# Patient Record
Sex: MALE | Race: WHITE | NOT HISPANIC OR LATINO | Employment: OTHER | URBAN - NONMETROPOLITAN AREA
[De-identification: names, ages, dates, MRNs, and addresses within clinical notes are randomized per-mention and may not be internally consistent; named-entity substitution may affect disease eponyms.]

---

## 2022-02-25 ENCOUNTER — HOSPITAL ENCOUNTER (INPATIENT)
Facility: HOSPITAL | Age: 72
LOS: 7 days | Discharge: NURSING, REHAB OR LONG-TERM CARE | DRG: 442 | End: 2022-03-04
Attending: EMERGENCY MEDICINE | Admitting: FAMILY MEDICINE
Payer: MEDICARE

## 2022-02-25 ENCOUNTER — APPOINTMENT (EMERGENCY)
Dept: RADIOLOGY | Facility: HOSPITAL | Age: 72
DRG: 442 | End: 2022-02-25
Payer: MEDICARE

## 2022-02-25 DIAGNOSIS — I10 HYPERTENSION: ICD-10-CM

## 2022-02-25 DIAGNOSIS — K80.20 CHOLELITHIASIS: ICD-10-CM

## 2022-02-25 DIAGNOSIS — K72.90 HEPATIC ENCEPHALOPATHY (HCC): ICD-10-CM

## 2022-02-25 DIAGNOSIS — R41.82 ALTERED MENTAL STATUS: Primary | ICD-10-CM

## 2022-02-25 LAB
ALBUMIN SERPL BCP-MCNC: 2.5 G/DL (ref 3.5–5)
ALP SERPL-CCNC: 214 U/L (ref 46–116)
ALT SERPL W P-5'-P-CCNC: 53 U/L (ref 12–78)
AMMONIA PLAS-SCNC: 233 UMOL/L (ref 11–35)
ANION GAP SERPL CALCULATED.3IONS-SCNC: 10 MMOL/L (ref 4–13)
APTT PPP: 34 SECONDS (ref 23–37)
AST SERPL W P-5'-P-CCNC: 61 U/L (ref 5–45)
BASOPHILS # BLD AUTO: 0.02 THOUSANDS/ΜL (ref 0–0.1)
BASOPHILS NFR BLD AUTO: 1 % (ref 0–1)
BILIRUB DIRECT SERPL-MCNC: 0.89 MG/DL (ref 0–0.2)
BILIRUB SERPL-MCNC: 2.12 MG/DL (ref 0.2–1)
BUN SERPL-MCNC: 15 MG/DL (ref 5–25)
CALCIUM ALBUM COR SERPL-MCNC: 9.5 MG/DL (ref 8.3–10.1)
CALCIUM SERPL-MCNC: 8.3 MG/DL (ref 8.3–10.1)
CARDIAC TROPONIN I PNL SERPL HS: 4 NG/L
CHLORIDE SERPL-SCNC: 108 MMOL/L (ref 100–108)
CK SERPL-CCNC: 130 U/L (ref 39–308)
CO2 SERPL-SCNC: 23 MMOL/L (ref 21–32)
CREAT SERPL-MCNC: 0.89 MG/DL (ref 0.6–1.3)
EOSINOPHIL # BLD AUTO: 0.08 THOUSAND/ΜL (ref 0–0.61)
EOSINOPHIL NFR BLD AUTO: 2 % (ref 0–6)
ERYTHROCYTE [DISTWIDTH] IN BLOOD BY AUTOMATED COUNT: 13.7 % (ref 11.6–15.1)
GFR SERPL CREATININE-BSD FRML MDRD: 85 ML/MIN/1.73SQ M
GLUCOSE SERPL-MCNC: 156 MG/DL (ref 65–140)
HCT VFR BLD AUTO: 36.3 % (ref 36.5–49.3)
HGB BLD-MCNC: 12.9 G/DL (ref 12–17)
IMM GRANULOCYTES # BLD AUTO: 0.01 THOUSAND/UL (ref 0–0.2)
IMM GRANULOCYTES NFR BLD AUTO: 0 % (ref 0–2)
INR PPP: 1.31 (ref 0.84–1.19)
LIPASE SERPL-CCNC: 300 U/L (ref 73–393)
LYMPHOCYTES # BLD AUTO: 0.52 THOUSANDS/ΜL (ref 0.6–4.47)
LYMPHOCYTES NFR BLD AUTO: 12 % (ref 14–44)
MAGNESIUM SERPL-MCNC: 2.1 MG/DL (ref 1.6–2.6)
MCH RBC QN AUTO: 32.3 PG (ref 26.8–34.3)
MCHC RBC AUTO-ENTMCNC: 35.5 G/DL (ref 31.4–37.4)
MCV RBC AUTO: 91 FL (ref 82–98)
MONOCYTES # BLD AUTO: 0.34 THOUSAND/ΜL (ref 0.17–1.22)
MONOCYTES NFR BLD AUTO: 8 % (ref 4–12)
NEUTROPHILS # BLD AUTO: 3.47 THOUSANDS/ΜL (ref 1.85–7.62)
NEUTS SEG NFR BLD AUTO: 77 % (ref 43–75)
NRBC BLD AUTO-RTO: 0 /100 WBCS
NT-PROBNP SERPL-MCNC: 15 PG/ML
PLATELET # BLD AUTO: 53 THOUSANDS/UL (ref 149–390)
PMV BLD AUTO: 12.7 FL (ref 8.9–12.7)
POTASSIUM SERPL-SCNC: 4 MMOL/L (ref 3.5–5.3)
PROT SERPL-MCNC: 8 G/DL (ref 6.4–8.2)
PROTHROMBIN TIME: 16.1 SECONDS (ref 11.6–14.5)
RBC # BLD AUTO: 3.99 MILLION/UL (ref 3.88–5.62)
SODIUM SERPL-SCNC: 141 MMOL/L (ref 136–145)
WBC # BLD AUTO: 4.44 THOUSAND/UL (ref 4.31–10.16)

## 2022-02-25 PROCEDURE — 93005 ELECTROCARDIOGRAM TRACING: CPT

## 2022-02-25 PROCEDURE — 82948 REAGENT STRIP/BLOOD GLUCOSE: CPT

## 2022-02-25 PROCEDURE — 71045 X-RAY EXAM CHEST 1 VIEW: CPT

## 2022-02-25 PROCEDURE — 85730 THROMBOPLASTIN TIME PARTIAL: CPT | Performed by: EMERGENCY MEDICINE

## 2022-02-25 PROCEDURE — 83735 ASSAY OF MAGNESIUM: CPT | Performed by: EMERGENCY MEDICINE

## 2022-02-25 PROCEDURE — 87040 BLOOD CULTURE FOR BACTERIA: CPT | Performed by: EMERGENCY MEDICINE

## 2022-02-25 PROCEDURE — 99285 EMERGENCY DEPT VISIT HI MDM: CPT | Performed by: EMERGENCY MEDICINE

## 2022-02-25 PROCEDURE — 1124F ACP DISCUSS-NO DSCNMKR DOCD: CPT | Performed by: EMERGENCY MEDICINE

## 2022-02-25 PROCEDURE — 84484 ASSAY OF TROPONIN QUANT: CPT | Performed by: EMERGENCY MEDICINE

## 2022-02-25 PROCEDURE — 83880 ASSAY OF NATRIURETIC PEPTIDE: CPT | Performed by: EMERGENCY MEDICINE

## 2022-02-25 PROCEDURE — 85025 COMPLETE CBC W/AUTO DIFF WBC: CPT | Performed by: EMERGENCY MEDICINE

## 2022-02-25 PROCEDURE — 82140 ASSAY OF AMMONIA: CPT | Performed by: EMERGENCY MEDICINE

## 2022-02-25 PROCEDURE — 83690 ASSAY OF LIPASE: CPT | Performed by: EMERGENCY MEDICINE

## 2022-02-25 PROCEDURE — 80053 COMPREHEN METABOLIC PANEL: CPT | Performed by: EMERGENCY MEDICINE

## 2022-02-25 PROCEDURE — 36415 COLL VENOUS BLD VENIPUNCTURE: CPT | Performed by: EMERGENCY MEDICINE

## 2022-02-25 PROCEDURE — 85610 PROTHROMBIN TIME: CPT | Performed by: EMERGENCY MEDICINE

## 2022-02-25 PROCEDURE — 99285 EMERGENCY DEPT VISIT HI MDM: CPT

## 2022-02-25 PROCEDURE — 82248 BILIRUBIN DIRECT: CPT | Performed by: EMERGENCY MEDICINE

## 2022-02-25 PROCEDURE — 82550 ASSAY OF CK (CPK): CPT | Performed by: EMERGENCY MEDICINE

## 2022-02-25 RX ORDER — LISINOPRIL AND HYDROCHLOROTHIAZIDE 12.5; 1 MG/1; MG/1
0.5 TABLET ORAL DAILY
COMMUNITY
Start: 2021-12-09 | End: 2022-03-04 | Stop reason: HOSPADM

## 2022-02-25 RX ORDER — MEMANTINE HYDROCHLORIDE 10 MG/1
10 TABLET ORAL 2 TIMES DAILY
COMMUNITY
Start: 2022-02-18

## 2022-02-25 RX ORDER — ASPIRIN 81 MG/1
81 TABLET ORAL DAILY
COMMUNITY

## 2022-02-25 RX ORDER — LACTULOSE 20 G/30ML
30 SOLUTION ORAL ONCE
Status: COMPLETED | OUTPATIENT
Start: 2022-02-25 | End: 2022-02-25

## 2022-02-25 RX ORDER — DONEPEZIL HYDROCHLORIDE 10 MG/1
10 TABLET, FILM COATED ORAL
COMMUNITY
Start: 2021-12-09

## 2022-02-25 RX ORDER — LACTULOSE 10 G/15ML
SOLUTION ORAL
COMMUNITY
Start: 2022-01-05 | End: 2022-03-04 | Stop reason: HOSPADM

## 2022-02-25 RX ADMIN — LACTULOSE 30 G: 10 SOLUTION ORAL at 22:59

## 2022-02-26 ENCOUNTER — APPOINTMENT (INPATIENT)
Dept: ULTRASOUND IMAGING | Facility: HOSPITAL | Age: 72
DRG: 442 | End: 2022-02-26
Payer: MEDICARE

## 2022-02-26 PROBLEM — D69.6 THROMBOCYTOPENIA (HCC): Status: ACTIVE | Noted: 2022-02-26

## 2022-02-26 LAB
ALBUMIN SERPL BCP-MCNC: 2.4 G/DL (ref 3.5–5)
ALP SERPL-CCNC: 189 U/L (ref 46–116)
ALT SERPL W P-5'-P-CCNC: 47 U/L (ref 12–78)
AMMONIA PLAS-SCNC: 48 UMOL/L (ref 11–35)
ANION GAP SERPL CALCULATED.3IONS-SCNC: 12 MMOL/L (ref 4–13)
AST SERPL W P-5'-P-CCNC: 62 U/L (ref 5–45)
BASOPHILS # BLD AUTO: 0.02 THOUSANDS/ΜL (ref 0–0.1)
BASOPHILS NFR BLD AUTO: 0 % (ref 0–1)
BILIRUB SERPL-MCNC: 2.3 MG/DL (ref 0.2–1)
BILIRUB UR QL STRIP: NEGATIVE
BUN SERPL-MCNC: 13 MG/DL (ref 5–25)
CALCIUM ALBUM COR SERPL-MCNC: 9.8 MG/DL (ref 8.3–10.1)
CALCIUM SERPL-MCNC: 8.5 MG/DL (ref 8.3–10.1)
CHLORIDE SERPL-SCNC: 108 MMOL/L (ref 100–108)
CLARITY UR: CLEAR
CO2 SERPL-SCNC: 22 MMOL/L (ref 21–32)
COLOR UR: YELLOW
CREAT SERPL-MCNC: 0.9 MG/DL (ref 0.6–1.3)
EOSINOPHIL # BLD AUTO: 0.12 THOUSAND/ΜL (ref 0–0.61)
EOSINOPHIL NFR BLD AUTO: 3 % (ref 0–6)
ERYTHROCYTE [DISTWIDTH] IN BLOOD BY AUTOMATED COUNT: 14 % (ref 11.6–15.1)
GFR SERPL CREATININE-BSD FRML MDRD: 85 ML/MIN/1.73SQ M
GLUCOSE SERPL-MCNC: 108 MG/DL (ref 65–140)
GLUCOSE SERPL-MCNC: 141 MG/DL (ref 65–140)
GLUCOSE UR STRIP-MCNC: NEGATIVE MG/DL
HCT VFR BLD AUTO: 35.1 % (ref 36.5–49.3)
HGB BLD-MCNC: 12.3 G/DL (ref 12–17)
HGB UR QL STRIP.AUTO: NEGATIVE
IMM GRANULOCYTES # BLD AUTO: 0.02 THOUSAND/UL (ref 0–0.2)
IMM GRANULOCYTES NFR BLD AUTO: 0 % (ref 0–2)
KETONES UR STRIP-MCNC: NEGATIVE MG/DL
LEUKOCYTE ESTERASE UR QL STRIP: NEGATIVE
LYMPHOCYTES # BLD AUTO: 0.73 THOUSANDS/ΜL (ref 0.6–4.47)
LYMPHOCYTES NFR BLD AUTO: 16 % (ref 14–44)
MCH RBC QN AUTO: 31.9 PG (ref 26.8–34.3)
MCHC RBC AUTO-ENTMCNC: 35 G/DL (ref 31.4–37.4)
MCV RBC AUTO: 91 FL (ref 82–98)
MONOCYTES # BLD AUTO: 0.4 THOUSAND/ΜL (ref 0.17–1.22)
MONOCYTES NFR BLD AUTO: 9 % (ref 4–12)
NEUTROPHILS # BLD AUTO: 3.2 THOUSANDS/ΜL (ref 1.85–7.62)
NEUTS SEG NFR BLD AUTO: 72 % (ref 43–75)
NITRITE UR QL STRIP: NEGATIVE
NRBC BLD AUTO-RTO: 0 /100 WBCS
PH UR STRIP.AUTO: 8 [PH]
PLATELET # BLD AUTO: 63 THOUSANDS/UL (ref 149–390)
PMV BLD AUTO: 13 FL (ref 8.9–12.7)
POTASSIUM SERPL-SCNC: 3.4 MMOL/L (ref 3.5–5.3)
PROT SERPL-MCNC: 8 G/DL (ref 6.4–8.2)
PROT UR STRIP-MCNC: NEGATIVE MG/DL
RBC # BLD AUTO: 3.86 MILLION/UL (ref 3.88–5.62)
SODIUM SERPL-SCNC: 142 MMOL/L (ref 136–145)
SP GR UR STRIP.AUTO: 1.02 (ref 1–1.03)
UROBILINOGEN UR QL STRIP.AUTO: 4 E.U./DL
WBC # BLD AUTO: 4.49 THOUSAND/UL (ref 4.31–10.16)

## 2022-02-26 PROCEDURE — 85025 COMPLETE CBC W/AUTO DIFF WBC: CPT

## 2022-02-26 PROCEDURE — 76705 ECHO EXAM OF ABDOMEN: CPT

## 2022-02-26 PROCEDURE — 82140 ASSAY OF AMMONIA: CPT

## 2022-02-26 PROCEDURE — 80053 COMPREHEN METABOLIC PANEL: CPT

## 2022-02-26 PROCEDURE — 99222 1ST HOSP IP/OBS MODERATE 55: CPT | Performed by: FAMILY MEDICINE

## 2022-02-26 PROCEDURE — 81003 URINALYSIS AUTO W/O SCOPE: CPT | Performed by: EMERGENCY MEDICINE

## 2022-02-26 RX ORDER — POTASSIUM CHLORIDE 20 MEQ/1
20 TABLET, EXTENDED RELEASE ORAL ONCE
Status: COMPLETED | OUTPATIENT
Start: 2022-02-26 | End: 2022-02-26

## 2022-02-26 RX ORDER — QUETIAPINE FUMARATE 25 MG/1
12.5 TABLET, FILM COATED ORAL
Status: DISCONTINUED | OUTPATIENT
Start: 2022-02-26 | End: 2022-02-27

## 2022-02-26 RX ORDER — LISINOPRIL 10 MG/1
10 TABLET ORAL DAILY
Status: DISCONTINUED | OUTPATIENT
Start: 2022-02-26 | End: 2022-03-04 | Stop reason: HOSPADM

## 2022-02-26 RX ORDER — HEPARIN SODIUM 5000 [USP'U]/ML
5000 INJECTION, SOLUTION INTRAVENOUS; SUBCUTANEOUS EVERY 8 HOURS SCHEDULED
Status: DISCONTINUED | OUTPATIENT
Start: 2022-02-26 | End: 2022-02-26

## 2022-02-26 RX ORDER — LACTULOSE 20 G/30ML
20 SOLUTION ORAL 4 TIMES DAILY
Status: DISCONTINUED | OUTPATIENT
Start: 2022-02-26 | End: 2022-02-27

## 2022-02-26 RX ORDER — ACETAMINOPHEN 325 MG/1
650 TABLET ORAL EVERY 6 HOURS PRN
Status: DISCONTINUED | OUTPATIENT
Start: 2022-02-26 | End: 2022-03-04 | Stop reason: HOSPADM

## 2022-02-26 RX ORDER — LISINOPRIL 10 MG/1
10 TABLET ORAL DAILY
Status: DISCONTINUED | OUTPATIENT
Start: 2022-02-26 | End: 2022-02-26

## 2022-02-26 RX ORDER — OLANZAPINE 10 MG/1
2.5 INJECTION, POWDER, LYOPHILIZED, FOR SOLUTION INTRAMUSCULAR ONCE
Status: DISCONTINUED | OUTPATIENT
Start: 2022-02-26 | End: 2022-02-26

## 2022-02-26 RX ORDER — DONEPEZIL HYDROCHLORIDE 5 MG/1
10 TABLET, FILM COATED ORAL
Status: DISCONTINUED | OUTPATIENT
Start: 2022-02-26 | End: 2022-03-04 | Stop reason: HOSPADM

## 2022-02-26 RX ORDER — ASPIRIN 81 MG/1
81 TABLET ORAL DAILY
Status: DISCONTINUED | OUTPATIENT
Start: 2022-02-26 | End: 2022-03-04 | Stop reason: HOSPADM

## 2022-02-26 RX ORDER — MEMANTINE HYDROCHLORIDE 10 MG/1
10 TABLET ORAL 2 TIMES DAILY
Status: DISCONTINUED | OUTPATIENT
Start: 2022-02-26 | End: 2022-03-04 | Stop reason: HOSPADM

## 2022-02-26 RX ADMIN — DONEPEZIL HYDROCHLORIDE 10 MG: 5 TABLET, FILM COATED ORAL at 21:16

## 2022-02-26 RX ADMIN — QUETIAPINE FUMARATE 12.5 MG: 25 TABLET ORAL at 21:16

## 2022-02-26 RX ADMIN — LISINOPRIL 10 MG: 10 TABLET ORAL at 08:23

## 2022-02-26 RX ADMIN — LACTULOSE 20 G: 10 SOLUTION ORAL at 08:48

## 2022-02-26 RX ADMIN — MEMANTINE 10 MG: 10 TABLET ORAL at 08:23

## 2022-02-26 RX ADMIN — MEMANTINE 10 MG: 10 TABLET ORAL at 18:12

## 2022-02-26 RX ADMIN — POTASSIUM CHLORIDE 20 MEQ: 1500 TABLET, EXTENDED RELEASE ORAL at 15:00

## 2022-02-26 RX ADMIN — LACTULOSE 200 G: 10 SOLUTION ORAL at 02:55

## 2022-02-26 RX ADMIN — ASPIRIN 81 MG: 81 TABLET, COATED ORAL at 08:23

## 2022-02-26 NOTE — CASE MANAGEMENT
Case Management Assessment & Discharge Planning Note    Patient name Denita Goltz  Location /-07 MRN [id number]  : 1950 Date 2022       Current Admission Date: 2022  Current Admission Diagnosis:Hepatic encephalopathy [facility])   Patient Active Problem List    Diagnosis Date Noted    Thrombocytopenia ([facility] ) 2022    Dementia with behavioral disturbance ([facility] )     Cirrhosis ([facility] )     Hepatic encephalopathy ([facility] )     Hypertension       LOS (days): 1  Geometric Mean LOS (GMLOS) (days): 3 20  Days to GMLOS:2 6     OBJECTIVE:    Risk of Unplanned Readmission Score: 13         Current admission status: Inpatient  Referral Reason: Other    Preferred Pharmacy:   PATIENT/FAMILY REPORTS NO PREFERRED PHARMACY  No address on file      Primary Care Provider: No primary care provider on file  Primary Insurance: MEDICARE  Secondary Insurance: AETNA    ASSESSMENT:  Active Health Care Agents    There are no active Health Care Agents on file  Advance Directives  Does patient have a Health Care POA?:  (Son Amy Amairani is POA)    Readmission Root Cause  30 Day Readmission: No    Patient Information  Admitted from[de-identified] Home  Mental Status: Other (Comment)  During Assessment patient was accompanied by: Other-Comment  Assessment information provided by[de-identified] Leonardo  Support Systems: [employer] entry access options   Select all that apply : Ramp  Type of Current Residence: [address] [place] home (Pt w/ 1st flr accomodations)  Homeless/housing insecurity resource given?: N/A  Living Arrangements: Other (Comment) (Pt has 24* caregiver)    Activities of Daily Living Prior to Admission  Functional Status: Assistance  Completes ADLs independently?: No  Level of ADL dependence: Assistance  Ambulates independently?: Yes  Does patient use assisted devices?: No  Does patient currently own DME?: Yes  What DME does the patient currently own?: Wheelchair (Pt has w/c from wife)  Does the patient have a history of Short-Term Rehab?: No  Does patient have a history of HHC?: No  Does patient currently have [address]?: No    Patient Information Continued  Income Source: Pension/long-term  Does patient have prescription coverage?: Yes  Food insecurity resource given?: N/A  Does patient receive dialysis treatments?: No  Does patient have a history of substance abuse?: No  Does patient have a history of Mental Health Diagnosis?: No    Means of Transportation  Was application for public transport provided?: N/A        DISCHARGE DETAILS:    Discharge planning discussed with[de-identified] Son  Pt lives at home, has 25* caregiver  Son is POA reports Pt was IPTA w/ ambulation has assistance w/ ADLs  Son reports he would consider STR if Pt needs strengthening  CM will follow for needs

## 2022-02-26 NOTE — ASSESSMENT & PLAN NOTE
· Presents from home with altered mental status per family  Patient also with history of dementia, unclear baseline  · Hx JAFFE, decompensated cirrhosis  · Ammonia 233 on arrival  · UA negative, no source of infection at this time  · Current OP regimen : Lactulose 20mg q 6hr   · Received PO Lactulose 30mg in ED  · Patient has not had a BM yet  Will order lactulose retention enema x 1  Titrate lactulose to 3-4 soft BM's per day  · Check AM ammonia  · Consult GI  · Check RUQ ultrasound with hepatic doppler  · Monitor mental status closely  On exam alert to self, confused, does not follow commands  Periods of agitation per nursing staff

## 2022-02-26 NOTE — ED PROVIDER NOTES
History  Chief Complaint   Patient presents with    Altered Mental Status     (as per caregiver and son - hx of dementia and liver disease  Past 3-4 days doesn;t seems himself, sleeping more, increased confusion, difficulty getting around  Recent medication changes  Appears more juandice, does take lactulolse  Seems to be in pain but unable to verbalize where  )     Patient is a 65-year-old male with a history of dementia, as well as chronic liver disease, brought to the emergency department by caregiver and son for complaints of altered mental status worsening over the past 3 days, he has had some shaking, some bizarre questioning, and appears to be in pain but unable to describe where he is experiencing pain, he has had no fever, no cough, cold or congestion, no vomiting or diarrhea, he does take daily lactulose, has not missed any doses, has been having regular bowel movements, no injury or trauma, he does have 24/7 care, no sick contacts          Prior to Admission Medications   Prescriptions Last Dose Informant Patient Reported? Taking? Cholecalciferol 25 MCG (1000 UT) tablet Unknown at Unknown time  Yes No   Sig: Take 1 tablet by mouth daily   aspirin (ECOTRIN LOW STRENGTH) 81 mg EC tablet Unknown at Unknown time  Yes No   Sig: Take 81 mg by mouth daily   donepezil (ARICEPT) 10 mg tablet Unknown at Unknown time  Yes No   Sig: Take 10 mg by mouth   lactulose (CHRONULAC) 10 g/15 mL solution Unknown at Unknown time  Yes No   Sig: Take 20g (30mL) every 6 hours to achieve 3-4 soft bowel movements/24hrs   Hold if greater than 4 stools in 24 hrs    lisinopril-hydrochlorothiazide (PRINZIDE,ZESTORETIC) 10-12 5 MG per tablet Unknown at Unknown time  Yes No   Sig: Take 0 5 tablets by mouth daily   memantine (NAMENDA) 10 mg tablet Unknown at Unknown time  Yes No   Sig: Take 10 mg by mouth 2 (two) times a day   polyethylene glycol (GOLYTELY) 4000 mL solution Unknown at Unknown time  Yes No   Sig: Take 4000 ML By mouth one time for1 dose      Facility-Administered Medications: None       Past Medical History:   Diagnosis Date    [facility]     [facility]     Hypertension        Past Surgical History:   Procedure Laterality Date    REPLACEMENT TOTAL KNEE         History reviewed  No pertinent family history  I have reviewed and agree with the history as documented  E-Cigarette/Vaping     E-Cigarette/Vaping Substances     Social History     Tobacco Use    Smoking status: Former Smoker    Smokeless tobacco: Never Used   Substance Use Topics    Alcohol use: Not Currently     Comment: former social ETOH    Drug use: Never       Review of Systems   Constitutional: Positive for activity change, appetite change and fatigue  HENT: Negative  Eyes: Negative  Respiratory: Negative  Cardiovascular: Negative  Gastrointestinal: Negative  Endocrine: Negative  Genitourinary: Negative  Musculoskeletal: Negative  Allergic/Immunologic: Negative  Neurological: Positive for tremors and weakness  Hematological: Negative  Psychiatric/Behavioral: Positive for confusion  Physical Exam  Physical Exam  Constitutional:       Appearance: He is well-developed  He is ill-appearing  HENT:      Head: Normocephalic and atraumatic  Eyes:      General: Scleral icterus present  Conjunctiva/sclera: Conjunctivae normal       Pupils: Pupils are equal, round, and reactive to light  Cardiovascular:      Rate and Rhythm: Normal rate  Pulmonary:      Effort: Pulmonary effort is normal    Abdominal:      Palpations: Abdomen is soft  Musculoskeletal:         General: Normal range of motion  Cervical back: Normal range of motion and neck supple  Skin:     General: Skin is warm and dry  Coloration: Skin is jaundiced  Neurological:      Mental Status: He is alert  He is confused           Vital Signs  ED Triage Vitals   Temperature Pulse Respirations Blood Pressure SpO2   02/25/22 2149 02/25/22 2149 02/25/22 2149 02/25/22 2149 02/25/22 2149   97 6 °F (36 4 °C) 79 18 134/77 98 %      Temp Source Heart Rate Source Patient Position - Orthostatic VS BP Location FiO2 (%)   02/25/22 2149 02/25/22 2149 02/25/22 2149 02/25/22 2149 --   Temporal Left;Radial Lying Left arm       Pain Score       02/25/22 2346       No Pain           Vitals:    02/25/22 2230 02/25/22 2315 02/25/22 2330 02/25/22 2346   BP: 149/74 160/79 154/76 160/83   Pulse: 77 79 78 80   Patient Position - Orthostatic VS:                 ED Medications  Medications   lactulose oral solution 30 g (30 g Oral Given 2/25/22 2259)       Diagnostic Studies  Results Reviewed     Procedure Component Value Units Date/Time    CBC and differential [[id number]]  (Abnormal) Collected: 02/25/22 2158    Lab Status: Final result Specimen: Blood from Arm, Left Updated: 02/25/22 2301     WBC 4 44 Thousand/uL      RBC 3 99 Million/uL      Hemoglobin 12 9 g/dL      Hematocrit 36 3 %      MCV 91 fL      MCH 32 3 pg      MCHC 35 5 g/dL      RDW 13 7 %      MPV 12 7 fL      Platelets 53 Thousands/uL      nRBC 0 /100 WBCs      Neutrophils Relative 77 %      Immat GRANS % 0 %      Lymphocytes Relative 12 %      Monocytes Relative 8 %      Eosinophils Relative 2 %      Basophils Relative 1 %      Neutrophils Absolute 3 47 Thousands/µL      Immature Grans Absolute 0 01 Thousand/uL      Lymphocytes Absolute 0 52 Thousands/µL      Monocytes Absolute 0 34 Thousand/µL      Eosinophils Absolute 0 08 Thousand/µL      Basophils Absolute 0 02 Thousands/µL     Ammonia [[id number]]  (Abnormal) Collected: 02/25/22 2158    Lab Status: Final result Specimen: Blood from Arm, Left Updated: 02/25/22 2246     Ammonia 233 umol/L     HS Troponin 0hr (reflex protocol) [[id number]]  (Normal) Collected: 02/25/22 2158    Lab Status: Final result Specimen: Blood from Arm, Left Updated: 02/25/22 2245     hs TnI 0hr 4 ng/L     Lipase [[id number]]  (Normal) Collected: 02/25/22 2158    Lab Status: Final result Specimen: Blood from Arm, Left Updated: 02/25/22 2245     Lipase 300 u/L     Magnesium [[id number]]  (Normal) Collected: 02/25/22 2158    Lab Status: Final result Specimen: Blood from Arm, Left Updated: 02/25/22 2245     Magnesium 2 1 mg/dL     NT-BNP PRO [[id number]]  (Normal) Collected: 02/25/22 2158    Lab Status: Final result Specimen: Blood from Arm, Left Updated: 02/25/22 2245     NT-proBNP 15 pg/mL     Bilirubin, direct [[id number]]  (Abnormal) Collected: 02/25/22 2158    Lab Status: Final result Specimen: Blood from Arm, Left Updated: 02/25/22 2245     Bilirubin, Direct 0 89 mg/dL     CK Total with Reflex CKMB [[id number]]  (Normal) Collected: 02/25/22 2158    Lab Status: Final result Specimen: Blood from Arm, Left Updated: 02/25/22 2244     Total  U/L     CMP [[id number]]  (Abnormal) Collected: 02/25/22 2158    Lab Status: Final result Specimen: Blood from Arm, Left Updated: 02/25/22 2242     Sodium 141 mmol/L      Potassium 4 0 mmol/L      Chloride 108 mmol/L      CO2 23 mmol/L      ANION GAP 10 mmol/L      BUN 15 mg/dL      Creatinine 0 89 mg/dL      Glucose 156 mg/dL      Calcium 8 3 mg/dL      Corrected Calcium 9 5 mg/dL      AST 61 U/L      ALT 53 U/L      Alkaline Phosphatase 214 U/L      Total Protein 8 0 g/dL      Albumin 2 5 g/dL      Total Bilirubin 2 12 mg/dL      eGFR 85 ml/min/1 73sq m     Narrative:      [place] guidelines for Chronic Kidney Disease (CKD):     Stage 1 with normal or high GFR (GFR > 90 mL/min/1 73 square meters)    Stage 2 Mild CKD (GFR = 60-89 mL/min/1 73 square meters)    Stage 3A Moderate CKD (GFR = 45-59 mL/min/1 73 square meters)    Stage 3B Moderate CKD (GFR = 30-44 mL/min/1 73 square meters)    Stage 4 Severe CKD (GFR = 15-29 mL/min/1 73 square meters)    Stage 5 End Stage CKD (GFR <15 mL/min/1 73 square meters)  Note: GFR calculation is accurate only with a steady state creatinine    APTT [[id number]]  (Normal) Collected: 02/25/22 2158    Lab Status: Final result Specimen: Blood from Arm, Left Updated: 02/25/22 2232     PTT 34 seconds     Protime-INR [[id number]]  (Abnormal) Collected: 02/25/22 2158    Lab Status: Final result Specimen: Blood from Arm, Left Updated: 02/25/22 2232     Protime 16 1 seconds      INR 1 31    Blood culture #1 [[id number]] Collected: 02/25/22 2158    Lab Status: In process Specimen: Blood from Hand, Left Updated: 02/25/22 2209    Blood culture #2 [[id number]] Collected: 02/25/22 2158    Lab Status:  In process Specimen: Blood from Arm, Left Updated: 02/25/22 2209    UA w Reflex to Microscopic w Reflex to Culture [[id number]]     Lab Status: No result Specimen: Urine                  X-ray chest 1 view portable    (Results Pending)              Procedures  ECG 12 Lead Documentation Only    Date/Time: 2/25/2022 10:06 PM  Performed by: Judy Mccray DO  Authorized by: Judy Mccray DO     Indications / Diagnosis:  AMS  ECG reviewed by me, the ED Provider: yes    Patient location:  ED  Previous ECG:     Previous ECG:  Unavailable    Comparison to cardiac monitor: Yes    Interpretation:     Interpretation: non-specific    Quality:     Tracing quality:  Limited by artifact  Rate:     ECG rate:  78    ECG rate assessment: normal    Rhythm:     Rhythm: sinus rhythm    Ectopy:     Ectopy: PVCs    QRS:     QRS axis:  Normal    QRS intervals:  Normal  Conduction:     Conduction: normal    ST segments:     ST segments:  Normal  T waves:     T waves: normal               ED Course  ED Course as of 02/26/22 0019   Sat Feb 26, 2022   0017 Laboratory and physical exam findings concerning for hepatic encephalopathy with ammonia level of 233, family reports he has not missed any doses of his home lactulose and has been having regular bowel movements with administration of same, therefore unsure what is causing his elevated the levels today as they are usually under control with his current medication regimen, no other concerning findings with liver enzyme values on par with previous lab findings, vital signs stable, although he presents for altered mental status he is relatively calm and cooperative at this time   0019 Patient was discussed with hospitalist service who agreed to admit for further evaluation and treatment                               SBIRT 20yo+      Most Recent Value   SBIRT (22 yo +)    In order to provide better care to our patients, we are screening all of our patients for alcohol and drug use  Would it be okay to ask you these screening questions? Yes Filed at: 02/25/2022 2157   Initial Alcohol Screen: US AUDIT-C     1  How often do you have a drink containing alcohol? 0 Filed at: 02/25/2022 2157   2  How many drinks containing alcohol do you have on a typical day you are drinking? 0 Filed at: 02/25/2022 2157   3a  Male UNDER 65: How often do you have five or more drinks on one occasion? 0 Filed at: 02/25/2022 2157   3b  FEMALE Any Age, or MALE 65+: How often do you have 4 or more drinks on one occassion? 0 Filed at: 02/25/2022 2157   Audit-C Score 0 Filed at: 02/25/2022 2157   SHELIA: How many times in the past year have you    Used an illegal drug or used a prescription medication for non-medical reasons? Never Filed at: 02/25/2022 2157                    Disposition  Final diagnoses:    Altered mental status   Hepatic encephalopathy ([facility] )     Time reflects when diagnosis was documented in both MDM as applicable and the Disposition within this note     Time User Action Codes Description Comment    2/25/2022 11:37 PM Carolina Toledo Add [R41 82] Altered mental status     2/25/2022 11:38 PM Carolina Toledo Add [K72 90] Hepatic encephalopathy [facility])       ED Disposition     ED Disposition Condition Date/Time Comment    Admit Stable Fri Feb 25, 2022 11:37 PM Case was discussed with NIYA De Los Santos and the patient's admission status was agreed to be Admission Status: inpatient status to the service of Dr Len Rolon          Follow-up Information    None         Patient's Medications   Discharge Prescriptions    No medications on file       No discharge procedures on file      PDMP Review     None          ED Provider  Electronically Signed by           Moiz Odonnell DO  02/26/22 0020

## 2022-02-26 NOTE — ASSESSMENT & PLAN NOTE
· Secondary to cirrhosis  · Plt 54  · No evidence of acute bleeding at this time  Hgb stable  · Will hold DVT ppx at this time     · Trend CBC

## 2022-02-26 NOTE — ASSESSMENT & PLAN NOTE
· Hx JAFFE diagnosed by biopsy in 2019  · Follows with [facility]   Scheduled for EGD in April 2022  · Last EGD in August 2021: large varice with band x 1   · MELD score 9  · Currently on lactulose 30 mg q6hr   Per chart review, working on approval for Rifaximin due to cost    · Consult GI

## 2022-02-26 NOTE — ASSESSMENT & PLAN NOTE
· Unclear baseline, lives at home with family and has outside caregivers    · Worsening behavioral disturbance secondary to hepatic encephalopathy  · Continue aricept, namenda

## 2022-02-26 NOTE — H&P
[address]  H&P- Rona Salmeron 1950, 66 y o  male MRN: [id number]  Unit/Bed#: -01 Encounter: [id number]  Primary Care Provider: No primary care provider on file  Date and time admitted to hospital: 2/25/2022  9:43 PM    * Hepatic encephalopathy [facility])  Assessment & Plan  · Presents from home with altered mental status per family  Patient also with history of dementia, unclear baseline  · Hx JAFFE, decompensated cirrhosis  · Ammonia 233 on arrival  · UA negative, no source of infection at this time  · Current OP regimen : Lactulose 20mg q 6hr   · Received PO Lactulose 30mg in ED  · Patient has not had a BM yet  Will order lactulose retention enema x 1  Titrate lactulose to 3-4 soft BM's per day  · Check AM ammonia  · Consult GI  · Check RUQ ultrasound with hepatic doppler  · Monitor mental status closely  On exam alert to self, confused, does not follow commands  Periods of agitation per nursing staff  Cirrhosis ([facility] )  Assessment & Plan  · Hx JAFFE diagnosed by biopsy in 2019  · Follows with [facility]   Scheduled for EGD in April 2022  · Last EGD in August 2021: large varice with band x 1   · MELD score 9  · Currently on lactulose 30 mg q6hr  Per chart review, working on approval for Rifaximin due to cost    · Consult GI     D[facility] with behavioral disturbance ([facility] )  Assessment & Plan  · Unclear baseline, lives at home with family and has outside caregivers  · Worsening behavioral disturbance secondary to hepatic encephalopathy  · Continue aricept, namenda    Thrombocytopenia (HCC)  Assessment & Plan  · Secondary to cirrhosis  · Plt 54  · No evidence of acute bleeding at this time  Hgb stable  · Will hold DVT ppx at this time  · Trend CBC     Hypertension  Assessment & Plan  · Takes Prinzide, will switch to Lisinopril 10mg while inpatient    VTE Pharmacologic Prophylaxis: VTE Score: 3 Moderate Risk (Score 3-4) - Pharmacological DVT Prophylaxis Contraindicated  Sequential Compression Devices Ordered  Thrombocytopenia   Code Status: Level 1 - Full Code     Anticipated Length of Stay: Patient will be admitted on an inpatient basis with an anticipated length of stay of greater than 2 midnights secondary to hepatic encephalopthy, ammonia elevated, lactulose   Total Time for Visit, including Counseling / Coordination of Care: 70 minutes Greater than 50% of this total time spent on direct patient counseling and coordination of care  Chief Complaint: altered mental status     History of Present Illness:  Franky Mahan is a 66 y o  male with a PMH of JAFFE, cirrhosis, dementia, hypertension who presents with altered mental status per family members  Patient lives at home with family and has caregivers, reporting increased confusion over the last 3-4 days  Has been compliant with lactulose at home  Patient poor historian secondary to hepatic encephalopathy and dementia  Unable to obtain ROS and family members not present at time of exam      In ED, found to have ammonia level of 233  Patient was given PO lactulose 30mg  Review of Systems:  Review of Systems   Unable to perform ROS: Mental status change       Past Medical and Surgical History:   Past Medical History:   Diagnosis Date    [facility]rhosis ([facility] )     Dementia ([facility] )     Hypertension        Past Surgical History:   Procedure Laterality Date    REPLACEMENT TOTAL KNEE         Meds/Allergies:  Prior to Admission medications    Medication Sig Start Date End Date Taking? Authorizing Provider   aspirin (ECOTRIN LOW STRENGTH) 81 mg EC tablet Take 81 mg by mouth daily    Historical Provider, MD   Cholecalciferol 25 MCG (1000 UT) tablet Take 1 tablet by mouth daily 11/8/21   Historical Provider, MD   donepezil (ARICEPT) 10 mg tablet Take 10 mg by mouth 12/9/21   Historical Provider, MD   lactulose (CHRONULAC) 10 g/15 mL solution Take 20g (30mL) every 6 hours to achieve 3-4 soft bowel movements/24hrs   Hold if greater than 4 stools in 24 hrs  1/5/22   Historical Provider, MD   lisinopril-hydrochlorothiazide (PRINZIDE,ZESTORETIC) 10-12 5 MG per tablet Take 0 5 tablets by mouth daily 12/9/21 12/9/22  Historical Provider, MD   memantine (NAMENDA) 10 mg tablet Take 10 mg by mouth 2 (two) times a day 2/18/22   Historical Provider, MD   polyethylene glycol (GOLYTELY) 4000 mL solution Take 4000 ML By mouth one time for1 dose 2/17/22   Historical Provider, MD     I have reviewed home medications using recent Epic encounter  Allergies: No Known Allergies    Social History:  Marital Status:    Patient Pre-hospital Living Situation: Home, With other family member: son, caregiver  Patient Pre-hospital Level of Mobility: unable to be assessed at time of evaluation  Patient Pre-hospital Diet Restrictions: none  Substance Use History:   Social History     Substance and Sexual Activity   Alcohol Use Not Currently    Comment: former social ETOH     Social History     Tobacco Use   Smoking Status Former Smoker   Smokeless Tobacco Never Used     Social History     Substance and Sexual Activity   Drug Use Never       Family History:  History reviewed  No pertinent family history  Physical Exam:     Vitals:   Blood Pressure: 147/79 (02/26/22 0232)  Pulse: 73 (02/26/22 0232)  Temperature: (!) 97 °F (36 1 °C) (02/26/22 0232)  Temp Source: Temporal (02/26/22 0232)  Respirations: 18 (02/26/22 0232)  Height: 6' 2" (188 cm) (02/25/22 2149)  Weight - Scale: 93 9 kg (207 lb 0 2 oz) (02/26/22 0232)  SpO2: 100 % (02/26/22 0232)    Physical Exam  Vitals and nursing note reviewed  Constitutional:       General: He is not in acute distress  Appearance: He is ill-appearing  HENT:      Head: Normocephalic and atraumatic  Eyes:      General: Scleral icterus present  Cardiovascular:      Rate and Rhythm: Normal rate and regular rhythm  Pulses: Normal pulses  Heart sounds: Normal heart sounds     Pulmonary:      Effort: Pulmonary effort is normal  No respiratory distress  Breath sounds: Normal breath sounds  No wheezing, rhonchi or rales  Abdominal:      General: Bowel sounds are normal  There is no distension  Palpations: Abdomen is soft  Tenderness: There is no abdominal tenderness  Musculoskeletal:         General: Normal range of motion  Cervical back: Normal range of motion and neck supple  Right lower leg: No edema  Left lower leg: No edema  Skin:     Coloration: Skin is jaundiced  Neurological:      Mental Status: He is alert  He is disoriented  Comments: Alert to self only  Unable to follow commands  Moving all extremities  Psychiatric:      Comments: Encephalopathic           Additional Data:     Lab Results:  Results from last 7 days   Lab Units 02/25/22 2158   WBC Thousand/uL 4 44   HEMOGLOBIN g/dL 12 9   HEMATOCRIT % 36 3*   PLATELETS Thousands/uL 53*   NEUTROS PCT % 77*   LYMPHS PCT % 12*   MONOS PCT % 8   EOS PCT % 2     Results from last 7 days   Lab Units 02/25/22 2158   SODIUM mmol/L 141   POTASSIUM mmol/L 4 0   CHLORIDE mmol/L 108   CO2 mmol/L 23   BUN mg/dL 15   CREATININE mg/dL 0 89   ANION GAP mmol/L 10   CALCIUM mg/dL 8 3   ALBUMIN g/dL 2 5*   TOTAL BILIRUBIN mg/dL 2 12*   ALK PHOS U/L 214*   ALT U/L 53   AST U/L 61*   GLUCOSE RANDOM mg/dL 156*     Results from last 7 days   Lab Units 02/25/22 2158   INR  1 31*                   Imaging: Reviewed radiology reports from this admission including: chest xray  X-ray chest 1 view portable    (Results Pending)   US right upper quadrant with liver dopplers    (Results Pending)       EKG and Other Studies Reviewed on Admission:   · EKG: Accelareted junctional - artifact present  ** Please Note: This note has been constructed using a voice recognition system   **

## 2022-02-26 NOTE — PLAN OF CARE
Problem: Potential for Falls  Goal: Patient will remain free of falls  Description: INTERVENTIONS:  - Educate patient/family on patient safety including physical limitations  - Instruct patient to call for assistance with activity   - Consult OT/PT to assist with strengthening/mobility   - Keep Call bell within reach  - Keep bed low and locked with side rails adjusted as appropriate  - Keep care items and personal belongings within reach  - Initiate and maintain comfort rounds  - Make Fall Risk Sign visible to staff  - Offer Toileting every 2 Hours, in advance of need  - Initiate/Maintain bed alarm  - Obtain necessary fall risk management equipment:   - Apply yellow socks and bracelet for high fall risk patients  - Consider moving patient to room near nurses station  Outcome: Progressing     Problem: PAIN - ADULT  Goal: Verbalizes/displays adequate comfort level or baseline comfort level  Description: Interventions:  - Encourage patient to monitor pain and request assistance  - Assess pain using appropriate pain scale  - Administer analgesics based on type and severity of pain and evaluate response  - Implement non-pharmacological measures as appropriate and evaluate response  - Consider cultural and social influences on pain and pain management  - Notify physician/advanced practitioner if interventions unsuccessful or patient reports new pain  Outcome: Progressing     Problem: INFECTION - ADULT  Goal: Absence or prevention of progression during hospitalization  Description: INTERVENTIONS:  - Assess and monitor for signs and symptoms of infection  - Monitor lab/diagnostic results  - Monitor all insertion sites, i e  indwelling lines, tubes, and drains  - Monitor endotracheal if appropriate and nasal secretions for changes in amount and color  - [place] appropriate cooling/warming therapies per order  - Administer medications as ordered  - Instruct and encourage patient and family to use good hand hygiene technique  - Identify and instruct in appropriate isolation precautions for identified infection/condition  Outcome: Progressing     Problem: SAFETY ADULT  Goal: Patient will remain free of falls  Description: INTERVENTIONS:  - Educate patient/family on patient safety including physical limitations  - Instruct patient to call for assistance with activity   - Consult OT/PT to assist with strengthening/mobility   - Keep Call bell within reach  - Keep bed low and locked with side rails adjusted as appropriate  - Keep care items and personal belongings within reach  - Initiate and maintain comfort rounds  - Make Fall Risk Sign visible to staff  - Offer Toileting every 2 Hours, in advance of need  - Initiate/Maintain bed alarm  - Obtain necessary fall risk management equipment:   - Apply yellow socks and bracelet for high fall risk patients  - Consider moving patient to room near nurses station  Outcome: Progressing  Goal: Maintain or return to baseline ADL function  Description: INTERVENTIONS:  -  Assess patient's ability to carry out ADLs; assess patient's baseline for ADL function and identify physical deficits which impact ability to perform ADLs (bathing, care of mouth/teeth, toileting, grooming, dressing, etc )  - Assess/evaluate cause of self-care deficits   - Assess range of motion  - Assess patient's mobility; develop plan if impaired  - Assess patient's need for assistive devices and provide as appropriate  - Encourage maximum independence but intervene and supervise when necessary  - Involve family in performance of ADLs  - Assess for home care needs following discharge   - Consider OT consult to assist with ADL evaluation and planning for discharge  - Provide patient education as appropriate  Outcome: Progressing  Goal: Maintains/Returns to pre admission functional level  Description: INTERVENTIONS:  - Perform BMAT or MOVE assessment daily    - Set and communicate daily mobility goal to care team and patient/family/caregiver  - Collaborate with rehabilitation services on mobility goals if consulted  - Perform Range of Motion 3 times a day  - Reposition patient every 2 hours  - Dangle patient 3 times a day  - Stand patient 3 times a day  - Ambulate patient 3 times a day  - Out of bed to chair 3 times a day   - Out of bed for meals 3 times a day  - Out of bed for toileting  - Record patient progress and toleration of activity level   Outcome: Progressing     Problem: DISCHARGE PLANNING  Goal: Discharge to home or other facility with appropriate resources  Description: INTERVENTIONS:  - Identify barriers to discharge w/patient and caregiver  - Arrange for needed discharge resources and transportation as appropriate  - Identify discharge learning needs (meds, wound care, etc )  - Arrange for interpretive services to assist at discharge as needed  - Refer to Case Management Department for coordinating discharge planning if the patient needs post-hospital services based on physician/advanced practitioner order or complex needs related to functional status, cognitive ability, or social support system  Outcome: Progressing     Problem: Knowledge Deficit  Goal: Patient/family/caregiver demonstrates understanding of disease process, treatment plan, medications, and discharge instructions  Description: Complete learning assessment and assess knowledge base    Interventions:  - Provide teaching at level of understanding  - Provide teaching via preferred learning methods  Outcome: Progressing     Problem: GASTROINTESTINAL - ADULT  Goal: Minimal or absence of nausea and/or vomiting  Description: INTERVENTIONS:  - Administer IV fluids if ordered to ensure adequate hydration  - Maintain NPO status until nausea and vomiting are resolved  - Nasogastric tube if ordered  - Administer ordered antiemetic medications as needed  - Provide nonpharmacologic comfort measures as appropriate  - Advance diet as tolerated, if ordered  - Consider nutrition services referral to assist patient with adequate nutrition and appropriate food choices  Outcome: Progressing  Goal: Maintains or returns to baseline bowel function  Description: INTERVENTIONS:  - Assess bowel function  - Encourage oral fluids to ensure adequate hydration  - Administer IV fluids if ordered to ensure adequate hydration  - Administer ordered medications as needed  - Encourage mobilization and activity  - Consider nutritional services referral to assist patient with adequate nutrition and appropriate food choices  Outcome: Progressing  Goal: Maintains adequate nutritional intake  Description: INTERVENTIONS:  - Monitor percentage of each meal consumed  - Identify factors contributing to decreased intake, treat as appropriate  - Assist with meals as needed  - Monitor I&O, weight, and lab values if indicated  - Obtain nutrition services referral as needed  Outcome: Progressing     Problem: METABOLIC, FLUID AND ELECTROLYTES - ADULT  Goal: Electrolytes maintained within normal limits  Description: INTERVENTIONS:  - Monitor labs and assess patient for signs and symptoms of electrolyte imbalances  - Administer electrolyte replacement as ordered  - Monitor response to electrolyte replacements, including repeat lab results as appropriate  - Instruct patient on fluid and nutrition as appropriate  Outcome: Progressing     Problem: MOBILITY - ADULT  Goal: Maintain or return to baseline ADL function  Description: INTERVENTIONS:  -  Assess patient's ability to carry out ADLs; assess patient's baseline for ADL function and identify physical deficits which impact ability to perform ADLs (bathing, care of mouth/teeth, toileting, grooming, dressing, etc )  - Assess/evaluate cause of self-care deficits   - Assess range of motion  - Assess patient's mobility; develop plan if impaired  - Assess patient's need for assistive devices and provide as appropriate  - Encourage maximum independence but intervene and supervise when necessary  - Involve family in performance of ADLs  - Assess for home care needs following discharge   - Consider OT consult to assist with ADL evaluation and planning for discharge  - Provide patient education as appropriate  Outcome: Progressing  Goal: Maintains/Returns to pre admission functional level  Description: INTERVENTIONS:  - Perform BMAT or MOVE assessment daily    - Set and communicate daily mobility goal to care team and patient/family/caregiver     - Collaborate with rehabilitation services on mobility goals if consulted  - Out of bed for toileting  - Record patient progress and toleration of activity level   Outcome: Progressing     Problem: Prexisting or High Potential for Compromised Skin Integrity  Goal: Skin integrity is maintained or improved  Description: INTERVENTIONS:  - Identify patients at risk for skin breakdown  - Assess and monitor skin integrity  - Assess and monitor nutrition and hydration status  - Monitor labs   - Assess for incontinence   - Turn and reposition patient  - Assist with mobility/ambulation  - Relieve pressure over bony prominences  - Avoid friction and shearing  - Provide appropriate hygiene as needed including keeping skin clean and dry  - Evaluate need for skin moisturizer/barrier cream  - Collaborate with interdisciplinary team   - Patient/family teaching  - Consider wound care consult   Outcome: Progressing

## 2022-02-26 NOTE — ED NOTES
Pt attempting to climb out of bed  Family present at this time  Attempted to redirect pt with no success  Lap belt applied  Pad alarm on        Christine Elaine RN  02/26/22 4704

## 2022-02-27 LAB
ALBUMIN SERPL BCP-MCNC: 2.2 G/DL (ref 3.5–5)
ALP SERPL-CCNC: 160 U/L (ref 46–116)
ALT SERPL W P-5'-P-CCNC: 43 U/L (ref 12–78)
AMMONIA PLAS-SCNC: 78 UMOL/L (ref 11–35)
ANION GAP SERPL CALCULATED.3IONS-SCNC: 8 MMOL/L (ref 4–13)
AST SERPL W P-5'-P-CCNC: 52 U/L (ref 5–45)
ATRIAL RATE: 71 BPM
BASOPHILS # BLD AUTO: 0.02 THOUSANDS/ΜL (ref 0–0.1)
BASOPHILS NFR BLD AUTO: 1 % (ref 0–1)
BILIRUB SERPL-MCNC: 2.36 MG/DL (ref 0.2–1)
BUN SERPL-MCNC: 16 MG/DL (ref 5–25)
CALCIUM ALBUM COR SERPL-MCNC: 9.5 MG/DL (ref 8.3–10.1)
CALCIUM SERPL-MCNC: 8.1 MG/DL (ref 8.3–10.1)
CHLORIDE SERPL-SCNC: 107 MMOL/L (ref 100–108)
CO2 SERPL-SCNC: 25 MMOL/L (ref 21–32)
CREAT SERPL-MCNC: 0.87 MG/DL (ref 0.6–1.3)
EOSINOPHIL # BLD AUTO: 0.17 THOUSAND/ΜL (ref 0–0.61)
EOSINOPHIL NFR BLD AUTO: 4 % (ref 0–6)
ERYTHROCYTE [DISTWIDTH] IN BLOOD BY AUTOMATED COUNT: 14.2 % (ref 11.6–15.1)
GFR SERPL CREATININE-BSD FRML MDRD: 86 ML/MIN/1.73SQ M
GLUCOSE SERPL-MCNC: 81 MG/DL (ref 65–140)
HCT VFR BLD AUTO: 32.6 % (ref 36.5–49.3)
HGB BLD-MCNC: 11.3 G/DL (ref 12–17)
IMM GRANULOCYTES # BLD AUTO: 0.01 THOUSAND/UL (ref 0–0.2)
IMM GRANULOCYTES NFR BLD AUTO: 0 % (ref 0–2)
INR PPP: 1.44 (ref 0.84–1.19)
LYMPHOCYTES # BLD AUTO: 0.8 THOUSANDS/ΜL (ref 0.6–4.47)
LYMPHOCYTES NFR BLD AUTO: 21 % (ref 14–44)
MCH RBC QN AUTO: 32.1 PG (ref 26.8–34.3)
MCHC RBC AUTO-ENTMCNC: 34.7 G/DL (ref 31.4–37.4)
MCV RBC AUTO: 93 FL (ref 82–98)
MONOCYTES # BLD AUTO: 0.39 THOUSAND/ΜL (ref 0.17–1.22)
MONOCYTES NFR BLD AUTO: 10 % (ref 4–12)
NEUTROPHILS # BLD AUTO: 2.52 THOUSANDS/ΜL (ref 1.85–7.62)
NEUTS SEG NFR BLD AUTO: 64 % (ref 43–75)
NRBC BLD AUTO-RTO: 0 /100 WBCS
PLATELET # BLD AUTO: 49 THOUSANDS/UL (ref 149–390)
PMV BLD AUTO: 13.1 FL (ref 8.9–12.7)
POTASSIUM SERPL-SCNC: 3.5 MMOL/L (ref 3.5–5.3)
PROT SERPL-MCNC: 6.7 G/DL (ref 6.4–8.2)
PROTHROMBIN TIME: 17.3 SECONDS (ref 11.6–14.5)
QRS AXIS: 38 DEGREES
QRSD INTERVAL: 84 MS
QT INTERVAL: 396 MS
QTC INTERVAL: 451 MS
RBC # BLD AUTO: 3.52 MILLION/UL (ref 3.88–5.62)
SODIUM SERPL-SCNC: 140 MMOL/L (ref 136–145)
T WAVE AXIS: 32 DEGREES
VENTRICULAR RATE: 78 BPM
WBC # BLD AUTO: 3.91 THOUSAND/UL (ref 4.31–10.16)

## 2022-02-27 PROCEDURE — 99232 SBSQ HOSP IP/OBS MODERATE 35: CPT | Performed by: FAMILY MEDICINE

## 2022-02-27 PROCEDURE — 85025 COMPLETE CBC W/AUTO DIFF WBC: CPT | Performed by: FAMILY MEDICINE

## 2022-02-27 PROCEDURE — 85610 PROTHROMBIN TIME: CPT | Performed by: FAMILY MEDICINE

## 2022-02-27 PROCEDURE — 80053 COMPREHEN METABOLIC PANEL: CPT | Performed by: FAMILY MEDICINE

## 2022-02-27 PROCEDURE — 82140 ASSAY OF AMMONIA: CPT | Performed by: FAMILY MEDICINE

## 2022-02-27 RX ORDER — LACTULOSE 20 G/30ML
30 SOLUTION ORAL ONCE
Status: COMPLETED | OUTPATIENT
Start: 2022-02-27 | End: 2022-02-27

## 2022-02-27 RX ORDER — QUETIAPINE FUMARATE 25 MG/1
12.5 TABLET, FILM COATED ORAL
Status: DISCONTINUED | OUTPATIENT
Start: 2022-02-27 | End: 2022-03-04 | Stop reason: HOSPADM

## 2022-02-27 RX ORDER — LACTULOSE 20 G/30ML
30 SOLUTION ORAL 4 TIMES DAILY
Status: DISCONTINUED | OUTPATIENT
Start: 2022-02-27 | End: 2022-02-27

## 2022-02-27 RX ORDER — LACTULOSE 20 G/30ML
20 SOLUTION ORAL 4 TIMES DAILY
Status: DISCONTINUED | OUTPATIENT
Start: 2022-02-27 | End: 2022-02-27

## 2022-02-27 RX ORDER — LACTULOSE 20 G/30ML
30 SOLUTION ORAL 4 TIMES DAILY
Status: DISCONTINUED | OUTPATIENT
Start: 2022-02-27 | End: 2022-03-04 | Stop reason: HOSPADM

## 2022-02-27 RX ADMIN — MEMANTINE 10 MG: 10 TABLET ORAL at 17:05

## 2022-02-27 RX ADMIN — MEMANTINE 10 MG: 10 TABLET ORAL at 09:13

## 2022-02-27 RX ADMIN — LACTULOSE 30 G: 10 SOLUTION ORAL at 13:32

## 2022-02-27 RX ADMIN — QUETIAPINE FUMARATE 12.5 MG: 25 TABLET ORAL at 20:02

## 2022-02-27 RX ADMIN — DONEPEZIL HYDROCHLORIDE 10 MG: 5 TABLET, FILM COATED ORAL at 21:25

## 2022-02-27 RX ADMIN — LACTULOSE 20 G: 10 SOLUTION ORAL at 09:13

## 2022-02-27 RX ADMIN — ASPIRIN 81 MG: 81 TABLET, COATED ORAL at 09:13

## 2022-02-27 RX ADMIN — LISINOPRIL 10 MG: 10 TABLET ORAL at 09:13

## 2022-02-27 RX ADMIN — LACTULOSE 20 G: 10 SOLUTION ORAL at 11:27

## 2022-02-27 RX ADMIN — LACTULOSE 30 G: 10 SOLUTION ORAL at 21:25

## 2022-02-27 RX ADMIN — LACTULOSE 30 G: 10 SOLUTION ORAL at 19:27

## 2022-02-27 RX ADMIN — LACTULOSE 30 G: 10 SOLUTION ORAL at 17:05

## 2022-02-27 NOTE — PLAN OF CARE
Problem: Potential for Falls  Goal: Patient will remain free of falls  Description: INTERVENTIONS:  - Educate patient/family on patient safety including physical limitations  - Instruct patient to call for assistance with activity   - Consult OT/PT to assist with strengthening/mobility   - Keep Call bell within reach  - Keep bed low and locked with side rails adjusted as appropriate  - Keep care items and personal belongings within reach  - Initiate and maintain comfort rounds  - Make Fall Risk Sign visible to staff  - Offer Toileting every 2 Hours, in advance of need  - Initiate/Maintain bed/chair alarm  - Apply yellow socks and bracelet for high fall risk patients  - Consider moving patient to room near nurses station  Outcome: Progressing     Problem: PAIN - ADULT  Goal: Verbalizes/displays adequate comfort level or baseline comfort level  Description: Interventions:  - Encourage patient to monitor pain and request assistance  - Assess pain using appropriate pain scale  - Administer analgesics based on type and severity of pain and evaluate response  - Implement non-pharmacological measures as appropriate and evaluate response  - Consider cultural and social influences on pain and pain management  - Notify physician/advanced practitioner if interventions unsuccessful or patient reports new pain  Outcome: Progressing     Problem: INFECTION - ADULT  Goal: Absence or prevention of progression during hospitalization  Description: INTERVENTIONS:  - Assess and monitor for signs and symptoms of infection  - Monitor lab/diagnostic results  - Monitor all insertion sites, i e  indwelling lines, tubes, and drains  - Monitor endotracheal if appropriate and nasal secretions for changes in amount and color  - [place] appropriate cooling/warming therapies per order  - Administer medications as ordered  - Instruct and encourage patient and family to use good hand hygiene technique  - Identify and instruct in appropriate isolation precautions for identified infection/condition  Outcome: Progressing     Problem: SAFETY ADULT  Goal: Patient will remain free of falls  Description: INTERVENTIONS:  - Educate patient/family on patient safety including physical limitations  - Instruct patient to call for assistance with activity   - Consult OT/PT to assist with strengthening/mobility   - Keep Call bell within reach  - Keep bed low and locked with side rails adjusted as appropriate  - Keep care items and personal belongings within reach  - Initiate and maintain comfort rounds  - Make Fall Risk Sign visible to staff  - Offer Toileting every 2 Hours, in advance of need  - Initiate/Maintain bed/chair alarm  - Apply yellow socks and bracelet for high fall risk patients  - Consider moving patient to room near nurses station  Outcome: Progressing  Goal: Maintain or return to baseline ADL function  Description: INTERVENTIONS:  -  Assess patient's ability to carry out ADLs; assess patient's baseline for ADL function and identify physical deficits which impact ability to perform ADLs (bathing, care of mouth/teeth, toileting, grooming, dressing, etc )  - Assess/evaluate cause of self-care deficits   - Assess range of motion  - Assess patient's mobility; develop plan if impaired  - Assess patient's need for assistive devices and provide as appropriate  - Encourage maximum independence but intervene and supervise when necessary  - Involve family in performance of ADLs  - Assess for home care needs following discharge   - Consider OT consult to assist with ADL evaluation and planning for discharge  - Provide patient education as appropriate  Outcome: Progressing  Goal: Maintains/Returns to pre admission functional level  Description: INTERVENTIONS:  - Perform BMAT or MOVE assessment daily    - Set and communicate daily mobility goal to care team and patient/family/caregiver     - Collaborate with rehabilitation services on mobility goals if consulted  - Ambulate patient 3 times a day  - Out of bed for toileting  - Record patient progress and toleration of activity level   Outcome: Progressing     Problem: DISCHARGE PLANNING  Goal: Discharge to home or other facility with appropriate resources  Description: INTERVENTIONS:  - Identify barriers to discharge w/patient and caregiver  - Arrange for needed discharge resources and transportation as appropriate  - Identify discharge learning needs (meds, wound care, etc )  - Arrange for interpretive services to assist at discharge as needed  - Refer to Case Management Department for coordinating discharge planning if the patient needs post-hospital services based on physician/advanced practitioner order or complex needs related to functional status, cognitive ability, or social support system  Outcome: Progressing     Problem: Knowledge Deficit  Goal: Patient/family/caregiver demonstrates understanding of disease process, treatment plan, medications, and discharge instructions  Description: Complete learning assessment and assess knowledge base    Interventions:  - Provide teaching at level of understanding  - Provide teaching via preferred learning methods  Outcome: Progressing     Problem: GASTROINTESTINAL - ADULT  Goal: Minimal or absence of nausea and/or vomiting  Description: INTERVENTIONS:  - Administer IV fluids if ordered to ensure adequate hydration  - Maintain NPO status until nausea and vomiting are resolved  - Nasogastric tube if ordered  - Administer ordered antiemetic medications as needed  - Provide nonpharmacologic comfort measures as appropriate  - Advance diet as tolerated, if ordered  - Consider nutrition services referral to assist patient with adequate nutrition and appropriate food choices  Outcome: Progressing  Goal: Maintains or returns to baseline bowel function  Description: INTERVENTIONS:  - Assess bowel function  - Encourage oral fluids to ensure adequate hydration  - Administer IV fluids if ordered to ensure adequate hydration  - Administer ordered medications as needed  - Encourage mobilization and activity  - Consider nutritional services referral to assist patient with adequate nutrition and appropriate food choices  Outcome: Progressing  Goal: Maintains adequate nutritional intake  Description: INTERVENTIONS:  - Monitor percentage of each meal consumed  - Identify factors contributing to decreased intake, treat as appropriate  - Assist with meals as needed  - Monitor I&O, weight, and lab values if indicated  - Obtain nutrition services referral as needed  Outcome: Progressing

## 2022-02-27 NOTE — ASSESSMENT & PLAN NOTE
· Presents from home with altered mental status per family  Patient also with history of dementia, unclear baseline  · Hx JAFFE, decompensated cirrhosis  · Ammonia 233 on arrival  · UA negative, no source of infection at this time  · Current OP regimen : Lactulose 20mg q 6hr   · Received PO Lactulose 30mg in ED  · Patient has not had a BM yet  Will order lactulose retention enema x 1  Titrate lactulose to 3-4 soft BM's per day     · Check AM ammonia  · Consult GI  · Check RUQ ultrasound with hepatic doppler reviewed  · It has improved patient is alert and oriented to self and place able to tell me his kidney able to function and answer questions appropriately now yesterday the patient has 6 bowel movements and further electrolytes was held he had none still now ammonia is up to 78 if will place him on lactulose 20 g actually q i d  to a 4 5 bowel movements a day repeat labs tomorrow

## 2022-02-27 NOTE — PROGRESS NOTES
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 [facility])  Assessment & Plan  · Hx JAFFE diagnosed by biopsy in 2019  · Follows with [facility]   Scheduled for EGD in April 2022  · Last EGD in August 2021: large varice with band x 1   · MELD score 9  · Currently on lactulose 30 mg q6hr  Per chart review, working on approval for Rifaximin due to cost    · Consult GI     Dementia with behavioral disturbance ([facility] [address] )  Assessment & Plan  · Unclear baseline, lives at home with family and has outside caregivers  · Worsening behavioral disturbance secondary to hepatic encephalopathy  · Continue aricept, namenda patient has some sundowning at night will place him on Seroquel 12 5 mg at 5:00 p m  Apparently it worked well last night    * Hepatic encephalopathy ([facility] [address] )  Assessment & Plan  · Presents from home with altered mental status per family  Patient also with history of dementia, unclear baseline  · Hx JAFFE, decompensated cirrhosis  · Ammonia 233 on arrival  · UA negative, no source of infection at this time  · Current OP regimen : Lactulose 20mg q 6hr   · Received PO Lactulose 30mg in ED  · Patient has not had a BM yet  Will order lactulose retention enema x 1  Titrate lactulose to 3-4 soft BM's per day     · Check AM ammonia  · Consult GI  · Check RUQ ultrasound with hepatic doppler reviewed  · It has improved patient is alert and oriented to self and place able to tell me his kidney able to function and answer questions appropriately now yesterday the patient has 6 bowel movements and further electrolytes was held he had none still now ammonia is up to 78 if will place him on lactulose 20 g actually q i d  to a 4 5 bowel movements a day repeat labs tomorrow      VTE Pharmacologic Prophylaxis: VTE Score: 3 Moderate Risk (Score 3-4) - Pharmacological DVT Prophylaxis Contraindicated  Sequential Compression Devices Ordered  Patient Centered Rounds: I performed bedside rounds with nursing staff today  Discussions with Specialists or Other Care Team Provider: cm    Education and Discussions with Family / Patient: patient will update son    Time Spent for Care: 30 minutes  More than 50% of total time spent on counseling and coordination of care as described above  Current Length of Stay: 2 day(s)  Current Patient Status: Inpatient   Certification Statement: The patient will continue to require additional inpatient hospital stay due to encephalopathy  Discharge Plan: Anticipate discharge in 24-48 hrs to discharge location to be determined pending rehab evaluations  Code Status: Level 1 - Full Code    Subjective:   Seen and examined no complaints - no bm since yesterday     Objective:     Vitals:   Temp (24hrs), Av 8 °F (36 6 °C), Min:97 5 °F (36 4 °C), Max:98 °F (36 7 °C)    Temp:  [97 5 °F (36 4 °C)-98 °F (36 7 °C)] 98 °F (36 7 °C)  HR:  [74-78] 74  Resp:  [16-19] 16  BP: ()/(51-75) 151/75  SpO2:  [97 %-99 %] 99 %  Body mass index is 26 81 kg/m²  Input and Output Summary (last 24 hours): Intake/Output Summary (Last 24 hours) at 2022 1156  Last data filed at 2022 0700  Gross per 24 hour   Intake --   Output 600 ml   Net -600 ml       Physical Exam:   Physical Exam  Vitals and nursing note reviewed  Constitutional:       Appearance: He is well-developed  HENT:      Head: Normocephalic and atraumatic     Eyes:      Conjunctiva/sclera: Conjunctivae normal       Comments: Sclera icterus   Cardiovascular:      Rate and Rhythm: Normal rate and regular rhythm  Heart sounds: No murmur heard  Pulmonary:      Effort: Pulmonary effort is normal  No respiratory distress  Breath sounds: Normal breath sounds  No wheezing or rales  Abdominal:      Palpations: Abdomen is soft  Tenderness: There is no abdominal tenderness  Musculoskeletal:         General: No swelling  Cervical back: Neck supple  Skin:     General: Skin is warm and dry  Comments: Jaundice improved   Neurological:      Mental Status: He is alert  Comments: orineted to self and place         Additional Data:     Labs:  Results from last 7 days   Lab Units 02/27/22  0500   WBC Thousand/uL 3 91*   HEMOGLOBIN g/dL 11 3*   HEMATOCRIT % 32 6*   PLATELETS Thousands/uL 49*   NEUTROS PCT % 64   LYMPHS PCT % 21   MONOS PCT % 10   EOS PCT % 4     Results from last 7 days   Lab Units 02/27/22  0500   SODIUM mmol/L 140   POTASSIUM mmol/L 3 5   CHLORIDE mmol/L 107   CO2 mmol/L 25   BUN mg/dL 16   CREATININE mg/dL 0 87   ANION GAP mmol/L 8   CALCIUM mg/dL 8 1*   ALBUMIN g/dL 2 2*   TOTAL BILIRUBIN mg/dL 2 36*   ALK PHOS U/L 160*   ALT U/L 43   AST U/L 52*   GLUCOSE RANDOM mg/dL 81     Results from last 7 days   Lab Units 02/27/22  0500   INR  1 44*     Results from last 7 days   Lab Units 02/25/22  2207   POC GLUCOSE mg/dl 141*               Lines/Drains:  Invasive Devices  Report    Peripheral Intravenous Line            Peripheral IV 02/27/22 Dorsal (posterior); Left Hand <1 day                      Imaging: Reviewed radiology reports from this admission including: ultrasound(s)    Recent Cultures (last 7 days):   Results from last 7 days   Lab Units 02/25/22  2158   BLOOD CULTURE  Received in Microbiology Lab  Culture in Progress  Received in Microbiology Lab  Culture in Progress         Last 24 Hours Medication List:   Current Facility-Administered Medications Medication Dose Route Frequency Provider Last Rate    acetaminophen  650 mg Oral Q6H PRN DUSTY Stroud      aspirin  81 mg Oral Daily DUSTY Stroud      donepezil  10 mg Oral HS DUSTY Stroud      lactulose  20 g Oral 4x Daily Kvng Aleman MD      lisinopril  10 mg Oral Daily Kvng Aleman MD      memantine  10 mg Oral BID DUSTY Stroud      QUEtiapine  12 5 mg Oral HS Kvng Aleman MD          Today, Patient Was Seen By: Kvng Aleman MD    **Please Note: This note may have been constructed using a voice recognition system  **

## 2022-02-27 NOTE — NURSING NOTE
Received from ICU VSS, OOB in chair, chair alarm on, call bell and belongings within reach; oriented to new room and joni quinones intact; agree with Tanya's assessment as earlier

## 2022-02-27 NOTE — ASSESSMENT & PLAN NOTE
· Unclear baseline, lives at home with family and has outside caregivers  · Worsening behavioral disturbance secondary to hepatic encephalopathy  · Continue aricept, namenda patient has some sundowning at night will place him on Seroquel 12 5 mg at 5:00 p m   Apparently it worked well last night

## 2022-02-27 NOTE — ASSESSMENT & PLAN NOTE
· Secondary to cirrhosis  · Plt 54  · No evidence of acute bleeding at this time  Hgb stable  · Will hold DVT ppx at this time     · Trend CBC slightly decreased today

## 2022-02-28 PROBLEM — E44.0 MODERATE PROTEIN-CALORIE MALNUTRITION (HCC): Status: ACTIVE | Noted: 2022-02-28

## 2022-02-28 LAB
ALBUMIN SERPL BCP-MCNC: 2.1 G/DL (ref 3.5–5)
ALP SERPL-CCNC: 159 U/L (ref 46–116)
ALT SERPL W P-5'-P-CCNC: 44 U/L (ref 12–78)
AMMONIA PLAS-SCNC: 83 UMOL/L (ref 11–35)
ANION GAP SERPL CALCULATED.3IONS-SCNC: 9 MMOL/L (ref 4–13)
AST SERPL W P-5'-P-CCNC: 49 U/L (ref 5–45)
BILIRUB SERPL-MCNC: 1.68 MG/DL (ref 0.2–1)
BUN SERPL-MCNC: 21 MG/DL (ref 5–25)
CALCIUM ALBUM COR SERPL-MCNC: 9.9 MG/DL (ref 8.3–10.1)
CALCIUM SERPL-MCNC: 8.4 MG/DL (ref 8.3–10.1)
CHLORIDE SERPL-SCNC: 106 MMOL/L (ref 100–108)
CO2 SERPL-SCNC: 23 MMOL/L (ref 21–32)
CREAT SERPL-MCNC: 1.13 MG/DL (ref 0.6–1.3)
GFR SERPL CREATININE-BSD FRML MDRD: 64 ML/MIN/1.73SQ M
GLUCOSE SERPL-MCNC: 94 MG/DL (ref 65–140)
POTASSIUM SERPL-SCNC: 3.4 MMOL/L (ref 3.5–5.3)
PROT SERPL-MCNC: 6.9 G/DL (ref 6.4–8.2)
SODIUM SERPL-SCNC: 138 MMOL/L (ref 136–145)

## 2022-02-28 PROCEDURE — 97163 PT EVAL HIGH COMPLEX 45 MIN: CPT

## 2022-02-28 PROCEDURE — 97167 OT EVAL HIGH COMPLEX 60 MIN: CPT

## 2022-02-28 PROCEDURE — 84630 ASSAY OF ZINC: CPT | Performed by: INTERNAL MEDICINE

## 2022-02-28 PROCEDURE — 99232 SBSQ HOSP IP/OBS MODERATE 35: CPT | Performed by: FAMILY MEDICINE

## 2022-02-28 PROCEDURE — 97116 GAIT TRAINING THERAPY: CPT

## 2022-02-28 PROCEDURE — 80053 COMPREHEN METABOLIC PANEL: CPT | Performed by: FAMILY MEDICINE

## 2022-02-28 PROCEDURE — 82140 ASSAY OF AMMONIA: CPT | Performed by: FAMILY MEDICINE

## 2022-02-28 PROCEDURE — 97535 SELF CARE MNGMENT TRAINING: CPT

## 2022-02-28 RX ORDER — SODIUM CHLORIDE, SODIUM GLUCONATE, SODIUM ACETATE, POTASSIUM CHLORIDE, MAGNESIUM CHLORIDE, SODIUM PHOSPHATE, DIBASIC, AND POTASSIUM PHOSPHATE .53; .5; .37; .037; .03; .012; .00082 G/100ML; G/100ML; G/100ML; G/100ML; G/100ML; G/100ML; G/100ML
75 INJECTION, SOLUTION INTRAVENOUS CONTINUOUS
Status: DISCONTINUED | OUTPATIENT
Start: 2022-02-28 | End: 2022-03-04 | Stop reason: HOSPADM

## 2022-02-28 RX ORDER — POTASSIUM CHLORIDE 20 MEQ/1
40 TABLET, EXTENDED RELEASE ORAL ONCE
Status: COMPLETED | OUTPATIENT
Start: 2022-02-28 | End: 2022-02-28

## 2022-02-28 RX ADMIN — DONEPEZIL HYDROCHLORIDE 10 MG: 5 TABLET, FILM COATED ORAL at 21:14

## 2022-02-28 RX ADMIN — LACTULOSE 30 G: 10 SOLUTION ORAL at 17:16

## 2022-02-28 RX ADMIN — LACTULOSE 200 G: 10 SOLUTION ORAL at 10:53

## 2022-02-28 RX ADMIN — ASPIRIN 81 MG: 81 TABLET, COATED ORAL at 08:34

## 2022-02-28 RX ADMIN — LISINOPRIL 10 MG: 10 TABLET ORAL at 08:34

## 2022-02-28 RX ADMIN — POTASSIUM CHLORIDE 40 MEQ: 1500 TABLET, EXTENDED RELEASE ORAL at 10:48

## 2022-02-28 RX ADMIN — LACTULOSE 30 G: 10 SOLUTION ORAL at 13:08

## 2022-02-28 RX ADMIN — MEMANTINE 10 MG: 10 TABLET ORAL at 17:16

## 2022-02-28 RX ADMIN — QUETIAPINE FUMARATE 12.5 MG: 25 TABLET ORAL at 21:14

## 2022-02-28 RX ADMIN — SODIUM CHLORIDE, SODIUM GLUCONATE, SODIUM ACETATE, POTASSIUM CHLORIDE, MAGNESIUM CHLORIDE, SODIUM PHOSPHATE, DIBASIC, AND POTASSIUM PHOSPHATE 100 ML/HR: .53; .5; .37; .037; .03; .012; .00082 INJECTION, SOLUTION INTRAVENOUS at 10:47

## 2022-02-28 RX ADMIN — MEMANTINE 10 MG: 10 TABLET ORAL at 08:34

## 2022-02-28 RX ADMIN — SODIUM CHLORIDE, SODIUM GLUCONATE, SODIUM ACETATE, POTASSIUM CHLORIDE, MAGNESIUM CHLORIDE, SODIUM PHOSPHATE, DIBASIC, AND POTASSIUM PHOSPHATE 75 ML/HR: .53; .5; .37; .037; .03; .012; .00082 INJECTION, SOLUTION INTRAVENOUS at 23:40

## 2022-02-28 RX ADMIN — LACTULOSE 30 G: 10 SOLUTION ORAL at 21:14

## 2022-02-28 RX ADMIN — LACTULOSE 30 G: 10 SOLUTION ORAL at 08:34

## 2022-02-28 NOTE — PROGRESS NOTES
[address]  Progress Note - Lucrecia Baxter 1950, 66 y o  male MRN: [id number]  Unit/Bed#: MS [facility] Encounter: [id number]  Primary Care Provider: No primary care provider on file  Date and time admitted to hospital: 2/25/2022  9:43 PM    Moderate protein-calorie malnutrition (HCC)  Assessment & Plan  Malnutrition Findings:   Adult Malnutrition type: Chronic illness (related to cirrhosis, increased nutrient needs as evidenced by moderate muscle loss to temporalis, pectoralis, quadriceps and gastrocnemius muscles, 9 6% weight loss x 6 mo (8/16/21 230lb, 2/28/22 208lb)  )  Adult Degree of Malnutrition: Malnutrition of moderate degree (Treated with: Regular diet, ensure enlive TID, recommend daily weights)    BMI Findings: Body mass index is 26 83 kg/m²  Thrombocytopenia ([address] )  Assessment & Plan  · Secondary to cirrhosis  · Plt 54  · No evidence of acute bleeding at this time  Hgb stable  · Will hold DVT ppx at this time  · Trend CBC slightly decreased today    Hypertension  Assessment & Plan  · Takes Prinzide, will switch to Lisinopril 10mg while inpatient    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 [facility])  Assessment & Plan  · Hx JAFFE diagnosed by biopsy in 2019  · Follows with [facility]   Scheduled for EGD in April 2022  · Last EGD in August 2021: large varice with band x 1   · MELD score 9  · Currently on lactulose 30 mg q6hr  Per chart review, working on approval for Rifaximin due to cost    · Consult GI discussed patient did not have adequate bowel movements yesterday will give lactulose and mild continued 30 mg 4 times a day to a m  At now 4 bowel movements a day to see how he is doing    Dementia with behavioral disturbance [facility])  Assessment & Plan  · Unclear baseline, lives at home with family and has outside caregivers    · Worsening behavioral disturbance secondary to hepatic encephalopathy  · Continue aricept, namenda patient has some sundowning at night will place him on Seroquel 12 5 mg at 5:00 p m  doing well    * Hepatic encephalopathy [facility])  Assessment & Plan  · Presents from home with altered mental status per family  Patient also with history of dementia, unclear baseline  · Hx JAFFE, decompensated cirrhosis  · Ammonia 233 on arrival  · UA negative, no source of infection at this time  · Current OP regimen : Lactulose 20mg q 6hr   · Had only 2 bm  after lactulose today has asterixis ammonia up - will a continued 30 g of lactulose q i d  Will give lactulose enema repeat ammonia and CMP tomorrow  · Evaluated by Gastroenterology as discussed with them no evidence of ascites ultrasound of the abdomen has been reviewed  · I have placed him on some fluids not to going to renal failure secondary to losing fluid through having BMs        VTE Pharmacologic Prophylaxis: VTE Score: 3 Moderate Risk (Score 3-4) - Pharmacological DVT Prophylaxis Contraindicated  Sequential Compression Devices Ordered  Patient Centered Rounds: I performed bedside rounds with nursing staff today  Discussions with Specialists or Other Care Team Provider: gi    Education and Discussions with Family / Patient: patient discussed with son     Time Spent for Care: 30 minutes  More than 50% of total time spent on counseling and coordination of care as described above  Current Length of Stay: 3 day(s)  Current Patient Status: Inpatient   Certification Statement: The patient will continue to require additional inpatient hospital stay due to encephalopathy  Discharge Plan: Anticipate discharge in 24-48 hrs to home      Code Status: Level 1 - Full Code    Subjective:   Seen and examined no complainbts- only 2 bm yesterday     Objective:     Vitals:   Temp (24hrs), Av 2 °F (36 8 °C), Min:97 8 °F (36 6 °C), Max:98 5 °F (36 9 °C)    Temp:  [97 8 °F (36 6 °C)-98 5 °F (36 9 °C)] 98 5 °F (36 9 °C)  HR:  [76-88] 84  Resp:  [14-17] 16  BP: (112-121)/(57-62) 115/61  SpO2:  [94 %-98 %] 94 %  Body mass index is 26 83 kg/m²  Input and Output Summary (last 24 hours): Intake/Output Summary (Last 24 hours) at 2/28/2022 1244  Last data filed at 2/28/2022 0911  Gross per 24 hour   Intake 958 ml   Output --   Net 958 ml       Physical Exam:   Physical Exam  Vitals and nursing note reviewed  Constitutional:       Appearance: He is well-developed  HENT:      Head: Normocephalic and atraumatic  Eyes:      Conjunctiva/sclera: Conjunctivae normal    Cardiovascular:      Rate and Rhythm: Normal rate and regular rhythm  Heart sounds: No murmur heard  Pulmonary:      Effort: Pulmonary effort is normal  No respiratory distress  Breath sounds: Normal breath sounds  No wheezing or rales  Abdominal:      General: There is no distension  Palpations: Abdomen is soft  Tenderness: There is no abdominal tenderness  Musculoskeletal:         General: No swelling  Cervical back: Neck supple  Skin:     General: Skin is warm and dry  Neurological:      Mental Status: He is alert        Comments: Oriented to self and place   Asterixis present         Additional Data:     Labs:  Results from last 7 days   Lab Units 02/27/22  0500   WBC Thousand/uL 3 91*   HEMOGLOBIN g/dL 11 3*   HEMATOCRIT % 32 6*   PLATELETS Thousands/uL 49*   NEUTROS PCT % 64   LYMPHS PCT % 21   MONOS PCT % 10   EOS PCT % 4     Results from last 7 days   Lab Units 02/28/22  0556   SODIUM mmol/L 138   POTASSIUM mmol/L 3 4*   CHLORIDE mmol/L 106   CO2 mmol/L 23   BUN mg/dL 21   CREATININE mg/dL 1 13   ANION GAP mmol/L 9   CALCIUM mg/dL 8 4   ALBUMIN g/dL 2 1*   TOTAL BILIRUBIN mg/dL 1 68*   ALK PHOS U/L 159*   ALT U/L 44   AST U/L 49*   GLUCOSE RANDOM mg/dL 94     Results from last 7 days   Lab Units 02/27/22  0500   INR  1 44*     Results from last 7 days   Lab Units 02/25/22  2207   POC GLUCOSE mg/dl 141*               Lines/Drains:  Invasive Devices  Report    Peripheral Intravenous Line            Peripheral IV 02/27/22 Dorsal (posterior); Left Hand 1 day                      Imaging: Reviewed radiology reports from this admission including: abdominal/pelvic CT    Recent Cultures (last 7 days):   Results from last 7 days   Lab Units 02/25/22  4658   BLOOD CULTURE  No Growth at 24 hrs  No Growth at 24 hrs  Last 24 Hours Medication List:   Current Facility-Administered Medications   Medication Dose Route Frequency Provider Last Rate    acetaminophen  650 mg Oral Q6H PRN Loki Pali, CRNP      aspirin  81 mg Oral Daily Loki Pali, CRNP      donepezil  10 mg Oral HS Loki Pali, CRNP      lactulose  30 g Oral 4x Daily Chato Fagan MD      lisinopril  10 mg Oral Daily Chato Fagan MD      memantine  10 mg Oral BID Loki Pali, CRNP      multi-electrolyte  100 mL/hr Intravenous Continuous Chato Fagan  mL/hr (02/28/22 1047)    QUEtiapine  12 5 mg Oral HS Chato Fagan MD          Today, Patient Was Seen By: Chato Fagan MD    **Please Note: This note may have been constructed using a voice recognition system  **

## 2022-02-28 NOTE — PLAN OF CARE
Problem: PHYSICAL THERAPY ADULT  Goal: Performs mobility at highest level of function for planned discharge setting  See evaluation for individualized goals  Description: Treatment/Interventions: Functional transfer training,LE strengthening/ROM,Elevations,Therapeutic exercise,Cognitive reorientation,Patient/family training,Equipment eval/education,Bed mobility,Gait training,Compensatory technique education,Spoke to nursing,OT,Spoke to case management  Equipment Recommended:  (TBD by rehab)       See flowsheet documentation for full assessment, interventions and recommendations  Note: Prognosis: Good  Problem List: Decreased strength,Decreased endurance,Impaired balance,Decreased cognition,Impaired judgement,Decreased safety awareness  Assessment: Pt is a 66 y o  male seen for PT evaluation s/p admission to [address] [place] [address] on 2/25/2022 with Hepatic encephalopathy ([facility] [address] )  Order placed for PT services  Upon evaluation: Pt is presenting with impaired functional mobility due to decreased strength, decreased endurance, impaired balance, gait deviations, impaired cognition, decreased safety awareness, impaired judgment and fall risk requiring supervision assistance for bed mobility, min assistance for transfers and mod assistance for ambulation with no AD  Pt's clinical presentation is currently unstable/unpredictable given the functional mobility deficits above coupled with fall risks as indicated by AM-PAC 6-Clicks: 14/05 as well as impulsivity, impaired balance, polypharmacy, impaired judgement, decreased safety awareness and decreased cognition and combined with medical complications of hypertension , abnormal H&H, abnormal WBCs, abnormal potassium values and need for input for mobility technique/safety  Pt's PMHx and comorbidities that may affect physical performance and progress include: HTN, Dementia and JAFFE   Personal factors affecting pt at time of IE include: multi-level environment, past experience, behavioral pattern, inability to perform IADLs, inability to perform ADLs, inability to navigate level surfaces without external assistance, inability to navigate community distances and limited insight into impairments  Pt will benefit from continued skilled PT services to address deficits as defined above and to maximize level of functional mobility to facilitate return toward PLOF and improved QOL  From PT/mobility standpoint, recommendation at time of d/c would be Short term rehab pending progress in order to reduce fall risk and maximize pt's functional independence and consistency with mobility in order to facilitate return to PLOF  Recommend trial with walker next 1-2 sessions and ther ex next 1-2 sessions  Barriers to Discharge: Decreased caregiver support,Inaccessible home environment  Barriers to Discharge Comments: Pt with decreased safety awareness and requires increased assistance with mobility     PT Discharge Recommendation: Post acute rehabilitation services          See flowsheet documentation for full assessment

## 2022-02-28 NOTE — CONSULTS
Verbal consult received from GI  Pt with dementia/hepatic encephalopathy, unable to obtain verbal diet hx at this time  Noting 13 3% weight loss x > 1 y with significant 9 6% weight loss x 6 mo  Chart review: 11/5/20 240lb, 8/16/21 230lb, 1/5/22 216lb, 2/28/22 208lb  Noting moderate muscle loss to temporalis, pectoralis, quadriceps and gastrocnemius muscles, meets critera for moderate malnutrition  Will order ensure enlive TID  Continue regular diet as ordered at this time  Recommend daily weights for nutrition monitoring  Will follow up

## 2022-02-28 NOTE — ASSESSMENT & PLAN NOTE
· Unclear baseline, lives at home with family and has outside caregivers    · Worsening behavioral disturbance secondary to hepatic encephalopathy  · Continue aricept, namenda patient has some sundowning at night will place him on Seroquel 12 5 mg at 5:00 p m  doing well

## 2022-02-28 NOTE — ASSESSMENT & PLAN NOTE
· Hx JAFFE diagnosed by biopsy in 2019  · Follows with [facility]   Scheduled for EGD in April 2022  · Last EGD in August 2021: large varice with band x 1   · MELD score 9  · Currently on lactulose 30 mg q6hr  Per chart review, working on approval for Rifaximin due to cost    · Consult GI discussed patient did not have adequate bowel movements yesterday will give lactulose and mild continued 30 mg 4 times a day to a m   At now 4 bowel movements a day to see how he is doing

## 2022-02-28 NOTE — PLAN OF CARE
Problem: Potential for Falls  Goal: Patient will remain free of falls  Description: INTERVENTIONS:  - Educate patient/family on patient safety including physical limitations  - Instruct patient to call for assistance with activity   - Consult OT/PT to assist with strengthening/mobility   - Keep Call bell within reach  - Keep bed low and locked with side rails adjusted as appropriate  - Keep care items and personal belongings within reach  - Initiate and maintain comfort rounds  - Make Fall Risk Sign visible to staff  - Apply yellow socks and bracelet for high fall risk patients  - Consider moving patient to room near nurses station  Outcome: Progressing     Problem: SAFETY ADULT  Goal: Patient will remain free of falls  Description: INTERVENTIONS:  - Educate patient/family on patient safety including physical limitations  - Instruct patient to call for assistance with activity   - Consult OT/PT to assist with strengthening/mobility   - Keep Call bell within reach  - Keep bed low and locked with side rails adjusted as appropriate  - Keep care items and personal belongings within reach  - Initiate and maintain comfort rounds  - Make Fall Risk Sign visible to staff  - Apply yellow socks and bracelet for high fall risk patients  - Consider moving patient to room near nurses station  Outcome: Progressing

## 2022-02-28 NOTE — MALNUTRITION/BMI
This medical record reflects one or more clinical indicators suggestive of malnutrition  Malnutrition Findings:   Adult Malnutrition type: Chronic illness (related to cirrhosis, increased nutrient needs as evidenced by moderate muscle loss to temporalis, pectoralis, quadriceps and gastrocnemius muscles, 9 6% weight loss x 6 mo (8/16/21 230lb, 2/28/22 208lb)  )  Adult Degree of Malnutrition: Malnutrition of moderate degree (Treated with: Regular diet, ensure enlive TID, recommend daily weights)  Malnutrition Characteristics: Muscle loss,Weight loss    BMI Findings: Body mass index is 26 83 kg/m²  See Nutrition note dated 2/28/22 for additional details  Completed nutrition assessment is viewable in the nutrition documentation

## 2022-02-28 NOTE — ASSESSMENT & PLAN NOTE
· Presents from home with altered mental status per family  Patient also with history of dementia, unclear baseline  · Hx JAFFE, decompensated cirrhosis  · Ammonia 233 on arrival  · UA negative, no source of infection at this time  · Current OP regimen : Lactulose 20mg q 6hr   · Had only 2 bm 2/27 after lactulose today has asterixis ammonia up - will a continued 30 g of lactulose q i d   Will give lactulose enema repeat ammonia and CMP tomorrow  · Evaluated by Gastroenterology as discussed with them no evidence of ascites ultrasound of the abdomen has been reviewed  · I have placed him on some fluids not to going to renal failure secondary to losing fluid through having BMs

## 2022-02-28 NOTE — PLAN OF CARE
Problem: OCCUPATIONAL THERAPY ADULT  Goal: Performs self-care activities at highest level of function for planned discharge setting  See evaluation for individualized goals  Description: Treatment Interventions: ADL retraining,Functional transfer training,UE strengthening/ROM,Endurance training,Cognitive reorientation,Patient/family training,Equipment evaluation/education,Neuromuscular reeducation,Compensatory technique education,Continued evaluation,Energy conservation,Activityengagement          See flowsheet documentation for full assessment, interventions and recommendations  Note: Limitation: Decreased ADL status,Decreased Safe judgement during ADL,Decreased cognition,Decreased endurance,Decreased self-care trans,Decreased high-level ADLs  Prognosis: Good  Assessment: Pt is a 66 y o  male, admitted to [address] [place] [address] 2/25/2022 d/t experiencing increased confusion over 3-4 days  Dx: hepatic encephalopathy  Pt with PMHx impacting their performance during ADL tasks, including: dementia, hypertension and cirrhosis  Cognitive status PTA was Impaired  Pt poor historian, giving varying reports of home living set-up and PLOF compared to H&P  Pt reporting prior to admission to the hospital Pt was performing ADLs without physical assistance  IADLs with physical assistance  Functional transfers/ambulation without physical assistance  OT order placed to assess Pt's ADLs, cognitive status, and performance during functional tasks in order to maximize safety and independence while making most appropriate d/c recommendations   Pt's clinical presentation is currently evolving given new onset deficits that effect Pt's occupational performance and ability to safely return to PLOF including decrease activity tolerance, decrease standing balance, decrease sitting balance, decrease performance during ADL tasks, decrease cognition, decrease safety awareness , increase impulsiveness, decrease generalized strength, decrease activity engagement, decrease performance during functional transfers, high fall risk and limited insight to deficits combined with medical complications of change in mental status, abnormal H&H, abnormal CBC and need for input for mobility technique/safety  Personal factors affecting Pt at time of initial evaluation include: multi-level environment and new need for AD  At this time, Pt required OT/PT co-eval d/t significant deficits with mobility and safety concerns  Pt will benefit from continued skilled OT services to address deficits as defined above and to maximize level independence/participation during ADLs and functional tasks to facilitate return toward PLOF and improved quality of life  From an occupational therapy standpoint, recommendation at time of d/c would be post acute rehabilitation services       OT Discharge Recommendation: Post acute rehabilitation services

## 2022-02-28 NOTE — PHYSICAL THERAPY NOTE
PHYSICAL THERAPY EVALUATION  NAME:  Isa Estrada  DATE: 02/28/22    AGE:   66 y o  Mrn:   [id number]  ADMIT DX:  Hepatic encephalopathy ([facility] [address] ) [K72 90]  Altered mental status [R41 82]  Manic episode ([facility] [address] ) [F30 9]    Past Medical History:   Diagnosis Date    Cirrhosis ([facility] [address] )     Dementia ([facility] )     Hypertension      Length Of Stay: 3  Performed at least 2 patient identifiers during session: Name and Birthday  PHYSICAL THERAPY EVALUATION :        02/28/22 1614   Note Type   Note type Evaluation   Pain Assessment   Pain Assessment Tool 0-10   Pain Score No Pain   Restrictions/Precautions   Other Precautions Cognitive; Chair Alarm; Bed Alarm; Fall Risk;Impulsive   Home Living   Type of Home House  (0 [facility])   Home Layout Two level;Performs ADLs on one level; Able to live on main level with bedroom/bathroom   Bathroom Shower/Tub Walk-in shower   Bathroom Toilet Standard   Bathroom Equipment Grab bars in shower; Shower chair;Grab bars around toilet   [address] [facility]  (doesn't use at baseline)   Additional Comments Pt is a poor historian; will consult with CM to obtain info on PLOF and home setup as pt intiially reporting he lives with son, later stating his son stops by several times a week  Per H&P pt has 24/7 caregiver   Prior Function   Level of Loving Independent with ADLs and functional mobility   Lives With Altamease Tasha Help From Personal care attendant; Family   ADL Assistance Independent   IADLs Needs assistance   Falls in the last 6 months 0   Comments Pt reports having a "cleaning lady" unable to state frequency/length of visits  Per CM report, pt previously I with mobility and ADLs, assistance with IADLs   Cognition   Overall Cognitive Status Impaired   Orientation Level Disoriented to situation;Oriented to person;Oriented to place; Disoriented to time   Following Commands Follows one step commands with increased time or repetition   RLE Assessment   RLE Assessment WFL  (4/5 strength)   LLE Assessment   LLE Assessment WFL  (4/5 strength)   Light Touch   RLE Light Touch Grossly intact   LLE Light Touch Grossly intact   Bed Mobility   Supine to Sit 5  Supervision   Additional items Increased time required;Verbal cues   Additional Comments VC for safety   Transfers   Sit to Stand   (CGA)   Additional items Increased time required;Verbal cues   Stand to Sit   (CGA)   Additional items Increased time required;Verbal cues   Stand pivot 4  Minimal assistance   Additional items Assist x 1; Increased time required;Verbal cues   Additional Comments Pt initially completing STS without AD, pt appears unsteady and was provided RW for rest of transfers see treatment note for details  VC for safety and hand placement   Ambulation/Elevation   Gait pattern Improper Weight shift;Decreased foot clearance; Wide ELEAZAR; Short stride; Excessively slow   Gait Assistance 3  Moderate assist   Additional items Assist x 1;Verbal cues   Assistive Device None   Distance 8' Pt demonstrates imbalance and near LOB without AD, see treatment note for implementation of RW for safety with ambulation   Balance   Static Sitting Good   Dynamic Sitting Fair +   Static Standing Fair -   Dynamic Standing Poor +   Ambulatory Poor   Endurance Deficit   Endurance Deficit Yes   Endurance Deficit Description fatigue   Activity Tolerance   Activity Tolerance Patient limited by fatigue   Medical Staff Made Aware Regine VICTOR Elders   Nurse Made Aware RNMary Ann   Assessment   Prognosis Good   Problem List Decreased strength;Decreased endurance; Impaired balance;Decreased cognition; Impaired judgement;Decreased safety awareness   Barriers to Discharge Decreased caregiver support; Inaccessible home environment   Barriers to Discharge Comments Pt with decreased safety awareness and requires increased assistance with mobility   Goals   Patient Goals Get better   STG Expiration Date 03/14/22   PT Treatment Day 1   Plan   Treatment/Interventions Functional transfer training;LE strengthening/ROM; Elevations; Therapeutic exercise;Cognitive reorientation;Patient/family training;Equipment eval/education; Bed mobility;Gait training; Compensatory technique education;Spoke to nursing;OT;Spoke to case management   PT Frequency 3-5x/wk   Recommendation   PT Discharge Recommendation Post acute rehabilitation services   Equipment Recommended   (TBD by rehab)   Additional Comments If pt should refuse post-acute rehab and family/caregivers can assist pt at current level of assistance, recommend [phone] in Bed Without Bedrails 3   Lying on Back to Sitting on Edge of Flat Bed 3   Moving Bed to Chair 3   Standing Up From Chair 3   Walk in Room 2   Climb 3-5 Stairs 2   Basic Mobility Inpatient Raw Score 16   Basic Mobility Standardized Score 38 32   Highest Level Of Mobility   -[facility] Goal 5: Stand one or more mins   -[facility] Highest Level of Mobility 6: Walk 10 steps or more   -[facility] Goal Achieved Yes   Additional Treatment Session   Start Time 1400   End Time 1409   Equipment Use During gait trial, pt was provided RW for continued ambulation and instructed on proper use  Pt ambulated 12' + 21' with RW and min A for safety and RW management as pt with limited ability to maintain RW within COG despite max VC, min A provided to manage  VC for safety and obstacle negotation as pt with 1 instance of collision with object in room  VC for RW management as pt occasionally attempted to abandon RW during transfers  Pt completed toilet transfer with min A x 1 STS with VC for hand placement on grab bars  Pt completed STS and SPT with RW with min A and VC for safety and hand placement  Additional Treatment Day 1   End of Consult   Patient Position at End of Consult Seated edge of bed;Bed/Chair alarm activated; All needs within reach   The patient's AM-PAC Basic Mobility Inpatient Short Form Raw Score is 16     A Raw score of less than or equal to 16 suggests the patient may benefit from discharge to post-acute rehabilitation services  Please also refer to the recommendation of the Physical Therapist for safe discharge planning  (Please find full objective findings from PT assessment regarding body systems outlined above)  Assessment: Pt is a 66 y o  male seen for PT evaluation s/p admission to [address] [place] [address] on 2/25/2022 with Hepatic encephalopathy ([facility] [address] )  Order placed for PT services  Upon evaluation: Pt is presenting with impaired functional mobility due to decreased strength, decreased endurance, impaired balance, gait deviations, impaired cognition, decreased safety awareness, impaired judgment and fall risk requiring supervision assistance for bed mobility, min assistance for transfers and mod assistance for ambulation with no AD  Pt's clinical presentation is currently unstable/unpredictable given the functional mobility deficits above coupled with fall risks as indicated by AM-PAC 6-Clicks: 48/24 as well as impulsivity, impaired balance, polypharmacy, impaired judgement, decreased safety awareness and decreased cognition and combined with medical complications of hypertension , abnormal H&H, abnormal WBCs, abnormal potassium values and need for input for mobility technique/safety  Pt's PMHx and comorbidities that may affect physical performance and progress include: HTN, Dementia and JAFFE  Personal factors affecting pt at time of IE include: multi-level environment, past experience, behavioral pattern, inability to perform IADLs, inability to perform ADLs, inability to navigate level surfaces without external assistance, inability to navigate community distances and limited insight into impairments  Pt will benefit from continued skilled PT services to address deficits as defined above and to maximize level of functional mobility to facilitate return toward PLOF and improved QOL   From PT/mobility standpoint, recommendation at time of d/c would be Short term rehab pending progress in order to reduce fall risk and maximize pt's functional independence and consistency with mobility in order to facilitate return to PLOF  Recommend trial with walker next 1-2 sessions and ther ex next 1-2 sessions  Goals: Pt will: Perform bed mobility tasks with modified I to reposition in bed and prepare for transfers  Pt will perform transfers with modified I to increase Indep in home environment and prepare for ambulation  Pt will ambulate with LRAD for >/= 150' with  Supervision  to decrease risk for falls and to access home environment  Pt will complete >/= 12 steps with with unilateral handrail with Supervision to return to multilevel home  Increase bilateral LE strength 1/2 grade to facilitate independent mobility and Increase all balance 1/2 grade to decrease risk for falls  Treatment Assessment:    Assessment: Pt seen for PT treatment session this date with interventions consisting of gait training w/ emphasis on improving pt's ability to ambulate level surfaces x 12' + 20' with min A provided by therapist with RW  Pt agreeable to PT treatment session upon arrival, pt found ambulating with therapist, in no apparent distress  In comparison to previous session, pt with improvements in pt requires decreased assistance with ambualtion with addition of RW use however pt continues to lack safety insight and is impulsive  Post session: pt returned back to recliner, chair alarm engaged, all needs in reach and RN notified of session findings/recommendations Continue to recommend STR at time of d/c in order to maximize pt's functional independence and safety w/ mobility  Pt continues to be functioning below baseline level, and remains limited 2* factors listed above and including decreased strength, balance, mobility, cognition, and safety awareness   PT will continue to see pt while here in order to address the deficits listed above and provide interventions consistent w/ POC in effort to achieve STGs       Katherine Lockhart, PT,DPT

## 2022-02-28 NOTE — PLAN OF CARE
Problem: PHYSICAL THERAPY ADULT  Goal: Performs mobility at highest level of function for planned discharge setting  See evaluation for individualized goals  Description: Treatment/Interventions: Functional transfer training,LE strengthening/ROM,Elevations,Therapeutic exercise,Cognitive reorientation,Patient/family training,Equipment eval/education,Bed mobility,Gait training,Compensatory technique education,Spoke to nursing,OT,Spoke to case management  Equipment Recommended:  (TBD by rehab)       See flowsheet documentation for full assessment, interventions and recommendations  2/28/2022 1532 by Xochitl Ortega PT  Outcome: Progressing  Note: Prognosis: Good  Problem List: Decreased strength,Decreased endurance,Impaired balance,Decreased cognition,Impaired judgement,Decreased safety awareness  Pt seen for PT treatment session this date with interventions consisting of gait training w/ emphasis on improving pt's ability to ambulate level surfaces x 12' + 20' with min A provided by therapist with RW  Pt agreeable to PT treatment session upon arrival, pt found ambulating with therapist, in no apparent distress  In comparison to previous session, pt with improvements in pt requires decreased assistance with ambualtion with addition of RW use however pt continues to lack safety insight and is impulsive  Post session: pt returned back to recliner, chair alarm engaged, all needs in reach and RN notified of session findings/recommendations Continue to recommend STR at time of d/c in order to maximize pt's functional independence and safety w/ mobility  Pt continues to be functioning below baseline level, and remains limited 2* factors listed above and including decreased strength, balance, mobility, cognition, and safety awareness  PT will continue to see pt while here in order to address the deficits listed above and provide interventions consistent w/ POC in effort to achieve STGs     Barriers to Discharge: Decreased caregiver support,Inaccessible home environment  Barriers to Discharge Comments: Pt with decreased safety awareness and requires increased assistance with mobility     PT Discharge Recommendation: Post acute rehabilitation services          See flowsheet documentation for full assessment

## 2022-02-28 NOTE — ASSESSMENT & PLAN NOTE
Malnutrition Findings:   Adult Malnutrition type: Chronic illness (related to cirrhosis, increased nutrient needs as evidenced by moderate muscle loss to temporalis, pectoralis, quadriceps and gastrocnemius muscles, 9 6% weight loss x 6 mo (8/16/21 230lb, 2/28/22 208lb)  )  Adult Degree of Malnutrition: Malnutrition of moderate degree (Treated with: Regular diet, ensure enlive TID, recommend daily weights)    BMI Findings: Body mass index is 26 83 kg/m²

## 2022-02-28 NOTE — CONSULTS
Consultation - [facility]  David Krueger 66 y o  male MRN: [id number]  Unit/Bed#: -01 Encounter: [id number]        Consults    Reason for Consult / Principal Problem:     Decompensated cirrhosis  Hepatic encephalopathy  Esophageal varices          ASSESSMENT AND PLAN:      Decompensated cirrhosis  Hepatic encephalopathy  Esophageal varices  -MELD-Na: 14, CP B  -Grade II hepatic encephalopathy(disoriented,+ asterixis)  -follows with [facility] hepatology(Dr Vanessa Muller)  -up-to-date with [facility]  screening and esophageal variceal screening  -unclear etiology of acute decompensation; no evidence of infection, portal vasculature appears to be patent, no evidence of GI bleed, minimal ascites but low likelihood of SBP given absence of leukocytosis, fever, abdominal pain  -may also be a component of dementia  -continue lactulose and titrate to 4 bowel movements per day; consider lactulose enema given  -do not see rule for Xifaxan as an inpatient at this was not approved as an outpatient and this would likely not be continued   -check zinc and if decreased recommend supplementation as this can also contribute to HE  -30-35kcal/kg/d, 1 2-1 5g/kg/d protein  -nutrition evaluation  -patient should follow-up with his primary hepatology/gastroenterology team    Gallbladder polyp/adenomyomatosis  -stable since 2019  -evaluated by surgery at [facility] management given decompensated cirrhosis    ______________________________________________________________________    HPI:  69-year-old male seen in consultation for biopsy-proven(2019) decompensated cirrhosis secondary to JAFFE complicated by hepatic encephalopathy  He also has significant past medical history for dementia and hypertension  He follows regularly with hepatology at [facility] (Dr Vanessa Muller)  Currently admitted due to worsening hepatic encephalopathy    Unfortunate unable to obtain any information from patient due to his hepatic encephalopathy and dementia  He is not oriented to place or time  He is maintained on lactulose at home and unfortunately Xifaxan was not approved by insurance  Presenting ammonia 244 down to 48 now increased to 78  UA without evidence of UTI, chest x-ray normal   No leukocytosis cytosis, fever, tachycardia to support infection  No abdominal pain  No GI bleeding  [facility]  screening up-to-date last ultrasound 12/2021 without mass  Last EGD 8/2021 with large esophageal varix with stigmata status post EVL x1  Vaccinated against HAV and HBV in 2020  History of stable gallbladder polyp/adenomyomatosis-previously evaluated by surgery however given decompensated cirrhosis conservative management              REVIEW OF SYSTEMS:    Review of Systems   Unable to perform ROS: Mental status change         Historical Information   Past Medical History:   Diagnosis Date    [facility]     D[facility]     Hypertension      Past Surgical History:   Procedure Laterality Date    REPLACEMENT TOTAL KNEE       Social History   Social History     Substance and Sexual Activity   Alcohol Use Not Currently    Comment: former social ETOH     Social History     Substance and Sexual Activity   Drug Use Never     Social History     Tobacco Use   Smoking Status Former Smoker   Smokeless Tobacco Never Used     History reviewed  No pertinent family history      Meds/Allergies     Medications Prior to Admission   Medication    aspirin (ECOTRIN LOW STRENGTH) 81 mg EC tablet    Cholecalciferol 25 MCG (1000 UT) tablet    donepezil (ARICEPT) 10 mg tablet    lactulose (CHRONULAC) 10 g/15 mL solution    lisinopril-hydrochlorothiazide (PRINZIDE,ZESTORETIC) 10-12 5 MG per tablet    memantine (NAMENDA) 10 mg tablet    polyethylene glycol (GOLYTELY) 4000 mL solution     Current Facility-Administered Medications   Medication Dose Route Frequency    acetaminophen (TYLENOL) tablet 650 mg  650 mg Oral Q6H PRN    aspirin (ECOTRIN LOW STRENGTH) EC tablet 81 mg  81 mg Oral Daily    donepezil (ARICEPT) tablet 10 mg  10 mg Oral HS    lactulose oral solution 30 g  30 g Oral 4x Daily    lisinopril (ZESTRIL) tablet 10 mg  10 mg Oral Daily    memantine (NAMENDA) tablet 10 mg  10 mg Oral BID    potassium chloride (K-DUR,KLOR-CON) CR tablet 40 mEq  40 mEq Oral Once    QUEtiapine (SEROquel) tablet 12 5 mg  12 5 mg Oral HS       No Known Allergies        Objective     Blood pressure 115/61, pulse 84, temperature 98 5 °F (36 9 °C), resp  rate 16, height 6' 2" (1 88 m), weight 94 8 kg (208 lb 15 9 oz), SpO2 94 %  Body mass index is 26 83 kg/m²  Intake/Output Summary (Last 24 hours) at 2/28/2022 0915  Last data filed at 2/28/2022 8760  Gross per 24 hour   Intake 958 ml   Output --   Net 958 ml         PHYSICAL EXAM:      Physical Exam  Constitutional:       General: He is not in acute distress  Appearance: He is obese  He is ill-appearing  HENT:      Head: Normocephalic  Eyes:      General: No scleral icterus  Cardiovascular:      Rate and Rhythm: Normal rate  Pulmonary:      Effort: Pulmonary effort is normal    Abdominal:      General: There is no distension  Palpations: Abdomen is soft  Tenderness: There is no abdominal tenderness  There is no guarding  Musculoskeletal:      Cervical back: Neck supple  Right lower leg: No edema  Left lower leg: No edema  Skin:     Coloration: Skin is not jaundiced  Neurological:      Mental Status: He is alert        Comments: Oriented to person not to time or place;+ asterixis             Lab Results:   Admission on 02/25/2022   Component Date Value    WBC 02/25/2022 4 44     RBC 02/25/2022 3 99     Hemoglobin 02/25/2022 12 9     Hematocrit 02/25/2022 36 3*    MCV 02/25/2022 91     MCH 02/25/2022 32 3     MCHC 02/25/2022 35 5     RDW 02/25/2022 13 7     MPV 02/25/2022 12 7     Platelets 73/20/8056 53*    nRBC 02/25/2022 0     Neutrophils Relative 02/25/2022 77*    Immat GRANS % 02/25/2022 0     Lymphocytes Relative 02/25/2022 12*    Monocytes Relative 02/25/2022 8     Eosinophils Relative 02/25/2022 2     Basophils Relative 02/25/2022 1     Neutrophils Absolute 02/25/2022 3 47     Immature Grans Absolute 02/25/2022 0 01     Lymphocytes Absolute 02/25/2022 0 52*    Monocytes Absolute 02/25/2022 0 34     Eosinophils Absolute 02/25/2022 0 08     Basophils Absolute 02/25/2022 0 02     Sodium 02/25/2022 141     Potassium 02/25/2022 4 0     Chloride 02/25/2022 108     CO2 02/25/2022 23     ANION GAP 02/25/2022 10     BUN 02/25/2022 15     Creatinine 02/25/2022 0 89     Glucose 02/25/2022 156*    Calcium 02/25/2022 8 3     Corrected Calcium 02/25/2022 9 5     AST 02/25/2022 61*    ALT 02/25/2022 53     Alkaline Phosphatase 02/25/2022 214*    Total Protein 02/25/2022 8 0     Albumin 02/25/2022 2 5*    Total Bilirubin 02/25/2022 2 12*    eGFR 02/25/2022 85     Lipase 02/25/2022 300     Magnesium 02/25/2022 2 1     Color, UA 02/26/2022 Yellow     Clarity, UA 02/26/2022 Clear     Specific Gravity, UA 02/26/2022 1 020     pH, UA 02/26/2022 8 0     Leukocytes, UA 02/26/2022 Negative     Nitrite, UA 02/26/2022 Negative     Protein, UA 02/26/2022 Negative     Glucose, UA 02/26/2022 Negative     Ketones, UA 02/26/2022 Negative     Urobilinogen, UA 02/26/2022 4 0*    Bilirubin, UA 02/26/2022 Negative     Blood, UA 02/26/2022 Negative     Protime 02/25/2022 16 1*    INR 02/25/2022 1 31*    PTT 02/25/2022 34     Ammonia 02/25/2022 233*    Total CK 02/25/2022 130     hs TnI 0hr 02/25/2022 4     NT-proBNP 02/25/2022 15     Blood Culture 02/25/2022 No Growth at 24 hrs   Blood Culture 02/25/2022 No Growth at 24 hrs       Bilirubin, Direct 02/25/2022 0 89*    Sodium 02/26/2022 142     Potassium 02/26/2022 3 4*    Chloride 02/26/2022 108     CO2 02/26/2022 22     ANION GAP 02/26/2022 12     BUN 02/26/2022 13     Creatinine 02/26/2022 0 90     Glucose 02/26/2022 108     Calcium 02/26/2022 8 5     Corrected Calcium 02/26/2022 9 8     AST 02/26/2022 62*    ALT 02/26/2022 47     Alkaline Phosphatase 02/26/2022 189*    Total Protein 02/26/2022 8 0     Albumin 02/26/2022 2 4*    Total Bilirubin 02/26/2022 2 30*    eGFR 02/26/2022 85     WBC 02/26/2022 4 49     RBC 02/26/2022 3 86*    Hemoglobin 02/26/2022 12 3     Hematocrit 02/26/2022 35 1*    MCV 02/26/2022 91     MCH 02/26/2022 31 9     MCHC 02/26/2022 35 0     RDW 02/26/2022 14 0     MPV 02/26/2022 13 0*    Platelets 91/65/3619 63*    nRBC 02/26/2022 0     Neutrophils Relative 02/26/2022 72     Immat GRANS % 02/26/2022 0     Lymphocytes Relative 02/26/2022 16     Monocytes Relative 02/26/2022 9     Eosinophils Relative 02/26/2022 3     Basophils Relative 02/26/2022 0     Neutrophils Absolute 02/26/2022 3 20     Immature Grans Absolute 02/26/2022 0 02     Lymphocytes Absolute 02/26/2022 0 73     Monocytes Absolute 02/26/2022 0 40     Eosinophils Absolute 02/26/2022 0 12     Basophils Absolute 02/26/2022 0 02     Ammonia 02/26/2022 48*    POC Glucose 02/25/2022 141*    Sodium 02/27/2022 140     Potassium 02/27/2022 3 5     Chloride 02/27/2022 107     CO2 02/27/2022 25     ANION GAP 02/27/2022 8     BUN 02/27/2022 16     Creatinine 02/27/2022 0 87     Glucose 02/27/2022 81     Calcium 02/27/2022 8 1*    Corrected Calcium 02/27/2022 9 5     AST 02/27/2022 52*    ALT 02/27/2022 43     Alkaline Phosphatase 02/27/2022 160*    Total Protein 02/27/2022 6 7     Albumin 02/27/2022 2 2*    Total Bilirubin 02/27/2022 2 36*    eGFR 02/27/2022 86     Ammonia 02/27/2022 78*    Protime 02/27/2022 17 3*    INR 02/27/2022 1 44*    WBC 02/27/2022 3 91*    RBC 02/27/2022 3 52*    Hemoglobin 02/27/2022 11 3*    Hematocrit 02/27/2022 32 6*    MCV 02/27/2022 93     MCH 02/27/2022 32 1     MCHC 02/27/2022 34 7     RDW 02/27/2022 14 2     MPV 02/27/2022 13 1*    Platelets 02/27/2022 49*    nRBC 02/27/2022 0     Neutrophils Relative 02/27/2022 64     Immat GRANS % 02/27/2022 0     Lymphocytes Relative 02/27/2022 21     Monocytes Relative 02/27/2022 10     Eosinophils Relative 02/27/2022 4     Basophils Relative 02/27/2022 1     Neutrophils Absolute 02/27/2022 2 52     Immature Grans Absolute 02/27/2022 0 01     Lymphocytes Absolute 02/27/2022 0 80     Monocytes Absolute 02/27/2022 0 39     Eosinophils Absolute 02/27/2022 0 17     Basophils Absolute 02/27/2022 0 02     Ventricular Rate 02/25/2022 78     Atrial Rate 02/25/2022 71     QRSD Interval 02/25/2022 84     QT Interval 02/25/2022 396     QTC Interval 02/25/2022 451     QRS Axis 02/25/2022 38     T Wave Axis 02/25/2022 32     Sodium 02/28/2022 138     Potassium 02/28/2022 3 4*    Chloride 02/28/2022 106     CO2 02/28/2022 23     ANION GAP 02/28/2022 9     BUN 02/28/2022 21     Creatinine 02/28/2022 1 13     Glucose 02/28/2022 94     Calcium 02/28/2022 8 4     Corrected Calcium 02/28/2022 9 9     AST 02/28/2022 49*    ALT 02/28/2022 44     Alkaline Phosphatase 02/28/2022 159*    Total Protein 02/28/2022 6 9     Albumin 02/28/2022 2 1*    Total Bilirubin 02/28/2022 1 68*    eGFR 02/28/2022 64     Ammonia 02/28/2022 83*       Imaging Studies: I have personally reviewed pertinent imaging studies

## 2022-02-28 NOTE — OCCUPATIONAL THERAPY NOTE
Occupational Therapy Evaluation     Patient Name: Esme MARTINEZ Date: 2/28/2022  Problem List  Principal Problem:    Hepatic encephalopathy ([facility] )  Active Problems:    Dementia with behavioral disturbance ([facility] )    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 ([facility] )    Hypertension    Thrombocytopenia ([facility] )    Moderate protein-calorie malnutrition ([facility] )    Past Medical History  Past Medical History:   Diagnosis Date    [facility]     D[facility]     Hypertension      Past Surgical History  Past Surgical History:   Procedure Laterality Date    REPLACEMENT TOTAL KNEE          02/28/22 1345   Note Type   Note type Evaluation   Restrictions/Precautions   Other Precautions Cognitive; Chair Alarm; Bed Alarm; Impulsive; Fall Risk   Pain Assessment   Pain Assessment Tool 0-10   Pain Score No Pain   Home Living   Type of Home House  (0 [place])   Home Layout Two level;Performs ADLs on one level; Able to live on main level with bedroom/bathroom   Bathroom Shower/Tub Walk-in shower   Bathroom Toilet Standard   Bathroom Equipment Grab bars in shower; Shower chair;Grab bars around toilet   [address] [facility]   Additional Comments Pt poor historian with baseline dementia, unsure of accuracy of reports  Pt initially reported living with son in a [facility]  Later reported his son isn't there all the time  Per H&P, son reporting pt has a 24/7 caregiver  Reports no AD use at baseline  Prior Function   Level of [place] Independent with ADLs and functional mobility   Lives With Son   Receives Help From Personal care attendant   ADL Assistance Independent   IADLs Needs assistance   Falls in the last 6 months 0   Vocational Retired   Comments Pt reports I with ADLs and has a cleaning lady come assist with IADLs  Pt H&P, pt required assist for ADLs but was independent iwth ambulation  ADL   UB Dressing Assistance 5  Supervision/Setup   UB Dressing Deficit Setup;Verbal cueing; Increased time to complete   LB Dressing Assistance 4 Minimal Assistance   LB Dressing Deficit Steadying; Don/doff L sock; Don/doff R sock   Additional Comments UB ADLs @ S  LB ADLs @ Min A for seated/standing balance deficits  Bed Mobility   Supine to Sit 5  Supervision   Additional items Increased time required;Verbal cues   Additional Comments Pt supine in bed at beginning of session  Supine to sit @ S  Transfers   Sit to Stand   (CGA)   Additional items Assist x 1; Increased time required;Verbal cues   Stand to Sit   (CGA)   Additional items Assist x 1; Increased time required;Verbal cues   Stand pivot 4  Minimal assistance   Additional items Assist x 1; Increased time required;Verbal cues   Additional Comments STS from EOB with no AD @ CGA  SPT to chair with no AD @ Min A x1  Pt able to complete approximately 5ft with no AD @ Mod A d/t balance deficits  Balance   Static Sitting Good   Dynamic Sitting Fair +   Static Standing Fair -   Dynamic Standing Poor +   Activity Tolerance   Activity Tolerance Patient limited by fatigue   Medical Staff Made Aware Co-eval with PT Chasity   Nurse Made Aware Spoke with CHARLEY Hsieh Postal   RUE Assessment   RUE Assessment WFL   LUE Assessment   LUE Assessment WFL   Hand Function   Gross Motor Coordination Functional   Fine Motor Coordination Functional   Sensation   Light Touch No apparent deficits   Cognition   Overall Cognitive Status Impaired   Arousal/Participation Alert; Responsive; Cooperative   Attention Difficulty attending to directions   Orientation Level Oriented to person;Oriented to place; Disoriented to situation;Disoriented to time   Memory Decreased recall of biographical information;Decreased short term memory;Decreased recall of recent events   Following Commands Follows one step commands with increased time or repetition   Comments Pt required increased cueing throughout for sequencing of tasks and safety awareness   Assessment   Limitation Decreased ADL status; Decreased Safe judgement during ADL;Decreased cognition;Decreased endurance;Decreased self-care trans;Decreased high-level ADLs   Prognosis Good   Assessment Pt is a 66 y o  male, admitted to [address] [place] [address] 2/25/2022 d/t experiencing increased confusion over 3-4 days  Dx: hepatic encephalopathy  Pt with PMHx impacting their performance during ADL tasks, including: dementia, hypertension and cirrhosis  Cognitive status PTA was Impaired  Pt poor historian, giving varying reports of home living set-up and PLOF compared to H&P  Pt reporting prior to admission to the hospital Pt was performing ADLs without physical assistance  IADLs with physical assistance  Functional transfers/ambulation without physical assistance  OT order placed to assess Pt's ADLs, cognitive status, and performance during functional tasks in order to maximize safety and independence while making most appropriate d/c recommendations  Pt's clinical presentation is currently evolving given new onset deficits that effect Pt's occupational performance and ability to safely return to PLOF including decrease activity tolerance, decrease standing balance, decrease sitting balance, decrease performance during ADL tasks, decrease cognition, decrease safety awareness , increase impulsiveness, decrease generalized strength, decrease activity engagement, decrease performance during functional transfers, high fall risk and limited insight to deficits combined with medical complications of change in mental status, abnormal H&H, abnormal CBC and need for input for mobility technique/safety  Personal factors affecting Pt at time of initial evaluation include: multi-level environment and new need for AD  At this time, Pt required OT/PT co-eval d/t significant deficits with mobility and safety concerns   Pt will benefit from continued skilled OT services to address deficits as defined above and to maximize level independence/participation during ADLs and functional tasks to facilitate return toward PLOF and improved quality of life  From an occupational therapy standpoint, recommendation at time of d/c would be post acute rehabilitation services  Plan   Treatment Interventions ADL retraining;Functional transfer training;UE strengthening/ROM; Endurance training;Cognitive reorientation;Patient/family training;Equipment evaluation/education; Neuromuscular reeducation; Compensatory technique education;Continued evaluation; Energy conservation; Activityengagement   Goal Expiration Date 03/14/22   OT Treatment Day 1   OT Frequency 3-5x/wk   Additional Treatment Session   Start Time 1400   End Time 1410   Treatment Assessment Pt completed OT tx session #1 on this date focused on ADL performance and functional mobility  Pt alert and agreeable to participate  Pt began session given RW for functional mobility d/t balance deficits  Pt able to complete functional mobility to/from bathrom, toilet and sink and return to chair with RW @ Min A  Toilet transfer @ [address]  Toileting @ CGA for balance deficits when standing  Pt able to wash hands while standing at sink with Min A for cueing with sequencing  Pt attempted to wet hands, required cues for location of soap, then dried hands off before rinsing them again  Pt appearing confused and required increased cueing for safety awareness and sequencing throughout     Recommendation   OT Discharge Recommendation Post acute rehabilitation services   AM-PAC Daily Activity Inpatient   Lower Body Dressing 3   Bathing 3   Toileting 3   Upper Body Dressing 3   Grooming 3   Eating 4   Daily Activity Raw Score 19   Daily Activity Standardized Score (Calc for Raw Score >=11) 40 22   AM-PAC Applied Cognition Inpatient   Following a Speech/Presentation 3   Understanding Ordinary Conversation 3   Taking Medications 2   Remembering Where Things Are Placed or Put Away 2   Remembering List of 4-5 Errands 2   Taking Care of Complicated Tasks 2   Applied Cognition Raw Score 14   Applied Cognition Standardized Score 32 02     The patient's raw score on the AM-PAC Daily Activity inpatient short form is 19, standardized score is 40 22, greater than 39 4  Patients at this level are likely to benefit from DC to home  Despite -[facility] recommendation of return to home, pt presents with decreased cognition and balance and would be a significant safety risk to return to home  Please refer to the recommendation of the Occupational Therapist for safe DC planning  Pt goals to be met by 3/14/2022    1  Pt will demonstrate ability to complete grooming/hygiene tasks @ Mod I after set-up  2  Pt will demonstrate ability to complete supine<>sit @ Mod I in order to increase safety and independence during ADL tasks  3  Pt will demonstrate ability to complete UB ADLs including washing/dressing @ Mod I in order to increase performance and participation during meaningful tasks  4  Pt will demonstrate ability to complete LB dressing @ S in order to increase safety and independence during meaningful tasks  5  Pt will demonstrate ability to celestine/doff socks/shoes while sitting EOB @ S in order to increase safety and independence during meaningful tasks  6  Pt will demonstrate ability to complete toileting tasks including CM and pericare @ S in order to increase safety and independence during meaningful tasks  7  Pt will demonstrate ability to complete EOB, chair, toilet/commode transfers @ S in order to increase performance and participation during functional tasks  8  Pt will demonstrate ability to stand for 5-10 minutes while maintaining Good balance with use of RW for UB support PRN  9  Pt will demonstrate ability to tolerate 30-35 minute OT session with no vc'ing for deep breathing or use of energy conservation techniques in order to increase activity tolerance during functional tasks     10  Pt will demonstrate Good carryover of use of energy conservation/compensatory strategies during ADLs and functional tasks in order to increase safety and reduce risk for falls  11  Pt will demonstrate Good attention and participation in continued evaluation of functional ambulation house hold distances in order to assist with safe d/c planning  12  Pt will attend to continued cognitive assessments 100% of the time in order to provide most appropriate d/c recommendations  13  Pt will follow 100% simple 2-step commands and be A&O x4 consistently with environmental cues to increase participation in functional activities  14  Pt will identify 3 areas of interest/hobbies and 1 intervention on how to incorporate into daily life in order to increase interaction with environment and peers as well as increase participation in meaningful tasks  15  Pt will demonstrate 100% carryover of BUE HEP in order to increase BUE MS and increase performance during functional tasks upon d/c home      Carolann Monterroso, OTR/L

## 2022-03-01 LAB
ALBUMIN SERPL BCP-MCNC: 2.1 G/DL (ref 3.5–5)
ALP SERPL-CCNC: 169 U/L (ref 46–116)
ALT SERPL W P-5'-P-CCNC: 44 U/L (ref 12–78)
AMMONIA PLAS-SCNC: 106 UMOL/L (ref 11–35)
ANION GAP SERPL CALCULATED.3IONS-SCNC: 8 MMOL/L (ref 4–13)
AST SERPL W P-5'-P-CCNC: 51 U/L (ref 5–45)
BASOPHILS # BLD AUTO: 0.02 THOUSANDS/ΜL (ref 0–0.1)
BASOPHILS NFR BLD AUTO: 1 % (ref 0–1)
BILIRUB SERPL-MCNC: 1.75 MG/DL (ref 0.2–1)
BUN SERPL-MCNC: 18 MG/DL (ref 5–25)
CALCIUM ALBUM COR SERPL-MCNC: 9.5 MG/DL (ref 8.3–10.1)
CALCIUM SERPL-MCNC: 8 MG/DL (ref 8.3–10.1)
CHLORIDE SERPL-SCNC: 107 MMOL/L (ref 100–108)
CO2 SERPL-SCNC: 24 MMOL/L (ref 21–32)
CREAT SERPL-MCNC: 0.89 MG/DL (ref 0.6–1.3)
EOSINOPHIL # BLD AUTO: 0.13 THOUSAND/ΜL (ref 0–0.61)
EOSINOPHIL NFR BLD AUTO: 3 % (ref 0–6)
ERYTHROCYTE [DISTWIDTH] IN BLOOD BY AUTOMATED COUNT: 14.2 % (ref 11.6–15.1)
GFR SERPL CREATININE-BSD FRML MDRD: 85 ML/MIN/1.73SQ M
GLUCOSE SERPL-MCNC: 79 MG/DL (ref 65–140)
HCT VFR BLD AUTO: 31.5 % (ref 36.5–49.3)
HGB BLD-MCNC: 11.2 G/DL (ref 12–17)
IMM GRANULOCYTES # BLD AUTO: 0.02 THOUSAND/UL (ref 0–0.2)
IMM GRANULOCYTES NFR BLD AUTO: 1 % (ref 0–2)
LYMPHOCYTES # BLD AUTO: 0.67 THOUSANDS/ΜL (ref 0.6–4.47)
LYMPHOCYTES NFR BLD AUTO: 18 % (ref 14–44)
MCH RBC QN AUTO: 31.9 PG (ref 26.8–34.3)
MCHC RBC AUTO-ENTMCNC: 35.6 G/DL (ref 31.4–37.4)
MCV RBC AUTO: 90 FL (ref 82–98)
MONOCYTES # BLD AUTO: 0.39 THOUSAND/ΜL (ref 0.17–1.22)
MONOCYTES NFR BLD AUTO: 10 % (ref 4–12)
NEUTROPHILS # BLD AUTO: 2.55 THOUSANDS/ΜL (ref 1.85–7.62)
NEUTS SEG NFR BLD AUTO: 67 % (ref 43–75)
NRBC BLD AUTO-RTO: 0 /100 WBCS
PLATELET # BLD AUTO: 48 THOUSANDS/UL (ref 149–390)
PMV BLD AUTO: 13.2 FL (ref 8.9–12.7)
POTASSIUM SERPL-SCNC: 3.9 MMOL/L (ref 3.5–5.3)
PROT SERPL-MCNC: 6.7 G/DL (ref 6.4–8.2)
RBC # BLD AUTO: 3.51 MILLION/UL (ref 3.88–5.62)
SODIUM SERPL-SCNC: 139 MMOL/L (ref 136–145)
WBC # BLD AUTO: 3.78 THOUSAND/UL (ref 4.31–10.16)

## 2022-03-01 PROCEDURE — 82140 ASSAY OF AMMONIA: CPT | Performed by: FAMILY MEDICINE

## 2022-03-01 PROCEDURE — 80053 COMPREHEN METABOLIC PANEL: CPT | Performed by: FAMILY MEDICINE

## 2022-03-01 PROCEDURE — 97535 SELF CARE MNGMENT TRAINING: CPT

## 2022-03-01 PROCEDURE — 85025 COMPLETE CBC W/AUTO DIFF WBC: CPT | Performed by: FAMILY MEDICINE

## 2022-03-01 PROCEDURE — 99232 SBSQ HOSP IP/OBS MODERATE 35: CPT | Performed by: STUDENT IN AN ORGANIZED HEALTH CARE EDUCATION/TRAINING PROGRAM

## 2022-03-01 RX ADMIN — LACTULOSE 30 G: 10 SOLUTION ORAL at 08:55

## 2022-03-01 RX ADMIN — DONEPEZIL HYDROCHLORIDE 10 MG: 5 TABLET, FILM COATED ORAL at 21:28

## 2022-03-01 RX ADMIN — LACTULOSE 30 G: 10 SOLUTION ORAL at 21:28

## 2022-03-01 RX ADMIN — LACTULOSE 30 G: 10 SOLUTION ORAL at 11:45

## 2022-03-01 RX ADMIN — QUETIAPINE FUMARATE 12.5 MG: 25 TABLET ORAL at 21:27

## 2022-03-01 RX ADMIN — MEMANTINE 10 MG: 10 TABLET ORAL at 17:33

## 2022-03-01 RX ADMIN — SODIUM CHLORIDE, SODIUM GLUCONATE, SODIUM ACETATE, POTASSIUM CHLORIDE, MAGNESIUM CHLORIDE, SODIUM PHOSPHATE, DIBASIC, AND POTASSIUM PHOSPHATE 75 ML/HR: .53; .5; .37; .037; .03; .012; .00082 INJECTION, SOLUTION INTRAVENOUS at 11:37

## 2022-03-01 RX ADMIN — LACTULOSE 30 G: 10 SOLUTION ORAL at 17:35

## 2022-03-01 RX ADMIN — ASPIRIN 81 MG: 81 TABLET, COATED ORAL at 08:55

## 2022-03-01 RX ADMIN — MEMANTINE 10 MG: 10 TABLET ORAL at 08:55

## 2022-03-01 RX ADMIN — LISINOPRIL 10 MG: 10 TABLET ORAL at 08:55

## 2022-03-01 NOTE — OCCUPATIONAL THERAPY NOTE
Occupational Therapy Progress Note     Patient Name: Maya MATADOttoI Date: 3/1/2022  Problem List  Principal Problem:    Hepatic encephalopathy ([facility] )  Active Problems:    Dementia with behavioral disturbance ([facility] )    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 ([facility] )    Hypertension    Thrombocytopenia ([facility] )    Moderate protein-calorie malnutrition ([facility] )            03/01/22 0834   Note Type   Note Type Treatment   Restrictions/Precautions   Other Precautions Cognitive; Chair Alarm; Bed Alarm; Impulsive;Multiple lines; Fall Risk   Pain Assessment   Pain Assessment Tool 0-10   Pain Score No Pain   ADL   Where Assessed Commode   Grooming Assistance   (Steadying Assist)   Grooming Deficit Steadying;Verbal cueing; Impulsive;Supervision/safety;Standing with assistive device; Wash/dry hands; Wash/dry face   Grooming Comments Pt standing at [place] to complete hand hygienea nd grooming tasks  Pt requiring Steadying Assist d/t balance and safety deficits  Pt impulsive at times   UB Bathing Assistance 5  Supervision/Setup   UB Bathing Deficit Setup;Verbal cueing;Supervision/safety; Increased time to complete   UB Bathing Comments vc'ing for thoroughness   LB Bathing Assistance   ([address] )   LB Bathing Deficit Steadying;Verbal cueing;Supervision/safety; Perineal area; Buttocks; Left lower leg including foot;Right lower leg including foot   LB Bathing Comments vc'ing for thoroughness and balance while standing to complete pericare hygiene   UB Dressing Assistance 5  Supervision/Setup   UB Dressing Deficit Setup   LB Dressing Assistance 4  Minimal Assistance   LB Dressing Deficit Steadying; Requires assistive device for steadying;Verbal cueing;Supervision/safety; Don/doff R sock; Don/doff L sock; Thread RLE into pants; Thread LLE into pants;Pull up over hips   LB Dressing Comments Min A for balnace, safety and thorougness while completing CM around waist  UB supported from RW PRN   vc'ing for impulsiveness   Toileting Assistance  4 Minimal Assistance   Toileting Deficit Steadying;Verbal cueing;Supervison/safety; Increased time to complete;Grab bar use;Clothing management up;Clothing management down;Perineal hygiene   Toileting Comments Min A for CM around waist while standing and thoroughness of posteriore pericare  Bed Mobility   Supine to Sit 5  Supervision   Additional items Increased time required   Additional Comments HOB flat, no bedrails   Transfers   Sit to Stand   [facility] Assist)   Additional items Impulsive;Verbal cues   Stand to Sit   (Steadying Assist)   Additional items Impulsive;Verbal cues   Stand pivot 4  Minimal assistance   Additional items Assist x 1; Impulsive;Verbal cues   Toilet transfer   (Steadying Assist)   Additional items Impulsive;Verbal cues;Standard toilet   Additional Comments Pt completing functional transfers with use of RW for UB support  Steadying Assist for STS with vc'ing for hand placement  Pt then requiri gnMinA for SPT d/t impulsiveness and inappropriate use of RW  Pt pushing walk ahead of self, increaseing fall risk  Pt requring Hardik for balance an dsafety as well as Mod A for RW managment while ambulating from restroom to recliner chair  RN present during ambulation to assist with IV line managemnet d/t impulsiveness and poor RW management  Cognition   Overall Cognitive Status Impaired   Arousal/Participation Alert; Responsive; Cooperative   Attention Attends with cues to redirect   Orientation Level Oriented to person;Oriented to place; Disoriented to time;Disoriented to situation   Following Commands Follows one step commands without difficulty   Activity Tolerance   Activity Tolerance Patient limited by fatigue   Medical Staff Made Aware Spoke with RN, Maxx Curry Pt seen for treatment session #2 this date  Pt alert and agreeable to participate at this time   Pt completing functional transfer and ambulation with minimal physical assistance required although d/t impulsiveness, safety concerns and improper use of RW, Min A was required at all times during ambulation  Pt with good participation during ADL tasks although vc;ing required for thoroughness  Pt demonstrates Good potential to make progress towards goals, although is limited by decrease activity tolerance, decrease standing balance, decrease sitting balance, decrease performance during ADL tasks, decrease cognition, decrease safety awareness , increase impulsiveness, decrease UB MS, decrease generalized strength, decrease activity engagement, decrease performance during functional transfers, high fall risk, limited insight to deficits and inability to express needs  Pt would benefit from continued acute OT services to address deficits as well as post acute rehab upon d/c from [address] [place] [address]  Plan   Treatment Interventions ADL retraining;Functional transfer training;UE strengthening/ROM; Endurance training;Cognitive reorientation;Patient/family training;Equipment evaluation/education; Neuromuscular reeducation; Compensatory technique education;Continued evaluation; Energy conservation; Activityengagement   Goal Expiration Date 03/14/22   OT Treatment Day 2   OT Frequency 3-5x/wk   Recommendation   OT Discharge Recommendation Post acute rehabilitation services   AM-PAC Daily Activity Inpatient   Lower Body Dressing 3   Bathing 3   Toileting 3   Upper Body Dressing 3   Grooming 3   Eating 4   Daily Activity Raw Score 19   Daily Activity Standardized Score (Calc for Raw Score >=11) 40 22   AM-PAC Applied Cognition Inpatient   Following a Speech/Presentation 2   Understanding Ordinary Conversation 3   Taking Medications 3   Remembering Where Things Are Placed or Put Away 3   Remembering List of 4-5 Errands 2   Taking Care of Complicated Tasks 2   Applied Cognition Raw Score 15   Applied Cognition Standardized Score 33 54     Pt goals to be met by 3/14/2022     1   Pt will demonstrate ability to complete grooming/hygiene tasks @ Mod I after set-up  2  Pt will demonstrate ability to complete supine<>sit @ Mod I in order to increase safety and independence during ADL tasks  3  Pt will demonstrate ability to complete UB ADLs including washing/dressing @ Mod I in order to increase performance and participation during meaningful tasks  4  Pt will demonstrate ability to complete LB dressing @ S in order to increase safety and independence during meaningful tasks  5  Pt will demonstrate ability to celestine/doff socks/shoes while sitting EOB @ S in order to increase safety and independence during meaningful tasks  6  Pt will demonstrate ability to complete toileting tasks including CM and pericare @ S in order to increase safety and independence during meaningful tasks  7  Pt will demonstrate ability to complete EOB, chair, toilet/commode transfers @ S in order to increase performance and participation during functional tasks  8  Pt will demonstrate ability to stand for 5-10 minutes while maintaining Good balance with use of RW for UB support PRN  9  Pt will demonstrate ability to tolerate 30-35 minute OT session with no vc'ing for deep breathing or use of energy conservation techniques in order to increase activity tolerance during functional tasks  10  Pt will demonstrate Good carryover of use of energy conservation/compensatory strategies during ADLs and functional tasks in order to increase safety and reduce risk for falls  11  Pt will demonstrate Good attention and participation in continued evaluation of functional ambulation house hold distances in order to assist with safe d/c planning  12  Pt will attend to continued cognitive assessments 100% of the time in order to provide most appropriate d/c recommendations  13  Pt will follow 100% simple 2-step commands and be A&O x4 consistently with environmental cues to increase participation in functional activities     14  Pt will identify 3 areas of interest/hobbies and 1 intervention on how to incorporate into daily life in order to increase interaction with environment and peers as well as increase participation in meaningful tasks     15  Pt will demonstrate 100% carryover of BUE HEP in order to increase BUE MS and increase performance during functional tasks upon d/c home      Mat Rizzo OTR/L

## 2022-03-01 NOTE — CASE MANAGEMENT
Case Management Discharge Planning Note    Patient name Bell Gardiner  Location /-01 MRN [id number]  : 1950 Date 3/1/2022       Current Admission Date: 2022  Current Admission Diagnosis:Hepatic encephalopathy [facility])   Patient Active Problem List    Diagnosis Date Noted    Moderate protein-calorie malnutrition ([facility] ) 2022    Thrombocytopenia ([facility] ) 2022    D[facility]     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 ([facility] )     Hepatic encephalopathy ([facility] )     Hypertension       LOS (days): 4  Geometric Mean LOS (GMLOS) (days): 3 20  Days to GMLOS:-0 4     OBJECTIVE:  Risk of Unplanned Readmission Score: 17         Current admission status: Inpatient   Preferred Pharmacy:   PATIENT/FAMILY REPORTS NO PREFERRED PHARMACY  No address on file      Primary Care Provider: No primary care provider on file  Primary Insurance: MEDICARE  Secondary Insurance: [employer]ET[employer]    DISCHARGE DETAILS:     CM spoke with patient son Kelsie Lozano, discussed discharge planning  Son Kelsie Lozano aware PT recommendations are STR, safety concerns  Son  Wants to speak with family and see if going to STR vs home with   care  Son to call CM back with preferences  STR or HHA  CM spoke with fernanda Lozano [phone] and he wants to take patient home with Home health  A post acute care recommendation was made by your care team for [facility]  Discussed Freedom of Choice with caregiver  List of agencies given to caregiver via phone  caregiver aware the list is custom filtered for them by preference  and that [facility] post acute providers are designated  Patient fernanda Lozano has no preferences, Cm to place referrals in [place] for local  HHA agencies  IMM completed verbally  via phone with Kelsie Lozano , cm provided phone number via phone to fernanda

## 2022-03-01 NOTE — PROGRESS NOTES
[facility]  Progress Note - Julien Mcelroy 66 y o  male MRN: [id number]    Unit/Bed#: -01 Encounter: [id number]    Assessment/Plan:  Decompensated cirrhosis  Hepatic encephalopathy  Esophageal varices  -MELD-Na: 14, CP B  -Grade I/II hepatic encephalopathy(disoriented, improved asterixis); 5 bowel movements documented today on lactulose 30 g q i d   -follows with [facility] hepatology(Dr Debbora Dakin)  -[facility]  screening and esophageal variceal screening(history of esophageal varices with stigmata requiring EVL)  -unclear etiology of acute decompensation; no evidence of infection, portal vasculature appears to be patent, no evidence of GI bleed(normal BUN, hemoglobin stable), minimal ascites but low likelihood of SBP given absence of leukocytosis, fever, abdominal pain  -may also be a component of dementia  -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 and will likely benefit from Xifaxan unfortunately this was not approved by insurance as an outpatient(with consider case management to work on this approval)  -zinc level pending as decreased levels can be contributory to HE  -30-35kcal/kg/d, 1 2-1 5g/kg/d protein  -nutrition evaluation  -patient should follow-up with his primary hepatology/gastroenterology team     Gallbladder polyp/adenomyomatosis  -stable since 2019  -evaluated by surgery at [facility] management given decompensated cirrhosis    Subjective:   Patient seen and examined  Appears more alert  Observed him walking with assistance with a walker from bathroom to chair and follows commands appropriately  He is still disoriented to time and place  Objective:     Vitals: Blood pressure 124/67, pulse 100, temperature 98 4 °F (36 9 °C), resp  rate 17, height 6' 2" (1 88 m), weight 97 2 kg (214 lb 6 oz), SpO2 98 %  ,Body mass index is 27 52 kg/m²        Intake/Output Summary (Last 24 hours) at 3/1/2022 1447  Last data filed at 3/1/2022 1211  Gross per 24 hour   Intake 1725 ml   Output -- Net 1725 ml       Review of Systems: as per HPI  Review of Systems    Physical Exam:     Physical Exam  Constitutional:       General: He is not in acute distress  Eyes:      General: No scleral icterus  Cardiovascular:      Rate and Rhythm: Normal rate  Abdominal:      General: There is no distension  Palpations: Abdomen is soft  Tenderness: There is no abdominal tenderness  Musculoskeletal:      Cervical back: Neck supple  Skin:     Coloration: Skin is not jaundiced  Neurological:      Mental Status: He is alert  He is disoriented        Comments: Asterixis improved but still present           Invasive Devices  Report    Peripheral Intravenous Line            Peripheral IV 02/28/22 Left Wrist <1 day                        CBC:   Lab Results   Component Value Date    WBC 3 78 (L) 03/01/2022    HGB 11 2 (L) 03/01/2022    HCT 31 5 (L) 03/01/2022    MCV 90 03/01/2022    PLT 48 (LL) 03/01/2022    MCH 31 9 03/01/2022    MCHC 35 6 03/01/2022    RDW 14 2 03/01/2022    MPV 13 2 (H) 03/01/2022    NRBC 0 03/01/2022   ,   CMP:   Lab Results   Component Value Date    K 3 9 03/01/2022     03/01/2022    CO2 24 03/01/2022    BUN 18 03/01/2022    CREATININE 0 89 03/01/2022    CALCIUM 8 0 (L) 03/01/2022    AST 51 (H) 03/01/2022    ALT 44 03/01/2022    ALKPHOS 169 (H) 03/01/2022    EGFR 85 03/01/2022

## 2022-03-01 NOTE — PLAN OF CARE
Problem: Potential for Falls  Goal: Patient will remain free of falls  Description: INTERVENTIONS:  - Educate patient/family on patient safety including physical limitations  - Instruct patient to call for assistance with activity   - Consult OT/PT to assist with strengthening/mobility   - Keep Call bell within reach  - Keep bed low and locked with side rails adjusted as appropriate  - Keep care items and personal belongings within reach  - Initiate and maintain comfort rounds  - Make Fall Risk Sign visible to staff  - Apply yellow socks and bracelet for high fall risk patients  - Consider moving patient to room near nurses station  Outcome: Progressing     Problem: PAIN - ADULT  Goal: Verbalizes/displays adequate comfort level or baseline comfort level  Description: Interventions:  - Encourage patient to monitor pain and request assistance  - Assess pain using appropriate pain scale  - Administer analgesics based on type and severity of pain and evaluate response  - Implement non-pharmacological measures as appropriate and evaluate response  - Consider cultural and social influences on pain and pain management  - Notify physician/advanced practitioner if interventions unsuccessful or patient reports new pain  Outcome: Progressing     Problem: INFECTION - ADULT  Goal: Absence or prevention of progression during hospitalization  Description: INTERVENTIONS:  - Assess and monitor for signs and symptoms of infection  - Monitor lab/diagnostic results  - Monitor all insertion sites, i e  indwelling lines, tubes, and drains  - Monitor endotracheal if appropriate and nasal secretions for changes in amount and color  - [place] appropriate cooling/warming therapies per order  - Administer medications as ordered  - Instruct and encourage patient and family to use good hand hygiene technique  - Identify and instruct in appropriate isolation precautions for identified infection/condition  Outcome: Progressing     Problem: SAFETY ADULT  Goal: Patient will remain free of falls  Description: INTERVENTIONS:  - Educate patient/family on patient safety including physical limitations  - Instruct patient to call for assistance with activity   - Consult OT/PT to assist with strengthening/mobility   - Keep Call bell within reach  - Keep bed low and locked with side rails adjusted as appropriate  - Keep care items and personal belongings within reach  - Initiate and maintain comfort rounds  - Make Fall Risk Sign visible to staff  - Offer Toileting every *2 Hours, in advance of need  - Initiate/Maintain bed and chair alarm  - Obtain necessary fall risk management equipment: bed alarm   - Apply yellow socks and bracelet for high fall risk patients  - Consider moving patient to room near nurses station  Outcome: Progressing  Goal: Maintain or return to baseline ADL function  Description: INTERVENTIONS:  -  Assess patient's ability to carry out ADLs; assess patient's baseline for ADL function and identify physical deficits which impact ability to perform ADLs (bathing, care of mouth/teeth, toileting, grooming, dressing, etc )  - Assess/evaluate cause of self-care deficits   - Assess range of motion  - Assess patient's mobility; develop plan if impaired  - Assess patient's need for assistive devices and provide as appropriate  - Encourage maximum independence but intervene and supervise when necessary  - Involve family in performance of ADLs  - Assess for home care needs following discharge   - Consider OT consult to assist with ADL evaluation and planning for discharge  - Provide patient education as appropriate  Outcome: Progressing  Goal: Maintains/Returns to pre admission functional level  Description: INTERVENTIONS:  - Perform BMAT or MOVE assessment daily    - Set and communicate daily mobility goal to care team and patient/family/caregiver     - Collaborate with rehabilitation services on mobility goals if consulted  - Out of bed for toileting  - Record patient progress and toleration of activity level   Outcome: Progressing     Problem: DISCHARGE PLANNING  Goal: Discharge to home or other facility with appropriate resources  Description: INTERVENTIONS:  - Identify barriers to discharge w/patient and caregiver  - Arrange for needed discharge resources and transportation as appropriate  - Identify discharge learning needs (meds, wound care, etc )  - Arrange for interpretive services to assist at discharge as needed  - Refer to Case Management Department for coordinating discharge planning if the patient needs post-hospital services based on physician/advanced practitioner order or complex needs related to functional status, cognitive ability, or social support system  Outcome: Progressing     Problem: Knowledge Deficit  Goal: Patient/family/caregiver demonstrates understanding of disease process, treatment plan, medications, and discharge instructions  Description: Complete learning assessment and assess knowledge base    Interventions:  - Provide teaching at level of understanding  - Provide teaching via preferred learning methods  Outcome: Progressing     Problem: GASTROINTESTINAL - ADULT  Goal: Minimal or absence of nausea and/or vomiting  Description: INTERVENTIONS:  - Administer IV fluids if ordered to ensure adequate hydration  - Maintain NPO status until nausea and vomiting are resolved  - Nasogastric tube if ordered  - Administer ordered antiemetic medications as needed  - Provide nonpharmacologic comfort measures as appropriate  - Advance diet as tolerated, if ordered  - Consider nutrition services referral to assist patient with adequate nutrition and appropriate food choices  Outcome: Progressing  Goal: Maintains or returns to baseline bowel function  Description: INTERVENTIONS:  - Assess bowel function  - Encourage oral fluids to ensure adequate hydration  - Administer IV fluids if ordered to ensure adequate hydration  - Administer ordered medications as needed  - Encourage mobilization and activity  - Consider nutritional services referral to assist patient with adequate nutrition and appropriate food choices  Outcome: Progressing  Goal: Maintains adequate nutritional intake  Description: INTERVENTIONS:  - Monitor percentage of each meal consumed  - Identify factors contributing to decreased intake, treat as appropriate  - Assist with meals as needed  - Monitor I&O, weight, and lab values if indicated  - Obtain nutrition services referral as needed  Outcome: Progressing     Problem: METABOLIC, FLUID AND ELECTROLYTES - ADULT  Goal: Electrolytes maintained within normal limits  Description: INTERVENTIONS:  - Monitor labs and assess patient for signs and symptoms of electrolyte imbalances  - Administer electrolyte replacement as ordered  - Monitor response to electrolyte replacements, including repeat lab results as appropriate  - Instruct patient on fluid and nutrition as appropriate  Outcome: Progressing     Problem: MOBILITY - ADULT  Goal: Maintain or return to baseline ADL function  Description: INTERVENTIONS:  -  Assess patient's ability to carry out ADLs; assess patient's baseline for ADL function and identify physical deficits which impact ability to perform ADLs (bathing, care of mouth/teeth, toileting, grooming, dressing, etc )  - Assess/evaluate cause of self-care deficits   - Assess range of motion  - Assess patient's mobility; develop plan if impaired  - Assess patient's need for assistive devices and provide as appropriate  - Encourage maximum independence but intervene and supervise when necessary  - Involve family in performance of ADLs  - Assess for home care needs following discharge   - Consider OT consult to assist with ADL evaluation and planning for discharge  - Provide patient education as appropriate  Outcome: Progressing  Goal: Maintains/Returns to pre admission functional level  Description: INTERVENTIONS:  - Perform BMAT or MOVE assessment daily    - Set and communicate daily mobility goal to care team and patient/family/caregiver  - Collaborate with rehabilitation services on mobility goals if consulted  - Perform Range of Motion 2 times a day  - Reposition patient every 2 hours  - Dangle patient 3 times a day  - Stand patient 5 times a day  - Ambulate patient 5 times a day  - Out of bed to chair 3 times a day   - Out of bed for meals 3 times a day  - Out of bed for toileting  - Record patient progress and toleration of activity level   Outcome: Progressing     Problem: Prexisting or High Potential for Compromised Skin Integrity  Goal: Skin integrity is maintained or improved  Description: INTERVENTIONS:  - Identify patients at risk for skin breakdown  - Assess and monitor skin integrity  - Assess and monitor nutrition and hydration status  - Monitor labs   - Assess for incontinence   - Turn and reposition patient  - Assist with mobility/ambulation  - Relieve pressure over bony prominences  - Avoid friction and shearing  - Provide appropriate hygiene as needed including keeping skin clean and dry  - Evaluate need for skin moisturizer/barrier cream  - Collaborate with interdisciplinary team   - Patient/family teaching  - Consider wound care consult   Outcome: Progressing     Problem: Nutrition/Hydration-ADULT  Goal: Nutrient/Hydration intake appropriate for improving, restoring or maintaining nutritional needs  Description: Monitor and assess patient's nutrition/hydration status for malnutrition  Collaborate with interdisciplinary team and initiate plan and interventions as ordered  Monitor patient's weight and dietary intake as ordered or per policy  Utilize nutrition screening tool and intervene as necessary  Determine patient's food preferences and provide high-protein, high-caloric foods as appropriate       INTERVENTIONS:  - Monitor oral intake, urinary output, labs, and treatment plans  - Assess nutrition and hydration status and recommend course of action  - Evaluate amount of meals eaten  - Assist patient with eating if necessary   - Allow adequate time for meals  - Recommend/ encourage appropriate diets, oral nutritional supplements, and vitamin/mineral supplements  - Order, calculate, and assess calorie counts as needed  - Recommend, monitor, and adjust tube feedings and TPN/PPN based on assessed needs  - Assess need for intravenous fluids  - Provide specific nutrition/hydration education as appropriate  - Include patient/family/caregiver in decisions related to nutrition  Outcome: Progressing

## 2022-03-01 NOTE — PLAN OF CARE
Problem: Potential for Falls  Goal: Patient will remain free of falls  Description: INTERVENTIONS:  - Educate patient/family on patient safety including physical limitations  - Instruct patient to call for assistance with activity   - Consult OT/PT to assist with strengthening/mobility   - Keep Call bell within reach  - Keep bed low and locked with side rails adjusted as appropriate  - Keep care items and personal belongings within reach  - Initiate and maintain comfort rounds  - Make Fall Risk Sign visible to staff  - Offer Toileting every   Hours, in advance of need  - Initiate/Maintain   alarm  - Obtain necessary fall risk management equipment:     - Apply yellow socks and bracelet for high fall risk patients  - Consider moving patient to room near nurses station  Outcome: Progressing     Problem: PAIN - ADULT  Goal: Verbalizes/displays adequate comfort level or baseline comfort level  Description: Interventions:  - Encourage patient to monitor pain and request assistance  - Assess pain using appropriate pain scale  - Administer analgesics based on type and severity of pain and evaluate response  - Implement non-pharmacological measures as appropriate and evaluate response  - Consider cultural and social influences on pain and pain management  - Notify physician/advanced practitioner if interventions unsuccessful or patient reports new pain  Outcome: Progressing     Problem: INFECTION - ADULT  Goal: Absence or prevention of progression during hospitalization  Description: INTERVENTIONS:  - Assess and monitor for signs and symptoms of infection  - Monitor lab/diagnostic results  - Monitor all insertion sites, i e  indwelling lines, tubes, and drains  - Monitor endotracheal if appropriate and nasal secretions for changes in amount and color  - [place] appropriate cooling/warming therapies per order  - Administer medications as ordered  - Instruct and encourage patient and family to use good hand hygiene technique  - Identify and instruct in appropriate isolation precautions for identified infection/condition  Outcome: Progressing     Problem: SAFETY ADULT  Goal: Patient will remain free of falls  Description: INTERVENTIONS:  - Educate patient/family on patient safety including physical limitations  - Instruct patient to call for assistance with activity   - Consult OT/PT to assist with strengthening/mobility   - Keep Call bell within reach  - Keep bed low and locked with side rails adjusted as appropriate  - Keep care items and personal belongings within reach  - Initiate and maintain comfort rounds  - Make Fall Risk Sign visible to staff  - Offer Toileting every   Hours, in advance of need  - Initiate/Maintain   alarm  - Obtain necessary fall risk management equipment:     - Apply yellow socks and bracelet for high fall risk patients  - Consider moving patient to room near nurses station  Outcome: Progressing  Goal: Maintain or return to baseline ADL function  Description: INTERVENTIONS:  -  Assess patient's ability to carry out ADLs; assess patient's baseline for ADL function and identify physical deficits which impact ability to perform ADLs (bathing, care of mouth/teeth, toileting, grooming, dressing, etc )  - Assess/evaluate cause of self-care deficits   - Assess range of motion  - Assess patient's mobility; develop plan if impaired  - Assess patient's need for assistive devices and provide as appropriate  - Encourage maximum independence but intervene and supervise when necessary  - Involve family in performance of ADLs  - Assess for home care needs following discharge   - Consider OT consult to assist with ADL evaluation and planning for discharge  - Provide patient education as appropriate  Outcome: Progressing  Goal: Maintains/Returns to pre admission functional level  Description: INTERVENTIONS:  - Perform BMAT or MOVE assessment daily    - Set and communicate daily mobility goal to care team and patient/family/caregiver  - Collaborate with rehabilitation services on mobility goals if consulted  - Perform Range of Motion   times a day  - Reposition patient every   hours  - Dangle patient   times a day  - Stand patient   times a day  - Ambulate patient   times a day  - Out of bed to chair   times a day   - Out of bed for meals   times a day  - Out of bed for toileting  - Record patient progress and toleration of activity level   Outcome: Progressing     Problem: DISCHARGE PLANNING  Goal: Discharge to home or other facility with appropriate resources  Description: INTERVENTIONS:  - Identify barriers to discharge w/patient and caregiver  - Arrange for needed discharge resources and transportation as appropriate  - Identify discharge learning needs (meds, wound care, etc )  - Arrange for interpretive services to assist at discharge as needed  - Refer to Case Management Department for coordinating discharge planning if the patient needs post-hospital services based on physician/advanced practitioner order or complex needs related to functional status, cognitive ability, or social support system  Outcome: Progressing     Problem: Knowledge Deficit  Goal: Patient/family/caregiver demonstrates understanding of disease process, treatment plan, medications, and discharge instructions  Description: Complete learning assessment and assess knowledge base    Interventions:  - Provide teaching at level of understanding  - Provide teaching via preferred learning methods  Outcome: Progressing     Problem: GASTROINTESTINAL - ADULT  Goal: Minimal or absence of nausea and/or vomiting  Description: INTERVENTIONS:  - Administer IV fluids if ordered to ensure adequate hydration  - Maintain NPO status until nausea and vomiting are resolved  - Nasogastric tube if ordered  - Administer ordered antiemetic medications as needed  - Provide nonpharmacologic comfort measures as appropriate  - Advance diet as tolerated, if ordered  - Consider nutrition services referral to assist patient with adequate nutrition and appropriate food choices  Outcome: Progressing  Goal: Maintains or returns to baseline bowel function  Description: INTERVENTIONS:  - Assess bowel function  - Encourage oral fluids to ensure adequate hydration  - Administer IV fluids if ordered to ensure adequate hydration  - Administer ordered medications as needed  - Encourage mobilization and activity  - Consider nutritional services referral to assist patient with adequate nutrition and appropriate food choices  Outcome: Progressing  Goal: Maintains adequate nutritional intake  Description: INTERVENTIONS:  - Monitor percentage of each meal consumed  - Identify factors contributing to decreased intake, treat as appropriate  - Assist with meals as needed  - Monitor I&O, weight, and lab values if indicated  - Obtain nutrition services referral as needed  Outcome: Progressing     Problem: METABOLIC, FLUID AND ELECTROLYTES - ADULT  Goal: Electrolytes maintained within normal limits  Description: INTERVENTIONS:  - Monitor labs and assess patient for signs and symptoms of electrolyte imbalances  - Administer electrolyte replacement as ordered  - Monitor response to electrolyte replacements, including repeat lab results as appropriate  - Instruct patient on fluid and nutrition as appropriate  Outcome: Progressing     Problem: MOBILITY - ADULT  Goal: Maintain or return to baseline ADL function  Description: INTERVENTIONS:  -  Assess patient's ability to carry out ADLs; assess patient's baseline for ADL function and identify physical deficits which impact ability to perform ADLs (bathing, care of mouth/teeth, toileting, grooming, dressing, etc )  - Assess/evaluate cause of self-care deficits   - Assess range of motion  - Assess patient's mobility; develop plan if impaired  - Assess patient's need for assistive devices and provide as appropriate  - Encourage maximum independence but intervene and supervise when necessary  - Involve family in performance of ADLs  - Assess for home care needs following discharge   - Consider OT consult to assist with ADL evaluation and planning for discharge  - Provide patient education as appropriate  Outcome: Progressing  Goal: Maintains/Returns to pre admission functional level  Description: INTERVENTIONS:  - Perform BMAT or MOVE assessment daily    - Set and communicate daily mobility goal to care team and patient/family/caregiver  - Collaborate with rehabilitation services on mobility goals if consulted  - Perform Range of Motion   times a day  - Reposition patient every   hours  - Dangle patient   times a day  - Stand patient   times a day  - Ambulate patient   times a day  - Out of bed to chair   times a day   - Out of bed for meals   times a day  - Out of bed for toileting  - Record patient progress and toleration of activity level   Outcome: Progressing     Problem: Prexisting or High Potential for Compromised Skin Integrity  Goal: Skin integrity is maintained or improved  Description: INTERVENTIONS:  - Identify patients at risk for skin breakdown  - Assess and monitor skin integrity  - Assess and monitor nutrition and hydration status  - Monitor labs   - Assess for incontinence   - Turn and reposition patient  - Assist with mobility/ambulation  - Relieve pressure over bony prominences  - Avoid friction and shearing  - Provide appropriate hygiene as needed including keeping skin clean and dry  - Evaluate need for skin moisturizer/barrier cream  - Collaborate with interdisciplinary team   - Patient/family teaching  - Consider wound care consult   Outcome: Progressing     Problem: Nutrition/Hydration-ADULT  Goal: Nutrient/Hydration intake appropriate for improving, restoring or maintaining nutritional needs  Description: Monitor and assess patient's nutrition/hydration status for malnutrition   Collaborate with interdisciplinary team and initiate plan and interventions as ordered  Monitor patient's weight and dietary intake as ordered or per policy  Utilize nutrition screening tool and intervene as necessary  Determine patient's food preferences and provide high-protein, high-caloric foods as appropriate       INTERVENTIONS:  - Monitor oral intake, urinary output, labs, and treatment plans  - Assess nutrition and hydration status and recommend course of action  - Evaluate amount of meals eaten  - Assist patient with eating if necessary   - Allow adequate time for meals  - Recommend/ encourage appropriate diets, oral nutritional supplements, and vitamin/mineral supplements  - Order, calculate, and assess calorie counts as needed  - Recommend, monitor, and adjust tube feedings and TPN/PPN based on assessed needs  - Assess need for intravenous fluids  - Provide specific nutrition/hydration education as appropriate  - Include patient/family/caregiver in decisions related to nutrition  Outcome: Progressing

## 2022-03-01 NOTE — PLAN OF CARE
Problem: OCCUPATIONAL THERAPY ADULT  Goal: Performs self-care activities at highest level of function for planned discharge setting  See evaluation for individualized goals  Description: Treatment Interventions: ADL retraining,Functional transfer training,UE strengthening/ROM,Endurance training,Cognitive reorientation,Patient/family training,Equipment evaluation/education,Neuromuscular reeducation,Compensatory technique education,Continued evaluation,Energy conservation,Activityengagement          See flowsheet documentation for full assessment, interventions and recommendations  Outcome: Progressing  Note: Limitation: Decreased ADL status,Decreased Safe judgement during ADL,Decreased cognition,Decreased endurance,Decreased self-care trans,Decreased high-level ADLs  Prognosis: Good  Assessment: Pt seen for treatment session #2 this date  Pt alert and agreeable to participate at this time  Pt completing functional transfer and ambulation with minimal physical assistance required although d/t impulsiveness, safety concerns and improper use of RW, Min A was required at all times during ambulation  Pt with good participation during ADL tasks although vc;ing required for thoroughness  Pt demonstrates Good potential to make progress towards goals, although is limited by decrease activity tolerance, decrease standing balance, decrease sitting balance, decrease performance during ADL tasks, decrease cognition, decrease safety awareness , increase impulsiveness, decrease UB MS, decrease generalized strength, decrease activity engagement, decrease performance during functional transfers, high fall risk, limited insight to deficits and inability to express needs  Pt would benefit from continued acute OT services to address deficits as well as post acute rehab upon d/c from [address] [place] [address]       OT Discharge Recommendation: Post acute rehabilitation services

## 2022-03-02 LAB
ALBUMIN SERPL BCP-MCNC: 2 G/DL (ref 3.5–5)
ALP SERPL-CCNC: 163 U/L (ref 46–116)
ALT SERPL W P-5'-P-CCNC: 40 U/L (ref 12–78)
AMMONIA PLAS-SCNC: 87 UMOL/L (ref 11–35)
ANION GAP SERPL CALCULATED.3IONS-SCNC: 6 MMOL/L (ref 4–13)
AST SERPL W P-5'-P-CCNC: 45 U/L (ref 5–45)
BASOPHILS # BLD AUTO: 0.02 THOUSANDS/ΜL (ref 0–0.1)
BASOPHILS # BLD AUTO: 0.03 THOUSANDS/ΜL (ref 0–0.1)
BASOPHILS NFR BLD AUTO: 0 % (ref 0–1)
BASOPHILS NFR BLD AUTO: 1 % (ref 0–1)
BILIRUB SERPL-MCNC: 2.11 MG/DL (ref 0.2–1)
BUN SERPL-MCNC: 14 MG/DL (ref 5–25)
CALCIUM ALBUM COR SERPL-MCNC: 9.4 MG/DL (ref 8.3–10.1)
CALCIUM SERPL-MCNC: 7.8 MG/DL (ref 8.3–10.1)
CHLORIDE SERPL-SCNC: 106 MMOL/L (ref 100–108)
CO2 SERPL-SCNC: 26 MMOL/L (ref 21–32)
CREAT SERPL-MCNC: 0.83 MG/DL (ref 0.6–1.3)
EOSINOPHIL # BLD AUTO: 0.03 THOUSAND/ΜL (ref 0–0.61)
EOSINOPHIL # BLD AUTO: 0.12 THOUSAND/ΜL (ref 0–0.61)
EOSINOPHIL NFR BLD AUTO: 0 % (ref 0–6)
EOSINOPHIL NFR BLD AUTO: 3 % (ref 0–6)
ERYTHROCYTE [DISTWIDTH] IN BLOOD BY AUTOMATED COUNT: 14 % (ref 11.6–15.1)
ERYTHROCYTE [DISTWIDTH] IN BLOOD BY AUTOMATED COUNT: 14.1 % (ref 11.6–15.1)
GFR SERPL CREATININE-BSD FRML MDRD: 87 ML/MIN/1.73SQ M
GLUCOSE SERPL-MCNC: 80 MG/DL (ref 65–140)
HCT VFR BLD AUTO: 29.9 % (ref 36.5–49.3)
HCT VFR BLD AUTO: 31.4 % (ref 36.5–49.3)
HGB BLD-MCNC: 10.6 G/DL (ref 12–17)
HGB BLD-MCNC: 11.3 G/DL (ref 12–17)
IMM GRANULOCYTES # BLD AUTO: 0.02 THOUSAND/UL (ref 0–0.2)
IMM GRANULOCYTES # BLD AUTO: 0.04 THOUSAND/UL (ref 0–0.2)
IMM GRANULOCYTES NFR BLD AUTO: 0 % (ref 0–2)
IMM GRANULOCYTES NFR BLD AUTO: 1 % (ref 0–2)
LACTATE SERPL-SCNC: 1.4 MMOL/L (ref 0.5–2)
LYMPHOCYTES # BLD AUTO: 0.43 THOUSANDS/ΜL (ref 0.6–4.47)
LYMPHOCYTES # BLD AUTO: 0.64 THOUSANDS/ΜL (ref 0.6–4.47)
LYMPHOCYTES NFR BLD AUTO: 15 % (ref 14–44)
LYMPHOCYTES NFR BLD AUTO: 4 % (ref 14–44)
MCH RBC QN AUTO: 32 PG (ref 26.8–34.3)
MCH RBC QN AUTO: 32.1 PG (ref 26.8–34.3)
MCHC RBC AUTO-ENTMCNC: 35.5 G/DL (ref 31.4–37.4)
MCHC RBC AUTO-ENTMCNC: 36 G/DL (ref 31.4–37.4)
MCV RBC AUTO: 89 FL (ref 82–98)
MCV RBC AUTO: 90 FL (ref 82–98)
MONOCYTES # BLD AUTO: 0.46 THOUSAND/ΜL (ref 0.17–1.22)
MONOCYTES # BLD AUTO: 0.86 THOUSAND/ΜL (ref 0.17–1.22)
MONOCYTES NFR BLD AUTO: 11 % (ref 4–12)
MONOCYTES NFR BLD AUTO: 8 % (ref 4–12)
NEUTROPHILS # BLD AUTO: 10.04 THOUSANDS/ΜL (ref 1.85–7.62)
NEUTROPHILS # BLD AUTO: 3.06 THOUSANDS/ΜL (ref 1.85–7.62)
NEUTS SEG NFR BLD AUTO: 69 % (ref 43–75)
NEUTS SEG NFR BLD AUTO: 88 % (ref 43–75)
NRBC BLD AUTO-RTO: 0 /100 WBCS
NRBC BLD AUTO-RTO: 0 /100 WBCS
PLATELET # BLD AUTO: 47 THOUSANDS/UL (ref 149–390)
PLATELET # BLD AUTO: 67 THOUSANDS/UL (ref 149–390)
PMV BLD AUTO: 13.2 FL (ref 8.9–12.7)
PMV BLD AUTO: 13.7 FL (ref 8.9–12.7)
POTASSIUM SERPL-SCNC: 3.6 MMOL/L (ref 3.5–5.3)
PROCALCITONIN SERPL-MCNC: 0.08 NG/ML
PROT SERPL-MCNC: 6.3 G/DL (ref 6.4–8.2)
RBC # BLD AUTO: 3.31 MILLION/UL (ref 3.88–5.62)
RBC # BLD AUTO: 3.52 MILLION/UL (ref 3.88–5.62)
SODIUM SERPL-SCNC: 138 MMOL/L (ref 136–145)
WBC # BLD AUTO: 11.43 THOUSAND/UL (ref 4.31–10.16)
WBC # BLD AUTO: 4.32 THOUSAND/UL (ref 4.31–10.16)

## 2022-03-02 PROCEDURE — 87040 BLOOD CULTURE FOR BACTERIA: CPT | Performed by: NURSE PRACTITIONER

## 2022-03-02 PROCEDURE — 99232 SBSQ HOSP IP/OBS MODERATE 35: CPT | Performed by: STUDENT IN AN ORGANIZED HEALTH CARE EDUCATION/TRAINING PROGRAM

## 2022-03-02 PROCEDURE — 82140 ASSAY OF AMMONIA: CPT | Performed by: STUDENT IN AN ORGANIZED HEALTH CARE EDUCATION/TRAINING PROGRAM

## 2022-03-02 PROCEDURE — 97530 THERAPEUTIC ACTIVITIES: CPT

## 2022-03-02 PROCEDURE — 97116 GAIT TRAINING THERAPY: CPT

## 2022-03-02 PROCEDURE — 85025 COMPLETE CBC W/AUTO DIFF WBC: CPT | Performed by: STUDENT IN AN ORGANIZED HEALTH CARE EDUCATION/TRAINING PROGRAM

## 2022-03-02 PROCEDURE — 0241U HB NFCT DS VIR RESP RNA 4 TRGT: CPT | Performed by: NURSE PRACTITIONER

## 2022-03-02 PROCEDURE — 84145 PROCALCITONIN (PCT): CPT | Performed by: NURSE PRACTITIONER

## 2022-03-02 PROCEDURE — 83605 ASSAY OF LACTIC ACID: CPT | Performed by: NURSE PRACTITIONER

## 2022-03-02 PROCEDURE — 80053 COMPREHEN METABOLIC PANEL: CPT | Performed by: STUDENT IN AN ORGANIZED HEALTH CARE EDUCATION/TRAINING PROGRAM

## 2022-03-02 PROCEDURE — 85025 COMPLETE CBC W/AUTO DIFF WBC: CPT | Performed by: NURSE PRACTITIONER

## 2022-03-02 RX ORDER — LORAZEPAM 2 MG/ML
1 INJECTION INTRAMUSCULAR ONCE
Status: COMPLETED | OUTPATIENT
Start: 2022-03-02 | End: 2022-03-02

## 2022-03-02 RX ORDER — ACETAMINOPHEN 650 MG/1
650 SUPPOSITORY RECTAL ONCE
Status: DISCONTINUED | OUTPATIENT
Start: 2022-03-02 | End: 2022-03-02

## 2022-03-02 RX ORDER — CEFTRIAXONE 2 G/50ML
2000 INJECTION, SOLUTION INTRAVENOUS EVERY 24 HOURS
Status: DISCONTINUED | OUTPATIENT
Start: 2022-03-02 | End: 2022-03-04 | Stop reason: HOSPADM

## 2022-03-02 RX ADMIN — CEFTRIAXONE 2000 MG: 2 INJECTION, SOLUTION INTRAVENOUS at 21:28

## 2022-03-02 RX ADMIN — LORAZEPAM 1 MG: 2 INJECTION INTRAMUSCULAR; INTRAVENOUS at 21:18

## 2022-03-02 RX ADMIN — QUETIAPINE FUMARATE 12.5 MG: 25 TABLET ORAL at 22:21

## 2022-03-02 RX ADMIN — LACTULOSE 30 G: 10 SOLUTION ORAL at 11:35

## 2022-03-02 RX ADMIN — SODIUM CHLORIDE, SODIUM GLUCONATE, SODIUM ACETATE, POTASSIUM CHLORIDE, MAGNESIUM CHLORIDE, SODIUM PHOSPHATE, DIBASIC, AND POTASSIUM PHOSPHATE 75 ML/HR: .53; .5; .37; .037; .03; .012; .00082 INJECTION, SOLUTION INTRAVENOUS at 14:13

## 2022-03-02 RX ADMIN — ASPIRIN 81 MG: 81 TABLET, COATED ORAL at 08:42

## 2022-03-02 RX ADMIN — DONEPEZIL HYDROCHLORIDE 10 MG: 5 TABLET, FILM COATED ORAL at 22:21

## 2022-03-02 RX ADMIN — SODIUM CHLORIDE, SODIUM GLUCONATE, SODIUM ACETATE, POTASSIUM CHLORIDE, MAGNESIUM CHLORIDE, SODIUM PHOSPHATE, DIBASIC, AND POTASSIUM PHOSPHATE 75 ML/HR: .53; .5; .37; .037; .03; .012; .00082 INJECTION, SOLUTION INTRAVENOUS at 01:19

## 2022-03-02 RX ADMIN — MEMANTINE 10 MG: 10 TABLET ORAL at 08:42

## 2022-03-02 RX ADMIN — LISINOPRIL 10 MG: 10 TABLET ORAL at 08:42

## 2022-03-02 RX ADMIN — LACTULOSE 30 G: 10 SOLUTION ORAL at 08:41

## 2022-03-02 RX ADMIN — ACETAMINOPHEN 650 MG: 325 TABLET ORAL at 20:45

## 2022-03-02 RX ADMIN — MEMANTINE 10 MG: 10 TABLET ORAL at 17:06

## 2022-03-02 NOTE — ASSESSMENT & PLAN NOTE
· Presents from home with altered mental status per family  Patient also with history of dementia, unclear baseline  · Hx JAFFE, decompensated cirrhosis  · Ammonia 233 on arrival  · UA negative, no source of infection at this time     · Current OP regimen : Lactulose 20mg q 6hr   · Currently on 30 mg QID - with improving asterixis and increasing bowel movements however ammonia levels uptrending

## 2022-03-02 NOTE — ASSESSMENT & PLAN NOTE
Malnutrition Findings:   Adult Malnutrition type: Chronic illness (related to cirrhosis, increased nutrient needs as evidenced by moderate muscle loss to temporalis, pectoralis, quadriceps and gastrocnemius muscles, 9 6% weight loss x 6 mo (8/16/21 230lb, 2/28/22 208lb)  )  Adult Degree of Malnutrition: Malnutrition of moderate degree (Treated with: Regular diet, ensure enlive TID, recommend daily weights)    BMI Findings: Body mass index is 27 52 kg/m²

## 2022-03-02 NOTE — CASE MANAGEMENT
Case Management Discharge Planning Note    Patient name Minster Aid  Location MS Izquierdo/-01 MRN [id number]  : 1950 Date 3/2/2022       Current Admission Date: 2022  Current Admission Diagnosis:Hepatic encephalopathy 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 ([facility] [address] [facility] )     Hepatic encephalopathy ([facility] [address] [facility] )     Hypertension       LOS (days): 5  Geometric Mean LOS (GMLOS) (days): 3 20  Days to GMLOS:-1 3     OBJECTIVE:  Risk of Unplanned Readmission Score: 16         Current admission status: Inpatient   Preferred Pharmacy:   PATIENT/FAMILY REPORTS NO PREFERRED PHARMACY  No address on file      Primary Care Provider: No primary care provider on file  Primary Insurance: MEDICARE  Secondary Insurance: AETNA    DISCHARGE DETAILS:    [facility] accepted patient  Cm called patient son Noe Orr for patient PCP   Dr Una Fletcher   Office called and verified patient is current patient of Dr Tripp Joshi  Son stated he is not sure of he is doing with right thing bringing patient home or will STR make patient stronger faster then come home  CM referred son to speak to therapist working with patient  CM discussed with therapy and they are calling patient son with update         discharge plan still pending HHA vs STR       Son Noe Orr aware anticipated dc 24 hrs

## 2022-03-02 NOTE — CASE MANAGEMENT
Case Management Discharge Planning Note    Patient name Neoma Nissen  Location /-01 MRN [id number]  : 1950 Date 3/2/2022       Current Admission Date: 2022  Current Admission Diagnosis:Hepatic encephalopathy 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 ([facility] [address] )     Hepatic encephalopathy ([facility] [address] )     Hypertension       LOS (days): 5  Geometric Mean LOS (GMLOS) (days): 3 20  Days to GMLOS:-1 4     OBJECTIVE:  Risk of Unplanned Readmission Score: 16         Current admission status: Inpatient   Preferred Pharmacy:   PATIENT/FAMILY REPORTS NO PREFERRED PHARMACY  No address on file      Primary Care Provider: Mary Lou Henriquez MD    Primary Insurance: MEDICARE  Secondary Insurance: AETNA    DISCHARGE DETAILS:     CM spoke with patient son Johnna Conde this afternoon  Aware STR looking at patient [facility] and rehab has a bed , tian gaffney  And kady gaffney looking at patient   Son is having difficulty making decision placing patient in STR -he had bad experience with his mother who had dementia recently passes away  Cm gave son number to [facility] to call and ask hours given to therapy  Son still second  guessing at  STR--HHA  Or  Out patient therapy with his  care  Patient son Johnna Conde aware its important for patient to follow up with Hepatology to manage ammonia level/ clinical symptoms  To avoid hepatic encephalopathic episodes that impair patient mental status and will make him weak  Patient saw  Dr Virgilio Gallardo hematology [facility] in past and dr Eris Castillo gastro   emailed option from [facility] to son Johnna Conde to look over and see if this would benefit patient for therapy at home      Johnna Conde does not want patient in STR- dementia unit -he is looking at home with out patient therapy          CM to follow up with son for anticipated dc tomorrow

## 2022-03-02 NOTE — PROGRESS NOTES
[address]  Progress Note - Roan Salmeron 1950, 66 y o  male MRN: [id number]  Unit/Bed#: [facility] Encounter: [id number]  Primary Care Provider: No primary care provider on file  Date and time admitted to hospital: 2/25/2022  9:43 PM    Moderate protein-calorie malnutrition (HCC)  Assessment & Plan  Malnutrition Findings:   Adult Malnutrition type: Chronic illness (related to cirrhosis, increased nutrient needs as evidenced by moderate muscle loss to temporalis, pectoralis, quadriceps and gastrocnemius muscles, 9 6% weight loss x 6 mo (8/16/21 230lb, 2/28/22 208lb)  )  Adult Degree of Malnutrition: Malnutrition of moderate degree (Treated with: Regular diet, ensure enlive TID, recommend daily weights)    BMI Findings: Body mass index is 27 52 kg/m²  Thrombocytopenia ([facility] [address] )  Assessment & Plan  · Secondary to cirrhosis  · Plt 54  · No evidence of acute bleeding at this time  Hgb stable  · Will hold DVT ppx at this time  · Trend cbc    Hypertension  Assessment & Plan  · Takes Prinzide, will switch to Lisinopril 10mg while inpatient    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 [facility])  Assessment & Plan  · Hx JAFFE diagnosed by biopsy in 2019  · Follows with [facility]   Scheduled for EGD in April 2022  · Last EGD in August 2021: large varice with band x 1   · MELD score 9  · Currently on lactulose 30 mg q6hr  Per chart review, working on approval for Rifaximin due to cost - hepatologist awaiting approval  · Case discussed with GI and recommendations noted  · Continue outpatient f/u with hepatology    Dementia with behavioral disturbance [facility])  Assessment & Plan  · Unclear baseline, lives at home with family and has outside caregivers    · Worsening behavioral disturbance secondary to hepatic encephalopathy  · Continue aricept, namenda patient has some sundowning at night will place him on Seroquel 12 5 mg at 5:00 p m  doing well    * Hepatic encephalopathy ([facility] [address] )  Assessment & Plan  · Presents from home with altered mental status per family  Patient also with history of dementia, unclear baseline  · Hx JAFFE, decompensated cirrhosis  · Ammonia 233 on arrival  · UA negative, no source of infection at this time  · Current OP regimen : Lactulose 20mg q 6hr   · Currently on 30 mg QID - with improving asterixis and increasing bowel movements however ammonia levels uptrending         VTE Pharmacologic Prophylaxis: VTE Score: 3 Moderate Risk (Score 3-4) - Pharmacological DVT Prophylaxis Contraindicated  Sequential Compression Devices Ordered  Patient Centered Rounds: I performed bedside rounds with nursing staff today  Discussions with Specialists or Other Care Team Provider: GI    Education and Discussions with Family / Patient: Updated  (son) via phone  Time Spent for Care: 30 minutes  More than 50% of total time spent on counseling and coordination of care as described above  Current Length of Stay: 4 day(s)  Current Patient Status: Inpatient   Certification Statement: The patient will continue to require additional inpatient hospital stay due to hepatic encephalopathy  Discharge Plan: Anticipate discharge in 24-48 hrs to home with home services  Code Status: Level 1 - Full Code    Subjective:   Patient seen and examined at bedside  Denies any complaints at this time  Does not seem oriented to time but knows he is in the hospital and knows himself  Very forgetful however and unable to remember what was discussed 5 minutes ago  Also very repetitive  Objective:     Vitals:   Temp (24hrs), Av 4 °F (36 9 °C), Min:98 1 °F (36 7 °C), Max:98 7 °F (37 1 °C)    Temp:  [98 1 °F (36 7 °C)-98 7 °F (37 1 °C)] 98 1 °F (36 7 °C)  HR:  [] 100  Resp:  [17] 17  BP: (124-129)/(67-75) 129/72  SpO2:  [96 %-98 %] 98 %  Body mass index is 27 52 kg/m²  Input and Output Summary (last 24 hours):      Intake/Output Summary (Last 24 hours) at 3/1/2022 190  Last data filed at 3/1/2022 1658  Gross per 24 hour   Intake 1485 ml   Output --   Net 1485 ml       Physical Exam:   Physical Exam  Vitals and nursing note reviewed  Constitutional:       Appearance: He is well-developed  HENT:      Head: Normocephalic and atraumatic  Eyes:      Conjunctiva/sclera: Conjunctivae normal    Cardiovascular:      Rate and Rhythm: Normal rate and regular rhythm  Heart sounds: No murmur heard  Pulmonary:      Effort: Pulmonary effort is normal  No respiratory distress  Breath sounds: Normal breath sounds  Abdominal:      Palpations: Abdomen is soft  Tenderness: There is no abdominal tenderness  Musculoskeletal:      Cervical back: Neck supple  Skin:     General: Skin is warm and dry  Neurological:      Mental Status: He is alert  Additional Data:     Labs:  Results from last 7 days   Lab Units 03/01/22  0503   WBC Thousand/uL 3 78*   HEMOGLOBIN g/dL 11 2*   HEMATOCRIT % 31 5*   PLATELETS Thousands/uL 48*   NEUTROS PCT % 67   LYMPHS PCT % 18   MONOS PCT % 10   EOS PCT % 3     Results from last 7 days   Lab Units 03/01/22  0503   SODIUM mmol/L 139   POTASSIUM mmol/L 3 9   CHLORIDE mmol/L 107   CO2 mmol/L 24   BUN mg/dL 18   CREATININE mg/dL 0 89   ANION GAP mmol/L 8   CALCIUM mg/dL 8 0*   ALBUMIN g/dL 2 1*   TOTAL BILIRUBIN mg/dL 1 75*   ALK PHOS U/L 169*   ALT U/L 44   AST U/L 51*   GLUCOSE RANDOM mg/dL 79     Results from last 7 days   Lab Units 02/27/22  0500   INR  1 44*     Results from last 7 days   Lab Units 02/25/22  2207   POC GLUCOSE mg/dl 141*               Lines/Drains:  Invasive Devices  Report    Peripheral Intravenous Line            Peripheral IV 02/28/22 Left Wrist 1 day                      Imaging:  Reviewed    Recent Cultures (last 7 days):   Results from last 7 days   Lab Units 02/25/22  2158   BLOOD CULTURE  No Growth at 72 hrs  No Growth at 72 hrs         Last 24 Hours Medication List:   Current Facility-Administered Medications Medication Dose Route Frequency Provider Last Rate    acetaminophen  650 mg Oral Q6H PRN DUSTY Wilkinson      aspirin  81 mg Oral Daily DUSTY Wilkinson      donepezil  10 mg Oral HS DUSTY Wilkinson      lactulose  30 g Oral 4x Daily Cindi Fernandez MD      lisinopril  10 mg Oral Daily Cindi Fernandez MD      memantine  10 mg Oral BID DUSTY Wilkinson      multi-electrolyte  75 mL/hr Intravenous Continuous Cindi Fernandez MD 75 mL/hr (03/01/22 1137)    QUEtiapine  12 5 mg Oral HS Cindi Fernandez MD          Today, Patient Was Seen By: Stacey Goldberg MD    **Please Note: This note may have been constructed using a voice recognition system  **

## 2022-03-02 NOTE — ASSESSMENT & PLAN NOTE
· Unclear baseline as patient has dementia with intermittent episodes of hepatic encephalopathy, lives at home with family and has outside caregivers    · Worsening behavioral disturbance secondary to hepatic encephalopathy  · Continue aricept, namenda   · Started on Seroquel 12 5 mg at 5:00 p m  due to sundowning - improved

## 2022-03-02 NOTE — PLAN OF CARE
Problem: Potential for Falls  Goal: Patient will remain free of falls  Description: INTERVENTIONS:  - Educate patient/family on patient safety including physical limitations  - Instruct patient to call for assistance with activity   - Consult OT/PT to assist with strengthening/mobility   - Keep Call bell within reach  - Keep bed low and locked with side rails adjusted as appropriate  - Keep care items and personal belongings within reach  - Initiate and maintain comfort rounds  - Make Fall Risk Sign visible to staff  -  - Apply yellow socks and bracelet for high fall risk patients  - Consider moving patient to room near nurses station  Outcome: Progressing     Problem: SAFETY ADULT  Goal: Patient will remain free of falls  Description: INTERVENTIONS:  - Educate patient/family on patient safety including physical limitations  - Instruct patient to call for assistance with activity   - Consult OT/PT to assist with strengthening/mobility   - Keep Call bell within reach  - Keep bed low and locked with side rails adjusted as appropriate  - Keep care items and personal belongings within reach  - Initiate and maintain comfort rounds  - Make Fall Risk Sign visible to staff    - Apply yellow socks and bracelet for high fall risk patients  - Consider moving patient to room near nurses station  Outcome: Progressing     Problem: METABOLIC, FLUID AND ELECTROLYTES - ADULT  Goal: Electrolytes maintained within normal limits  Description: INTERVENTIONS:  - Monitor labs and assess patient for signs and symptoms of electrolyte imbalances  - Administer electrolyte replacement as ordered  - Monitor response to electrolyte replacements, including repeat lab results as appropriate  - Instruct patient on fluid and nutrition as appropriate  Outcome: Progressing     Problem: Nutrition/Hydration-ADULT  Goal: Nutrient/Hydration intake appropriate for improving, restoring or maintaining nutritional needs  Description: Monitor and assess patient's nutrition/hydration status for malnutrition  Collaborate with interdisciplinary team and initiate plan and interventions as ordered  Monitor patient's weight and dietary intake as ordered or per policy  Utilize nutrition screening tool and intervene as necessary  Determine patient's food preferences and provide high-protein, high-caloric foods as appropriate       INTERVENTIONS:  - Monitor oral intake, urinary output, labs, and treatment plans  - Assess nutrition and hydration status and recommend course of action  - Evaluate amount of meals eaten  - Assist patient with eating if necessary   - Allow adequate time for meals  - Recommend/ encourage appropriate diets, oral nutritional supplements, and vitamin/mineral supplements  - Order, calculate, and assess calorie counts as needed  - Recommend, monitor, and adjust tube feedings and TPN/PPN based on assessed needs  - Assess need for intravenous fluids  - Provide specific nutrition/hydration education as appropriate  - Include patient/family/caregiver in decisions related to nutrition  Outcome: Progressing

## 2022-03-02 NOTE — ASSESSMENT & PLAN NOTE
· Presents from home with altered mental status per family  Patient also with history of dementia, unclear baseline  · Hx JAFFE, decompensated cirrhosis  · Ammonia 233 on arrival  · UA negative, no source of infection at this time  · Current OP regimen : Lactulose 20mg q 6hr   · Currently on 30 mg QID - with improving asterixis and increasing bowel movements, ammonia levels fluctuating  Discussed with GI and as per GI, we can monitor based on patient's clinical status more than ammonia levels  · Patient may benefit from Rifaximin which is being worked on by his hepatologist at this time   Previously not approved by insurance

## 2022-03-02 NOTE — PHYSICAL THERAPY NOTE
PHYSICAL THERAPY TREATMENT NOTE  NAME:  Allen Daniels  DATE: 03/02/22    Length Of Stay: 5  Performed at least 2 patient identifiers during session: Name and Birthday    TREATMENT:    03/02/22 0936   PT Last Visit   PT Visit Date 03/02/22   Note Type   Note Type Treatment   Pain Assessment   Pain Assessment Tool 0-10   Pain Score No Pain   Restrictions/Precautions   Other Precautions Cognitive; Chair Alarm; Bed Alarm; Fall Risk;Impulsive;Multiple lines   General   Chart Reviewed Yes   Response to Previous Treatment Patient with no complaints from previous session  Cognition   Arousal/Participation Alert; Responsive; Cooperative   Orientation Level Oriented to person;Disoriented to place; Disoriented to time;Disoriented to situation  (Unable to give day)   Following Commands Follows one step commands without difficulty   Subjective   Subjective "Oh my god, I am getting old"   Bed Mobility   Additional Comments Pt seated OOB in recliner at start and end of session with LE elevated, chair alarm engaged, and all needs within reach  Transfers   Sit to Stand   (sba)   Additional items Assist x 1; Increased time required;Verbal cues; Impulsive   Stand to Sit   (sba)   Additional items Assist x 1; Increased time required;Verbal cues; Impulsive   Stand pivot   (Steadying->minAx1)   Additional items Assist x 1; Increased time required;Verbal cues; Impulsive   Toilet transfer   (steadying)   Additional items Assist x 1; Increased time required;Verbal cues; Impulsive;Standard toilet   Additional Comments Use of RW for all transfers  SBA for sit<>stand with pt initally having correct hand placement for safety and then requiring min vc'ing for second sit<>stand for safety and technique  SPT initally with steadying assist with max vc'ing to turn with RW completely prior to sitting in recliner  Then requiring minAx1 for SPT due to impulsivity to turn without RW and with slight LOB posteriorly   Steadying assist for toilet transfer with vc'ing for grab bar use  Pt requiring some assistance with pericare  Ambulation/Elevation   Gait pattern Improper Weight shift; Inconsistent john; Foward flexed; Wide ELEAZAR; Short stride;Decreased foot clearance   Gait Assistance 4  Minimal assist   Additional items Assist x 1   Assistive Device Rolling walker   Distance 60'x2 with RW and minAx1 for RW management with vc'ing to step into ELEAZAR of RW and for decreased john speed for safety concerns  Pt with path deviation to L with cues for correction  Pt impulsive and requiring cues for directional changes  Stair Management Assistance   (steadying)   Additional items Assist x 1;Verbal cues   Stair Management Technique Two rails; Step to pattern; Foreward   Number of Stairs 10   Ambulation/Elevation Additional Comments Step to pattern leading with LLE to ascend and RLE to descend  Impulsive at times with vc'ing for technique  Balance   Static Sitting Good   Dynamic Sitting Fair +   Static Standing Fair -   Dynamic Standing Poor +   Ambulatory Poor +   Endurance Deficit   Endurance Deficit Yes   Endurance Deficit Description Fatigue   Activity Tolerance   Activity Tolerance Patient limited by fatigue   Nurse Made Aware RN   Assessment   Prognosis Good   Problem List Decreased strength;Decreased endurance; Impaired balance;Decreased cognition; Impaired judgement;Decreased safety awareness   Assessment Pt seen for PT treatment session this date with interventions consisting of gait training w/ emphasis on improving pt's ability to ambulate level surfaces x 60'x2 with min A provided by therapist with RW, therapeutic activity consisting of training: sit<>stand transfers and stand pivot transfers and navigating 10 stairs w/ B handrail with step to pattern with CGA  Pt agreeable to PT treatment session upon arrival, pt found seated OOB in recliner, in no apparent distress   In comparison to previous session, pt with improvements in ambulation distances with decreased assistance levels with RW use and with stair trial iniatiated  Pt continues to be impulsive and requires max cues for safety and technique with transfers, ambulation, and stair training  Pt continues to be limited due to decreased safety awareness, balance deficits, and impulsivity  Post session: pt returned back to recliner, chair alarm engaged and all needs in reach Continue to recommend STR at time of d/c in order to maximize pt's functional independence and safety w/ mobility  Pt continues to be functioning below baseline level, and remains limited 2* factors listed above  PT will continue to see pt while here in order to address the deficits listed above and provide interventions consistent w/ POC in effort to achieve STGs  Barriers to Discharge Decreased caregiver support; Inaccessible home environment   Barriers to Discharge Comments Pt with decreased safety awareness, increased assistance with mobility for safety   Goals   Patient Goals "Go home"   PT Treatment Day 1   Plan   Treatment/Interventions Functional transfer training;LE strengthening/ROM; Elevations; Therapeutic exercise; Endurance training;Cognitive reorientation;Patient/family training;Equipment eval/education; Bed mobility;Gait training;Spoke to nursing   Progress Progressing toward goals   PT Frequency 3-5x/wk   Recommendation   PT Discharge Recommendation Post acute rehabilitation services   Equipment Recommended   (TBD by rehab)   Additional Comments If pt/family should refuse PAR, 24/7 supervision from family needed d/t impuslivity and balance deficits with [address] Mobility Inpatient   Turning in Bed Without Bedrails 3   Lying on Back to Sitting on Edge of Flat Bed 3   Moving Bed to Chair 3   Standing Up From Chair 3   Walk in Room 3   Climb 3-5 Stairs 3   Basic Mobility Inpatient Raw Score 18   Basic Mobility Standardized Score 41 05   Highest Level Of Mobility   -[facility] Goal 6: Walk 10 steps or more   -[facility] Highest Level of Mobility 7: Walk 25 feet or more   -[facility] Goal Achieved Yes   Education   Education Provided Mobility training;Assistive device   Patient Reinforcement needed   End of Consult   Patient Position at End of Consult Bedside chair;Bed/Chair alarm activated; All needs within reach        The patient's AM-PAC Basic Mobility Inpatient Short Form Raw Score is 18  A Raw score of greater than 16 suggests the patient may benefit from discharge to home  Please also refer to the recommendation of the Physical Therapist for safe discharge planning  PAR recommended d/t balance deficits and safety concerns d/t impulsivity  Pt seen for PT treatment session this date with interventions consisting of gait training w/ emphasis on improving pt's ability to ambulate level surfaces x 60'x2 with min A provided by therapist with RW, therapeutic activity consisting of training: sit<>stand transfers and stand pivot transfers and navigating 10 stairs w/ B handrail with step to pattern with CGA  Pt agreeable to PT treatment session upon arrival, pt found seated OOB in recliner, in no apparent distress  In comparison to previous session, pt with improvements in ambulation distances with decreased assistance levels with RW use and with stair trial iniatiated  Pt continues to be impulsive and requires max cues for safety and technique with transfers, ambulation, and stair training  Pt continues to be limited due to decreased safety awareness, balance deficits, and impulsivity  Post session: pt returned back to recliner, chair alarm engaged and all needs in reach Continue to recommend STR at time of d/c in order to maximize pt's functional independence and safety w/ mobility  Pt continues to be functioning below baseline level, and remains limited 2* factors listed above  PT will continue to see pt while here in order to address the deficits listed above and provide interventions consistent w/ POC in effort to achieve STGs      Butch Webster PTA

## 2022-03-02 NOTE — PLAN OF CARE
Problem: PHYSICAL THERAPY ADULT  Goal: Performs mobility at highest level of function for planned discharge setting  See evaluation for individualized goals  Description: Treatment/Interventions: Functional transfer training,LE strengthening/ROM,Elevations,Therapeutic exercise,Cognitive reorientation,Patient/family training,Equipment eval/education,Bed mobility,Gait training,Compensatory technique education,Spoke to nursing,OT,Spoke to case management  Equipment Recommended:  (TBD by rehab)       See flowsheet documentation for full assessment, interventions and recommendations  Outcome: Progressing  Note: Prognosis: Good  Problem List: Decreased strength,Decreased endurance,Impaired balance,Decreased cognition,Impaired judgement,Decreased safety awareness  Assessment: Pt seen for PT treatment session this date with interventions consisting of gait training w/ emphasis on improving pt's ability to ambulate level surfaces x 60'x2 with min A provided by therapist with RW, therapeutic activity consisting of training: sit<>stand transfers and stand pivot transfers and navigating 10 stairs w/ B handrail with step to pattern with CGA  Pt agreeable to PT treatment session upon arrival, pt found seated OOB in recliner, in no apparent distress  In comparison to previous session, pt with improvements in ambulation distances with decreased assistance levels with RW use and with stair trial iniatiated  Pt continues to be impulsive and requires max cues for safety and technique with transfers, ambulation, and stair training  Pt continues to be limited due to decreased safety awareness, balance deficits, and impulsivity  Post session: pt returned back to recliner, chair alarm engaged and all needs in reach Continue to recommend STR at time of d/c in order to maximize pt's functional independence and safety w/ mobility  Pt continues to be functioning below baseline level, and remains limited 2* factors listed above   PT will continue to see pt while here in order to address the deficits listed above and provide interventions consistent w/ POC in effort to achieve STGs  Barriers to Discharge: Decreased caregiver support,Inaccessible home environment  Barriers to Discharge Comments: Pt with decreased safety awareness, increased assistance with mobility for safety     PT Discharge Recommendation: Post acute rehabilitation services          See flowsheet documentation for full assessment

## 2022-03-02 NOTE — PROGRESS NOTES
[address]  Progress Note - Jossy Zepeda 1950, 66 y o  male MRN: [id number]  Unit/Bed#: MS [facility] Encounter: [id number]  Primary Care Provider: Larry Pettit MD   Date and time admitted to hospital: 2/25/2022  9:43 PM    Moderate protein-calorie malnutrition ([facility] [address] )  Assessment & Plan  Malnutrition Findings:   Adult Malnutrition type: Chronic illness (related to cirrhosis, increased nutrient needs as evidenced by moderate muscle loss to temporalis, pectoralis, quadriceps and gastrocnemius muscles, 9 6% weight loss x 6 mo (8/16/21 230lb, 2/28/22 208lb)  )  Adult Degree of Malnutrition: Malnutrition of moderate degree (Treated with: Regular diet, ensure enlive TID, recommend daily weights)    BMI Findings: Body mass index is 28 22 kg/m²  Thrombocytopenia ([facility] [address] )  Assessment & Plan  · Secondary to cirrhosis  · Plt 54  · No evidence of acute bleeding at this time  Hgb stable  · Will hold DVT ppx at this time  · Trend cbc    Hypertension  Assessment & Plan  · Takes Prinzide, will switch to Lisinopril 10mg while inpatient    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 [facility])  Assessment & Plan  · Hx JAFFE diagnosed by biopsy in 2019  · Follows with [facility]   Scheduled for EGD in April 2022  · Last EGD in August 2021: large varice with band x 1   · MELD score 9  · Currently on lactulose 30 mg q6hr  Per chart review, working on approval for Rifaximin due to cost - hepatologist awaiting approval  · Case discussed with GI and recommendations noted  · Continue outpatient f/u with hepatology    Dementia with behavioral disturbance [facility])  Assessment & Plan  · Unclear baseline as patient has dementia with intermittent episodes of hepatic encephalopathy, lives at home with family and has outside caregivers    · Worsening behavioral disturbance secondary to hepatic encephalopathy  · Continue aricept, namenda   · Started on Seroquel 12 5 mg at 5:00 p m  due to sundowning - improved    * Hepatic encephalopathy ([facility] [address] )  Assessment & Plan  · Presents from home with altered mental status per family  Patient also with history of dementia, unclear baseline  · Hx JAFFE, decompensated cirrhosis  · Ammonia 233 on arrival  · UA negative, no source of infection at this time  · Current OP regimen : Lactulose 20mg q 6hr   · Currently on 30 mg QID - with improving asterixis and increasing bowel movements, ammonia levels fluctuating  Discussed with GI and as per GI, we can monitor based on patient's clinical status more than ammonia levels  · Patient may benefit from Rifaximin which is being worked on by his hepatologist at this time  Previously not approved by insurance        VTE Pharmacologic Prophylaxis: VTE Score: 3 Moderate Risk (Score 3-4) - Pharmacological DVT Prophylaxis Contraindicated  Sequential Compression Devices Ordered  Patient Centered Rounds: I performed bedside rounds with nursing staff today  Discussions with Specialists or Other Care Team Provider: GI    Education and Discussions with Family / Patient: Updated  (son) via phone  Time Spent for Care: 30 minutes  More than 50% of total time spent on counseling and coordination of care as described above  Current Length of Stay: 5 day(s)  Current Patient Status: Inpatient   Certification Statement: The patient will continue to require additional inpatient hospital stay due to pending rehab  Discharge Plan: Anticipate discharge in 24-48 hrs to rehab facility  Code Status: Level 1 - Full Code    Subjective:   Patient seen and examined at bedside  Oriented to self and place however not to time  Denies any complaints at this time  Very repetitive and persistently asking if he could go      Objective:     Vitals:   Temp (24hrs), Av 3 °F (36 8 °C), Min:98 1 °F (36 7 °C), Max:98 5 °F (36 9 °C)    Temp:  [98 1 °F (36 7 °C)-98 5 °F (36 9 °C)] 98 5 °F (36 9 °C)  HR:  [] 81  Resp:  [16-18] 16  BP: (128-130)/(66-76) 128/66  SpO2:  [97 %-98 %] 97 %  Body mass index is 28 22 kg/m²  Input and Output Summary (last 24 hours): Intake/Output Summary (Last 24 hours) at 3/2/2022 1407  Last data filed at 3/2/2022 1327  Gross per 24 hour   Intake 1200 ml   Output --   Net 1200 ml       Physical Exam:   Physical Exam  Vitals and nursing note reviewed  Constitutional:       Appearance: He is well-developed  HENT:      Head: Normocephalic and atraumatic  Eyes:      Conjunctiva/sclera: Conjunctivae normal    Cardiovascular:      Rate and Rhythm: Normal rate and regular rhythm  Heart sounds: No murmur heard  Pulmonary:      Effort: Pulmonary effort is normal  No respiratory distress  Breath sounds: Normal breath sounds  Abdominal:      Palpations: Abdomen is soft  Tenderness: There is no abdominal tenderness  Musculoskeletal:      Cervical back: Neck supple  Skin:     General: Skin is warm and dry  Neurological:      Mental Status: He is alert          Additional Data:     Labs:  Results from last 7 days   Lab Units 03/02/22  0439   WBC Thousand/uL 4 32   HEMOGLOBIN g/dL 10 6*   HEMATOCRIT % 29 9*   PLATELETS Thousands/uL 47*   NEUTROS PCT % 69   LYMPHS PCT % 15   MONOS PCT % 11   EOS PCT % 3     Results from last 7 days   Lab Units 03/02/22  0439   SODIUM mmol/L 138   POTASSIUM mmol/L 3 6   CHLORIDE mmol/L 106   CO2 mmol/L 26   BUN mg/dL 14   CREATININE mg/dL 0 83   ANION GAP mmol/L 6   CALCIUM mg/dL 7 8*   ALBUMIN g/dL 2 0*   TOTAL BILIRUBIN mg/dL 2 11*   ALK PHOS U/L 163*   ALT U/L 40   AST U/L 45   GLUCOSE RANDOM mg/dL 80     Results from last 7 days   Lab Units 02/27/22  0500   INR  1 44*     Results from last 7 days   Lab Units 02/25/22  2207   POC GLUCOSE mg/dl 141*               Lines/Drains:  Invasive Devices  Report    Peripheral Intravenous Line            Peripheral IV 02/28/22 Left Wrist 1 day                      Imaging:  Reviewed    Recent Cultures (last 7 days):   Results from last 7 days   Lab Units 02/25/22  7818   BLOOD CULTURE  No Growth at 72 hrs  No Growth at 72 hrs  Last 24 Hours Medication List:   Current Facility-Administered Medications   Medication Dose Route Frequency Provider Last Rate    acetaminophen  650 mg Oral Q6H PRN DUSTY Warner      aspirin  81 mg Oral Daily DUSTY Warner      donepezil  10 mg Oral HS DUSTY Warner      lactulose  30 g Oral 4x Daily Taya Gregorio MD      lisinopril  10 mg Oral Daily Taya Gregorio MD      memantine  10 mg Oral BID DUSTY Warner      multi-electrolyte  75 mL/hr Intravenous Continuous Taya Gregorio MD 75 mL/hr (03/02/22 0119)    QUEtiapine  12 5 mg Oral HS Taya Gregorio MD          Today, Patient Was Seen By: Dena Dover MD    **Please Note: This note may have been constructed using a voice recognition system  **

## 2022-03-02 NOTE — ASSESSMENT & PLAN NOTE
Malnutrition Findings:   Adult Malnutrition type: Chronic illness (related to cirrhosis, increased nutrient needs as evidenced by moderate muscle loss to temporalis, pectoralis, quadriceps and gastrocnemius muscles, 9 6% weight loss x 6 mo (8/16/21 230lb, 2/28/22 208lb)  )  Adult Degree of Malnutrition: Malnutrition of moderate degree (Treated with: Regular diet, ensure enlive TID, recommend daily weights)    BMI Findings: Body mass index is 28 22 kg/m²

## 2022-03-02 NOTE — ASSESSMENT & PLAN NOTE
· Hx JAFFE diagnosed by biopsy in 2019  · Follows with [facility]   Scheduled for EGD in April 2022  · Last EGD in August 2021: large varice with band x 1   · MELD score 9  · Currently on lactulose 30 mg q6hr   Per chart review, working on approval for Rifaximin due to cost - hepatologist awaiting approval  · Case discussed with GI and recommendations noted  · Continue outpatient f/u with hepatology

## 2022-03-03 ENCOUNTER — APPOINTMENT (INPATIENT)
Dept: CT IMAGING | Facility: HOSPITAL | Age: 72
DRG: 442 | End: 2022-03-03
Payer: MEDICARE

## 2022-03-03 PROBLEM — R65.10 SIRS (SYSTEMIC INFLAMMATORY RESPONSE SYNDROME) (HCC): Status: ACTIVE | Noted: 2022-03-03

## 2022-03-03 LAB
ALBUMIN SERPL BCP-MCNC: 2.2 G/DL (ref 3.5–5)
ALP SERPL-CCNC: 159 U/L (ref 46–116)
ALT SERPL W P-5'-P-CCNC: 42 U/L (ref 12–78)
AMMONIA PLAS-SCNC: 95 UMOL/L (ref 11–35)
ANION GAP SERPL CALCULATED.3IONS-SCNC: 8 MMOL/L (ref 4–13)
AST SERPL W P-5'-P-CCNC: 51 U/L (ref 5–45)
BACTERIA BLD CULT: NORMAL
BACTERIA BLD CULT: NORMAL
BACTERIA UR QL AUTO: ABNORMAL /HPF
BASOPHILS # BLD AUTO: 0.03 THOUSANDS/ΜL (ref 0–0.1)
BASOPHILS NFR BLD AUTO: 0 % (ref 0–1)
BILIRUB SERPL-MCNC: 2.71 MG/DL (ref 0.2–1)
BILIRUB UR QL STRIP: ABNORMAL
BUN SERPL-MCNC: 15 MG/DL (ref 5–25)
CALCIUM ALBUM COR SERPL-MCNC: 9.2 MG/DL (ref 8.3–10.1)
CALCIUM SERPL-MCNC: 7.8 MG/DL (ref 8.3–10.1)
CHLORIDE SERPL-SCNC: 104 MMOL/L (ref 100–108)
CLARITY UR: CLEAR
CO2 SERPL-SCNC: 24 MMOL/L (ref 21–32)
COLOR UR: ABNORMAL
CREAT SERPL-MCNC: 1.04 MG/DL (ref 0.6–1.3)
EOSINOPHIL # BLD AUTO: 0.07 THOUSAND/ΜL (ref 0–0.61)
EOSINOPHIL NFR BLD AUTO: 1 % (ref 0–6)
ERYTHROCYTE [DISTWIDTH] IN BLOOD BY AUTOMATED COUNT: 14.1 % (ref 11.6–15.1)
FLUAV RNA RESP QL NAA+PROBE: NEGATIVE
FLUBV RNA RESP QL NAA+PROBE: NEGATIVE
GFR SERPL CREATININE-BSD FRML MDRD: 70 ML/MIN/1.73SQ M
GLUCOSE SERPL-MCNC: 70 MG/DL (ref 65–140)
GLUCOSE UR STRIP-MCNC: NEGATIVE MG/DL
HCT VFR BLD AUTO: 32.9 % (ref 36.5–49.3)
HGB BLD-MCNC: 11.7 G/DL (ref 12–17)
HGB UR QL STRIP.AUTO: ABNORMAL
HYALINE CASTS #/AREA URNS LPF: ABNORMAL /LPF
IMM GRANULOCYTES # BLD AUTO: 0.03 THOUSAND/UL (ref 0–0.2)
IMM GRANULOCYTES NFR BLD AUTO: 0 % (ref 0–2)
KETONES UR STRIP-MCNC: NEGATIVE MG/DL
LEUKOCYTE ESTERASE UR QL STRIP: NEGATIVE
LYMPHOCYTES # BLD AUTO: 0.54 THOUSANDS/ΜL (ref 0.6–4.47)
LYMPHOCYTES NFR BLD AUTO: 5 % (ref 14–44)
MCH RBC QN AUTO: 32.1 PG (ref 26.8–34.3)
MCHC RBC AUTO-ENTMCNC: 35.6 G/DL (ref 31.4–37.4)
MCV RBC AUTO: 90 FL (ref 82–98)
MONOCYTES # BLD AUTO: 1.03 THOUSAND/ΜL (ref 0.17–1.22)
MONOCYTES NFR BLD AUTO: 10 % (ref 4–12)
NEUTROPHILS # BLD AUTO: 9.16 THOUSANDS/ΜL (ref 1.85–7.62)
NEUTS SEG NFR BLD AUTO: 84 % (ref 43–75)
NITRITE UR QL STRIP: NEGATIVE
NON-SQ EPI CELLS URNS QL MICRO: ABNORMAL /HPF
NRBC BLD AUTO-RTO: 0 /100 WBCS
PH UR STRIP.AUTO: 7.5 [PH]
PLATELET # BLD AUTO: 55 THOUSANDS/UL (ref 149–390)
PMV BLD AUTO: 12.9 FL (ref 8.9–12.7)
POTASSIUM SERPL-SCNC: 3.8 MMOL/L (ref 3.5–5.3)
PROCALCITONIN SERPL-MCNC: 0.19 NG/ML
PROT SERPL-MCNC: 6.9 G/DL (ref 6.4–8.2)
PROT UR STRIP-MCNC: ABNORMAL MG/DL
RBC # BLD AUTO: 3.64 MILLION/UL (ref 3.88–5.62)
RBC #/AREA URNS AUTO: ABNORMAL /HPF
RSV RNA RESP QL NAA+PROBE: NEGATIVE
SARS-COV-2 RNA RESP QL NAA+PROBE: NEGATIVE
SODIUM SERPL-SCNC: 136 MMOL/L (ref 136–145)
SP GR UR STRIP.AUTO: 1.02 (ref 1–1.03)
UROBILINOGEN UR QL STRIP.AUTO: >=8 E.U./DL
WBC # BLD AUTO: 10.86 THOUSAND/UL (ref 4.31–10.16)
WBC #/AREA URNS AUTO: ABNORMAL /HPF
ZINC SERPL-MCNC: 47 UG/DL (ref 44–115)

## 2022-03-03 PROCEDURE — 97530 THERAPEUTIC ACTIVITIES: CPT

## 2022-03-03 PROCEDURE — 97535 SELF CARE MNGMENT TRAINING: CPT

## 2022-03-03 PROCEDURE — 85025 COMPLETE CBC W/AUTO DIFF WBC: CPT | Performed by: STUDENT IN AN ORGANIZED HEALTH CARE EDUCATION/TRAINING PROGRAM

## 2022-03-03 PROCEDURE — 97112 NEUROMUSCULAR REEDUCATION: CPT

## 2022-03-03 PROCEDURE — 87505 NFCT AGENT DETECTION GI: CPT | Performed by: STUDENT IN AN ORGANIZED HEALTH CARE EDUCATION/TRAINING PROGRAM

## 2022-03-03 PROCEDURE — 82140 ASSAY OF AMMONIA: CPT | Performed by: STUDENT IN AN ORGANIZED HEALTH CARE EDUCATION/TRAINING PROGRAM

## 2022-03-03 PROCEDURE — 80053 COMPREHEN METABOLIC PANEL: CPT | Performed by: STUDENT IN AN ORGANIZED HEALTH CARE EDUCATION/TRAINING PROGRAM

## 2022-03-03 PROCEDURE — 84145 PROCALCITONIN (PCT): CPT | Performed by: NURSE PRACTITIONER

## 2022-03-03 PROCEDURE — 74177 CT ABD & PELVIS W/CONTRAST: CPT

## 2022-03-03 PROCEDURE — NC001 PR NO CHARGE: Performed by: PHYSICIAN ASSISTANT

## 2022-03-03 PROCEDURE — 71260 CT THORAX DX C+: CPT

## 2022-03-03 PROCEDURE — 97116 GAIT TRAINING THERAPY: CPT

## 2022-03-03 PROCEDURE — 81001 URINALYSIS AUTO W/SCOPE: CPT | Performed by: NURSE PRACTITIONER

## 2022-03-03 PROCEDURE — 99232 SBSQ HOSP IP/OBS MODERATE 35: CPT | Performed by: STUDENT IN AN ORGANIZED HEALTH CARE EDUCATION/TRAINING PROGRAM

## 2022-03-03 PROCEDURE — G1004 CDSM NDSC: HCPCS

## 2022-03-03 RX ADMIN — SODIUM CHLORIDE, SODIUM GLUCONATE, SODIUM ACETATE, POTASSIUM CHLORIDE, MAGNESIUM CHLORIDE, SODIUM PHOSPHATE, DIBASIC, AND POTASSIUM PHOSPHATE 75 ML/HR: .53; .5; .37; .037; .03; .012; .00082 INJECTION, SOLUTION INTRAVENOUS at 17:30

## 2022-03-03 RX ADMIN — QUETIAPINE FUMARATE 12.5 MG: 25 TABLET ORAL at 21:41

## 2022-03-03 RX ADMIN — IOHEXOL 100 ML: 350 INJECTION, SOLUTION INTRAVENOUS at 10:46

## 2022-03-03 RX ADMIN — MEMANTINE 10 MG: 10 TABLET ORAL at 10:00

## 2022-03-03 RX ADMIN — LACTULOSE 30 G: 10 SOLUTION ORAL at 12:09

## 2022-03-03 RX ADMIN — SODIUM CHLORIDE, SODIUM GLUCONATE, SODIUM ACETATE, POTASSIUM CHLORIDE, MAGNESIUM CHLORIDE, SODIUM PHOSPHATE, DIBASIC, AND POTASSIUM PHOSPHATE 75 ML/HR: .53; .5; .37; .037; .03; .012; .00082 INJECTION, SOLUTION INTRAVENOUS at 03:13

## 2022-03-03 RX ADMIN — DONEPEZIL HYDROCHLORIDE 10 MG: 5 TABLET, FILM COATED ORAL at 21:41

## 2022-03-03 RX ADMIN — LISINOPRIL 10 MG: 10 TABLET ORAL at 10:00

## 2022-03-03 RX ADMIN — MEMANTINE 10 MG: 10 TABLET ORAL at 17:29

## 2022-03-03 RX ADMIN — ASPIRIN 81 MG: 81 TABLET, COATED ORAL at 10:00

## 2022-03-03 RX ADMIN — CEFTRIAXONE 2000 MG: 2 INJECTION, SOLUTION INTRAVENOUS at 21:42

## 2022-03-03 NOTE — ASSESSMENT & PLAN NOTE
· Overnight, patient met SIRS criteria with tachycardia, fever to 102, leukocytosis  · Unclear etiology at this time, no overt signs of infection  · CT scan reviewed  · UA negative  · Repeat BCx pending  · Patient has some atelectasis - will order incentive spirometry  · PE benign with no abdominal pain  · Will continue to monitor  · Stool studies sent

## 2022-03-03 NOTE — ASSESSMENT & PLAN NOTE
· Hx JAFFE diagnosed by biopsy in 2019  Follows with [facility]   Scheduled for EGD in April 2022  · Last EGD in August 2021: large varice with band x 1   · MELD score 9  · Currently on lactulose 30 mg q6hr   Per chart review, working on approval for Rifaximin due to cost - hepatologist awaiting approval  · Case discussed with GI and recommendations noted  · Continue outpatient f/u with hepatology

## 2022-03-03 NOTE — PROGRESS NOTES
[facility]  Progress Note - Maya Monahan 67 y o  male MRN: [id number]    Unit/Bed#: MS [facility]-01 Encounter: [id number]    Assessment/Plan:  Decompensated cirrhosis  Hepatic encephalopathy  Esophageal varices  -MELD-Na: 14, CP B  -Grade I/II hepatic encephalopathy(disoriented, improved asterixis); 5 bowel movements documented today on lactulose 30 g q i d   -follows with [facility] hepatology(Dr Juan Trammell)  -up-to-date with [facility] [address]  screening and esophageal variceal screening(history of esophageal varices with stigmata requiring EVL)  -unclear etiology of acute decompensation; no evidence of infection, portal vasculature appears to be patent, no evidence of GI bleed(normal BUN, hemoglobin stable), minimal ascites but low likelihood of SBP given absence of leukocytosis, abdominal pain; T-max 102 2° tonight(as below)  -may also be a component of dementia  -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 management given decompensated cirrhosis    Fever  -T-max 102 2  -procalcitonin negative  -WBC elevated without left shift  -UA negative  -repeat blood culture results pending  -COVID-19/flu/RSV negative  -CT chest abdomen pelvis: atelectasis, cholelithiasis with small pericholecystic fluid and colonic wall thickening-both in the setting of ascites  Patient has no abdominal pain upon deep palpation in any quadrant   -check infectious stool studies(although less likely and diarrhea secondary to lactulose)  -continue to monitor  -incentive spirometry      Subjective:   Patient seen and examined today  Voices no complaints  Is pleasantly confused but alert  Objective:     Vitals: Blood pressure 149/87, pulse 95, temperature 99 °F (37 2 °C), resp  rate 17, height 6' 2" (1 88 m), weight 101 kg (223 lb 1 7 oz), SpO2 98 %  ,Body mass index is 28 65 kg/m²  Intake/Output Summary (Last 24 hours) at 3/3/2022 1409  Last data filed at 3/3/2022 1201  Gross per 24 hour   Intake 880 ml   Output 350 ml   Net 530 ml       Review of Systems: as per HPI  Review of Systems    Physical Exam:     Physical Exam  Constitutional:       General: He is not in acute distress  Eyes:      General: No scleral icterus  Cardiovascular:      Rate and Rhythm: Tachycardia present  Pulmonary:      Effort: Pulmonary effort is normal    Abdominal:      General: There is no distension  Palpations: Abdomen is soft  Tenderness: There is no abdominal tenderness  There is no guarding  Musculoskeletal:      Cervical back: Neck supple  Skin:     Coloration: Skin is not jaundiced  Neurological:      Mental Status: He is alert  Mental status is at baseline     Psychiatric:         Mood and Affect: Mood normal            Invasive Devices  Report    Peripheral Intravenous Line            Peripheral IV 03/03/22 Left;Ventral (anterior) Wrist <1 day                        CBC:   Lab Results   Component Value Date    WBC 10 86 (H) 03/03/2022    HGB 11 7 (L) 03/03/2022    HCT 32 9 (L) 03/03/2022    MCV 90 03/03/2022    PLT 55 (L) 03/03/2022    MCH 32 1 03/03/2022    MCHC 35 6 03/03/2022    RDW 14 1 03/03/2022    MPV 12 9 (H) 03/03/2022    NRBC 0 03/03/2022   ,   CMP:   Lab Results   Component Value Date    K 3 8 03/03/2022     03/03/2022    CO2 24 03/03/2022    BUN 15 03/03/2022    CREATININE 1 04 03/03/2022    CALCIUM 7 8 (L) 03/03/2022    AST 51 (H) 03/03/2022    ALT 42 03/03/2022    ALKPHOS 159 (H) 03/03/2022    EGFR 70 03/03/2022

## 2022-03-03 NOTE — OCCUPATIONAL THERAPY NOTE
Occupational Therapy Progress Note     Patient Name: Caridad Horvath  [id number] Date: 3/3/2022  Problem List  Principal Problem:    Hepatic encephalopathy ([facility] )  Active Problems:    Dementia with behavioral disturbance ([facility] )    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 ([facility] )    Hypertension    Thrombocytopenia ([facility] )    Moderate protein-calorie malnutrition ([place] [address] )            03/03/22 1213   Note Type   Note Type Treatment   Restrictions/Precautions   Other Precautions Cognitive; Chair Alarm; Bed Alarm;Multiple lines; Fall Risk   Pain Assessment   Pain Assessment Tool 0-10   Pain Score No Pain   ADL   Where Assessed Edge of bed   Grooming Assistance   (Steadying-Min A )   Grooming Deficit Steadying;Verbal cueing;Supervision/safety; Increased time to complete;Standing with assistive device; Wash/dry hands; Wash/dry face;Brushing hair   Grooming Comments Pt completing grooming tasks while standing at sinkside  Pt requiring Steadying-Min A d/t balance deficits and safety concerns  Pt completing oral care, hand hygiene and hair brushing   UB Dressing Assistance 5  Supervision/Setup   UB Dressing Deficit Setup;Verbal cueing;Supervision/safety; Increased time to complete   UB Dressing Comments while seated at EOB    [address]; Requires assistive device for steadying;Verbal cueing;Supervision/safety; Increased time to complete; Don/doff R sock; Don/doff L sock; Thread RLE into pants; Thread LLE into pants;Pull up over hips   LB Dressing Comments Min A for balance/safety during CM around waist while standing with UB supported from RW PRN  Pt donning B socks @ SBA with increased time/effort noted and vc'ing for tehcnique   Toileting Assistance  4  Minimal Assistance   Toileting Deficit Steadying;Verbal cueing;Supervison/safety; Increased time to complete;Grab bar use;Clothing management up;Clothing management down;Perineal hygiene   Toileting Comments Steadying Assist for hygiene tasks while standing with UB supported PRN  Pt requiring Min A for thorough CM around waist d/t balance deficits  vc'ing for self-correction of posterior lean   Functional Standing Tolerance   Time 2-3 minutes   Activity dynamic standing balance tasks   Comments Pt completing dynamic standing balance activity while standing at RW with 1 hand supported at all times  Pt instrusted to reach with 1 hand at a time to hit targets  Pt switching hand consistantly and having a difficult time following commands  Pt requiring Steadying Assist-Min A for balance and safety durin activity although Pt also noted to be leaning agaist chair with posterior legs  Activity completed to challenge Pt's ELEAZAR and ability to self-correct when LOB occurs in order to maximize safety and independence during ADLs and functional tasks  Bed Mobility   Supine to Sit 5  Supervision   Additional items HOB elevated; Increased time required;Verbal cues   Transfers   Sit to Stand   [facility] Assist)   Additional items Increased time required;Verbal cues   Stand to Sit   (Steadying-Min A)   Additional items Assist x 1; Increased time required;Verbal cues   Stand pivot 3  Moderate assistance   Additional items Assist x 1; Increased time required;Verbal cues; Impulsive   Toilet transfer   (Steadying Assist)   Additional items Assist x 1; Increased time required;Verbal cues;Standard toilet   Additional Comments Pt completing functional transfers with use of RW for UB support  Steadying Assist for STS with vc'ing for hand placement and technique  Pt completing SPT @ Mod A d/t forward lean, shuffling feet, RW being too far ahead of Pt and Pt requiring physical assistance for weight shifting, RW management and balance  Pt then requiring Steadying-Min A for stand->sit d/t impulsiveness and Pt "flopping" into chair  Cognition   Overall Cognitive Status Impaired   Arousal/Participation Alert; Responsive; Cooperative   Attention Attends with cues to redirect Orientation Level Oriented to person;Oriented to place; Disoriented to time;Disoriented to situation   Memory Decreased long term memory;Decreased recall of biographical information;Decreased short term memory   Following Commands Follows one step commands with increased time or repetition   Comments Pt with decreased short term memory and recall  difficult with following commands and carryover   Activity Tolerance   Activity Tolerance Patient limited by fatigue   Medical Staff Made Aware Spoke with Merline Pih and RN, Fercho donaldson   Assessment   Assessment Pt seen for treatment session #3 this date  Pt alert and agreeable to participate at this time  Pt completing ADLs with good participation although d/t decreased balance and safety, Pt continues to require physical assistance during standing balance tasks  Pt continues to demonstrate poor technique withuse of RW for ambulation and requires Mod A and Max cues for safety  Pt demonstrates Good potential to make progress towards goals, although is currently limited by decrease activity tolerance, decrease standing balance, decrease sitting balance, decrease performance during ADL tasks, decrease cognition, decrease safety awareness , increase impulsiveness, decrease UB MS, decrease generalized strength, decrease activity engagement, decrease performance during functional transfers, frequent falls, high fall risk and limited insight to deficits  Pt would benefit from continued acute OT services to address deficits as well as post acute rehab upon d/c from [address] [place] [address]  Plan   Treatment Interventions ADL retraining;Functional transfer training;UE strengthening/ROM; Endurance training;Cognitive reorientation;Patient/family training;Equipment evaluation/education; Neuromuscular reeducation; Compensatory technique education;Continued evaluation; Energy conservation; Activityengagement   Goal Expiration Date 03/14/22   OT Treatment Day 3   OT Frequency 3-5x/wk   Recommendation   OT Discharge Recommendation Post acute rehabilitation services   AM-PAC Daily Activity Inpatient   Lower Body Dressing 3   Bathing 2   Toileting 3   Upper Body Dressing 3   Grooming 3   Eating 4   Daily Activity Raw Score 18   Daily Activity Standardized Score (Calc for Raw Score >=11) 38 66   AM-PAC Applied Cognition Inpatient   Following a Speech/Presentation 2   Understanding Ordinary Conversation 3   Taking Medications 2   Remembering Where Things Are Placed or Put Away 2   Remembering List of 4-5 Errands 2   Taking Care of Complicated Tasks 2   Applied Cognition Raw Score 13   Applied Cognition Standardized Score 30 46     Pt goals to be met by 3/14/2022     1  Pt will demonstrate ability to complete grooming/hygiene tasks @ Mod I after set-up  2  Pt will demonstrate ability to complete supine<>sit @ Mod I in order to increase safety and independence during ADL tasks  3  Pt will demonstrate ability to complete UB ADLs including washing/dressing @ Mod I in order to increase performance and participation during meaningful tasks  4  Pt will demonstrate ability to complete LB dressing @ S in order to increase safety and independence during meaningful tasks  5  Pt will demonstrate ability to celestine/doff socks/shoes while sitting EOB @ S in order to increase safety and independence during meaningful tasks  6  Pt will demonstrate ability to complete toileting tasks including CM and pericare @ S in order to increase safety and independence during meaningful tasks  7  Pt will demonstrate ability to complete EOB, chair, toilet/commode transfers @ S in order to increase performance and participation during functional tasks  8  Pt will demonstrate ability to stand for 5-10 minutes while maintaining Good balance with use of RW for UB support PRN    9  Pt will demonstrate ability to tolerate 30-35 minute OT session with no vc'ing for deep breathing or use of energy conservation techniques in order to increase activity tolerance during functional tasks  10  Pt will demonstrate Good carryover of use of energy conservation/compensatory strategies during ADLs and functional tasks in order to increase safety and reduce risk for falls  11  Pt will demonstrate Good attention and participation in continued evaluation of functional ambulation house hold distances in order to assist with safe d/c planning  12  Pt will attend to continued cognitive assessments 100% of the time in order to provide most appropriate d/c recommendations  13  Pt will follow 100% simple 2-step commands and be A&O x4 consistently with environmental cues to increase participation in functional activities  14  Pt will identify 3 areas of interest/hobbies and 1 intervention on how to incorporate into daily life in order to increase interaction with environment and peers as well as increase participation in meaningful tasks     15  Pt will demonstrate 100% carryover of BUE HEP in order to increase BUE MS and increase performance during functional tasks upon d/c home      Leslye Sorensen OTR/L

## 2022-03-03 NOTE — PLAN OF CARE
Problem: PHYSICAL THERAPY ADULT  Goal: Performs mobility at highest level of function for planned discharge setting  See evaluation for individualized goals  Description: Treatment/Interventions: Functional transfer training,LE strengthening/ROM,Elevations,Therapeutic exercise,Cognitive reorientation,Patient/family training,Equipment eval/education,Bed mobility,Gait training,Compensatory technique education,Spoke to nursing,OT,Spoke to case management  Equipment Recommended:  (TBD by rehab)       See flowsheet documentation for full assessment, interventions and recommendations  Note: Prognosis: Good  Problem List: Decreased strength,Decreased endurance,Impaired balance,Decreased mobility,Impaired judgement,Decreased cognition,Decreased safety awareness,Decreased coordination  Assessment: Pt tolerated session fairly  He requires increased assistance with mobility this date associated with safety concerns with RW management and balance deficits  He requires sitting rest breaks between activities and increased verbal and manual cues for safety with mobility  He is pleasantly confused, cooperative with therapy  He is limited by decreased strength, balance, endurance and safety with impaired cognition  He will continue to benefit from PT services to maximize LOF  Barriers to Discharge: Decreased caregiver support,Inaccessible home environment  Barriers to Discharge Comments: requires increased assistance to complete all mobility for safety, requires hands on assistance for all mobility  PT Discharge Recommendation: Post acute rehabilitation services          See flowsheet documentation for full assessment

## 2022-03-03 NOTE — ASSESSMENT & PLAN NOTE
Malnutrition Findings:   Adult Malnutrition type: Chronic illness (related to cirrhosis, increased nutrient needs as evidenced by moderate muscle loss to temporalis, pectoralis, quadriceps and gastrocnemius muscles, 9 6% weight loss x 6 mo (8/16/21 230lb, 2/28/22 208lb)  )  Adult Degree of Malnutrition: Malnutrition of moderate degree (Treated with: Regular diet, ensure enlive TID, recommend daily weights)    BMI Findings: Body mass index is 28 65 kg/m²

## 2022-03-03 NOTE — POST FALL NOTE
Post Fall Note    No data recorded    Brief Description of Events  Requested assistance from PCA, upon entering room pt was found in sitting position on floor and stating 'help me up"  Denied need to use restroom  Pt denies hitting head  Small pink abrasions noted on both knees  Pt moving all extremities without difficulty  Pt assisted back to chair after assessment  Pt oriented to person and place, confused to time and situation as previous  Call bell in reach  Pt reinstructed to call for assistance  Chair alarm on  Dr Min made aware of same and assessed pt  Pt's son Ruth Ann Harrison called and made aware of same  Last Recorded Vitals  Blood pressure 149/87, pulse (!) 108, temperature 100 2 °F (37 9 °C), resp  rate 17, height 6' 2" (1 88 m), weight 101 kg (223 lb 1 7 oz), SpO2 96 %        Principal Problem:    Hepatic encephalopathy (HCC)  Active Problems:    Dementia with behavioral disturbance (HCC)    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

## 2022-03-03 NOTE — ASSESSMENT & PLAN NOTE
· Patient spiked a fever overnight and CT chest, abdomen and pelvis performed to rule out any underlying source of infection as patient not a reliable historian  · CT scan showing cholelithiasis with small pericholecystic fluid and fat stranding are nonspecific in the setting of ascites /underlying liver disease    · Discussed with GI and General surgery  · Low suspicion for cholecystitis or cholangitis at this time as patient's abdominal exam benign  · Will continue to monitor - should patient's clinical status deteriorate, HIDA may be considered

## 2022-03-03 NOTE — PHYSICAL THERAPY NOTE
PHYSICAL THERAPY TREATMENT NOTE  NAME:  Denita Goltz  DATE: 03/03/22    Length Of Stay: 6  Performed at least 2 patient identifiers during session: Name and Birthday    TREATMENT FLOW SHEET:      03/03/22 1212   PT Last Visit   PT Visit Date 03/03/22   Note Type   Note Type Treatment   Pain Assessment   Pain Assessment Tool 0-10   Pain Score No Pain   Restrictions/Precautions   Other Precautions Cognitive; Chair Alarm; Bed Alarm; Impulsive; Fall Risk   General   Chart Reviewed Yes   Response to Previous Treatment Patient reporting fatigue but able to participate  Cognition   Orientation Level Oriented to person;Oriented to place; Disoriented to situation  (partially time w March, 2012)   Comments unable to recall fall this AM    Subjective   Subjective "Really" (in respect to birthday today)   Bed Mobility   Supine to Sit 5  Supervision   Additional items Increased time required;Verbal cues   Additional Comments HOB flat without bedrail  required increased time and effort   Transfers   Sit to Stand   (steadying assistance)   Additional items Assist x 1; Increased time required;Verbal cues   Stand to Sit   (steadying assistance to minAx1 for controlled descent )   Stand pivot 3  Moderate assistance   Additional items Assist x 1; Increased time required;Verbal cues; Impulsive   Additional Comments use of RW  min cues for hand placement for safety with RW  achieved standing with steadying assistance with increased time and effort  stand to sit, requires steadying to minAx1 due to uncontrolled descent requiring manual and verbal cues for safety  spt with RW with modAx1 with manual cues for balance, wt shifting and RW management  cues to stay within ELEAZAR of RW and to turn completely prior to sitting  Ambulation/Elevation   Gait pattern Short stride;Decreased foot clearance; Foward flexed; Excessively slow; Shuffling   Gait Assistance   (mod to minAx1)   Additional items Assist x 1;Verbal cues   Assistive Device Rolling walker   Distance 14'x1, 25'x1 and 18'x1 with RW with moderate -->minimal assistance with verbal cues for staying wihtin ELEAZAR of RW, increased step length and foot clearance  pt pushing RW anterior outside COG requiring min to mod cues for safety with RW management  Balance   Static Sitting Good   Dynamic Sitting Fair +   Static Standing Fair -   Dynamic Standing Poor +   Ambulatory Poor   Activity Tolerance   Activity Tolerance Patient limited by fatigue   Medical Staff Made Aware OT, Tiburcio Ortega   Nurse Made Aware RN, Ashley Lowery   Exercises   Balance training  standing with 1 UE support reaching outside ELEAZAR anteriorly with steadying to minAx1  then reaching ipsi and contralaterally outside ELEAZAR high and kathleen requiring min to steadying assistance to prevent anterior LOB  Assessment   Prognosis Good   Problem List Decreased strength;Decreased endurance; Impaired balance;Decreased mobility; Impaired judgement;Decreased cognition;Decreased safety awareness;Decreased coordination   Barriers to Discharge Comments requires increased assistance to complete all mobility for safety, requires hands on assistance for all mobility  Goals   Patient Goals none stated   PT Treatment Day 2   Plan   Treatment/Interventions Functional transfer training;LE strengthening/ROM; Elevations; Therapeutic exercise; Endurance training;Patient/family training;Cognitive reorientation;Equipment eval/education; Bed mobility;Gait training; Compensatory technique education;Spoke to nursing;Spoke to case management;OT   Progress Slow progress, cognitive deficits   PT Frequency 3-5x/wk   Recommendation   PT Discharge Recommendation Post acute rehabilitation services   Equipment Recommended   (TBD by rehab)   Additional Comments should patient/family decline post acute rehab, pt will need HHPT and 24/7 caregiver assistance with mobility for safety to decrease fall risk      AM-PAC Basic Mobility Inpatient   Turning in Bed Without Bedrails 3   Lying on Back to Sitting on Edge of Flat Bed 3   Moving Bed to Chair 2   Standing Up From Chair 3   Walk in Room 2   Climb 3-5 Stairs 1   Basic Mobility Inpatient Raw Score 14   Basic Mobility Standardized Score 35 55   Highest Level Of Mobility   -[facility] Goal 4: Move to chair/commode   -[facility] Highest Level of Mobility 7: Walk 25 feet or more   -[facility] Goal Achieved Yes   Education   Education Provided Mobility training;Assistive device   Patient Reinforcement needed   End of Consult   Patient Position at End of Consult Bedside chair;Bed/Chair alarm activated; All needs within reach  (reclined)   End of Consult Comments pt reclined in recliner  Recliner moved to side of bed closest to door given fall this AM  Pt on posey alarm with all needs within reach  Pt requires PT /OT co-treat due to safety concerns with mobility and cognitive-behavioral impairments  The patient's AM-PAC Basic Mobility Inpatient Short Form Raw Score is 14  A Raw score of less than or equal to 16 suggests the patient may benefit from discharge to post-acute rehabilitation services  Please also refer to the recommendation of the Physical Therapist for safe discharge planning  Pt tolerated session fairly  He requires increased assistance with mobility this date associated with safety concerns with RW management and balance deficits  He requires sitting rest breaks between activities and increased verbal and manual cues for safety with mobility  He is pleasantly confused, cooperative with therapy  He is limited by decreased strength, balance, endurance and safety with impaired cognition  He will continue to benefit from PT services to maximize LOF       Maribell Mcmahon, PT,DPT

## 2022-03-03 NOTE — PLAN OF CARE
Problem: Potential for Falls  Goal: Patient will remain free of falls  Description: INTERVENTIONS:  - Educate patient/family on patient safety including physical limitations  - Instruct patient to call for assistance with activity   - Consult OT/PT to assist with strengthening/mobility   - Keep Call bell within reach  - Keep bed low and locked with side rails adjusted as appropriate  - Keep care items and personal belongings within reach  - Initiate and maintain comfort rounds  - Make Fall Risk Sign visible to staff  - Offer Toileting every 2 Hours, in advance of need  - Initiate/Maintain bed alarm  - Obtain necessary fall risk management equipment: call bell/ bed alarm  - Apply yellow socks and bracelet for high fall risk patients  - Consider moving patient to room near nurses station  Outcome: Progressing       Goal: Maintain or return to baseline ADL function  Description: INTERVENTIONS:  -  Assess patient's ability to carry out ADLs; assess patient's baseline for ADL function and identify physical deficits which impact ability to perform ADLs (bathing, care of mouth/teeth, toileting, grooming, dressing, etc )  - Assess/evaluate cause of self-care deficits   - Assess range of motion  - Assess patient's mobility; develop plan if impaired  - Assess patient's need for assistive devices and provide as appropriate  - Encourage maximum independence but intervene and supervise when necessary  - Involve family in performance of ADLs  - Assess for home care needs following discharge   - Consider OT consult to assist with ADL evaluation and planning for discharge  - Provide patient education as appropriate  Outcome: Progressing     Problem: GASTROINTESTINAL - ADULT  Goal: Minimal or absence of nausea and/or vomiting  Description: INTERVENTIONS:  - Administer IV fluids if ordered to ensure adequate hydration  - Maintain NPO status until nausea and vomiting are resolved  - Nasogastric tube if ordered  - Administer ordered antiemetic medications as needed  - Provide nonpharmacologic comfort measures as appropriate  - Advance diet as tolerated, if ordered  - Consider nutrition services referral to assist patient with adequate nutrition and appropriate food choices  Outcome: Progressing     Problem: MOBILITY - ADULT  Goal: Maintain or return to baseline ADL function  Description: INTERVENTIONS:  -  Assess patient's ability to carry out ADLs; assess patient's baseline for ADL function and identify physical deficits which impact ability to perform ADLs (bathing, care of mouth/teeth, toileting, grooming, dressing, etc )  - Assess/evaluate cause of self-care deficits   - Assess range of motion  - Assess patient's mobility; develop plan if impaired  - Assess patient's need for assistive devices and provide as appropriate  - Encourage maximum independence but intervene and supervise when necessary  - Involve family in performance of ADLs  - Assess for home care needs following discharge   - Consider OT consult to assist with ADL evaluation and planning for discharge  - Provide patient education as appropriate  Outcome: Progressing

## 2022-03-03 NOTE — PROGRESS NOTES
[address]  Progress Note - Lauren Burger 1950, 67 y o  male MRN: [id number]  Unit/Bed#: [facility] Encounter: [id number]  Primary Care Provider: Manjinder Hogan MD   Date and time admitted to hospital: 2/25/2022  9:43 PM    SIRS (systemic inflammatory response syndrome) (HCC)  Assessment & Plan  · Overnight, patient met SIRS criteria with tachycardia, fever to 102, leukocytosis  · Unclear etiology at this time, no overt signs of infection  · CT scan reviewed  · UA negative  · Repeat BCx pending  · Patient has some atelectasis - will order incentive spirometry  · PE benign with no abdominal pain  · Will continue to monitor  · Stool studies sent    Cholelithiasis  Assessment & Plan  · Patient spiked a fever overnight and CT chest, abdomen and pelvis performed to rule out any underlying source of infection as patient not a reliable historian  · CT scan showing cholelithiasis with small pericholecystic fluid and fat stranding are nonspecific in the setting of ascites /underlying liver disease  · Discussed with GI and General surgery  · Low suspicion for cholecystitis or cholangitis at this time as patient's abdominal exam benign  · Will continue to monitor - should patient's clinical status deteriorate, HIDA may be considered    Moderate protein-calorie malnutrition (HCC)  Assessment & Plan  Malnutrition Findings:   Adult Malnutrition type: Chronic illness (related to cirrhosis, increased nutrient needs as evidenced by moderate muscle loss to temporalis, pectoralis, quadriceps and gastrocnemius muscles, 9 6% weight loss x 6 mo (8/16/21 230lb, 2/28/22 208lb)  )  Adult Degree of Malnutrition: Malnutrition of moderate degree (Treated with: Regular diet, ensure enlive TID, recommend daily weights)    BMI Findings: Body mass index is 28 65 kg/m²  Thrombocytopenia ([address] )  Assessment & Plan  · Secondary to cirrhosis  · Plt 54  · No evidence of acute bleeding at this time   Hgb stable  · Will hold DVT ppx at this time  · Trend cbc    Hypertension  Assessment & Plan  · Takes Prinzide, will switch to Lisinopril 10mg while inpatient    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 [facility])  Assessment & Plan  · Hx JAFFE diagnosed by biopsy in 2019  Follows with [facility]   Scheduled for EGD in April 2022  · Last EGD in August 2021: large varice with band x 1   · MELD score 9  · Currently on lactulose 30 mg q6hr  Per chart review, working on approval for Rifaximin due to cost - hepatologist awaiting approval  · Case discussed with GI and recommendations noted  · Continue outpatient f/u with hepatology    Dementia with behavioral disturbance [facility])  Assessment & Plan  · Unclear baseline as patient has dementia with intermittent episodes of hepatic encephalopathy, lives at home with family and has outside caregivers  · Worsening behavioral disturbance secondary to hepatic encephalopathy  · Continue aricept, namenda   · Started on Seroquel 12 5 mg at 5:00 p m  due to sundowning - improved    * Hepatic encephalopathy ([facility] [address] )  Assessment & Plan  · Presents from home with altered mental status per family  Patient also with history of dementia, unclear baseline  In setting of history of JAFFE and decompensated cirrhosis with OP lactulose regimen of 20 mg q6h  · Ammonia 233 on arrival - Lactulose increased to 30 mg QID with improvement in mentation and level of alertness, no longer with asterixis  · Discussed with GI and as per GI, we can monitor based on patient's clinical status more than ammonia levels  · Patient may benefit from Rifaximin which is being worked on by his hepatologist at this time  Previously not approved by insurance        VTE Pharmacologic Prophylaxis: VTE Score: 3 Moderate Risk (Score 3-4) - Pharmacological DVT Prophylaxis Contraindicated  Sequential Compression Devices Ordered  Patient Centered Rounds: I performed bedside rounds with nursing staff today    Discussions with Specialists or Other Care Team Provider: GI, general surgery    Education and Discussions with Family / Patient: Updated  (son) via phone  Time Spent for Care: 30 minutes  More than 50% of total time spent on counseling and coordination of care as described above  Current Length of Stay: 6 day(s)  Current Patient Status: Inpatient   Certification Statement: The patient will continue to require additional inpatient hospital stay due to sepsis work-up  Discharge Plan: Anticipate discharge in 24-48 hrs to undecided as to whether patient will be going home vs rehab    Code Status: Level 1 - Full Code    Subjective:   Patient seen examined at bedside  Denies any complaints at this time  Denies any abdominal pain  Pleasantly confused  Had an episode of mechanical fall earlier this morning when patient was trying to stand up from his chair on his own but denies any pain following this  Objective:     Vitals:   Temp (24hrs), Av 9 °F (37 7 °C), Min:98 7 °F (37 1 °C), Max:102 2 °F (39 °C)    Temp:  [98 7 °F (37 1 °C)-102 2 °F (39 °C)] 98 7 °F (37 1 °C)  HR:  [] 97  Resp:  [17-24] 17  BP: (105-152)/(70-90) 105/71  SpO2:  [95 %-98 %] 98 %  Body mass index is 28 65 kg/m²  Input and Output Summary (last 24 hours): Intake/Output Summary (Last 24 hours) at 3/3/2022 1854  Last data filed at 3/3/2022 1730  Gross per 24 hour   Intake 1720 ml   Output 250 ml   Net 1470 ml       Physical Exam:   Physical Exam  Vitals and nursing note reviewed  Constitutional:       Appearance: He is well-developed  HENT:      Head: Normocephalic and atraumatic  Eyes:      Conjunctiva/sclera: Conjunctivae normal    Cardiovascular:      Rate and Rhythm: Normal rate and regular rhythm  Heart sounds: No murmur heard  Pulmonary:      Effort: Pulmonary effort is normal  No respiratory distress  Breath sounds: Normal breath sounds  Abdominal:      General: There is no distension        Palpations: Abdomen is soft       Tenderness: There is no abdominal tenderness  There is no guarding or rebound  Musculoskeletal:      Cervical back: Neck supple  Skin:     General: Skin is warm and dry  Neurological:      General: No focal deficit present  Mental Status: He is alert  Mental status is at baseline  He is disoriented  Additional Data:     Labs:  Results from last 7 days   Lab Units 03/03/22  1026   WBC Thousand/uL 10 86*   HEMOGLOBIN g/dL 11 7*   HEMATOCRIT % 32 9*   PLATELETS Thousands/uL 55*   NEUTROS PCT % 84*   LYMPHS PCT % 5*   MONOS PCT % 10   EOS PCT % 1     Results from last 7 days   Lab Units 03/03/22  0532   SODIUM mmol/L 136   POTASSIUM mmol/L 3 8   CHLORIDE mmol/L 104   CO2 mmol/L 24   BUN mg/dL 15   CREATININE mg/dL 1 04   ANION GAP mmol/L 8   CALCIUM mg/dL 7 8*   ALBUMIN g/dL 2 2*   TOTAL BILIRUBIN mg/dL 2 71*   ALK PHOS U/L 159*   ALT U/L 42   AST U/L 51*   GLUCOSE RANDOM mg/dL 70     Results from last 7 days   Lab Units 02/27/22  0500   INR  1 44*     Results from last 7 days   Lab Units 02/25/22  2207   POC GLUCOSE mg/dl 141*         Results from last 7 days   Lab Units 03/03/22  0532 03/02/22  2140   LACTIC ACID mmol/L  --  1 4   PROCALCITONIN ng/ml 0 19 0 08       Lines/Drains:  Invasive Devices  Report    Peripheral Intravenous Line            Peripheral IV 03/03/22 Left;Ventral (anterior) Wrist <1 day                      Imaging:  Reviewed    Recent Cultures (last 7 days):   Results from last 7 days   Lab Units 03/02/22  2141 02/25/22  2158   BLOOD CULTURE  Received in Microbiology Lab  Culture in Progress  Received in Microbiology Lab  Culture in Progress  No Growth After 5 Days  No Growth After 5 Days         Last 24 Hours Medication List:   Current Facility-Administered Medications   Medication Dose Route Frequency Provider Last Rate    acetaminophen  650 mg Oral Q6H PRN DUSTY Dougherty      aspirin  81 mg Oral Daily DUSTY Dougherty      cefTRIAXone  2,000 mg Intravenous Q24H Breanna CORDOBA AaronKEAGANNP 2,000 mg (03/02/22 2128)    donepezil  10 mg Oral HS DUSTY Padgett      lactulose  30 g Oral 4x Daily Calin Camargo MD      lisinopril  10 mg Oral Daily Calin Camargo MD      memantine  10 mg Oral BID DUSTY Padgett      multi-electrolyte  75 mL/hr Intravenous Continuous Calin Camargo MD 75 mL/hr (03/03/22 1730)    QUEtiapine  12 5 mg Oral HS Calin Camargo MD          Today, Patient Was Seen By: Parisa Nassar MD    **Please Note: This note may have been constructed using a voice recognition system  **

## 2022-03-03 NOTE — CONSULTS
Consultation - General Surgery   Maya Monahan 67 y o  male MRN: [id number]  Unit/Bed#: -01 Encounter: [id number]    Assessment/Plan     Assessment:  - Cholelithiasis without acute cholecystitis, gallbladder polyp (unchanged since 2019)  - CT finding of " Cholelithiasis   Small pericholecystic fluid and fat stranding are nonspecific in the setting of ascites /underlying liver disease "  - US finding of "Cholelithiasis   Mild uniform gallbladder wall thickening probably representing sequela of hepatic parenchymal disease"  Febrile: Tmax 102 2 8pm, 100 2 this AM, now 99  WBC 10 85 (11 43, 4 32)  Procalcitonin 0 19  Lactic acid 1 4  Tbili elevated: 2 71 (2 11, 1 75, 1 68, 2 36, 2 30)  AST/ALT 51/42  Lipase 300 on 2/25  Hx of decompensated cirrhosis  Hx of HTN  Hx of dementia  Rule out Cdiff  COVID negative    Plan:  Diet as tolerated  No acute surgical intervention, most likely source of issues are related to cirrhosis/ascites  If no other source of fever is found the patient may undergo HIDA without CCK  Continue antibiotics as indicated  Gentle IV hydration  Monitor I/Os closely  Medicate PRN pain/nausea  Trend fever curve  Follow CBC and CMP  Follow blood cultures  Follow Cdiff / stool studies  OOB ad manasa with assist  Incentive spirometer 10x every hour  SCDs  Management of comorbidities per primary team      History of Present Illness   History, ROS and PFSH unobtainable from any source due to dementia  HPI:  Maya Monahan is a 67 y o  male who presents with acute onset fevers and CT findings of Cholelithiasis and gallbladder polyp  There is a small amount pericholecystic fluid and fat stranding are nonspecific in the setting of ascites /underlying liver disease  At time of exam today the patient denies fevers/chills although Tmax noted to be 102 2 last night and currently 99 0  He denies post prandial nausea and vomiting  Denies any abdominal pain  States he is passing gas and moving bowels   Denies prior abdominal surgeries  Inpatient consult to Acute Care Surgery  Consult performed by: Garfield Doty PA-C  Consult ordered by: Dominik Mcgarry MD        Review of Systems   Unable to perform ROS: Dementia     Historical Information   Past Medical History:   Diagnosis Date    [facility] [address] [facility]     Dementia [facility] [address] [facility]     Hypertension      Past Surgical History:   Procedure Laterality Date    REPLACEMENT TOTAL KNEE       Social History   Social History     Substance and Sexual Activity   Alcohol Use Not Currently    Comment: former social ETOH     Social History     Substance and Sexual Activity   Drug Use Never     E-Cigarette/Vaping     E-Cigarette/Vaping Substances     Social History     Tobacco Use   Smoking Status Former Smoker   Smokeless Tobacco Never Used     Family History: non-contributory    Meds/Allergies   all current active meds have been reviewed, current meds:   Current Facility-Administered Medications   Medication Dose Route Frequency    acetaminophen (TYLENOL) tablet 650 mg  650 mg Oral Q6H PRN    aspirin (ECOTRIN LOW STRENGTH) EC tablet 81 mg  81 mg Oral Daily    cefTRIAXone (ROCEPHIN) IVPB (premix in dextrose) 2,000 mg 50 mL  2,000 mg Intravenous Q24H    donepezil (ARICEPT) tablet 10 mg  10 mg Oral HS    lactulose oral solution 30 g  30 g Oral 4x Daily    lisinopril (ZESTRIL) tablet 10 mg  10 mg Oral Daily    memantine (NAMENDA) tablet 10 mg  10 mg Oral BID    multi-electrolyte (PLASMALYTE-A/ISOLYTE-S PH 7 4) IV solution  75 mL/hr Intravenous Continuous    QUEtiapine (SEROquel) tablet 12 5 mg  12 5 mg Oral HS    and PTA meds:   Prior to Admission Medications   Prescriptions Last Dose Informant Patient Reported? Taking?    Cholecalciferol 25 MCG (1000 UT) tablet Unknown at Unknown time  Yes No   Sig: Take 1 tablet by mouth daily   aspirin (ECOTRIN LOW STRENGTH) 81 mg EC tablet Unknown at Unknown time  Yes No   Sig: Take 81 mg by mouth daily   donepezil (ARICEPT) 10 mg tablet Unknown at Unknown time  Yes No   Sig: Take 10 mg by mouth   lactulose (CHRONULAC) 10 g/15 mL solution Unknown at Unknown time  Yes No   Sig: Take 20g (30mL) every 6 hours to achieve 3-4 soft bowel movements/24hrs  Hold if greater than 4 stools in 24 hrs    lisinopril-hydrochlorothiazide (PRINZIDE,ZESTORETIC) 10-12 5 MG per tablet Unknown at Unknown time  Yes No   Sig: Take 0 5 tablets by mouth daily   memantine (NAMENDA) 10 mg tablet Unknown at Unknown time  Yes No   Sig: Take 10 mg by mouth 2 (two) times a day   polyethylene glycol (GOLYTELY) 4000 mL solution Unknown at Unknown time  Yes No   Sig: Take 4000 ML By mouth one time for1 dose      Facility-Administered Medications: None     No Known Allergies    Objective   First Vitals:   Blood Pressure: 134/77 (02/25/22 2149)  Pulse: 79 (02/25/22 2149)  Temperature: 97 6 °F (36 4 °C) (02/25/22 2149)  Temp Source: Temporal (02/25/22 2149)  Respirations: 18 (02/25/22 2149)  Height: 6' 2" (188 cm) (02/25/22 2149)  Weight - Scale: 104 kg (230 lb) (02/25/22 2149)  SpO2: 98 % (02/25/22 2149)    Current Vitals:   Blood Pressure: 149/87 (03/03/22 0820)  Pulse: 95 (03/03/22 0956)  Temperature: 99 °F (37 2 °C) (03/03/22 0956)  Temp Source: Oral (03/02/22 2135)  Respirations: 17 (03/03/22 0820)  Height: 6' 2" (188 cm) (02/28/22 1113)  Weight - Scale: 101 kg (223 lb 1 7 oz) (03/03/22 0709)  SpO2: 98 % (03/03/22 0956)      Intake/Output Summary (Last 24 hours) at 3/3/2022 1438  Last data filed at 3/3/2022 1201  Gross per 24 hour   Intake 880 ml   Output 350 ml   Net 530 ml       Invasive Devices  Report    Peripheral Intravenous Line            Peripheral IV 03/03/22 Left;Ventral (anterior) Wrist <1 day                Physical Exam  Vitals and nursing note reviewed  Constitutional:       General: He is not in acute distress  Appearance: He is obese  He is not ill-appearing  HENT:      Head: Normocephalic and atraumatic        Right Ear: External ear normal  Left Ear: External ear normal       Nose: Nose normal       Mouth/Throat:      Mouth: Mucous membranes are dry  Pharynx: Oropharynx is clear  Eyes:      General: No scleral icterus  Right eye: No discharge  Left eye: No discharge  Pupils: Pupils are equal, round, and reactive to light  Cardiovascular:      Rate and Rhythm: Regular rhythm  Tachycardia present  Pulses: Normal pulses  Heart sounds: Normal heart sounds  Pulmonary:      Effort: Pulmonary effort is normal  No respiratory distress  Abdominal:      General: Abdomen is flat  Bowel sounds are normal  There is no distension  Palpations: Abdomen is soft  Tenderness: There is no abdominal tenderness  There is no guarding or rebound  Musculoskeletal:         General: No swelling  Cervical back: Normal range of motion and neck supple  Right lower leg: No edema  Left lower leg: No edema  Skin:     General: Skin is warm and dry  Capillary Refill: Capillary refill takes less than 2 seconds  Findings: No erythema or rash  Comments: Very slight jaundice noted    Neurological:      Mental Status: He is alert  Mental status is at baseline  Psychiatric:         Mood and Affect: Mood normal          Behavior: Behavior normal        Lab Results:   I have personally reviewed pertinent lab results      CBC:   Lab Results   Component Value Date    WBC 10 86 (H) 03/03/2022    HGB 11 7 (L) 03/03/2022    HCT 32 9 (L) 03/03/2022    MCV 90 03/03/2022    PLT 55 (L) 03/03/2022    MCH 32 1 03/03/2022    MCHC 35 6 03/03/2022    RDW 14 1 03/03/2022    MPV 12 9 (H) 03/03/2022    NRBC 0 03/03/2022     CMP:   Lab Results   Component Value Date    SODIUM 136 03/03/2022    K 3 8 03/03/2022     03/03/2022    CO2 24 03/03/2022    BUN 15 03/03/2022    CREATININE 1 04 03/03/2022    CALCIUM 7 8 (L) 03/03/2022    AST 51 (H) 03/03/2022    ALT 42 03/03/2022    ALKPHOS 159 (H) 03/03/2022    EGFR 70 03/03/2022     Urinalysis:   Lab Results   Component Value Date    COLORU Orange 03/03/2022    CLARITYU Clear 03/03/2022    SPECGRAV 1 020 03/03/2022    PHUR 7 5 03/03/2022    LEUKOCYTESUR Negative 03/03/2022    NITRITE Negative 03/03/2022    GLUCOSEU Negative 03/03/2022    KETONESU Negative 03/03/2022    BILIRUBINUR Small (A) 03/03/2022    BLOODU Trace-lysed (A) 03/03/2022     Imaging: I have personally reviewed pertinent reports  CT chest/abd/pelvis w contrast 3/3/22  Impression:     1   Cirrhosis and findings of portal hypertension   Small volume ascites  2   Cholelithiasis   Small pericholecystic fluid and fat stranding are nonspecific in the setting of ascites /underlying liver disease   If there is clinical concern for acute gallbladder pathology, gallbladder ultrasound may be considered  3   Diffuse edematous wall thickening of the ascending and transverse colon could be due to portal hypertensive colopathy or infectious/inflammatory colitis  4   Trace right pleural effusion with mild subjacent atelectasis  5   Mild age-indeterminate compression fracture of L1 with depression of the superior endplate     US RUQ w liver dopplers 2/26/22  Impression:     Lobulated hepatic cirrhosis with coarsened hepatic echotexture in keeping with reported history of hepatic cirrhosis   No sonographically detectable solid hepatic mass   Normal Doppler analysis of hepatic vasculature  Portal hypertension as evidenced by recanalization of periumbilical vein and small volume of upper abdominal ascites       Cholelithiasis   Mild uniform gallbladder wall thickening probably representing sequela of hepatic parenchymal disease   Focal thickening/soft tissue prominence at the tip of the gallbladder fundus measuring up to 2 cm is most suspicious for adenomyomatosis   The possibility that this is a large gallbladder polyp is considered far less likely but, attention to this finding is recommended at such time as the patient undergoes cross-sectional imaging for evaluation of the liver  EKG, Pathology, and Other Studies: I have personally reviewed pertinent reports  Counseling / Coordination of Care  Total floor / unit time spent today 40 minutes  Greater than 50% of total time was spent with the patient and / or family counseling and / or coordination of care    A description of the counseling / coordination of care: review of labs and imaging, obtaining history/chart review, performing exam, discussion of treatment plan with patient and primary team       Yamilet Rooney PA-C

## 2022-03-03 NOTE — PLAN OF CARE
Problem: OCCUPATIONAL THERAPY ADULT  Goal: Performs self-care activities at highest level of function for planned discharge setting  See evaluation for individualized goals  Description: Treatment Interventions: ADL retraining,Functional transfer training,UE strengthening/ROM,Endurance training,Cognitive reorientation,Patient/family training,Equipment evaluation/education,Neuromuscular reeducation,Compensatory technique education,Continued evaluation,Energy conservation,Activityengagement          See flowsheet documentation for full assessment, interventions and recommendations  Outcome: Not Progressing  Note: Limitation: Decreased ADL status,Decreased Safe judgement during ADL,Decreased cognition,Decreased endurance,Decreased self-care trans,Decreased high-level ADLs  Prognosis: Good  Assessment: Pt seen for treatment session #3 this date  Pt alert and agreeable to participate at this time  Pt completing ADLs with good participation although d/t decreased balance and safety, Pt continues to require physical assistance during standing balance tasks  Pt continues to demonstrate poor technique withuse of RW for ambulation and requires Mod A and Max cues for safety  Pt demonstrates Good potential to make progress towards goals, although is currently limited by decrease activity tolerance, decrease standing balance, decrease sitting balance, decrease performance during ADL tasks, decrease cognition, decrease safety awareness , increase impulsiveness, decrease UB MS, decrease generalized strength, decrease activity engagement, decrease performance during functional transfers, frequent falls, high fall risk and limited insight to deficits  Pt would benefit from continued acute OT services to address deficits as well as post acute rehab upon d/c from [address] [place] [address]       OT Discharge Recommendation: Post acute rehabilitation services

## 2022-03-03 NOTE — ASSESSMENT & PLAN NOTE
· Presents from home with altered mental status per family  Patient also with history of dementia, unclear baseline  In setting of history of JAFFE and decompensated cirrhosis with OP lactulose regimen of 20 mg q6h  · Ammonia 233 on arrival - Lactulose increased to 30 mg QID with improvement in mentation and level of alertness, no longer with asterixis  · Discussed with GI and as per GI, we can monitor based on patient's clinical status more than ammonia levels  · Patient may benefit from Rifaximin which is being worked on by his hepatologist at this time   Previously not approved by insurance

## 2022-03-03 NOTE — PLAN OF CARE
Problem: INFECTION - ADULT  Goal: Absence or prevention of progression during hospitalization  Description: INTERVENTIONS:  - Assess and monitor for signs and symptoms of infection  - Monitor lab/diagnostic results blood cultures  - Monitor all insertion sites,   - Monitor endotracheal if appropriate and nasal secretions for changes in amount and color  - [place] appropriate cooling/warming therapies per order  - Administer medications as ordered  - Instruct and encourage patient and family to use good hand hygiene technique  - Identify and instruct in appropriate isolation precautions for identified infection/condition  Outcome: Progressing

## 2022-03-04 ENCOUNTER — LAB REQUISITION (OUTPATIENT)
Dept: LAB | Facility: HOSPITAL | Age: 72
End: 2022-03-04

## 2022-03-04 VITALS
HEIGHT: 74 IN | DIASTOLIC BLOOD PRESSURE: 78 MMHG | SYSTOLIC BLOOD PRESSURE: 121 MMHG | OXYGEN SATURATION: 95 % | TEMPERATURE: 98.4 F | HEART RATE: 85 BPM | WEIGHT: 225.31 LBS | BODY MASS INDEX: 28.92 KG/M2 | RESPIRATION RATE: 18 BRPM

## 2022-03-04 DIAGNOSIS — Z20.822 CONTACT WITH AND (SUSPECTED) EXPOSURE TO COVID-19: ICD-10-CM

## 2022-03-04 LAB
ALBUMIN SERPL BCP-MCNC: 1.9 G/DL (ref 3.5–5)
ALP SERPL-CCNC: 135 U/L (ref 46–116)
ALT SERPL W P-5'-P-CCNC: 29 U/L (ref 12–78)
ANION GAP SERPL CALCULATED.3IONS-SCNC: 6 MMOL/L (ref 4–13)
AST SERPL W P-5'-P-CCNC: 38 U/L (ref 5–45)
BASOPHILS # BLD AUTO: 0.02 THOUSANDS/ΜL (ref 0–0.1)
BASOPHILS NFR BLD AUTO: 0 % (ref 0–1)
BILIRUB SERPL-MCNC: 2.21 MG/DL (ref 0.2–1)
BUN SERPL-MCNC: 13 MG/DL (ref 5–25)
CALCIUM ALBUM COR SERPL-MCNC: 9.3 MG/DL (ref 8.3–10.1)
CALCIUM SERPL-MCNC: 7.6 MG/DL (ref 8.3–10.1)
CHLORIDE SERPL-SCNC: 106 MMOL/L (ref 100–108)
CO2 SERPL-SCNC: 24 MMOL/L (ref 21–32)
CREAT SERPL-MCNC: 0.9 MG/DL (ref 0.6–1.3)
EOSINOPHIL # BLD AUTO: 0.13 THOUSAND/ΜL (ref 0–0.61)
EOSINOPHIL NFR BLD AUTO: 3 % (ref 0–6)
ERYTHROCYTE [DISTWIDTH] IN BLOOD BY AUTOMATED COUNT: 14.3 % (ref 11.6–15.1)
GFR SERPL CREATININE-BSD FRML MDRD: 84 ML/MIN/1.73SQ M
GLUCOSE SERPL-MCNC: 77 MG/DL (ref 65–140)
HCT VFR BLD AUTO: 29.1 % (ref 36.5–49.3)
HGB BLD-MCNC: 10.4 G/DL (ref 12–17)
IMM GRANULOCYTES # BLD AUTO: 0.03 THOUSAND/UL (ref 0–0.2)
IMM GRANULOCYTES NFR BLD AUTO: 1 % (ref 0–2)
LYMPHOCYTES # BLD AUTO: 0.84 THOUSANDS/ΜL (ref 0.6–4.47)
LYMPHOCYTES NFR BLD AUTO: 16 % (ref 14–44)
MCH RBC QN AUTO: 32.4 PG (ref 26.8–34.3)
MCHC RBC AUTO-ENTMCNC: 35.7 G/DL (ref 31.4–37.4)
MCV RBC AUTO: 91 FL (ref 82–98)
MONOCYTES # BLD AUTO: 0.61 THOUSAND/ΜL (ref 0.17–1.22)
MONOCYTES NFR BLD AUTO: 12 % (ref 4–12)
NEUTROPHILS # BLD AUTO: 3.62 THOUSANDS/ΜL (ref 1.85–7.62)
NEUTS SEG NFR BLD AUTO: 68 % (ref 43–75)
NRBC BLD AUTO-RTO: 0 /100 WBCS
PLATELET # BLD AUTO: 47 THOUSANDS/UL (ref 149–390)
PMV BLD AUTO: 12.5 FL (ref 8.9–12.7)
POTASSIUM SERPL-SCNC: 3.7 MMOL/L (ref 3.5–5.3)
PROT SERPL-MCNC: 6.3 G/DL (ref 6.4–8.2)
RBC # BLD AUTO: 3.21 MILLION/UL (ref 3.88–5.62)
SODIUM SERPL-SCNC: 136 MMOL/L (ref 136–145)
WBC # BLD AUTO: 5.25 THOUSAND/UL (ref 4.31–10.16)

## 2022-03-04 PROCEDURE — U0003 INFECTIOUS AGENT DETECTION BY NUCLEIC ACID (DNA OR RNA); SEVERE ACUTE RESPIRATORY SYNDROME CORONAVIRUS 2 (SARS-COV-2) (CORONAVIRUS DISEASE [COVID-19]), AMPLIFIED PROBE TECHNIQUE, MAKING USE OF HIGH THROUGHPUT TECHNOLOGIES AS DESCRIBED BY CMS-2020-01-R: HCPCS | Performed by: NURSE PRACTITIONER

## 2022-03-04 PROCEDURE — 80053 COMPREHEN METABOLIC PANEL: CPT | Performed by: STUDENT IN AN ORGANIZED HEALTH CARE EDUCATION/TRAINING PROGRAM

## 2022-03-04 PROCEDURE — 85025 COMPLETE CBC W/AUTO DIFF WBC: CPT | Performed by: STUDENT IN AN ORGANIZED HEALTH CARE EDUCATION/TRAINING PROGRAM

## 2022-03-04 PROCEDURE — U0005 INFEC AGEN DETEC AMPLI PROBE: HCPCS | Performed by: NURSE PRACTITIONER

## 2022-03-04 PROCEDURE — 99239 HOSP IP/OBS DSCHRG MGMT >30: CPT | Performed by: STUDENT IN AN ORGANIZED HEALTH CARE EDUCATION/TRAINING PROGRAM

## 2022-03-04 RX ORDER — LISINOPRIL 10 MG/1
10 TABLET ORAL DAILY
Refills: 0
Start: 2022-03-05 | End: 2022-06-05

## 2022-03-04 RX ORDER — QUETIAPINE FUMARATE 25 MG/1
12.5 TABLET, FILM COATED ORAL
Refills: 0
Start: 2022-03-04

## 2022-03-04 RX ORDER — LACTULOSE 20 G/30ML
30 SOLUTION ORAL 4 TIMES DAILY
Qty: 5400 ML | Refills: 0
Start: 2022-03-04 | End: 2022-04-03

## 2022-03-04 RX ADMIN — LACTULOSE 30 G: 10 SOLUTION ORAL at 11:54

## 2022-03-04 RX ADMIN — SODIUM CHLORIDE, SODIUM GLUCONATE, SODIUM ACETATE, POTASSIUM CHLORIDE, MAGNESIUM CHLORIDE, SODIUM PHOSPHATE, DIBASIC, AND POTASSIUM PHOSPHATE 75 ML/HR: .53; .5; .37; .037; .03; .012; .00082 INJECTION, SOLUTION INTRAVENOUS at 07:42

## 2022-03-04 RX ADMIN — LACTULOSE 30 G: 10 SOLUTION ORAL at 08:39

## 2022-03-04 RX ADMIN — LISINOPRIL 10 MG: 10 TABLET ORAL at 08:39

## 2022-03-04 RX ADMIN — ASPIRIN 81 MG: 81 TABLET, COATED ORAL at 08:39

## 2022-03-04 RX ADMIN — MEMANTINE 10 MG: 10 TABLET ORAL at 08:39

## 2022-03-04 NOTE — ASSESSMENT & PLAN NOTE
Malnutrition Findings:   Adult Malnutrition type: Chronic illness (related to cirrhosis, increased nutrient needs as evidenced by moderate muscle loss to temporalis, pectoralis, quadriceps and gastrocnemius muscles, 9 6% weight loss x 6 mo (8/16/21 230lb, 2/28/22 208lb)  )  Adult Degree of Malnutrition: Malnutrition of moderate degree (Treated with: Regular diet, ensure enlive TID, recommend daily weights)    BMI Findings: Body mass index is 28 93 kg/m²

## 2022-03-04 NOTE — DISCHARGE SUMMARY
[address]  Discharge- Caridad Cea 1950, 67 y o  male MRN: [id number]  Unit/Bed#: [facility] Encounter: [id number]  Primary Care Provider: Tomasa Resendiz MD   Date and time admitted to hospital: 2/25/2022  9:43 PM    * Hepatic encephalopathy [facility])  Assessment & Plan  · Presents from home with altered mental status per family  Patient also with history of dementia, unclear baseline  In setting of history of JAFFE and decompensated cirrhosis with OP lactulose regimen of 20 mg q6h  · Ammonia 233 on arrival - Lactulose increased to 30 mg QID with improvement in mentation and level of alertness, no longer with asterixis  · Discussed with GI and as per GI, we can monitor based on patient's clinical status more than ammonia levels  · Patient may benefit from Rifaximin which is being worked on by his hepatologist at this time  Previously not approved by insurance    Patient stable to be discharged with significant improvement in mental status although with baseline dementia and disorientation but no somnolence and very alert    SIRS (systemic inflammatory response syndrome) ([facility] [address] )  Assessment & Plan  · 3/2: patient met SIRS criteria with tachycardia, fever to 102, leukocytosis however with unclear etiology with no overt signs of infection  UA negative, repeat BCx no growth at 24 hours  CXR showing some atelectasis but no infiltrates  CT scan reviewed and discussed with GI and general surgery  Physical exam unrevealing with no abdominal pain, clear breath sounds  No surgical intervention at this time   Incentive spirometry for atelectasis    RESOLVED     Cholelithiasis  Assessment & Plan  · Patient spiked a fever overnight and CT chest, abdomen and pelvis performed to rule out any underlying source of infection as patient not a reliable historian  · CT scan showing cholelithiasis with small pericholecystic fluid and fat stranding are nonspecific in the setting of ascites /underlying liver disease  · Discussed with GI and General surgery  · Low suspicion for cholecystitis or cholangitis at this time as patient's abdominal exam benign  · Will continue to monitor - should patient's clinical status deteriorate, HIDA may be considered    Moderate protein-calorie malnutrition (HCC)  Assessment & Plan  Malnutrition Findings:   Adult Malnutrition type: Chronic illness (related to cirrhosis, increased nutrient needs as evidenced by moderate muscle loss to temporalis, pectoralis, quadriceps and gastrocnemius muscles, 9 6% weight loss x 6 mo (8/16/21 230lb, 2/28/22 208lb)  )  Adult Degree of Malnutrition: Malnutrition of moderate degree (Treated with: Regular diet, ensure enlive TID, recommend daily weights)    BMI Findings: Body mass index is 28 93 kg/m²  Thrombocytopenia ([address] )  Assessment & Plan  · Secondary to cirrhosis  · Plt 54  · No evidence of acute bleeding at this time  Hgb stable  · Will hold DVT ppx at this time  · Trend cbc    Hypertension  Assessment & Plan  · Takes Prinzide, will switch to Lisinopril 10mg while inpatient    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 [facility])  Assessment & Plan  · Hx JAFFE diagnosed by biopsy in 2019  Follows with [facility]   Scheduled for EGD in April 2022  · Last EGD in August 2021: large varice with band x 1   · MELD score 9  · Currently on lactulose 30 mg q6hr  Per chart review, working on approval for Rifaximin due to cost - hepatologist awaiting approval  · Case discussed with GI and recommendations noted  · Continue outpatient f/u with hepatology    Dementia with behavioral disturbance [facility])  Assessment & Plan  · Unclear baseline as patient has dementia with intermittent episodes of hepatic encephalopathy, lives at home with family and has outside caregivers    · Worsening behavioral disturbance secondary to hepatic encephalopathy  · Continue aricept, namenda   · Started on Seroquel 12 5 mg at 5:00 p m  due to sundowning - improved      Medical Problems Resolved Problems  Date Reviewed: 2/28/2022    None              Discharging Physician / Practitioner: Etta Matthews MD  PCP: Sam Hester MD  Admission Date:   Admission Orders (From admission, onward)     Ordered        02/25/22 2338  Inpatient Admission  Once                      Discharge Date: 03/04/22    Consultations During Hospital Stay:  · GI, General surgery    Procedures Performed:   · none    Significant Findings / Test Results:   CT chest abdomen pelvis w contrast   Final Result by Zeina Mendez MD (03/03 1201)      1  Cirrhosis and findings of portal hypertension  Small volume ascites  2   Cholelithiasis  Small pericholecystic fluid and fat stranding are nonspecific in the setting of ascites /underlying liver disease  If there is clinical concern for acute gallbladder pathology, gallbladder ultrasound may be considered  3   Diffuse edematous wall thickening of the ascending and transverse colon could be due to portal hypertensive colopathy or infectious/inflammatory colitis  4   Trace right pleural effusion with mild subjacent atelectasis  5   Mild age-indeterminate compression fracture of L1 with depression of the superior endplate  The study was marked in [facility] for immediate notification  Workstation performed: [id number]         US right upper quadrant with liver dopplers   Final Result by Rylan Rosales MD (02/26 9722)      Lobulated hepatic cirrhosis with coarsened hepatic echotexture in keeping with reported history of hepatic cirrhosis  No sonographically detectable solid hepatic mass  Normal Doppler analysis of hepatic vasculature  Portal hypertension as evidenced by recanalization of periumbilical vein and small volume of upper abdominal ascites  Cholelithiasis  Mild uniform gallbladder wall thickening probably representing sequela of hepatic parenchymal disease    Focal thickening/soft tissue prominence at the tip of the gallbladder fundus measuring up to 2 cm is most suspicious for    adenomyomatosis  The possibility that this is a large gallbladder polyp is considered far less likely but, attention to this finding is recommended at such time as the patient undergoes cross-sectional imaging for evaluation of the liver  This examination was marked "significant notification" in Epic in order to begin the standard process by which the radiology reading room liaison alerts the referring practitioner  Workstation performed: [id number]         X-ray chest 1 view portable   Final Result by Stanley Melchor DO (02/26 1012)      No acute cardiopulmonary disease  Workstation performed: [id number]             Incidental Findings:   · none    Test Results Pending at Discharge (will require follow up):   · none     Outpatient Tests Requested:  · none    Complications:  none    Reason for Admission: hepatic encephalopathy    Hospital Course:   Jorge A Lewis is a 67 y o  male patient who originally presented to the hospital on 2/25/2022 due to worsening confusion and behavioral disturbances with increasing somnolence  On admission, patient was found to have ammonia levels in the 200s  Patient was continued on a higher dose of lactulose from what he was being maintained on at home  GI and General surgery were consulted on the case  Patient's mental status significantly improved following improved bowel output  On 3/2, patient spiked a fever for which infectious work-up was done  Clinically, patient with no overt signs of infection with spontaneous resolution of SIRS  Patient also evaluated by PT and OT who recommend rehab  Patient to be discharged to rehab at [facility]  Please see above list of diagnoses and related plan for additional information       Condition at Discharge: stable    Discharge Day Visit / Exam:   Subjective:  Patient seen examined at bedside  Denies any complaints at this time  Denies any abdominal pain  Vitals: Blood Pressure: 121/78 (03/04/22 0738)  Pulse: 85 (03/04/22 0738)  Temperature: 98 4 °F (36 9 °C) (03/04/22 0738)  Temp Source: Oral (03/02/22 2135)  Respirations: 18 (03/04/22 0738)  Height: 6' 2" (188 cm) (02/28/22 1113)  Weight - Scale: 102 kg (225 lb 5 oz) (03/04/22 0536)  SpO2: 95 % (03/04/22 0900)    Exam:   Physical Exam  Vitals and nursing note reviewed  Constitutional:       Appearance: He is well-developed  HENT:      Head: Normocephalic and atraumatic  Eyes:      Conjunctiva/sclera: Conjunctivae normal    Cardiovascular:      Rate and Rhythm: Normal rate and regular rhythm  Heart sounds: No murmur heard  Pulmonary:      Effort: Pulmonary effort is normal  No respiratory distress  Breath sounds: Normal breath sounds  Abdominal:      Palpations: Abdomen is soft  Tenderness: There is no abdominal tenderness  Musculoskeletal:      Cervical back: Neck supple  Skin:     General: Skin is warm and dry  Neurological:      Mental Status: He is alert  Mental status is at baseline  He is disoriented  Discussion with Family: Updated  (son) via phone  Discharge instructions/Information to patient and family:   See after visit summary for information provided to patient and family  Provisions for Follow-Up Care:  See after visit summary for information related to follow-up care and any pertinent home health orders  Disposition:   Other [facility] Readmission:  None     Discharge Statement:  I spent 45 minutes discharging the patient  This time was spent on the day of discharge  I had direct contact with the patient on the day of discharge   Greater than 50% of the total time was spent examining patient, answering all patient questions, arranging and discussing plan of care with patient as well as directly providing post-discharge instructions  Additional time then spent on discharge activities  Discharge Medications:  See after visit summary for reconciled discharge medications provided to patient and/or family        **Please Note: This note may have been constructed using a voice recognition system**

## 2022-03-04 NOTE — PROGRESS NOTES
Progress Note - General Surgery   Samantha Lester 67 y o  male MRN: [id number]  Unit/Bed#: -01 Encounter: [id number]    Assessment:  - 65 year old male with cholelithiasis without acute cholecystitis, gallbladder polyp (unchanged since 2019)  - CT: Cholelithiasis   Small pericholecystic fluid and fat stranding are nonspecific in the setting of ascites /underlying liver disease  - U/S: Cholelithiasis   Mild uniform gallbladder wall thickening probably representing sequela of hepatic parenchymal disease  - Afebrile today  Temp 100 2F yesterday morning  Mild tachycardia yesterday morning, appears to be resolved now  Otherwise VSS and WNL   - WBC 5 25 (10 85, 11 43, 4 32)  - Tbili elevated: 2 21 (2 71, 2 11, 1 75, 1 68, 2 36, 2 30)  - AST 38, ALT 29, Alk phos 135  - Hx of decompensated cirrhosis, HTN, dementia  - Rule out Cdiff  - COVID negative    Plan:  - No surgical intervention at this time  - May consider HIDA w/ CCK if no other source of infection found  - IV antibiotics  - Pain/nausea control prn  - Regular diet  - IV hydration  - Follow CBC and CMP  - Follow blood cultures  - Follow Cdiff / stool studies  - OOB ad manasa with assist  - Incentive spirometer 1x every hour  - SCDs  - Management of comorbidities per primary team      Subjective/Objective   Subjective: No complaints  No acute events overnight  Pt states he feels well  Reports he is passing regular stools  Tolerating regular diet well  Denies abdominal pain, n/v/d/c, bloating, fever, chills, CP, or SOB  Objective:     Blood pressure 121/78, pulse 85, temperature 98 4 °F (36 9 °C), resp  rate 18, height 6' 2" (1 88 m), weight 102 kg (225 lb 5 oz), SpO2 98 %  ,Body mass index is 28 93 kg/m²        Intake/Output Summary (Last 24 hours) at 3/4/2022 0809  Last data filed at 3/4/2022 0536  Gross per 24 hour   Intake 2218 75 ml   Output --   Net 2218 75 ml       Invasive Devices  Report    Peripheral Intravenous Line            Peripheral IV 03/03/22 Left;Ventral (anterior) Wrist 1 day                Physical Exam: /78   Pulse 85   Temp 98 4 °F (36 9 °C)   Resp 18   Ht 6' 2" (1 88 m)   Wt 102 kg (225 lb 5 oz)   SpO2 98%   BMI 28 93 kg/m²   General appearance: alert and oriented, in no acute distress  Lungs: clear to auscultation bilaterally  Heart: regular rate and rhythm, S1, S2 normal, no murmur, click, rub or gallop  Abdomen: soft, non-tender; bowel sounds normal; no masses,  no organomegaly    Lab, Imaging and other studies:  I have personally reviewed pertinent lab results      CBC:   Lab Results   Component Value Date    WBC 5 25 03/04/2022    HGB 10 4 (L) 03/04/2022    HCT 29 1 (L) 03/04/2022    MCV 91 03/04/2022    PLT 47 (LL) 03/04/2022    MCH 32 4 03/04/2022    MCHC 35 7 03/04/2022    RDW 14 3 03/04/2022    MPV 12 5 03/04/2022    NRBC 0 03/04/2022   CMP:   Lab Results   Component Value Date    SODIUM 136 03/04/2022    K 3 7 03/04/2022     03/04/2022    CO2 24 03/04/2022    BUN 13 03/04/2022    CREATININE 0 90 03/04/2022    CALCIUM 7 6 (L) 03/04/2022    AST 38 03/04/2022    ALT 29 03/04/2022    ALKPHOS 135 (H) 03/04/2022    EGFR 84 03/04/2022     VTE Mechanical Prophylaxis: sequential compression device     Kristian Kaur PA-C

## 2022-03-04 NOTE — PLAN OF CARE
Problem: Potential for Falls  Goal: Patient will remain free of falls  Description: INTERVENTIONS:  - Educate patient/family on patient safety including physical limitations  - Instruct patient to call for assistance with activity   - Consult OT/PT to assist with strengthening/mobility   - Keep Call bell within reach  - Keep bed low and locked with side rails adjusted as appropriate  - Keep care items and personal belongings within reach  - Initiate and maintain comfort rounds  - Make Fall Risk Sign visible to staff  - Apply yellow socks and bracelet for high fall risk patients  - Consider moving patient to room near nurses station  Outcome: Completed     Problem: PAIN - ADULT  Goal: Verbalizes/displays adequate comfort level or baseline comfort level  Description: Interventions:  - Encourage patient to monitor pain and request assistance  - Assess pain using appropriate pain scale  - Administer analgesics based on type and severity of pain and evaluate response  - Implement non-pharmacological measures as appropriate and evaluate response  - Consider cultural and social influences on pain and pain management  - Notify physician/advanced practitioner if interventions unsuccessful or patient reports new pain  3/4/2022 1209 by Bharath Natarajan RN  Outcome: Completed  3/4/2022 0959 by Bahrath Natarajan RN  Outcome: Progressing     Problem: INFECTION - ADULT  Goal: Absence or prevention of progression during hospitalization  Description: INTERVENTIONS:  - Assess and monitor for signs and symptoms of infection  - Monitor lab/diagnostic results blood cultures  - Monitor all insertion sites,   - Monitor endotracheal if appropriate and nasal secretions for changes in amount and color  - [place] appropriate cooling/warming therapies per order  - Administer medications as ordered  - Instruct and encourage patient and family to use good hand hygiene technique  - Identify and instruct in appropriate isolation precautions for identified infection/condition  Outcome: Completed     Problem: SAFETY ADULT  Goal: Patient will remain free of falls  Description: INTERVENTIONS:  - Educate patient/family on patient safety including physical limitations  - Instruct patient to call for assistance with activity   - Consult OT/PT to assist with strengthening/mobility   - Keep Call bell within reach  - Keep bed low and locked with side rails adjusted as appropriate  - Keep care items and personal belongings within reach  - Initiate and maintain comfort rounds  - Make Fall Risk Sign visible to staff  - Apply yellow socks and bracelet for high fall risk patients  - Consider moving patient to room near nurses station  Outcome: Completed  Goal: Maintain or return to baseline ADL function  Description: INTERVENTIONS:  -  Assess patient's ability to carry out ADLs; assess patient's baseline for ADL function and identify physical deficits which impact ability to perform ADLs (bathing, care of mouth/teeth, toileting, grooming, dressing, etc )  - Assess/evaluate cause of self-care deficits   - Assess range of motion  - Assess patient's mobility; develop plan if impaired  - Assess patient's need for assistive devices and provide as appropriate  - Encourage maximum independence but intervene and supervise when necessary  - Involve family in performance of ADLs  - Assess for home care needs following discharge   - Consider OT consult to assist with ADL evaluation and planning for discharge  - Provide patient education as appropriate  Outcome: Completed  Goal: Maintains/Returns to pre admission functional level  Description: INTERVENTIONS:  - Perform BMAT or MOVE assessment daily    - Set and communicate daily mobility goal to care team and patient/family/caregiver     - Collaborate with rehabilitation services on mobility goals if consulted  - Out of bed for toileting  - Record patient progress and toleration of activity level   Outcome: Completed     Problem: DISCHARGE PLANNING  Goal: Discharge to home or other facility with appropriate resources  Description: INTERVENTIONS:  - Identify barriers to discharge w/patient and caregiver  - Arrange for needed discharge resources and transportation as appropriate  - Identify discharge learning needs (meds, wound care, etc )  - Arrange for interpretive services to assist at discharge as needed  - Refer to Case Management Department for coordinating discharge planning if the patient needs post-hospital services based on physician/advanced practitioner order or complex needs related to functional status, cognitive ability, or social support system  Outcome: Completed     Problem: Knowledge Deficit  Goal: Patient/family/caregiver demonstrates understanding of disease process, treatment plan, medications, and discharge instructions  Description: Complete learning assessment and assess knowledge base    Interventions:  - Provide teaching at level of understanding  - Provide teaching via preferred learning methods  Outcome: Completed     Problem: GASTROINTESTINAL - ADULT  Goal: Minimal or absence of nausea and/or vomiting  Description: INTERVENTIONS:  - Administer IV fluids if ordered to ensure adequate hydration  - Maintain NPO status until nausea and vomiting are resolved  - Nasogastric tube if ordered  - Administer ordered antiemetic medications as needed  - Provide nonpharmacologic comfort measures as appropriate  - Advance diet as tolerated, if ordered  - Consider nutrition services referral to assist patient with adequate nutrition and appropriate food choices  Outcome: Completed  Goal: Maintains or returns to baseline bowel function  Description: INTERVENTIONS:  - Assess bowel function  - Encourage oral fluids to ensure adequate hydration  - Administer IV fluids if ordered to ensure adequate hydration  - Administer ordered medications as needed  - Encourage mobilization and activity  - Consider nutritional services referral to assist patient with adequate nutrition and appropriate food choices  Outcome: Completed  Goal: Maintains adequate nutritional intake  Description: INTERVENTIONS:  - Monitor percentage of each meal consumed  - Identify factors contributing to decreased intake, treat as appropriate  - Assist with meals as needed  - Monitor I&O, weight, and lab values if indicated  - Obtain nutrition services referral as needed  Outcome: Completed     Problem: METABOLIC, FLUID AND ELECTROLYTES - ADULT  Goal: Electrolytes maintained within normal limits  Description: INTERVENTIONS:  - Monitor labs and assess patient for signs and symptoms of electrolyte imbalances  - Administer electrolyte replacement as ordered  - Monitor response to electrolyte replacements, including repeat lab results as appropriate  - Instruct patient on fluid and nutrition as appropriate  Outcome: Completed     Problem: MOBILITY - ADULT  Goal: Maintain or return to baseline ADL function  Description: INTERVENTIONS:  -  Assess patient's ability to carry out ADLs; assess patient's baseline for ADL function and identify physical deficits which impact ability to perform ADLs (bathing, care of mouth/teeth, toileting, grooming, dressing, etc )  - Assess/evaluate cause of self-care deficits   - Assess range of motion  - Assess patient's mobility; develop plan if impaired  - Assess patient's need for assistive devices and provide as appropriate  - Encourage maximum independence but intervene and supervise when necessary  - Involve family in performance of ADLs  - Assess for home care needs following discharge   - Consider OT consult to assist with ADL evaluation and planning for discharge  - Provide patient education as appropriate  Outcome: Completed  Goal: Maintains/Returns to pre admission functional level  Description: INTERVENTIONS:  - Perform BMAT or MOVE assessment daily    - Set and communicate daily mobility goal to care team and patient/family/caregiver     - Collaborate with rehabilitation services on mobility goals if consulted  - Out of bed for toileting  - Record patient progress and toleration of activity level   Outcome: Completed     Problem: Prexisting or High Potential for Compromised Skin Integrity  Goal: Skin integrity is maintained or improved  Description: INTERVENTIONS:  - Identify patients at risk for skin breakdown  - Assess and monitor skin integrity  - Assess and monitor nutrition and hydration status  - Monitor labs   - Assess for incontinence   - Turn and reposition patient  - Assist with mobility/ambulation  - Relieve pressure over bony prominences  - Avoid friction and shearing  - Provide appropriate hygiene as needed including keeping skin clean and dry  - Evaluate need for skin moisturizer/barrier cream  - Collaborate with interdisciplinary team   - Patient/family teaching  - Consider wound care consult   Outcome: Completed     Problem: Nutrition/Hydration-ADULT  Goal: Nutrient/Hydration intake appropriate for improving, restoring or maintaining nutritional needs  Description: Monitor and assess patient's nutrition/hydration status for malnutrition  Collaborate with interdisciplinary team and initiate plan and interventions as ordered  Monitor patient's weight and dietary intake as ordered or per policy  Utilize nutrition screening tool and intervene as necessary  Determine patient's food preferences and provide high-protein, high-caloric foods as appropriate       INTERVENTIONS:  - Monitor oral intake, urinary output, labs, and treatment plans  - Assess nutrition and hydration status and recommend course of action  - Evaluate amount of meals eaten  - Assist patient with eating if necessary   - Allow adequate time for meals  - Recommend/ encourage appropriate diets, oral nutritional supplements, and vitamin/mineral supplements  - Order, calculate, and assess calorie counts as needed  - Recommend, monitor, and adjust tube feedings and TPN/PPN based on assessed needs  - Assess need for intravenous fluids  - Provide specific nutrition/hydration education as appropriate  - Include patient/family/caregiver in decisions related to nutrition  Outcome: Completed

## 2022-03-04 NOTE — PROGRESS NOTES
[facility]  Progress Note - Esme Sanford 67 y o  male MRN: [id number]    Unit/Bed#: -01 Encounter: [id number]    Assessment/Plan:  Decompensated cirrhosis  Hepatic encephalopathy  Esophageal varices  -MELD-Na: 16, CP c  -3 bowel movements documented yesterday on lactulose 30 g q i d with only mild asterixis  -follows with [facility](Dr Yasir Freedman)  -up-to-date with [facility]  screening and esophageal variceal screening(history of esophageal varices with stigmata requiring EVL)  -unclear etiology of acute decompensation; no evidence of infection, portal vasculature appears to be patent, no evidence of GI bleed(normal BUN, hemoglobin stable), minimal ascites but low likelihood of SBP given absence of leukocytosis, abdominal pain; T-max 100 2° last night  -may also be a component of dementia  -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 and will likely benefit from Xifaxan unfortunately this was not approved by insurance as an outpatient and primary hepatologist has already submitted paperwork for review  -zinc level normal at 47  -30-35kcal/kg/d, 1 2-1 5g/kg/d protein  -nutrition evaluation  -patient should follow-up with his primary hepatology/gastroenterology team     Gallbladder polyp/adenomyomatosis  -stable since 2019  -evaluated by surgery at [facility] management given decompensated cirrhosis     Fever  -T-max 100 2 last night  -procalcitonin negative  -WBC normalized-on rocephin 2grams Q24 hours  -UA negative  -repeat blood culture results pending, negative thus far  -COVID-19/flu/RSV negative  -CT chest abdomen pelvis: atelectasis, cholelithiasis with small pericholecystic fluid and colonic wall thickening-both in the setting of ascites  Patient has no abdominal pain upon deep palpation in any quadrant, seen by general surgery with no plans for surgical intervention at this time  -check infectious stool studies(although less likely and diarrhea secondary to lactulose)  -continue to monitor  -incentive spirometry      Marilu BINGHAM  [facility]  Gastroenterology    Patient plan of care formulated with Dr Lamont Leigh  Subjective:   Patient reports feeling fine  Denies any abdominal pain, nausea, or vomiting  Says he has been able to eat and drink without any issues whatsoever, and proceeds to take his Ensure and finish it  Objective:     Vitals: Blood pressure 121/78, pulse 85, temperature 98 4 °F (36 9 °C), resp  rate 18, height 6' 2" (1 88 m), weight 102 kg (225 lb 5 oz), SpO2 98 %  ,Body mass index is 28 93 kg/m²  Intake/Output Summary (Last 24 hours) at 3/4/2022 0945  Last data filed at 3/4/2022 0940  Gross per 24 hour   Intake 2038 75 ml   Output 200 ml   Net 1838 75 ml       Review of Systems: as per HPI  Review of Systems   Unable to perform ROS: Dementia (limited)   Constitutional:        Per HPI   All other systems reviewed and are negative  Physical Exam:     Physical Exam  Vitals and nursing note reviewed  Constitutional:       General: He is not in acute distress  Appearance: Normal appearance  He is well-developed  He is obese  He is ill-appearing  He is not toxic-appearing or diaphoretic  HENT:      Head: Normocephalic and atraumatic  Mouth/Throat:      Mouth: Mucous membranes are moist       Pharynx: Oropharynx is clear  Eyes:      General: No scleral icterus  Conjunctiva/sclera: Conjunctivae normal    Cardiovascular:      Rate and Rhythm: Normal rate  Pulmonary:      Effort: Pulmonary effort is normal  No respiratory distress  Abdominal:      General: There is no distension  Palpations: Abdomen is soft  There is no mass  Tenderness: There is no abdominal tenderness  There is no guarding or rebound  Hernia: No hernia is present  Skin:     General: Skin is warm and dry  Coloration: Skin is not jaundiced or pale  Findings: No rash  Neurological:      General: No focal deficit present  Mental Status: He is alert  Mental status is at baseline  Comments: Very mild asterixis noted    knows name, date of birth, and that he is in the hospital, unable to tell me which hospital, the year, or the president    Psychiatric:         Mood and Affect: Mood normal       Comments: Pleasant           Invasive Devices  Report    Peripheral Intravenous Line            Peripheral IV 03/03/22 Left;Ventral (anterior) Wrist 1 day                        CBC:   Lab Results   Component Value Date    WBC 5 25 03/04/2022    HGB 10 4 (L) 03/04/2022    HCT 29 1 (L) 03/04/2022    MCV 91 03/04/2022    PLT 47 (LL) 03/04/2022    MCH 32 4 03/04/2022    MCHC 35 7 03/04/2022    RDW 14 3 03/04/2022    MPV 12 5 03/04/2022    NRBC 0 03/04/2022   ,   CMP:   Lab Results   Component Value Date    K 3 7 03/04/2022     03/04/2022    CO2 24 03/04/2022    BUN 13 03/04/2022    CREATININE 0 90 03/04/2022    CALCIUM 7 6 (L) 03/04/2022    AST 38 03/04/2022    ALT 29 03/04/2022    ALKPHOS 135 (H) 03/04/2022    EGFR 84 03/04/2022   ,   Lipase: No results found for: LIPASE,  PT/INR: No results found for: PT, INR,   Troponin: No results found for: TROPONINI,   Magnesium: No components found for: MAG,   Phosphorous: No results found for: PHOS  Imaging Studies: I have personally reviewed pertinent reports  03/03/2022:  CT CHEST, ABDOMEN AND PELVIS WITH IV CONTRAST     INDICATION:   Sepsis     COMPARISON:  Gallbladder ultrasound 2/26/2022     TECHNIQUE: CT examination of the chest, abdomen and pelvis was performed  Axial, sagittal, and coronal 2D reformatted images were created from the source data and submitted for interpretation      Radiation dose length product (DLP) for this visit:  1102 mGy-cm     This examination, like all CT scans performed in the [facility], was performed utilizing techniques to minimize radiation dose exposure, including the use of iterative   reconstruction and automated exposure control      IV Contrast:  100 mL of iohexol (OMNIPAQUE)  Enteric contrast was not administered       FINDINGS:     CHEST     LUNGS:  Mild bibasilar atelectasis greater on the right  Lungs otherwise clear  Respiratory motion artifact  Central airways are patent        PLEURA:  Trace right pleural effusion      HEART/GREAT VESSELS:  Coronary artery calcifications  No thoracic aortic aneurysm      MEDIASTINUM AND MIKE:  Unremarkable      CHEST WALL AND LOWER NECK: Right thyroid lobe hypodense 1 3 cm nodule  Incidental discovery of one or more thyroid nodule(s) measuring less than 1 5 cm and without suspicious features is noted in this patient who is above 28years old; according to   guidelines published in the February 2015 white paper on incidental thyroid nodules in the Journal of the [employer] of Radiology [facility]), no further evaluation is recommended       ABDOMEN     LIVER/BILIARY TREE:  Cirrhosis  No biliary ductal dilation  Patent portal veins  Recanalized paraumbilical vein        GALLBLADDER:  Cholelithiasis  Small pericholecystic fluid and fat stranding are nonspecific in the setting of ascites /underlying liver disease  If there is clinical concern for acute gallbladder pathology, gallbladder ultrasound may be considered      SPLEEN:  Splenomegaly measuring 20 5 cm craniocaudad      PANCREAS:  Unremarkable      ADRENAL GLANDS:  Nodular thickening of the right adrenal gland  Left adrenal gland is unremarkable        KIDNEYS/URETERS:  KIDNEYS/URETERS:  Right renal cysts  Bilateral too small to characterize subcentimeter renal hypodensities  No hydronephrosis  STOMACH AND BOWEL: Diffuse edematous wall thickening of the ascending and transverse colon could be due to portal hypertensive colopathy or infectious/inflammatory colitis  Diverticulosis coli  No bowel obstruction      APPENDIX:  Normal      ABDOMINOPELVIC CAVITY:  Small volume of ascites  Diffuse mesenteric edema  No pneumoperitoneum    No lymphadenopathy      VESSELS: Gastric hepatic ligament, paraesophageal, perisplenic varices  Atherosclerosis  No abdominal aortic aneurysm      PELVIS     REPRODUCTIVE ORGANS:  Unremarkable for patient's age      URINARY BLADDER:  Unremarkable      ABDOMINAL WALL/INGUINAL REGIONS:  Small bilateral fat-containing inguinal hernias also containing ascites on the left      OSSEOUS STRUCTURES:  Mild age-indeterminate compression fracture of L1 with depression of the superior endplate  Degenerative changes of the spine and shoulders  Old left rib fracture deformities      IMPRESSION:     1  Cirrhosis and findings of portal hypertension  Small volume ascites      2  Cholelithiasis  Small pericholecystic fluid and fat stranding are nonspecific in the setting of ascites /underlying liver disease  If there is clinical concern for acute gallbladder pathology, gallbladder ultrasound may be considered      3  Diffuse edematous wall thickening of the ascending and transverse colon could be due to portal hypertensive colopathy or infectious/inflammatory colitis      4   Trace right pleural effusion with mild subjacent atelectasis      5   Mild age-indeterminate compression fracture of L1 with depression of the superior endplate           Counseling / Coordination of Care  Total time spent today  15 minutes  Greater than 50% of total time was spent with the patient and / or family counseling and / or coordination of care

## 2022-03-04 NOTE — CASE MANAGEMENT
Case Management Discharge Planning Note    Patient name Rona Salmeron  Location /-01 MRN [id number]  : 1950 Date 3/4/2022       Current Admission Date: 2022  Current Admission Diagnosis:Hepatic encephalopathy [facility])   Patient Active Problem List    Diagnosis Date Noted    SIRS (systemic inflammatory response syndrome) ([facility] [place] [address] ) 2022    Cholelithiasis     Moderate protein-calorie malnutrition ([facility] [place] [address] ) 2022    Thrombocytopenia ([facility] [place] [address] ) 2022    Dementia with behavioral disturbance ([facility] [place] [address] )     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     Hepatic encephalopathy ([facility] [place] [address] )     Hypertension       LOS (days): 7  Geometric Mean LOS (GMLOS) (days): 3 20  Days to GMLOS:-3 3     OBJECTIVE:  Risk of Unplanned Readmission Score: 16         Current admission status: Inpatient   Preferred Pharmacy:   PATIENT/FAMILY REPORTS NO PREFERRED PHARMACY  No address on file      Primary Care Provider: Carole Pérez MD    Primary Insurance: MEDICARE  Secondary Insurance: AETNA    DISCHARGE DETAILS:     Patient discussed in am huddle  Patient stable for discharge today  CM called patient son Roger Raman and he accepted [facility] rehab bed  [facility] able to accept patient today  Referral to [facility] placed for BLS transport due to dementia for safe transport  Medical Necessity for transportation was completed  Copy available for transport team and a copy was placed in bin to be scanned into the chart  vertified  time  1600 by Osvaldo AYERS     Patient and smiley at  [facility] aware patient  time and son is to meet patient to sign papers

## 2022-03-04 NOTE — ASSESSMENT & PLAN NOTE
· Presents from home with altered mental status per family  Patient also with history of dementia, unclear baseline  In setting of history of JAFFE and decompensated cirrhosis with OP lactulose regimen of 20 mg q6h  · Ammonia 233 on arrival - Lactulose increased to 30 mg QID with improvement in mentation and level of alertness, no longer with asterixis  · Discussed with GI and as per GI, we can monitor based on patient's clinical status more than ammonia levels  · Patient may benefit from Rifaximin which is being worked on by his hepatologist at this time   Previously not approved by insurance    Patient stable to be discharged with significant improvement in mental status although with baseline dementia and disorientation but no somnolence and very alert

## 2022-03-04 NOTE — PLAN OF CARE
Problem: Potential for Falls  Goal: Patient will remain free of falls  Description: INTERVENTIONS:  - Educate patient/family on patient safety including physical limitations  - Instruct patient to call for assistance with activity   - Consult OT/PT to assist with strengthening/mobility   - Keep Call bell within reach  - Keep bed low and locked with side rails adjusted as appropriate  - Keep care items and personal belongings within reach  - Initiate and maintain comfort rounds  - Make Fall Risk Sign visible to staff  - Offer Toileting every 2 Hours, in advance of need  - Initiate/Maintain bed and chair alarm  Problem: INFECTION - ADULT  Goal: Absence or prevention of progression during hospitalization  Description: INTERVENTIONS:  - Assess and monitor for signs and symptoms of infection  - Monitor lab/diagnostic results blood cultures  - Monitor all insertion sites,   - Monitor endotracheal if appropriate and nasal secretions for changes in amount and color  - [place] appropriate cooling/warming therapies per order  - Administer medications as ordered  - Instruct and encourage patient and family to use good hand hygiene technique  - Identify and instruct in appropriate isolation precautions for identified infection/condition  Outcome: Progressing     Problem: PAIN - ADULT  Goal: Verbalizes/displays adequate comfort level or baseline comfort level  Description: Interventions:  - Encourage patient to monitor pain and request assistance  - Assess pain using appropriate pain scale  - Administer analgesics based on type and severity of pain and evaluate response  - Implement non-pharmacological measures as appropriate and evaluate response  - Consider cultural and social influences on pain and pain management  - Notify physician/advanced practitioner if interventions unsuccessful or patient reports new pain  Outcome: Progressing     Problem: DISCHARGE PLANNING  Goal: Discharge to home or other facility with appropriate resources  Description: INTERVENTIONS:  - Identify barriers to discharge w/patient and caregiver  - Arrange for needed discharge resources and transportation as appropriate  - Identify discharge learning needs (meds, wound care, etc )  - Arrange for interpretive services to assist at discharge as needed  - Refer to Case Management Department for coordinating discharge planning if the patient needs post-hospital services based on physician/advanced practitioner order or complex needs related to functional status, cognitive ability, or social support system  Outcome: Progressing     Problem: Knowledge Deficit  Goal: Patient/family/caregiver demonstrates understanding of disease process, treatment plan, medications, and discharge instructions  Description: Complete learning assessment and assess knowledge base    Interventions:  - Provide teaching at level of understanding  - Provide teaching via preferred learning methods  Outcome: Progressing     Problem: GASTROINTESTINAL - ADULT  Goal: Minimal or absence of nausea and/or vomiting  Description: INTERVENTIONS:  - Administer IV fluids if ordered to ensure adequate hydration  - Maintain NPO status until nausea and vomiting are resolved  - Nasogastric tube if ordered  - Administer ordered antiemetic medications as needed  - Provide nonpharmacologic comfort measures as appropriate  - Advance diet as tolerated, if ordered  - Consider nutrition services referral to assist patient with adequate nutrition and appropriate food choices  Outcome: Progressing  Goal: Maintains or returns to baseline bowel function  Description: INTERVENTIONS:  - Assess bowel function  - Encourage oral fluids to ensure adequate hydration  - Administer IV fluids if ordered to ensure adequate hydration  - Administer ordered medications as needed  - Encourage mobilization and activity  - Consider nutritional services referral to assist patient with adequate nutrition and appropriate food choices  Outcome: Progressing  Goal: Maintains adequate nutritional intake  Description: INTERVENTIONS:  - Monitor percentage of each meal consumed  - Identify factors contributing to decreased intake, treat as appropriate  - Assist with meals as needed  - Monitor I&O, weight, and lab values if indicated  - Obtain nutrition services referral as needed  Outcome: Progressing     Problem: METABOLIC, FLUID AND ELECTROLYTES - ADULT  Goal: Electrolytes maintained within normal limits  Description: INTERVENTIONS:  - Monitor labs and assess patient for signs and symptoms of electrolyte imbalances  - Administer electrolyte replacement as ordered  - Monitor response to electrolyte replacements, including repeat lab results as appropriate  - Instruct patient on fluid and nutrition as appropriate  Outcome: Progressing     Problem: Nutrition/Hydration-ADULT  Goal: Nutrient/Hydration intake appropriate for improving, restoring or maintaining nutritional needs  Description: Monitor and assess patient's nutrition/hydration status for malnutrition  Collaborate with interdisciplinary team and initiate plan and interventions as ordered  Monitor patient's weight and dietary intake as ordered or per policy  Utilize nutrition screening tool and intervene as necessary  Determine patient's food preferences and provide high-protein, high-caloric foods as appropriate       INTERVENTIONS:  - Monitor oral intake, urinary output, labs, and treatment plans  - Assess nutrition and hydration status and recommend course of action  - Evaluate amount of meals eaten  - Assist patient with eating if necessary   - Allow adequate time for meals  - Recommend/ encourage appropriate diets, oral nutritional supplements, and vitamin/mineral supplements  - Order, calculate, and assess calorie counts as needed  - Recommend, monitor, and adjust tube feedings and TPN/PPN based on assessed needs  - Assess need for intravenous fluids  - Provide specific nutrition/hydration education as appropriate  - Include patient/family/caregiver in decisions related to nutrition  Outcome: Progressing     - Apply yellow socks and bracelet for high fall risk patients  - Consider moving patient to room near nurses station  Outcome: Progressing

## 2022-03-04 NOTE — ASSESSMENT & PLAN NOTE
· 3/2: patient met SIRS criteria with tachycardia, fever to 102, leukocytosis however with unclear etiology with no overt signs of infection  UA negative, repeat BCx no growth at 24 hours  CXR showing some atelectasis but no infiltrates  CT scan reviewed and discussed with GI and general surgery  Physical exam unrevealing with no abdominal pain, clear breath sounds  No surgical intervention at this time   Incentive spirometry for atelectasis    RESOLVED

## 2022-03-04 NOTE — DISCHARGE INSTRUCTIONS
Hepatic Encephalopathy   WHAT YOU NEED TO KNOW:   Hepatic encephalopathy (HE) is a brain condition that is caused by liver disease  Liver diseases such as cirrhosis prevent the liver from removing ammonia and other harmful substances from the blood  The harmful substances build up in the blood and prevent the brain from working correctly  Early treatment is needed to reverse the damaging effects of this condition and restore proper brain function  DISCHARGE INSTRUCTIONS:   Call 911 or have someone else call for any of the following:   · You have a seizure  · You cannot be woken  Seek care immediately if:   · You feel confused, dizzy, or lightheaded  · Your heart is beating faster than is normal for you  · You have sudden shortness of breath  Contact your healthcare provider if:   · You have a fever  · You are sleeping more than usual     · You have questions or concerns about your condition or care  Follow up with your healthcare provider as directed:  Write down your questions so you remember to ask them during your visits  Self-care:   · Do not drink alcohol  Alcohol can worsen your condition  Alcohol can also cause new or worsening damage to your liver and brain  · Eat low-protein and low-sodium foods  You may be asked to eat less protein and sodium (salt)  Some foods high in protein include beef, pork, poultry (chicken, turkey), beans, and nuts  Some foods high in sodium include salty snacks, canned foods, condiments, deli meats, and cured meat such as woodruff  Ask your dietitian for more information about foods to limit or avoid  © Copyright [employer] 2022 Information is for End User's use only and may not be sold, redistributed or otherwise used for commercial purposes  All illustrations and images included in CareNotes® are the copyrighted property of A D A M , Inc  or [facility] Reese Gentile  The above information is an  only   It is not intended as medical advice for individual conditions or treatments  Talk to your doctor, nurse or pharmacist before following any medical regimen to see if it is safe and effective for you

## 2022-03-04 NOTE — NURSING NOTE
Attemped to call discharge report to [facility], no answer  Left voicemail for Adrien Ba RN on the skilled nursing unit  Will attempt at a later time

## 2022-03-05 LAB
CAMPYLOBACTER DNA SPEC NAA+PROBE: NORMAL
SALMONELLA DNA SPEC QL NAA+PROBE: NORMAL
SARS-COV-2 RNA RESP QL NAA+PROBE: NEGATIVE
SHIGA TOXIN STX GENE SPEC NAA+PROBE: NORMAL
SHIGELLA DNA SPEC QL NAA+PROBE: NORMAL

## 2022-03-08 LAB
BACTERIA BLD CULT: NORMAL
BACTERIA BLD CULT: NORMAL

## 2022-03-09 ENCOUNTER — LAB REQUISITION (OUTPATIENT)
Dept: LAB | Facility: HOSPITAL | Age: 72
End: 2022-03-09

## 2022-03-09 DIAGNOSIS — Z20.822 CONTACT WITH AND (SUSPECTED) EXPOSURE TO COVID-19: ICD-10-CM

## 2022-03-09 LAB — SARS-COV-2 RNA RESP QL NAA+PROBE: NEGATIVE

## 2022-03-09 PROCEDURE — U0003 INFECTIOUS AGENT DETECTION BY NUCLEIC ACID (DNA OR RNA); SEVERE ACUTE RESPIRATORY SYNDROME CORONAVIRUS 2 (SARS-COV-2) (CORONAVIRUS DISEASE [COVID-19]), AMPLIFIED PROBE TECHNIQUE, MAKING USE OF HIGH THROUGHPUT TECHNOLOGIES AS DESCRIBED BY CMS-2020-01-R: HCPCS | Performed by: NURSE PRACTITIONER

## 2022-03-09 PROCEDURE — U0005 INFEC AGEN DETEC AMPLI PROBE: HCPCS | Performed by: NURSE PRACTITIONER

## 2022-03-10 ENCOUNTER — LAB REQUISITION (OUTPATIENT)
Dept: LAB | Facility: HOSPITAL | Age: 72
End: 2022-03-10

## 2022-03-10 DIAGNOSIS — K72.90 HEPATIC FAILURE, UNSPECIFIED WITHOUT COMA (HCC): ICD-10-CM

## 2022-03-10 LAB
ALBUMIN SERPL BCP-MCNC: 1.9 G/DL (ref 3.5–5)
ALP SERPL-CCNC: 121 U/L (ref 46–116)
ALT SERPL W P-5'-P-CCNC: 27 U/L (ref 12–78)
AMMONIA PLAS-SCNC: 53 UMOL/L (ref 11–35)
ANION GAP SERPL CALCULATED.3IONS-SCNC: 6 MMOL/L (ref 4–13)
AST SERPL W P-5'-P-CCNC: 40 U/L (ref 5–45)
BILIRUB SERPL-MCNC: 1.77 MG/DL (ref 0.2–1)
BUN SERPL-MCNC: 9 MG/DL (ref 5–25)
CALCIUM ALBUM COR SERPL-MCNC: 9.7 MG/DL (ref 8.3–10.1)
CALCIUM SERPL-MCNC: 8 MG/DL (ref 8.3–10.1)
CHLORIDE SERPL-SCNC: 108 MMOL/L (ref 100–108)
CO2 SERPL-SCNC: 26 MMOL/L (ref 21–32)
CREAT SERPL-MCNC: 0.91 MG/DL (ref 0.6–1.3)
ERYTHROCYTE [DISTWIDTH] IN BLOOD BY AUTOMATED COUNT: 14.6 % (ref 11.6–15.1)
GFR SERPL CREATININE-BSD FRML MDRD: 83 ML/MIN/1.73SQ M
GLUCOSE P FAST SERPL-MCNC: 76 MG/DL (ref 65–99)
HCT VFR BLD AUTO: 30.3 % (ref 36.5–49.3)
HGB BLD-MCNC: 10.5 G/DL (ref 12–17)
MCH RBC QN AUTO: 32.5 PG (ref 26.8–34.3)
MCHC RBC AUTO-ENTMCNC: 34.7 G/DL (ref 31.4–37.4)
MCV RBC AUTO: 94 FL (ref 82–98)
PLATELET # BLD AUTO: 67 THOUSANDS/UL (ref 149–390)
PMV BLD AUTO: 11.8 FL (ref 8.9–12.7)
POTASSIUM SERPL-SCNC: 4 MMOL/L (ref 3.5–5.3)
PROT SERPL-MCNC: 6.4 G/DL (ref 6.4–8.2)
RBC # BLD AUTO: 3.23 MILLION/UL (ref 3.88–5.62)
SODIUM SERPL-SCNC: 140 MMOL/L (ref 136–145)
WBC # BLD AUTO: 4.36 THOUSAND/UL (ref 4.31–10.16)

## 2022-03-10 PROCEDURE — 80053 COMPREHEN METABOLIC PANEL: CPT | Performed by: INTERNAL MEDICINE

## 2022-03-10 PROCEDURE — 82140 ASSAY OF AMMONIA: CPT | Performed by: INTERNAL MEDICINE

## 2022-03-10 PROCEDURE — 85027 COMPLETE CBC AUTOMATED: CPT | Performed by: INTERNAL MEDICINE

## 2022-03-17 ENCOUNTER — LAB REQUISITION (OUTPATIENT)
Dept: LAB | Facility: HOSPITAL | Age: 72
End: 2022-03-17

## 2022-03-17 DIAGNOSIS — K72.90 HEPATIC FAILURE, UNSPECIFIED WITHOUT COMA (HCC): ICD-10-CM

## 2022-03-17 DIAGNOSIS — D64.9 ANEMIA, UNSPECIFIED: ICD-10-CM

## 2022-03-17 LAB
AMMONIA PLAS-SCNC: 115 UMOL/L (ref 11–35)
ANION GAP SERPL CALCULATED.3IONS-SCNC: 8 MMOL/L (ref 4–13)
BUN SERPL-MCNC: 9 MG/DL (ref 5–25)
CALCIUM SERPL-MCNC: 7.8 MG/DL (ref 8.3–10.1)
CHLORIDE SERPL-SCNC: 107 MMOL/L (ref 100–108)
CO2 SERPL-SCNC: 24 MMOL/L (ref 21–32)
CREAT SERPL-MCNC: 0.8 MG/DL (ref 0.6–1.3)
ERYTHROCYTE [DISTWIDTH] IN BLOOD BY AUTOMATED COUNT: 14.6 % (ref 11.6–15.1)
GFR SERPL CREATININE-BSD FRML MDRD: 88 ML/MIN/1.73SQ M
GLUCOSE P FAST SERPL-MCNC: 78 MG/DL (ref 65–99)
HCT VFR BLD AUTO: 30.4 % (ref 36.5–49.3)
HGB BLD-MCNC: 10.8 G/DL (ref 12–17)
MCH RBC QN AUTO: 32 PG (ref 26.8–34.3)
MCHC RBC AUTO-ENTMCNC: 35.5 G/DL (ref 31.4–37.4)
MCV RBC AUTO: 90 FL (ref 82–98)
PLATELET # BLD AUTO: 65 THOUSANDS/UL (ref 149–390)
PMV BLD AUTO: 12.1 FL (ref 8.9–12.7)
POTASSIUM SERPL-SCNC: 3.7 MMOL/L (ref 3.5–5.3)
RBC # BLD AUTO: 3.38 MILLION/UL (ref 3.88–5.62)
SODIUM SERPL-SCNC: 139 MMOL/L (ref 136–145)
WBC # BLD AUTO: 4.6 THOUSAND/UL (ref 4.31–10.16)

## 2022-03-17 PROCEDURE — 80048 BASIC METABOLIC PNL TOTAL CA: CPT | Performed by: NURSE PRACTITIONER

## 2022-03-17 PROCEDURE — 85027 COMPLETE CBC AUTOMATED: CPT | Performed by: NURSE PRACTITIONER

## 2022-03-17 PROCEDURE — 82140 ASSAY OF AMMONIA: CPT | Performed by: NURSE PRACTITIONER

## 2022-04-19 ENCOUNTER — APPOINTMENT (EMERGENCY)
Dept: CT IMAGING | Facility: HOSPITAL | Age: 72
End: 2022-04-19
Payer: MEDICARE

## 2022-04-19 ENCOUNTER — HOSPITAL ENCOUNTER (EMERGENCY)
Facility: HOSPITAL | Age: 72
Discharge: HOME | End: 2022-04-19
Attending: EMERGENCY MEDICINE | Admitting: EMERGENCY MEDICINE
Payer: MEDICARE

## 2022-04-19 VITALS
RESPIRATION RATE: 16 BRPM | TEMPERATURE: 97.9 F | DIASTOLIC BLOOD PRESSURE: 80 MMHG | HEART RATE: 76 BPM | OXYGEN SATURATION: 99 % | SYSTOLIC BLOOD PRESSURE: 149 MMHG | BODY MASS INDEX: 27.56 KG/M2 | WEIGHT: 214.73 LBS | HEIGHT: 74 IN

## 2022-04-19 DIAGNOSIS — K74.60 HEPATIC CIRRHOSIS, UNSPECIFIED HEPATIC CIRRHOSIS TYPE, UNSPECIFIED WHETHER ASCITES PRESENT (HCC): Primary | ICD-10-CM

## 2022-04-19 LAB
ALBUMIN SERPL BCP-MCNC: 2.2 G/DL (ref 3.5–5)
ALP SERPL-CCNC: 196 U/L (ref 46–116)
ALT SERPL W P-5'-P-CCNC: 47 U/L (ref 12–78)
AMMONIA PLAS-SCNC: 55 UMOL/L (ref 11–35)
ANION GAP SERPL CALCULATED.3IONS-SCNC: 8 MMOL/L (ref 4–13)
AST SERPL W P-5'-P-CCNC: 57 U/L (ref 5–45)
BASOPHILS # BLD AUTO: 0.02 THOUSANDS/ΜL (ref 0–0.1)
BASOPHILS NFR BLD AUTO: 1 % (ref 0–1)
BILIRUB SERPL-MCNC: 1.93 MG/DL (ref 0.2–1)
BUN SERPL-MCNC: 8 MG/DL (ref 5–25)
CALCIUM ALBUM COR SERPL-MCNC: 9.2 MG/DL (ref 8.3–10.1)
CALCIUM SERPL-MCNC: 7.8 MG/DL (ref 8.3–10.1)
CHLORIDE SERPL-SCNC: 103 MMOL/L (ref 100–108)
CO2 SERPL-SCNC: 27 MMOL/L (ref 21–32)
CREAT SERPL-MCNC: 0.79 MG/DL (ref 0.6–1.3)
EOSINOPHIL # BLD AUTO: 0.12 THOUSAND/ΜL (ref 0–0.61)
EOSINOPHIL NFR BLD AUTO: 3 % (ref 0–6)
ERYTHROCYTE [DISTWIDTH] IN BLOOD BY AUTOMATED COUNT: 14.6 % (ref 11.6–15.1)
GFR SERPL CREATININE-BSD FRML MDRD: 89 ML/MIN/1.73SQ M
GLUCOSE SERPL-MCNC: 102 MG/DL (ref 65–140)
HCT VFR BLD AUTO: 35.2 % (ref 36.5–49.3)
HGB BLD-MCNC: 12.1 G/DL (ref 12–17)
IMM GRANULOCYTES # BLD AUTO: 0.01 THOUSAND/UL (ref 0–0.2)
IMM GRANULOCYTES NFR BLD AUTO: 0 % (ref 0–2)
LIPASE SERPL-CCNC: 344 U/L (ref 73–393)
LYMPHOCYTES # BLD AUTO: 0.5 THOUSANDS/ΜL (ref 0.6–4.47)
LYMPHOCYTES NFR BLD AUTO: 13 % (ref 14–44)
MCH RBC QN AUTO: 32.3 PG (ref 26.8–34.3)
MCHC RBC AUTO-ENTMCNC: 34.4 G/DL (ref 31.4–37.4)
MCV RBC AUTO: 94 FL (ref 82–98)
MONOCYTES # BLD AUTO: 0.45 THOUSAND/ΜL (ref 0.17–1.22)
MONOCYTES NFR BLD AUTO: 12 % (ref 4–12)
NEUTROPHILS # BLD AUTO: 2.62 THOUSANDS/ΜL (ref 1.85–7.62)
NEUTS SEG NFR BLD AUTO: 71 % (ref 43–75)
NRBC BLD AUTO-RTO: 0 /100 WBCS
PLATELET # BLD AUTO: 57 THOUSANDS/UL (ref 149–390)
PMV BLD AUTO: 12.1 FL (ref 8.9–12.7)
POTASSIUM SERPL-SCNC: 3.6 MMOL/L (ref 3.5–5.3)
PROT SERPL-MCNC: 7.1 G/DL (ref 6.4–8.2)
RBC # BLD AUTO: 3.75 MILLION/UL (ref 3.88–5.62)
SODIUM SERPL-SCNC: 138 MMOL/L (ref 136–145)
WBC # BLD AUTO: 3.72 THOUSAND/UL (ref 4.31–10.16)

## 2022-04-19 PROCEDURE — 82140 ASSAY OF AMMONIA: CPT | Performed by: EMERGENCY MEDICINE

## 2022-04-19 PROCEDURE — G1004 CDSM NDSC: HCPCS

## 2022-04-19 PROCEDURE — 74177 CT ABD & PELVIS W/CONTRAST: CPT

## 2022-04-19 PROCEDURE — 80053 COMPREHEN METABOLIC PANEL: CPT | Performed by: EMERGENCY MEDICINE

## 2022-04-19 PROCEDURE — 99284 EMERGENCY DEPT VISIT MOD MDM: CPT

## 2022-04-19 PROCEDURE — 83690 ASSAY OF LIPASE: CPT | Performed by: EMERGENCY MEDICINE

## 2022-04-19 PROCEDURE — 99284 EMERGENCY DEPT VISIT MOD MDM: CPT | Performed by: EMERGENCY MEDICINE

## 2022-04-19 PROCEDURE — 36415 COLL VENOUS BLD VENIPUNCTURE: CPT | Performed by: EMERGENCY MEDICINE

## 2022-04-19 PROCEDURE — 85025 COMPLETE CBC W/AUTO DIFF WBC: CPT | Performed by: EMERGENCY MEDICINE

## 2022-04-19 RX ADMIN — IOHEXOL 100 ML: 350 INJECTION, SOLUTION INTRAVENOUS at 16:03

## 2022-04-19 NOTE — ED PROVIDER NOTES
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 and that patient should come to the emergency room for further evaluation  Patient denies any chest pain, shortness of breath, abdominal pain, nausea, vomiting, diarrhea, or other complaint  Patient does have a history of liver cirrhosis  History provided by:  Patient   used: No    Evaluation of Abnormal Diagnostic Test  Time since result:  One week  Patient referred by:  Specialist (Gastroenterologist)  Resulting agency:  External  Result type: radiology    Radiology:     Abdominal ultrasound: abnormal (Questionable finding of thrombosed SMV)        Prior to Admission Medications   Prescriptions Last Dose Informant Patient Reported? Taking?    Cholecalciferol 25 MCG (1000 UT) tablet   Yes No   Sig: Take 1 tablet by mouth daily   QUEtiapine (SEROquel) 25 mg tablet   No No   Sig: Take 0 5 tablets (12 5 mg total) by mouth daily at bedtime   aspirin (ECOTRIN LOW STRENGTH) 81 mg EC tablet   Yes No   Sig: Take 81 mg by mouth daily   donepezil (ARICEPT) 10 mg tablet   Yes No   Sig: Take 10 mg by mouth   lactulose 20 g/30 mL   No No   Sig: Take 45 mL (30 g total) by mouth 4 (four) times a day   lisinopril (ZESTRIL) 10 mg tablet   No No   Sig: Take 1 tablet (10 mg total) by mouth daily   memantine (NAMENDA) 10 mg tablet   Yes No   Sig: Take 10 mg by mouth 2 (two) times a day      Facility-Administered Medications: None       Past Medical History:   Diagnosis Date    Cirrhosis ([facility]     Dementia ([facility] )     Hypertension        Past Surgical History:   Procedure Laterality Date    REPLACEMENT TOTAL KNEE History reviewed  No pertinent family history  I have reviewed and agree with the history as documented  E-Cigarette/Vaping    E-Cigarette Use Never User      E-Cigarette/Vaping Substances     Social History     Tobacco Use    Smoking status: Former Smoker    Smokeless tobacco: Never Used   Vaping Use    Vaping Use: Never used   Substance Use Topics    Alcohol use: Not Currently     Comment: former social ETOH    Drug use: Never       Review of Systems   Constitutional: Negative for chills and fever  HENT: Negative for ear pain, hearing loss, sore throat, trouble swallowing and voice change  Eyes: Negative for pain and discharge  Respiratory: Negative for cough, shortness of breath and wheezing  Cardiovascular: Negative for chest pain and palpitations  Gastrointestinal: Negative for abdominal pain, blood in stool, constipation, diarrhea, nausea and vomiting  Genitourinary: Negative for dysuria, flank pain, frequency and hematuria  Musculoskeletal: Negative for joint swelling, neck pain and neck stiffness  Skin: Negative for rash and wound  Neurological: Negative for dizziness, seizures, syncope, facial asymmetry and headaches  Psychiatric/Behavioral: Negative for hallucinations, self-injury and suicidal ideas  All other systems reviewed and are negative  Physical Exam  Physical Exam  Vitals and nursing note reviewed  Constitutional:       General: He is not in acute distress  Appearance: He is well-developed  HENT:      Head: Normocephalic and atraumatic  Right Ear: External ear normal       Left Ear: External ear normal    Eyes:      General: No scleral icterus  Right eye: No discharge  Left eye: No discharge  Extraocular Movements: Extraocular movements intact  Conjunctiva/sclera: Conjunctivae normal    Cardiovascular:      Rate and Rhythm: Normal rate and regular rhythm  Heart sounds: Normal heart sounds   No murmur heard       Pulmonary:      Effort: Pulmonary effort is normal       Breath sounds: Normal breath sounds  No wheezing or rales  Abdominal:      General: Bowel sounds are normal  There is no distension  Palpations: Abdomen is soft  Tenderness: There is no abdominal tenderness  There is no guarding or rebound  Musculoskeletal:         General: No deformity  Normal range of motion  Cervical back: Normal range of motion and neck supple  Skin:     General: Skin is warm and dry  Findings: No rash  Neurological:      General: No focal deficit present  Mental Status: He is alert and oriented to person, place, and time  Cranial Nerves: No cranial nerve deficit  Psychiatric:         Mood and Affect: Mood normal          Behavior: Behavior normal          Thought Content:  Thought content normal          Judgment: Judgment normal          Vital Signs  ED Triage Vitals [04/19/22 1449]   Temperature Pulse Respirations Blood Pressure SpO2   97 9 °F (36 6 °C) 72 17 148/68 99 %      Temp src Heart Rate Source Patient Position - Orthostatic VS BP Location FiO2 (%)   -- Monitor Lying Left arm --      Pain Score       No Pain           Vitals:    04/19/22 1515 04/19/22 1530 04/19/22 1545 04/19/22 1630   BP: 128/68 142/73 140/74 149/80   Pulse: 75 79 73 76   Patient Position - Orthostatic VS: Lying  Lying          Visual Acuity      ED Medications  Medications   iohexol (OMNIPAQUE) 350 MG/ML injection (SINGLE-DOSE) 100 mL (100 mL Intravenous Given 4/19/22 1603)       Diagnostic Studies  Results Reviewed     Procedure Component Value Units Date/Time    Ammonia [[id number]]  (Abnormal) Collected: 04/19/22 1524    Lab Status: Final result Specimen: Blood from Arm, Left Updated: 04/19/22 1605     Ammonia 55 umol/L     Comprehensive metabolic panel [[id number]]  (Abnormal) Collected: 04/19/22 1524    Lab Status: Final result Specimen: Blood from Arm, Left Updated: 04/19/22 1550     Sodium 138 mmol/L Potassium 3 6 mmol/L      Chloride 103 mmol/L      CO2 27 mmol/L      ANION GAP 8 mmol/L      BUN 8 mg/dL      Creatinine 0 79 mg/dL      Glucose 102 mg/dL      Calcium 7 8 mg/dL      Corrected Calcium 9 2 mg/dL      AST 57 U/L      ALT 47 U/L      Alkaline Phosphatase 196 U/L      Total Protein 7 1 g/dL      Albumin 2 2 g/dL      Total Bilirubin 1 93 mg/dL      eGFR 89 ml/min/1 73sq m     Narrative:      National Kidney Disease Foundation guidelines for Chronic Kidney Disease (CKD):     Stage 1 with normal or high GFR (GFR > 90 mL/min/1 73 square meters)    Stage 2 Mild CKD (GFR = 60-89 mL/min/1 73 square meters)    Stage 3A Moderate CKD (GFR = 45-59 mL/min/1 73 square meters)    Stage 3B Moderate CKD (GFR = 30-44 mL/min/1 73 square meters)    Stage 4 Severe CKD (GFR = 15-29 mL/min/1 73 square meters)    Stage 5 End Stage CKD (GFR <15 mL/min/1 73 square meters)  Note: GFR calculation is accurate only with a steady state creatinine    Lipase [[id number]]  (Normal) Collected: 04/19/22 1524    Lab Status: Final result Specimen: Blood from Arm, Left Updated: 04/19/22 1550     Lipase 344 u/L     CBC and differential [[id number]]  (Abnormal) Collected: 04/19/22 1524    Lab Status: Final result Specimen: Blood from Arm, Left Updated: 04/19/22 1533     WBC 3 72 Thousand/uL      RBC 3 75 Million/uL      Hemoglobin 12 1 g/dL      Hematocrit 35 2 %      MCV 94 fL      MCH 32 3 pg      MCHC 34 4 g/dL      RDW 14 6 %      MPV 12 1 fL      Platelets 57 Thousands/uL      nRBC 0 /100 WBCs      Neutrophils Relative 71 %      Immat GRANS % 0 %      Lymphocytes Relative 13 %      Monocytes Relative 12 %      Eosinophils Relative 3 %      Basophils Relative 1 %      Neutrophils Absolute 2 62 Thousands/µL      Immature Grans Absolute 0 01 Thousand/uL      Lymphocytes Absolute 0 50 Thousands/µL      Monocytes Absolute 0 45 Thousand/µL      Eosinophils Absolute 0 12 Thousand/µL      Basophils Absolute 0 02 Thousands/µL CT abdomen pelvis w contrast   Final Result by Susan Lagos MD (04/19 1637)      Evidence of cirrhosis without focal abnormality  Splenomegaly and ascites is similar to the prior study  No evidence of new SMV thrombosis as suggested by the prior ultrasound  The gallbladder is contracted  No obvious enhancing polyp can be appreciated to correlate with the prior reported ultrasounds  A polypoid lesion was apparently visible since prior ultrasound of 2018 according to the most recent [facility] ultrasound    result which commented on the possibility of "adenomyomatosis but carcinoma cannot be excluded"  We will attempt to obtain those examinations  Patient should be re-referred to their GI physicians to review prior imaging and to reevaluate the    gallbladder findings and the need for further imaging versus further clinical evaluation  Slight edematous change of the right colon and transverse colon with some improvement since the study of March  Ascites is again demonstrated similar to the prior study of March  This was discussed with Dr Jamie Sims at 4:30 PM      Workstation performed: [id number]                    Procedures  Procedures         ED Course  ED Course as of 04/19/22 1648   Tue Apr 19, 2022   1635 Discussed by phone with radiologist   SMV is widely patent  Other chronic findings in the liver and gallbladder consistent with patient's known history, no significant change from prior imaging  Patient stable for discharge                                               MDM  Number of Diagnoses or Management Options     Amount and/or Complexity of Data Reviewed  Clinical lab tests: ordered and reviewed  Tests in the radiology section of CPT®: ordered and reviewed  Decide to obtain previous medical records or to obtain history from someone other than the patient: yes  Review and summarize past medical records: yes  Independent visualization of images, tracings, or specimens: yes        Disposition  Final diagnoses:   Hepatic cirrhosis, unspecified hepatic cirrhosis type, unspecified whether ascites present [facility])     Time reflects when diagnosis was documented in both MDM as applicable and the Disposition within this note     Time User Action Codes Description Comment    4/19/2022  4:37 PM Palak Spangler [K74 60] Hepatic cirrhosis, unspecified hepatic cirrhosis type, unspecified whether ascites present [facility])       ED Disposition     ED Disposition Condition Date/Time Comment    Discharge Stable Tue Apr 19, 2022  4:37 PM [place] Cons discharge to home/self care  Follow-up Information     Follow up With Specialties Details Why Contact Info    Roger Jarrett MD Internal Medicine   [address] [address] [phone]            Discharge Medication List as of 4/19/2022  4:38 PM      CONTINUE these medications which have NOT CHANGED    Details   aspirin (ECOTRIN LOW STRENGTH) 81 mg EC tablet Take 81 mg by mouth daily, Historical Med      Cholecalciferol 25 MCG (1000 UT) tablet Take 1 tablet by mouth daily, Starting Mon 11/8/2021, Historical Med      donepezil (ARICEPT) 10 mg tablet Take 10 mg by mouth, Starting Thu 12/9/2021, Historical Med      lactulose 20 g/30 mL Take 45 mL (30 g total) by mouth 4 (four) times a day, Starting Fri 3/4/2022, Until Sun 4/3/2022, No Print      lisinopril (ZESTRIL) 10 mg tablet Take 1 tablet (10 mg total) by mouth daily, Starting Sat 3/5/2022, No Print      memantine (NAMENDA) 10 mg tablet Take 10 mg by mouth 2 (two) times a day, Starting Fri 2/18/2022, Historical Med      QUEtiapine (SEROquel) 25 mg tablet Take 0 5 tablets (12 5 mg total) by mouth daily at bedtime, Starting Fri 3/4/2022, No Print             No discharge procedures on file      PDMP Review     None          ED Provider  Electronically Signed by           Cheng Jimenez MD  04/19/22 0606

## 2022-06-04 ENCOUNTER — APPOINTMENT (EMERGENCY)
Dept: CT IMAGING | Facility: HOSPITAL | Age: 72
DRG: 092 | End: 2022-06-04
Payer: MEDICARE

## 2022-06-04 ENCOUNTER — HOSPITAL ENCOUNTER (INPATIENT)
Facility: HOSPITAL | Age: 72
LOS: 3 days | Discharge: NURSING, REHAB OR LONG-TERM CARE | DRG: 092 | End: 2022-06-07
Attending: FAMILY MEDICINE | Admitting: FAMILY MEDICINE
Payer: MEDICARE

## 2022-06-04 DIAGNOSIS — F03.91 DEMENTIA WITH BEHAVIORAL DISTURBANCE, UNSPECIFIED DEMENTIA TYPE (HCC): ICD-10-CM

## 2022-06-04 DIAGNOSIS — R53.1 WEAKNESS GENERALIZED: ICD-10-CM

## 2022-06-04 DIAGNOSIS — R41.82 ALTERED MENTAL STATUS: ICD-10-CM

## 2022-06-04 DIAGNOSIS — K72.90 HEPATIC ENCEPHALOPATHY (HCC): ICD-10-CM

## 2022-06-04 DIAGNOSIS — K75.81 LIVER CIRRHOSIS SECONDARY TO NASH (HCC): ICD-10-CM

## 2022-06-04 DIAGNOSIS — R53.1 GENERALIZED WEAKNESS: Primary | ICD-10-CM

## 2022-06-04 DIAGNOSIS — S22.31XA CLOSED FRACTURE OF ONE RIB OF RIGHT SIDE, INITIAL ENCOUNTER: ICD-10-CM

## 2022-06-04 DIAGNOSIS — S22.49XA RIB FRACTURES: ICD-10-CM

## 2022-06-04 DIAGNOSIS — K74.60 LIVER CIRRHOSIS SECONDARY TO NASH (HCC): ICD-10-CM

## 2022-06-04 DIAGNOSIS — E44.0 MODERATE PROTEIN-CALORIE MALNUTRITION (HCC): ICD-10-CM

## 2022-06-04 DIAGNOSIS — R26.2 AMBULATORY DYSFUNCTION: ICD-10-CM

## 2022-06-04 LAB
2HR DELTA HS TROPONIN: 1 NG/L
ABO GROUP BLD: NORMAL
ABO GROUP BLD: NORMAL
ALBUMIN SERPL BCP-MCNC: 2.3 G/DL (ref 3.5–5)
ALP SERPL-CCNC: 140 U/L (ref 46–116)
ALT SERPL W P-5'-P-CCNC: 40 U/L (ref 12–78)
AMMONIA PLAS-SCNC: 37 UMOL/L (ref 11–35)
ANION GAP SERPL CALCULATED.3IONS-SCNC: 8 MMOL/L (ref 4–13)
APTT PPP: 35 SECONDS (ref 23–37)
AST SERPL W P-5'-P-CCNC: 69 U/L (ref 5–45)
BACTERIA UR QL AUTO: ABNORMAL /HPF
BASOPHILS # BLD AUTO: 0.02 THOUSANDS/ΜL (ref 0–0.1)
BASOPHILS NFR BLD AUTO: 0 % (ref 0–1)
BILIRUB SERPL-MCNC: 3.85 MG/DL (ref 0.2–1)
BILIRUB UR QL STRIP: ABNORMAL
BLD GP AB SCN SERPL QL: NEGATIVE
BUN SERPL-MCNC: 16 MG/DL (ref 5–25)
CALCIUM ALBUM COR SERPL-MCNC: 9.5 MG/DL (ref 8.3–10.1)
CALCIUM SERPL-MCNC: 8.1 MG/DL (ref 8.3–10.1)
CAOX CRY URNS QL MICRO: ABNORMAL /HPF
CARDIAC TROPONIN I PNL SERPL HS: 5 NG/L
CARDIAC TROPONIN I PNL SERPL HS: 6 NG/L
CHLORIDE SERPL-SCNC: 104 MMOL/L (ref 100–108)
CLARITY UR: CLEAR
CO2 SERPL-SCNC: 24 MMOL/L (ref 21–32)
COLOR UR: ABNORMAL
CREAT SERPL-MCNC: 0.88 MG/DL (ref 0.6–1.3)
EOSINOPHIL # BLD AUTO: 0.07 THOUSAND/ΜL (ref 0–0.61)
EOSINOPHIL NFR BLD AUTO: 1 % (ref 0–6)
ERYTHROCYTE [DISTWIDTH] IN BLOOD BY AUTOMATED COUNT: 14.6 % (ref 11.6–15.1)
GFR SERPL CREATININE-BSD FRML MDRD: 85 ML/MIN/1.73SQ M
GLUCOSE SERPL-MCNC: 83 MG/DL (ref 65–140)
GLUCOSE UR STRIP-MCNC: NEGATIVE MG/DL
HCT VFR BLD AUTO: 31.2 % (ref 36.5–49.3)
HGB BLD-MCNC: 10.5 G/DL (ref 12–17)
HGB UR QL STRIP.AUTO: NEGATIVE
IMM GRANULOCYTES # BLD AUTO: 0.03 THOUSAND/UL (ref 0–0.2)
IMM GRANULOCYTES NFR BLD AUTO: 1 % (ref 0–2)
INR PPP: 1.44 (ref 0.84–1.19)
KETONES UR STRIP-MCNC: NEGATIVE MG/DL
LACTATE SERPL-SCNC: 1.1 MMOL/L (ref 0.5–2)
LACTATE SERPL-SCNC: 2.5 MMOL/L (ref 0.5–2)
LEUKOCYTE ESTERASE UR QL STRIP: NEGATIVE
LIPASE SERPL-CCNC: 359 U/L (ref 73–393)
LYMPHOCYTES # BLD AUTO: 0.46 THOUSANDS/ΜL (ref 0.6–4.47)
LYMPHOCYTES NFR BLD AUTO: 8 % (ref 14–44)
MAGNESIUM SERPL-MCNC: 1.9 MG/DL (ref 1.6–2.6)
MCH RBC QN AUTO: 31 PG (ref 26.8–34.3)
MCHC RBC AUTO-ENTMCNC: 33.7 G/DL (ref 31.4–37.4)
MCV RBC AUTO: 92 FL (ref 82–98)
MONOCYTES # BLD AUTO: 0.53 THOUSAND/ΜL (ref 0.17–1.22)
MONOCYTES NFR BLD AUTO: 10 % (ref 4–12)
NEUTROPHILS # BLD AUTO: 4.37 THOUSANDS/ΜL (ref 1.85–7.62)
NEUTS SEG NFR BLD AUTO: 80 % (ref 43–75)
NITRITE UR QL STRIP: NEGATIVE
NON-SQ EPI CELLS URNS QL MICRO: ABNORMAL /HPF
NRBC BLD AUTO-RTO: 0 /100 WBCS
PH UR STRIP.AUTO: 7 [PH]
PLATELET # BLD AUTO: 70 THOUSANDS/UL (ref 149–390)
PMV BLD AUTO: 12.2 FL (ref 8.9–12.7)
POTASSIUM SERPL-SCNC: 3.6 MMOL/L (ref 3.5–5.3)
PROT SERPL-MCNC: 7.2 G/DL (ref 6.4–8.2)
PROT UR STRIP-MCNC: ABNORMAL MG/DL
PROTHROMBIN TIME: 17.3 SECONDS (ref 11.6–14.5)
RBC # BLD AUTO: 3.39 MILLION/UL (ref 3.88–5.62)
RBC #/AREA URNS AUTO: ABNORMAL /HPF
RH BLD: POSITIVE
RH BLD: POSITIVE
SODIUM SERPL-SCNC: 136 MMOL/L (ref 136–145)
SP GR UR STRIP.AUTO: 1.01 (ref 1–1.03)
SPECIMEN EXPIRATION DATE: NORMAL
UROBILINOGEN UR QL STRIP.AUTO: >=8 E.U./DL
WBC # BLD AUTO: 5.48 THOUSAND/UL (ref 4.31–10.16)
WBC #/AREA URNS AUTO: ABNORMAL /HPF

## 2022-06-04 PROCEDURE — 84484 ASSAY OF TROPONIN QUANT: CPT | Performed by: PHYSICIAN ASSISTANT

## 2022-06-04 PROCEDURE — 86900 BLOOD TYPING SEROLOGIC ABO: CPT | Performed by: PHYSICIAN ASSISTANT

## 2022-06-04 PROCEDURE — 80053 COMPREHEN METABOLIC PANEL: CPT | Performed by: PHYSICIAN ASSISTANT

## 2022-06-04 PROCEDURE — 36415 COLL VENOUS BLD VENIPUNCTURE: CPT | Performed by: PHYSICIAN ASSISTANT

## 2022-06-04 PROCEDURE — 85025 COMPLETE CBC W/AUTO DIFF WBC: CPT | Performed by: PHYSICIAN ASSISTANT

## 2022-06-04 PROCEDURE — 85730 THROMBOPLASTIN TIME PARTIAL: CPT | Performed by: PHYSICIAN ASSISTANT

## 2022-06-04 PROCEDURE — G1004 CDSM NDSC: HCPCS

## 2022-06-04 PROCEDURE — 86850 RBC ANTIBODY SCREEN: CPT | Performed by: PHYSICIAN ASSISTANT

## 2022-06-04 PROCEDURE — 86901 BLOOD TYPING SEROLOGIC RH(D): CPT | Performed by: PHYSICIAN ASSISTANT

## 2022-06-04 PROCEDURE — 83605 ASSAY OF LACTIC ACID: CPT | Performed by: PHYSICIAN ASSISTANT

## 2022-06-04 PROCEDURE — 93005 ELECTROCARDIOGRAM TRACING: CPT

## 2022-06-04 PROCEDURE — 85610 PROTHROMBIN TIME: CPT | Performed by: PHYSICIAN ASSISTANT

## 2022-06-04 PROCEDURE — 83690 ASSAY OF LIPASE: CPT | Performed by: PHYSICIAN ASSISTANT

## 2022-06-04 PROCEDURE — 72125 CT NECK SPINE W/O DYE: CPT

## 2022-06-04 PROCEDURE — 82140 ASSAY OF AMMONIA: CPT | Performed by: PHYSICIAN ASSISTANT

## 2022-06-04 PROCEDURE — 81001 URINALYSIS AUTO W/SCOPE: CPT | Performed by: PHYSICIAN ASSISTANT

## 2022-06-04 PROCEDURE — 99285 EMERGENCY DEPT VISIT HI MDM: CPT | Performed by: PHYSICIAN ASSISTANT

## 2022-06-04 PROCEDURE — 99285 EMERGENCY DEPT VISIT HI MDM: CPT

## 2022-06-04 PROCEDURE — 71250 CT THORAX DX C-: CPT

## 2022-06-04 PROCEDURE — 83735 ASSAY OF MAGNESIUM: CPT | Performed by: PHYSICIAN ASSISTANT

## 2022-06-04 PROCEDURE — 74176 CT ABD & PELVIS W/O CONTRAST: CPT

## 2022-06-04 PROCEDURE — 70450 CT HEAD/BRAIN W/O DYE: CPT

## 2022-06-04 RX ORDER — BACITRACIN, NEOMYCIN, POLYMYXIN B 400; 3.5; 5 [USP'U]/G; MG/G; [USP'U]/G
1 OINTMENT TOPICAL ONCE
Status: COMPLETED | OUTPATIENT
Start: 2022-06-04 | End: 2022-06-05

## 2022-06-05 PROBLEM — D64.9 CHRONIC ANEMIA: Status: ACTIVE | Noted: 2022-06-05

## 2022-06-05 PROBLEM — S22.31XA CLOSED FRACTURE OF ONE RIB OF RIGHT SIDE: Status: ACTIVE | Noted: 2022-06-05

## 2022-06-05 PROBLEM — R53.1 WEAKNESS GENERALIZED: Status: ACTIVE | Noted: 2022-06-05

## 2022-06-05 LAB
ALBUMIN SERPL BCP-MCNC: 1.9 G/DL (ref 3.5–5)
ALP SERPL-CCNC: 109 U/L (ref 46–116)
ALT SERPL W P-5'-P-CCNC: 37 U/L (ref 12–78)
ANION GAP SERPL CALCULATED.3IONS-SCNC: 5 MMOL/L (ref 4–13)
AST SERPL W P-5'-P-CCNC: 60 U/L (ref 5–45)
BILIRUB SERPL-MCNC: 2.86 MG/DL (ref 0.2–1)
BUN SERPL-MCNC: 13 MG/DL (ref 5–25)
CALCIUM ALBUM COR SERPL-MCNC: 9 MG/DL (ref 8.3–10.1)
CALCIUM SERPL-MCNC: 7.3 MG/DL (ref 8.3–10.1)
CHLORIDE SERPL-SCNC: 108 MMOL/L (ref 100–108)
CO2 SERPL-SCNC: 26 MMOL/L (ref 21–32)
CREAT SERPL-MCNC: 0.78 MG/DL (ref 0.6–1.3)
ERYTHROCYTE [DISTWIDTH] IN BLOOD BY AUTOMATED COUNT: 14.7 % (ref 11.6–15.1)
ERYTHROCYTE [DISTWIDTH] IN BLOOD BY AUTOMATED COUNT: 14.9 % (ref 11.6–15.1)
FLUAV RNA RESP QL NAA+PROBE: NEGATIVE
FLUBV RNA RESP QL NAA+PROBE: NEGATIVE
GFR SERPL CREATININE-BSD FRML MDRD: 89 ML/MIN/1.73SQ M
GLUCOSE SERPL-MCNC: 82 MG/DL (ref 65–140)
HCT VFR BLD AUTO: 22 % (ref 36.5–49.3)
HCT VFR BLD AUTO: 28.5 % (ref 36.5–49.3)
HGB BLD-MCNC: 7.3 G/DL (ref 12–17)
HGB BLD-MCNC: 9.6 G/DL (ref 12–17)
MCH RBC QN AUTO: 31.2 PG (ref 26.8–34.3)
MCH RBC QN AUTO: 31.6 PG (ref 26.8–34.3)
MCHC RBC AUTO-ENTMCNC: 33.2 G/DL (ref 31.4–37.4)
MCHC RBC AUTO-ENTMCNC: 33.7 G/DL (ref 31.4–37.4)
MCV RBC AUTO: 93 FL (ref 82–98)
MCV RBC AUTO: 95 FL (ref 82–98)
PLATELET # BLD AUTO: 42 THOUSANDS/UL (ref 149–390)
PLATELET # BLD AUTO: 55 THOUSANDS/UL (ref 149–390)
PMV BLD AUTO: 11.9 FL (ref 8.9–12.7)
PMV BLD AUTO: 12.3 FL (ref 8.9–12.7)
POTASSIUM SERPL-SCNC: 3 MMOL/L (ref 3.5–5.3)
PROT SERPL-MCNC: 5.9 G/DL (ref 6.4–8.2)
RBC # BLD AUTO: 2.31 MILLION/UL (ref 3.88–5.62)
RBC # BLD AUTO: 3.08 MILLION/UL (ref 3.88–5.62)
RSV RNA RESP QL NAA+PROBE: NEGATIVE
SARS-COV-2 RNA RESP QL NAA+PROBE: NEGATIVE
SODIUM SERPL-SCNC: 139 MMOL/L (ref 136–145)
WBC # BLD AUTO: 2.66 THOUSAND/UL (ref 4.31–10.16)
WBC # BLD AUTO: 3.18 THOUSAND/UL (ref 4.31–10.16)

## 2022-06-05 PROCEDURE — 0241U HB NFCT DS VIR RESP RNA 4 TRGT: CPT | Performed by: PHYSICIAN ASSISTANT

## 2022-06-05 PROCEDURE — 99222 1ST HOSP IP/OBS MODERATE 55: CPT | Performed by: INTERNAL MEDICINE

## 2022-06-05 PROCEDURE — 85027 COMPLETE CBC AUTOMATED: CPT | Performed by: NURSE PRACTITIONER

## 2022-06-05 PROCEDURE — 80053 COMPREHEN METABOLIC PANEL: CPT | Performed by: NURSE PRACTITIONER

## 2022-06-05 PROCEDURE — 36415 COLL VENOUS BLD VENIPUNCTURE: CPT | Performed by: NURSE PRACTITIONER

## 2022-06-05 RX ORDER — POTASSIUM CHLORIDE 20 MEQ/1
40 TABLET, EXTENDED RELEASE ORAL ONCE
Status: COMPLETED | OUTPATIENT
Start: 2022-06-05 | End: 2022-06-05

## 2022-06-05 RX ORDER — LISINOPRIL 10 MG/1
10 TABLET ORAL DAILY
Status: DISCONTINUED | OUTPATIENT
Start: 2022-06-05 | End: 2022-06-07 | Stop reason: HOSPADM

## 2022-06-05 RX ORDER — LISINOPRIL AND HYDROCHLOROTHIAZIDE 12.5; 1 MG/1; MG/1
1 TABLET ORAL DAILY
COMMUNITY

## 2022-06-05 RX ORDER — ASPIRIN 81 MG/1
81 TABLET ORAL DAILY
Status: DISCONTINUED | OUTPATIENT
Start: 2022-06-05 | End: 2022-06-07 | Stop reason: HOSPADM

## 2022-06-05 RX ORDER — MELATONIN
1000 DAILY
Status: DISCONTINUED | OUTPATIENT
Start: 2022-06-05 | End: 2022-06-07 | Stop reason: HOSPADM

## 2022-06-05 RX ORDER — DONEPEZIL HYDROCHLORIDE 5 MG/1
10 TABLET, FILM COATED ORAL
Status: DISCONTINUED | OUTPATIENT
Start: 2022-06-05 | End: 2022-06-07 | Stop reason: HOSPADM

## 2022-06-05 RX ORDER — LACTULOSE 20 G/30ML
20 SOLUTION ORAL 4 TIMES DAILY
Status: DISCONTINUED | OUTPATIENT
Start: 2022-06-05 | End: 2022-06-07 | Stop reason: HOSPADM

## 2022-06-05 RX ADMIN — RIFAXIMIN 550 MG: 550 TABLET ORAL at 08:23

## 2022-06-05 RX ADMIN — LACTULOSE 20 G: 20 SOLUTION ORAL at 08:23

## 2022-06-05 RX ADMIN — POTASSIUM CHLORIDE 40 MEQ: 1500 TABLET, EXTENDED RELEASE ORAL at 13:00

## 2022-06-05 RX ADMIN — DONEPEZIL HYDROCHLORIDE 10 MG: 5 TABLET, FILM COATED ORAL at 23:19

## 2022-06-05 RX ADMIN — LISINOPRIL 10 MG: 10 TABLET ORAL at 08:23

## 2022-06-05 RX ADMIN — SERTRALINE HYDROCHLORIDE 50 MG: 50 TABLET ORAL at 08:23

## 2022-06-05 RX ADMIN — LACTULOSE 20 G: 20 SOLUTION ORAL at 20:55

## 2022-06-05 RX ADMIN — BACITRACIN, NEOMYCIN, POLYMYXIN B 1 SMALL APPLICATION: 400; 3.5; 5 OINTMENT TOPICAL at 00:21

## 2022-06-05 RX ADMIN — SODIUM CHLORIDE 1000 ML: 0.9 INJECTION, SOLUTION INTRAVENOUS at 00:20

## 2022-06-05 RX ADMIN — LACTULOSE 20 G: 20 SOLUTION ORAL at 13:15

## 2022-06-05 RX ADMIN — RIFAXIMIN 550 MG: 550 TABLET ORAL at 20:56

## 2022-06-05 RX ADMIN — LACTULOSE 20 G: 20 SOLUTION ORAL at 17:43

## 2022-06-05 RX ADMIN — Medication 1000 UNITS: at 08:23

## 2022-06-05 RX ADMIN — ASPIRIN 81 MG: 81 TABLET, COATED ORAL at 08:23

## 2022-06-05 NOTE — ASSESSMENT & PLAN NOTE
· History liver cirrhosis with hepatic encephalopathy  · Follows with outpatient Gastroenterology and recently started on Xifaxan  · We no evidence of hepatic encephalopathy on exam  · Ammonia 37 on admission  · Chronic thrombocytopenia- platelets 72- avoid systemic anticoagulation  · MELD Na score 18

## 2022-06-05 NOTE — ASSESSMENT & PLAN NOTE
· POA on CT chest abdomen pelvis  · Right 11th rib fracture nondisplaced  · Denies pain  · Generalized weakness and multiple falls at home over the past week  · Rib fracture protocol

## 2022-06-05 NOTE — H&P
[address] Manuele Aric  H&P- [address] 1950, 67 y o  male MRN: [id number]  Unit/Bed#: ED 06 Encounter: [id number]  Primary Care Provider: Tierney Cui MD   Date and time admitted to hospital: 6/4/2022  7:16 PM    * Weakness generalized  Assessment & Plan  · POA with worsening weakness over past week, falls at home  · UA no evidence of infection, no evidence of hepatic encephalopathy with normal ammonia  · PT/OT/case management  · CT brain no acute abnormality  · Recent rehab stay at [facility]  · Hemoglobin at baseline on admission  · Trend  · Chronic anemia, thrombocytopenia of liver disease    Closed fracture of one rib of right side  Assessment & Plan  · POA on CT chest abdomen pelvis  · Right 11th rib fracture nondisplaced  · Denies pain  · Generalized weakness and multiple falls at home over the past week  · Rib fracture protocol    Hypertension  Assessment & Plan  · Continue PTA lisinopril 10 mg  · Trend blood pressures    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 ([facility] [address] )  Assessment & Plan  · History liver cirrhosis with hepatic encephalopathy  · Follows with outpatient Gastroenterology and recently started on Xifaxan  · We no evidence of hepatic encephalopathy on exam  · Ammonia 37 on admission  · Chronic thrombocytopenia- platelets 72- avoid systemic anticoagulation  · MELD Na score 18    Dementia with behavioral disturbance (HCC)  Assessment & Plan  · Continue PTA Aricept 10 mg daily, was on Namenda both worsening hepatic encephalopathy and discontinued  · Followed by Dr Kristen Arguello of Neurology    VTE Pharmacologic Prophylaxis: VTE Score: 3 Moderate Risk (Score 3-4) - Pharmacological DVT Prophylaxis Contraindicated  Sequential Compression Devices Ordered    Code Status: Level 3 - DNAR and DNI     Anticipated Length of Stay: Patient will be admitted on an inpatient basis with an anticipated length of stay of greater than 2 midnights secondary to Generalized weakness, rib fracture  Total Time for Visit, including Counseling / Coordination of Care: 30 minutes Greater than 50% of this total time spent on direct patient counseling and coordination of care  Chief Complaint:  Generalized weakness    History of Present Illness:  Pierre Doty is a 67 y o  male with a PMH of liver cirrhosis secondary to JAFFE, hepatic encephalopathy, dementia, hypertension has 24 hour care at home and support of his son  Recent admission for hepatic encephalopathy since that time has been put on Xifaxan, ammonia 37 on admission  Reported 1 week of generalized weakness and falls at home  Trauma scan found nondisplaced right-sided 11th rib fracture  UA negative, CT brain negative    Review of Systems:  Review of Systems   Constitutional: Negative for chills and fever  HENT: Negative for ear pain and sore throat  Eyes: Negative for pain and visual disturbance  Respiratory: Negative for cough and shortness of breath  Cardiovascular: Negative for chest pain and palpitations  Gastrointestinal: Negative for abdominal pain and vomiting  Genitourinary: Negative for dysuria and hematuria  Musculoskeletal: Negative for arthralgias and back pain  Skin: Negative for color change and rash  Neurological: Positive for weakness  Negative for seizures and syncope  All other systems reviewed and are negative  Past Medical and Surgical History:   Past Medical History:   Diagnosis Date    Cirrhosis ([facility] [address] )     Dementia ([facility] [address] )     Hypertension        Past Surgical History:   Procedure Laterality Date    REPLACEMENT TOTAL KNEE         Meds/Allergies:  Prior to Admission medications    Medication Sig Start Date End Date Taking?  Authorizing Provider   aspirin (ECOTRIN LOW STRENGTH) 81 mg EC tablet Take 81 mg by mouth daily    Historical Provider, MD   Cholecalciferol 25 MCG (1000 UT) tablet Take 1 tablet by mouth daily 11/8/21   Historical Provider, MD   donepezil (ARICEPT) 10 mg tablet Take 10 mg by mouth 12/9/21   Historical Provider, MD   lactulose 20 g/30 mL Take 45 mL (30 g total) by mouth 4 (four) times a day 3/4/22 4/3/22  Wanda Harris MD   lisinopril (ZESTRIL) 10 mg tablet Take 1 tablet (10 mg total) by mouth daily 3/5/22   Wanda Harris MD   memantine [facility]) 10 mg tablet Take 10 mg by mouth 2 (two) times a day 2/18/22   Historical Provider, MD   QUEtiapine (SEROquel) 25 mg tablet Take 0 5 tablets (12 5 mg total) by mouth daily at bedtime 3/4/22   Karena Chery MD     I have reviewed home medications using recent Epic encounter  Allergies: No Known Allergies    Social History:  Marital Status:    Occupation:  Retired  Patient Pre-hospital Living Situation: Home  Patient Pre-hospital Level of Mobility: unable to be assessed at time of evaluation  Patient Pre-hospital Diet Restrictions:  None  Substance Use History:   Social History     Substance and Sexual Activity   Alcohol Use Not Currently    Comment: former social ETOH     Social History     Tobacco Use   Smoking Status Former Smoker   Smokeless Tobacco Never Used     Social History     Substance and Sexual Activity   Drug Use Never       Family History:  History reviewed  No pertinent family history  Physical Exam:     Vitals:   Blood Pressure: 126/63 (06/05/22 0810)  Pulse: 84 (06/05/22 0728)  Temperature: 98 4 °F (36 9 °C) (06/05/22 0600)  Temp Source: Temporal (06/05/22 0600)  Respirations: 18 (06/05/22 0728)  Height: 6' (182 9 cm) (06/04/22 1920)  Weight - Scale: 95 5 kg (210 lb 8 6 oz) (06/04/22 1920)  SpO2: 98 % (06/05/22 0728)    Physical Exam  Vitals and nursing note reviewed  Constitutional:       Appearance: He is well-developed  HENT:      Head: Normocephalic and atraumatic  Eyes:      General: Scleral icterus present        Conjunctiva/sclera: Conjunctivae normal    Cardiovascular:      Rate and Rhythm: Normal rate and regular rhythm  Heart sounds: No murmur heard  Pulmonary:      Effort: Pulmonary effort is normal  No respiratory distress  Breath sounds: Normal breath sounds  Abdominal:      Palpations: Abdomen is soft  Tenderness: There is no abdominal tenderness  Musculoskeletal:      Cervical back: Neck supple  Skin:     General: Skin is warm and dry  Neurological:      General: No focal deficit present  Mental Status: He is alert  Comments: Oriented to year but very forgetful which is patient's baseline        Additional Data:     Lab Results:  Results from last 7 days   Lab Units 06/04/22  1946   WBC Thousand/uL 5 48   HEMOGLOBIN g/dL 10 5*   HEMATOCRIT % 31 2*   PLATELETS Thousands/uL 70*   NEUTROS PCT % 80*   LYMPHS PCT % 8*   MONOS PCT % 10   EOS PCT % 1    < > = values in this interval not displayed  Results from last 7 days   Lab Units 06/04/22  1946   INR  1 44*             Results from last 7 days   Lab Units 06/04/22  2250 06/04/22 1946   LACTIC ACID mmol/L 1 1 2 5*        Imaging: Reviewed radiology reports from this admission including: chest CT scan, abdominal/pelvic CT and CT head  CT chest abdomen pelvis wo contrast   Final Result by David Meier DO (06/04 2219)      Nondisplaced fracture of the right lateral 11th rib      Cirrhotic liver  Limited evaluation due to lack of intravenous contrast                  Workstation performed: [id number]         CT cervical spine without contrast   Final Result by David Meier DO (06/04 2210)      No cervical spine fracture or traumatic malalignment  Workstation performed: [id number]         CT head without contrast   Final Result by David Meier DO (06/04 2206)      No acute intracranial abnormality  Workstation performed: [id number]         XR chest portable    (Results Pending)       EKG and Other Studies Reviewed on Admission:   · EKG: NSR  HR nonspecific ST changes      ** Please Note: This note has been constructed using a voice recognition system   **

## 2022-06-05 NOTE — ED PROVIDER NOTES
History  Chief Complaint   Patient presents with    Weakness - Generalized     Off balance for 1 week, walking into things and having frequent fall     The patient is a 23-year-old male, history of cirrhosis with hepatic encephalopathy on lactulose, hypertension, dementia, who presents emergency department today escorted by his daughter and caretaker for concern of increased confusion and ambulatory dysfunction with frequent falls  The patient was admitted in the past due to his hepatic encephalopathy with elevated ammonia levels  Patient ultimately did return home  He received 24/7 care given by both his daughters  At baseline he does suffer from slight confusion  His ambulatory status is pretty good at baseline where he is able to ambulate and perform most tasks without assistance  The patient over the last week has gradually declined with his ambulatory status and function as well as his cognition  Patient has had frequent falls this week which were witnessed by his daughter  Had 1 fall last evening and two today thus far  States that the patient has not had any head injury but has fallen directly on buttocks or back  Patient was noticeably more weak today requiring full assistance by both daughters for standing and slight care, which is abnormal   Patient has also had increased confusion today most recent example would be that he did not recognize his daughter and apparently had a repeated conversation within an hour with the same individual   They were concerned that possibly his ammonia level was increased again like previous episodes  Patient has not had any new medication changes or adjustments  Has been compliant with medication at home    Patient has been complaining of back pain as well as knee pain bilaterally which is somewhat chronic      History provided by:  Patient and relative  History limited by:  Mental status change  Altered Mental Status  Presenting symptoms: confusion    Severity: Moderate  Most recent episode: Today  Episode history:  Multiple  Timing:  Intermittent  Progression:  Worsening  Context: dementia    Context: taking medications as prescribed and not nursing home resident    Associated symptoms: weakness    Associated symptoms: no abdominal pain, no agitation, no decreased appetite, no difficulty breathing, no fever, no hallucinations, no headaches, no palpitations, no rash, no seizures, no visual change and no vomiting    Weakness:     Severity:  Severe    Onset quality:  Gradual    Timing:  Constant    Progression:  Worsening    Chronicity:  New      Prior to Admission Medications   Prescriptions Last Dose Informant Patient Reported? Taking? Cholecalciferol 25 MCG (1000 UT) tablet   Yes No   Sig: Take 1 tablet by mouth daily   QUEtiapine (SEROquel) 25 mg tablet   No No   Sig: Take 0 5 tablets (12 5 mg total) by mouth daily at bedtime   aspirin (ECOTRIN LOW STRENGTH) 81 mg EC tablet   Yes No   Sig: Take 81 mg by mouth daily   donepezil (ARICEPT) 10 mg tablet   Yes No   Sig: Take 10 mg by mouth   lactulose 20 g/30 mL   No No   Sig: Take 45 mL (30 g total) by mouth 4 (four) times a day   lisinopril-hydrochlorothiazide (PRINZIDE,ZESTORETIC) 10-12 5 MG per tablet   Yes Yes   Sig: Take 1 tablet by mouth daily   memantine (NAMENDA) 10 mg tablet   Yes No   Sig: Take 10 mg by mouth 2 (two) times a day   rifaximin (XIFAXAN) 550 mg tablet   Yes Yes   Sig: Take 550 mg by mouth every 12 (twelve) hours   sertraline (ZOLOFT) 50 mg tablet   Yes Yes   Sig: Take 50 mg by mouth daily      Facility-Administered Medications: None       Past Medical History:   Diagnosis Date    Cirrhosis ([facility] )     Dementia ([facility] )     Hypertension        Past Surgical History:   Procedure Laterality Date    REPLACEMENT TOTAL KNEE         History reviewed  No pertinent family history  I have reviewed and agree with the history as documented      E-Cigarette/Vaping    E-Cigarette Use Never User E-Cigarette/Vaping Substances     Social History     Tobacco Use    Smoking status: Former Smoker    Smokeless tobacco: Never Used   Vaping Use    Vaping Use: Never used   Substance Use Topics    Alcohol use: Not Currently     Comment: former social ETOH    Drug use: Never       Review of Systems   Constitutional: Negative for decreased appetite and fever  Cardiovascular: Negative for palpitations  Gastrointestinal: Negative for abdominal pain and vomiting  Skin: Negative for rash  Neurological: Positive for weakness  Negative for seizures and headaches  Psychiatric/Behavioral: Positive for confusion  Negative for agitation and hallucinations  All other systems reviewed and are negative  Physical Exam  Physical Exam  Vitals and nursing note reviewed  Constitutional:       Appearance: He is well-developed  HENT:      Head: Normocephalic and atraumatic  Mouth/Throat:      Mouth: Mucous membranes are dry  Eyes:      General: Scleral icterus present  Extraocular Movements: Extraocular movements intact  Conjunctiva/sclera: Conjunctivae normal       Pupils: Pupils are equal, round, and reactive to light  Cardiovascular:      Rate and Rhythm: Normal rate and regular rhythm  Heart sounds: No murmur heard  Pulmonary:      Effort: Pulmonary effort is normal  No respiratory distress  Breath sounds: Normal breath sounds  Chest:   Breasts:      Right: Tenderness present  Abdominal:      Palpations: Abdomen is soft  Tenderness: There is no abdominal tenderness  There is no right CVA tenderness or left CVA tenderness  Hernia: No hernia is present  Musculoskeletal:      Cervical back: Normal range of motion and neck supple  No bony tenderness  Thoracic back: No bony tenderness  Lumbar back: No bony tenderness  Right lower leg: No edema  Left lower leg: No edema  Skin:     General: Skin is warm and dry        Capillary Refill: Capillary refill takes less than 2 seconds  Findings: Bruising and ecchymosis present  Neurological:      General: No focal deficit present  Mental Status: He is alert  Sensory: Sensation is intact  Motor: Motor function is intact  No tremor        Comments: Patient alert to location itself but confused as per time in day         Vital Signs  ED Triage Vitals [06/04/22 1920]   Temperature Pulse Respirations Blood Pressure SpO2   98 2 °F (36 8 °C) 88 20 151/70 99 %      Temp Source Heart Rate Source Patient Position - Orthostatic VS BP Location FiO2 (%)   Temporal Monitor Sitting Left arm --      Pain Score       3           Vitals:    06/05/22 0600 06/05/22 0728 06/05/22 0800 06/05/22 0810   BP: 116/61 116/68 126/63 126/63   Pulse: 80 86 86    Patient Position - Orthostatic VS:  Lying           Visual Acuity  Visual Acuity    Flowsheet Row Most Recent Value   L Pupil Size (mm) 2   R Pupil Size (mm) 2   L Pupil Shape Round   R Pupil Shape Round          ED Medications  Medications   aspirin (ECOTRIN LOW STRENGTH) EC tablet 81 mg (81 mg Oral Given 6/5/22 0823)   rifaximin (XIFAXAN) tablet 550 mg (550 mg Oral Given 6/5/22 0823)   sertraline (ZOLOFT) tablet 50 mg (50 mg Oral Given 6/5/22 0823)   donepezil (ARICEPT) tablet 10 mg (has no administration in time range)   lactulose oral solution 20 g (20 g Oral Given 6/5/22 1315)   cholecalciferol (VITAMIN D3) tablet 1,000 Units (1,000 Units Oral Given 6/5/22 0823)   lisinopril (ZESTRIL) tablet 10 mg (10 mg Oral Given 6/5/22 0823)   potassium chloride (K-DUR,KLOR-CON) CR tablet 40 mEq (has no administration in time range)   sodium chloride 0 9 % bolus 1,000 mL (0 mL Intravenous Stopped 6/5/22 0314)   neomycin-bacitracin-polymyxin b (NEOSPORIN) ointment 1 small application (1 small application Topical Given 6/5/22 0021)       Diagnostic Studies  Results Reviewed     Procedure Component Value Units Date/Time    CBC [[id number]]  (Abnormal) Collected: 06/05/22 0806    Lab Status: Final result Specimen: Blood from Arm, Right Updated: 06/05/22 0825     WBC 3 18 Thousand/uL      RBC 3 08 Million/uL      Hemoglobin 9 6 g/dL      Hematocrit 28 5 %      MCV 93 fL      MCH 31 2 pg      MCHC 33 7 g/dL      RDW 14 7 %      Platelets 55 Thousands/uL      MPV 11 9 fL     Narrative:      No Clots    CBC (With Platelets) [[id number]]  (Abnormal) Collected: 06/05/22 0606    Lab Status: Final result Specimen: Blood from Arm, Right Updated: 06/05/22 0751     WBC 2 66 Thousand/uL      RBC 2 31 Million/uL      Hemoglobin 7 3 g/dL      Hematocrit 22 0 %      MCV 95 fL      MCH 31 6 pg      MCHC 33 2 g/dL      RDW 14 9 %      Platelets 42 Thousands/uL      MPV 12 3 fL     Comprehensive metabolic panel [[id number]]  (Abnormal) Collected: 06/05/22 0606    Lab Status: Final result Specimen: Blood from Arm, Right Updated: 06/05/22 0647     Sodium 139 mmol/L      Potassium 3 0 mmol/L      Chloride 108 mmol/L      CO2 26 mmol/L      ANION GAP 5 mmol/L      BUN 13 mg/dL      Creatinine 0 78 mg/dL      Glucose 82 mg/dL      Calcium 7 3 mg/dL      Corrected Calcium 9 0 mg/dL      AST 60 U/L      ALT 37 U/L      Alkaline Phosphatase 109 U/L      Total Protein 5 9 g/dL      Albumin 1 9 g/dL      Total Bilirubin 2 86 mg/dL      eGFR 89 ml/min/1 73sq m     Narrative:      [place] guidelines for Chronic Kidney Disease (CKD):     Stage 1 with normal or high GFR (GFR > 90 mL/min/1 73 square meters)    Stage 2 Mild CKD (GFR = 60-89 mL/min/1 73 square meters)    Stage 3A Moderate CKD (GFR = 45-59 mL/min/1 73 square meters)    Stage 3B Moderate CKD (GFR = 30-44 mL/min/1 73 square meters)    Stage 4 Severe CKD (GFR = 15-29 mL/min/1 73 square meters)    Stage 5 End Stage CKD (GFR <15 mL/min/1 73 square meters)  Note: GFR calculation is accurate only with a steady state creatinine    COVID/FLU/RSV - 2 hour TAT [[id number]]  (Normal) Collected: 06/05/22 0022    Lab Status: Final result Specimen: Nares from Nose Updated: 06/05/22 0108     SARS-CoV-2 Negative     INFLUENZA A PCR Negative     INFLUENZA B PCR Negative     RSV PCR Negative    Narrative:      FOR PEDIATRIC PATIENTS - copy/paste COVID Guidelines URL to browser: [email or web address]    SARS-CoV-2 assay is a Nucleic Acid Amplification assay intended for the  qualitative detection of nucleic acid from SARS-CoV-2 in nasopharyngeal  swabs  Results are for the presumptive identification of SARS-CoV-2 RNA  Positive results are indicative of infection with SARS-CoV-2, the virus  causing COVID-19, but do not rule out bacterial infection or co-infection  with other viruses  Laboratories within the [place] and its  territories are required to report all positive results to the appropriate  public health authorities  Negative results do not preclude SARS-CoV-2  infection and should not be used as the sole basis for treatment or other  patient management decisions  Negative results must be combined with  clinical observations, patient history, and epidemiological information  This test has not been FDA cleared or approved  This test has been authorized by FDA under an Emergency Use Authorization  (EUA)  This test is only authorized for the duration of time the  declaration that circumstances exist justifying the authorization of the  emergency use of an in vitro diagnostic tests for detection of SARS-CoV-2  virus and/or diagnosis of COVID-19 infection under section 564(b)(1) of  the Act, 21 U  S C  889BPH-4(S)(0), unless the authorization is terminated  or revoked sooner  The test has been validated but independent review by FDA  and CLIA is pending  Test performed using [employer] GeneXpert: This RT-PCR assay targets N2,  a region unique to SARS-CoV-2  A conserved region in the E-gene was chosen  for pan-Sarbecovirus detection which includes SARS-CoV-2      Lactic acid 2 Hours [[id number]]  (Normal) Collected: 06/04/22 2250    Lab Status: Final result Specimen: Blood from Arm, Right Updated: 06/04/22 2330     LACTIC ACID 1 1 mmol/L     Narrative:      Result may be elevated if tourniquet was used during collection  Urine Microscopic [[id number]]  (Abnormal) Collected: 06/04/22 2303    Lab Status: Final result Specimen: Urine, Clean Catch Updated: 06/04/22 2329     RBC, UA None Seen /hpf      WBC, UA 0-1 /hpf      Epithelial Cells Occasional /hpf      Bacteria, UA Occasional /hpf      Ca Oxalate Jannet, UA Occasional /hpf     UA w Reflex to Microscopic w Reflex to Culture [[id number]]  (Abnormal) Collected: 06/04/22 2303    Lab Status: Final result Specimen: Urine, Clean Catch Updated: 06/04/22 2310     Color, UA Evelyne     Clarity, UA Clear     Specific Gravity, UA 1 015     pH, UA 7 0     Leukocytes, UA Negative     Nitrite, UA Negative     Protein, UA Trace mg/dl      Glucose, UA Negative mg/dl      Ketones, UA Negative mg/dl      Urobilinogen, UA >=8 0 E U /dl      Bilirubin, UA Small     Blood, UA Negative    HS Troponin I 2hr [[id number]]  (Normal) Collected: 06/04/22 2151    Lab Status: Final result Specimen: Blood from Arm, Right Updated: 06/04/22 2234     hs TnI 2hr 6 ng/L      Delta 2hr hsTnI 1 ng/L     Ammonia [[id number]]  (Abnormal) Collected: 06/04/22 1946    Lab Status: Final result Specimen: Blood from Arm, Right Updated: 06/04/22 2039     Ammonia 37 umol/L     Lactic acid [[id number]]  (Abnormal) Collected: 06/04/22 1946    Lab Status: Final result Specimen: Blood from Arm, Right Updated: 06/04/22 2026     LACTIC ACID 2 5 mmol/L     Narrative:      Result may be elevated if tourniquet was used during collection      HS Troponin 0hr (reflex protocol) [[id number]]  (Normal) Collected: 06/04/22 1946    Lab Status: Final result Specimen: Blood from Arm, Right Updated: 06/04/22 2020     hs TnI 0hr 5 ng/L     Comprehensive metabolic panel [[id number]]  (Abnormal) Collected: 06/04/22 1946    Lab Status: Final result Specimen: Blood from Arm, Right Updated: 06/04/22 2015     Sodium 136 mmol/L      Potassium 3 6 mmol/L      Chloride 104 mmol/L      CO2 24 mmol/L      ANION GAP 8 mmol/L      BUN 16 mg/dL      Creatinine 0 88 mg/dL      Glucose 83 mg/dL      Calcium 8 1 mg/dL      Corrected Calcium 9 5 mg/dL      AST 69 U/L      ALT 40 U/L      Alkaline Phosphatase 140 U/L      Total Protein 7 2 g/dL      Albumin 2 3 g/dL      Total Bilirubin 3 85 mg/dL      eGFR 85 ml/min/1 73sq m     Narrative:      [facility] guidelines for Chronic Kidney Disease (CKD):     Stage 1 with normal or high GFR (GFR > 90 mL/min/1 73 square meters)    Stage 2 Mild CKD (GFR = 60-89 mL/min/1 73 square meters)    Stage 3A Moderate CKD (GFR = 45-59 mL/min/1 73 square meters)    Stage 3B Moderate CKD (GFR = 30-44 mL/min/1 73 square meters)    Stage 4 Severe CKD (GFR = 15-29 mL/min/1 73 square meters)    Stage 5 End Stage CKD (GFR <15 mL/min/1 73 square meters)  Note: GFR calculation is accurate only with a steady state creatinine    Magnesium [[id number]]  (Normal) Collected: 06/04/22 1946    Lab Status: Final result Specimen: Blood from Arm, Right Updated: 06/04/22 2015     Magnesium 1 9 mg/dL     Lipase [[id number]]  (Normal) Collected: 06/04/22 1946    Lab Status: Final result Specimen: Blood from Arm, Right Updated: 06/04/22 2015     Lipase 359 u/L     Protime-INR [[id number]]  (Abnormal) Collected: 06/04/22 1946    Lab Status: Final result Specimen: Blood from Arm, Right Updated: 06/04/22 2013     Protime 17 3 seconds      INR 1 44    APTT [[id number]]  (Normal) Collected: 06/04/22 1946    Lab Status: Final result Specimen: Blood from Arm, Right Updated: 06/04/22 2013     PTT 35 seconds     CBC and differential [[id number]]  (Abnormal) Collected: 06/04/22 1946    Lab Status: Final result Specimen: Blood from Arm, Right Updated: 06/04/22 2004     WBC 5 48 Thousand/uL      RBC 3 39 Million/uL      Hemoglobin 10 5 g/dL      Hematocrit 31 2 %      MCV 92 fL      MCH 31 0 pg      MCHC 33 7 g/dL      RDW 14 6 %      MPV 12 2 fL      Platelets 70 Thousands/uL      nRBC 0 /100 WBCs      Neutrophils Relative 80 %      Immat GRANS % 1 %      Lymphocytes Relative 8 %      Monocytes Relative 10 %      Eosinophils Relative 1 %      Basophils Relative 0 %      Neutrophils Absolute 4 37 Thousands/µL      Immature Grans Absolute 0 03 Thousand/uL      Lymphocytes Absolute 0 46 Thousands/µL      Monocytes Absolute 0 53 Thousand/µL      Eosinophils Absolute 0 07 Thousand/µL      Basophils Absolute 0 02 Thousands/µL                  CT chest abdomen pelvis wo contrast   Final Result by Sue Woodward DO (06/04 2219)      Nondisplaced fracture of the right lateral 11th rib      Cirrhotic liver  Limited evaluation due to lack of intravenous contrast                  Workstation performed: [id number]         CT cervical spine without contrast   Final Result by Sue Woodward DO (06/04 2210)      No cervical spine fracture or traumatic malalignment  Workstation performed: [id number]         CT head without contrast   Final Result by Sue Woodward DO (06/04 2206)      No acute intracranial abnormality  Workstation performed: [id number]         XR chest portable    (Results Pending)              Procedures  Procedures         ED Course                                             MDM  Number of Diagnoses or Management Options  Altered mental status  Ambulatory dysfunction  Generalized weakness  Rib fractures  Diagnosis management comments: High concern for hepatic encephalopathy however moan your level was only slightly elevated when compared to previous bouts in the past   The patient did not have any neurologic findings as per focal weakness or numbness or sensory loss  Patient did significant weakness on exam and ambulatory dysfunction which was her daughter different denies baseline  Seems as though this has been a gradual decrease over the last week  On imaging patient was noted to have a nondisplaced fracture of the right lateral rib  Admission placed to hospitalist service  Family in agreement with treatment plan         Amount and/or Complexity of Data Reviewed  Clinical lab tests: ordered and reviewed  Tests in the radiology section of CPT®: ordered and reviewed  Decide to obtain previous medical records or to obtain history from someone other than the patient: yes  Obtain history from someone other than the patient: yes  Review and summarize past medical records: yes  Discuss the patient with other providers: yes  Independent visualization of images, tracings, or specimens: yes    Risk of Complications, Morbidity, and/or Mortality  Presenting problems: high  Diagnostic procedures: high  Management options: high    Patient Progress  Patient progress: stable      Disposition  Final diagnoses:   Generalized weakness   Ambulatory dysfunction   Altered mental status   Rib fractures     Time reflects when diagnosis was documented in both MDM as applicable and the Disposition within this note     Time User Action Codes Description Comment    6/4/2022 10:35 PM Paige Ro Add [R53 1] Generalized weakness     6/4/2022 10:35 PM Paige Ro Add [R26 2] Ambulatory dysfunction     6/4/2022 10:35 PM Paige Ro Add [R41 82] Altered mental status     6/4/2022 10:36 PM Paige Ro Add [S22 49XA] Rib fractures     6/5/2022 10:18 AM Justin Sanders Add [R53 1] Weakness generalized     6/5/2022 10:18 AM Justin Sanders Add [S22 31XA] Closed fracture of one rib of right side, initial encounter     6/5/2022 10:18 AM Justin Sanders Add [E44 0] Moderate protein-calorie malnutrition ([facility] )     6/5/2022 10:18 AM Justin Sanders Add [K72 90] Hepatic encephalopathy ([facility] )     6/5/2022 10:18 AM Justin Sanders Add [K75 81,  K74 60] 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 ([facility] )     6/5/2022 10:18 AM Justin Sanders Add [F03 91] Dementia with behavioral disturbance, unspecified dementia type [facility])       ED Disposition     ED Disposition   Admit    Condition   Stable    Date/Time   Sat Jun 4, 2022 10:36 PM    Comment   Case was discussed with Willard mora  and the patient's admission status was agreed to be Admission Status: inpatient status to the service of Dr Lurdes Melendez   Follow-up Information    None         Current Discharge Medication List      CONTINUE these medications which have NOT CHANGED    Details   lisinopril-hydrochlorothiazide (PRINZIDE,ZESTORETIC) 10-12 5 MG per tablet Take 1 tablet by mouth daily      rifaximin (XIFAXAN) 550 mg tablet Take 550 mg by mouth every 12 (twelve) hours      sertraline (ZOLOFT) 50 mg tablet Take 50 mg by mouth daily      aspirin (ECOTRIN LOW STRENGTH) 81 mg EC tablet Take 81 mg by mouth daily      Cholecalciferol 25 MCG (1000 UT) tablet Take 1 tablet by mouth daily      donepezil (ARICEPT) 10 mg tablet Take 10 mg by mouth      lactulose 20 g/30 mL Take 45 mL (30 g total) by mouth 4 (four) times a day  Qty: 5400 mL, Refills: 0    Associated Diagnoses: Hepatic encephalopathy (HCC)      memantine (NAMENDA) 10 mg tablet Take 10 mg by mouth 2 (two) times a day      QUEtiapine (SEROquel) 25 mg tablet Take 0 5 tablets (12 5 mg total) by mouth daily at bedtime  Refills: 0    Associated Diagnoses: Altered mental status             No discharge procedures on file      PDMP Review     None          ED Provider  Electronically Signed by           Ryne Brand PA-C  06/05/22 5493

## 2022-06-05 NOTE — ASSESSMENT & PLAN NOTE
· Continue PTA Aricept 10 mg daily, was on Namenda both worsening hepatic encephalopathy and discontinued  · Followed by Dr Francis Citizen of Neurology

## 2022-06-05 NOTE — ASSESSMENT & PLAN NOTE
· POA with worsening weakness over past week, falls at home  · UA no evidence of infection, no evidence of hepatic encephalopathy with normal ammonia  · PT/OT/case management  · CT brain no acute abnormality  · Recent rehab stay at [address] [place] [address]

## 2022-06-05 NOTE — ED NOTES
Pt transferred to inpatient bed for comfort  Pt has call bell within reach and denies any needs at this time       Radha Panda RN  06/05/22 Bin Goode

## 2022-06-05 NOTE — ED NOTES
Called ICU to give report on patient  RN taking patient busy and will call back  Will await return phone call        Celia Munguia RN  06/05/22 [id number]

## 2022-06-06 ENCOUNTER — APPOINTMENT (INPATIENT)
Dept: RADIOLOGY | Facility: HOSPITAL | Age: 72
DRG: 092 | End: 2022-06-06
Payer: MEDICARE

## 2022-06-06 LAB
ALBUMIN SERPL BCP-MCNC: 2 G/DL (ref 3.5–5)
ALP SERPL-CCNC: 119 U/L (ref 46–116)
ALT SERPL W P-5'-P-CCNC: 37 U/L (ref 12–78)
ANION GAP SERPL CALCULATED.3IONS-SCNC: 6 MMOL/L (ref 4–13)
AST SERPL W P-5'-P-CCNC: 70 U/L (ref 5–45)
ATRIAL RATE: 85 BPM
BILIRUB SERPL-MCNC: 3.31 MG/DL (ref 0.2–1)
BUN SERPL-MCNC: 10 MG/DL (ref 5–25)
CALCIUM ALBUM COR SERPL-MCNC: 9.1 MG/DL (ref 8.3–10.1)
CALCIUM SERPL-MCNC: 7.5 MG/DL (ref 8.3–10.1)
CHLORIDE SERPL-SCNC: 107 MMOL/L (ref 100–108)
CO2 SERPL-SCNC: 26 MMOL/L (ref 21–32)
CREAT SERPL-MCNC: 0.7 MG/DL (ref 0.6–1.3)
ERYTHROCYTE [DISTWIDTH] IN BLOOD BY AUTOMATED COUNT: 14.7 % (ref 11.6–15.1)
GFR SERPL CREATININE-BSD FRML MDRD: 93 ML/MIN/1.73SQ M
GLUCOSE SERPL-MCNC: 77 MG/DL (ref 65–140)
HCT VFR BLD AUTO: 29.3 % (ref 36.5–49.3)
HGB BLD-MCNC: 10.1 G/DL (ref 12–17)
MCH RBC QN AUTO: 31.9 PG (ref 26.8–34.3)
MCHC RBC AUTO-ENTMCNC: 34.5 G/DL (ref 31.4–37.4)
MCV RBC AUTO: 92 FL (ref 82–98)
P AXIS: 81 DEGREES
PLATELET # BLD AUTO: 60 THOUSANDS/UL (ref 149–390)
PMV BLD AUTO: 12 FL (ref 8.9–12.7)
POTASSIUM SERPL-SCNC: 3.7 MMOL/L (ref 3.5–5.3)
PR INTERVAL: 188 MS
PROT SERPL-MCNC: 6.5 G/DL (ref 6.4–8.2)
QRS AXIS: 64 DEGREES
QRSD INTERVAL: 80 MS
QT INTERVAL: 380 MS
QTC INTERVAL: 452 MS
RBC # BLD AUTO: 3.17 MILLION/UL (ref 3.88–5.62)
SODIUM SERPL-SCNC: 139 MMOL/L (ref 136–145)
T WAVE AXIS: 61 DEGREES
VENTRICULAR RATE: 85 BPM
WBC # BLD AUTO: 3.69 THOUSAND/UL (ref 4.31–10.16)

## 2022-06-06 PROCEDURE — 73560 X-RAY EXAM OF KNEE 1 OR 2: CPT

## 2022-06-06 PROCEDURE — 85027 COMPLETE CBC AUTOMATED: CPT | Performed by: INTERNAL MEDICINE

## 2022-06-06 PROCEDURE — 80053 COMPREHEN METABOLIC PANEL: CPT | Performed by: INTERNAL MEDICINE

## 2022-06-06 PROCEDURE — 97535 SELF CARE MNGMENT TRAINING: CPT

## 2022-06-06 PROCEDURE — 93010 ELECTROCARDIOGRAM REPORT: CPT | Performed by: INTERNAL MEDICINE

## 2022-06-06 PROCEDURE — 99232 SBSQ HOSP IP/OBS MODERATE 35: CPT | Performed by: INTERNAL MEDICINE

## 2022-06-06 PROCEDURE — 97163 PT EVAL HIGH COMPLEX 45 MIN: CPT

## 2022-06-06 PROCEDURE — 71045 X-RAY EXAM CHEST 1 VIEW: CPT

## 2022-06-06 PROCEDURE — 97116 GAIT TRAINING THERAPY: CPT

## 2022-06-06 PROCEDURE — 97167 OT EVAL HIGH COMPLEX 60 MIN: CPT

## 2022-06-06 RX ADMIN — LACTULOSE 20 G: 20 SOLUTION ORAL at 21:08

## 2022-06-06 RX ADMIN — LACTULOSE 20 G: 20 SOLUTION ORAL at 11:44

## 2022-06-06 RX ADMIN — Medication 1000 UNITS: at 09:11

## 2022-06-06 RX ADMIN — LACTULOSE 20 G: 20 SOLUTION ORAL at 09:12

## 2022-06-06 RX ADMIN — SERTRALINE HYDROCHLORIDE 50 MG: 50 TABLET ORAL at 09:12

## 2022-06-06 RX ADMIN — DONEPEZIL HYDROCHLORIDE 10 MG: 5 TABLET, FILM COATED ORAL at 21:08

## 2022-06-06 RX ADMIN — RIFAXIMIN 550 MG: 550 TABLET ORAL at 09:11

## 2022-06-06 RX ADMIN — RIFAXIMIN 550 MG: 550 TABLET ORAL at 21:07

## 2022-06-06 RX ADMIN — ASPIRIN 81 MG: 81 TABLET, COATED ORAL at 09:12

## 2022-06-06 RX ADMIN — LACTULOSE 20 G: 20 SOLUTION ORAL at 17:09

## 2022-06-06 RX ADMIN — LISINOPRIL 10 MG: 10 TABLET ORAL at 09:11

## 2022-06-06 NOTE — ASSESSMENT & PLAN NOTE
- initial CT of the chest showed 11th rib fracture with displacement  - discussed with radiology who stated that the rib fracture was present on multiple previous imaging studies and represented a chronic finding

## 2022-06-06 NOTE — PROGRESS NOTES
[address]  Progress Note - Nita Lam 1950, 67 y o  male MRN: [id number]  Unit/Bed#: -01 Encounter: [id number]  Primary Care Provider: Tom Goodwin MD   Date and time admitted to hospital: 6/4/2022  7:16 PM    * Weakness generalized  Assessment & Plan  - presented with worsening weakness and multiple falls at home  - metabolic, infectious workups negative  - CT head negative    - seen by PT/OT; will need rehab again  - case management to arrange      Closed fracture of one rib of right side  Assessment & Plan  - initial CT of the chest showed 11th rib fracture with displacement  - discussed with radiology who stated that the rib fracture was present on multiple previous imaging studies and represented a chronic finding  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 [facility])  Assessment & Plan  - History liver cirrhosis with hepatic encephalopathy  - Follows with outpatient Gastroenterology and recently started on Xifaxan    - continue Xifaxan  - monitor mental status  - monitor platelets    Chronic anemia  Assessment & Plan  - at baseline; continue to monitor    Hypertension  Assessment & Plan  - monitor on home lisinopril    Dementia with behavioral disturbance (HCC)  Assessment & Plan  - Continue PTA Aricept 10 mg daily, was on Namenda with worsening hepatic encephalopathy and discontinued  - Followed by Dr Taye Baird of Neurology        VTE Pharmacologic Prophylaxis: VTE Score: 3 Moderate Risk (Score 3-4) - Pharmacological DVT Prophylaxis Contraindicated  Sequential Compression Devices Ordered  Patient Centered Rounds: I performed bedside rounds with nursing staff today  Discussions with Specialists or Other Care Team Provider: CM     Education and Discussions with Family / Patient: Updated  (son) via phone  Time Spent for Care: 20 minutes  More than 50% of total time spent on counseling and coordination of care as described above      Current Length of Stay: 2 day(s)  Current Patient Status: Inpatient   Certification Statement: The patient will continue to require additional inpatient hospital stay due to discharge planning, ambulatory dysfunction, rehab arrangements  Discharge Plan: Anticipate discharge in 24-48 hrs to rehab facility  Code Status: Level 3 - DNAR and DNI    Subjective:   Patient seen and examined  Following up for generalized weakness  Patient with no new complaints today  Poor historian  No events reported overnight  Clinically stable  Objective:     Vitals:   Temp (24hrs), Av °F (36 7 °C), Min:97 7 °F (36 5 °C), Max:98 4 °F (36 9 °C)    Temp:  [97 7 °F (36 5 °C)-98 4 °F (36 9 °C)] 98 4 °F (36 9 °C)  HR:  [76-83] 82  Resp:  [16-18] 16  BP: (121-140)/(60-75) 121/60  SpO2:  [95 %-100 %] 95 %  Body mass index is 28 55 kg/m²  Input and Output Summary (last 24 hours): Intake/Output Summary (Last 24 hours) at 2022 1349  Last data filed at 2022 1201  Gross per 24 hour   Intake 980 ml   Output 1050 ml   Net -70 ml       PHYSICAL EXAM:    Vitals signs reviewed  Constitutional   Awake and cooperative  NAD  Head/Neck   Normocephalic  Atraumatic  HEENT   Scleral icterus noted  EOMI  Heart   Regular rate and rhythm  No murmers  Lungs   Clear to auscultation bilaterally  Respirations unlaboured  Abdomen   Soft  Nontender  Nondistended  Skin   Skin color jaundiced  No rashes  Extremities   No deformities  No peripheral edema  Neuro   Alert  Oriented to self only  No new deficits  Psych   Mood stable   Affect normal          Additional Data:     Labs:  Results from last 7 days   Lab Units 22  0541 22  0606 22  1946   WBC Thousand/uL 3 69*   < > 5 48   HEMOGLOBIN g/dL 10 1*   < > 10 5*   HEMATOCRIT % 29 3*   < > 31 2*   PLATELETS Thousands/uL 60*   < > 70*   NEUTROS PCT %  --   --  80*   LYMPHS PCT %  --   --  8*   MONOS PCT %  --   --  10   EOS PCT %  --   --  1    < > = values in this interval not displayed  Results from last 7 days   Lab Units 06/06/22  0541   SODIUM mmol/L 139   POTASSIUM mmol/L 3 7   CHLORIDE mmol/L 107   CO2 mmol/L 26   BUN mg/dL 10   CREATININE mg/dL 0 70   ANION GAP mmol/L 6   CALCIUM mg/dL 7 5*   ALBUMIN g/dL 2 0*   TOTAL BILIRUBIN mg/dL 3 31*   ALK PHOS U/L 119*   ALT U/L 37   AST U/L 70*   GLUCOSE RANDOM mg/dL 77     Results from last 7 days   Lab Units 06/04/22  1946   INR  1 44*             Results from last 7 days   Lab Units 06/04/22  2250 06/04/22  1946   LACTIC ACID mmol/L 1 1 2 5*       Lines/Drains:  Invasive Devices  Report    Peripheral Intravenous Line  Duration           Peripheral IV 06/04/22 Right Antecubital 1 day                      Imaging: No pertinent imaging reviewed  Recent Cultures (last 7 days):         Last 24 Hours Medication List:   Current Facility-Administered Medications   Medication Dose Route Frequency Provider Last Rate    aspirin  81 mg Oral Daily Breanna S Aaron, CRNP      cholecalciferol  1,000 Units Oral Daily Breanna S Aaron, CRNP      donepezil  10 mg Oral HS Breanna S Aaron, CRNP      lactulose  20 g Oral 4x Daily Breanna S Aaron, CRNP      lisinopril  10 mg Oral Daily Breanna S Aaron, CRNP      rifaximin  550 mg Oral Q12H [address] Breanna S Aaron, CRNP      sertraline  50 mg Oral Daily Breanna S Aaron, CRNP          Today, Patient Was Seen By: Luanne Sol,     **Please Note: This note may have been constructed using a voice recognition system  **

## 2022-06-06 NOTE — PLAN OF CARE
Problem: MOBILITY - ADULT  Goal: Maintain or return to baseline ADL function  Description: INTERVENTIONS:  -  Assess patient's ability to carry out ADLs; assess patient's baseline for ADL function and identify physical deficits which impact ability to perform ADLs (bathing, care of mouth/teeth, toileting, grooming, dressing, etc )  - Assess/evaluate cause of self-care deficits   - Assess range of motion  - Assess patient's mobility; develop plan if impaired  - Assess patient's need for assistive devices and provide as appropriate  - Encourage maximum independence but intervene and supervise when necessary  - Involve family in performance of ADLs  - Assess for home care needs following discharge   - Consider OT consult to assist with ADL evaluation and planning for discharge  - Provide patient education as appropriate  Outcome: Progressing  Goal: Maintains/Returns to pre admission functional level  Description: INTERVENTIONS:  - Perform BMAT or MOVE assessment daily    - Set and communicate daily mobility goal to care team and patient/family/caregiver  - Collaborate with rehabilitation services on mobility goals if consulted  - Perform Range of Motion 3 times a day  - Reposition patient every 2 hours    - Dangle patient 3 times a day  - Stand patient 3 times a day  - Ambulate patient 3 times a day  - Out of bed to chair 3 times a day   - Out of bed for meals 3 times a day  - Out of bed for toileting  - Record patient progress and toleration of activity level   Outcome: Progressing     Problem: Potential for Falls  Goal: Patient will remain free of falls  Description: INTERVENTIONS:  - Educate patient/family on patient safety including physical limitations  - Instruct patient to call for assistance with activity   - Consult OT/PT to assist with strengthening/mobility   - Keep Call bell within reach  - Keep bed low and locked with side rails adjusted as appropriate  - Keep care items and personal belongings within reach  - Initiate and maintain comfort rounds  - Make Fall Risk Sign visible to staff  - Offer Toileting every 2 Hours, in advance of need  - Initiate/Maintain bed alarm  - Obtain necessary fall risk management equipment: call bell in reach/alarm activated  - Apply yellow socks and bracelet for high fall risk patients  - Consider moving patient to room near nurses station  Outcome: Progressing     Problem: MOBILITY - ADULT  Goal: Maintain or return to baseline ADL function  Description: INTERVENTIONS:  -  Assess patient's ability to carry out ADLs; assess patient's baseline for ADL function and identify physical deficits which impact ability to perform ADLs (bathing, care of mouth/teeth, toileting, grooming, dressing, etc )  - Assess/evaluate cause of self-care deficits   - Assess range of motion  - Assess patient's mobility; develop plan if impaired  - Assess patient's need for assistive devices and provide as appropriate  - Encourage maximum independence but intervene and supervise when necessary  - Involve family in performance of ADLs  - Assess for home care needs following discharge   - Consider OT consult to assist with ADL evaluation and planning for discharge  - Provide patient education as appropriate  Outcome: Progressing  Goal: Maintains/Returns to pre admission functional level  Description: INTERVENTIONS:  - Perform BMAT or MOVE assessment daily    - Set and communicate daily mobility goal to care team and patient/family/caregiver  - Collaborate with rehabilitation services on mobility goals if consulted  - Perform Range of Motion 3 times a day  - Reposition patient every 2 hours    - Dangle patient 3 times a day  - Stand patient 3 times a day  - Ambulate patient 3 times a day  - Out of bed to chair 3 times a day   - Out of bed for meals 3 times a day  - Out of bed for toileting  - Record patient progress and toleration of activity level   Outcome: Progressing     Problem: Potential for Falls  Goal: Patient will remain free of falls  Description: INTERVENTIONS:  - Educate patient/family on patient safety including physical limitations  - Instruct patient to call for assistance with activity   - Consult OT/PT to assist with strengthening/mobility   - Keep Call bell within reach  - Keep bed low and locked with side rails adjusted as appropriate  - Keep care items and personal belongings within reach  - Initiate and maintain comfort rounds  - Make Fall Risk Sign visible to staff  - Offer Toileting every 2 Hours, in advance of need  - Initiate/Maintain bed alarm  - Obtain necessary fall risk management equipment: call bell in reach/bed alarm activated  - Apply yellow socks and bracelet for high fall risk patients  - Consider moving patient to room near nurses station  Outcome: Progressing     Problem: Prexisting or High Potential for Compromised Skin Integrity  Goal: Skin integrity is maintained or improved  Description: INTERVENTIONS:  - Identify patients at risk for skin breakdown  - Assess and monitor skin integrity  - Assess and monitor nutrition and hydration status  - Monitor labs   - Assess for incontinence   - Turn and reposition patient  - Assist with mobility/ambulation  - Relieve pressure over bony prominences  - Avoid friction and shearing  - Provide appropriate hygiene as needed including keeping skin clean and dry  - Evaluate need for skin moisturizer/barrier cream  - Collaborate with interdisciplinary team   - Patient/family teaching  - Consider wound care consult   Outcome: Progressing

## 2022-06-06 NOTE — OCCUPATIONAL THERAPY NOTE
Occupational Therapy Evaluation     Patient Name: Rachel Gillespie  Today's Date: 6/6/2022  Problem List  Principal Problem:    Weakness generalized  Active Problems:    Dementia with behavioral disturbance (HCC)    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 [facility])    Hypertension    Closed fracture of one rib of right side    Chronic anemia    Past Medical History  Past Medical History:   Diagnosis Date    Cirrhosis ([facility] [address] )     Dementia ([facility] [address] )     Hypertension      Past Surgical History  Past Surgical History:   Procedure Laterality Date    REPLACEMENT TOTAL KNEE          06/06/22 1057   Note Type   Note type Evaluation   Restrictions/Precautions   Other Precautions Cognitive; Chair Alarm; Bed Alarm; Impulsive; Fall Risk;Pain   Pain Assessment   Pain Assessment Tool 0-10   Pain Score 4   Pain Location/Orientation Orientation: Right;Location: Knee  (bruise)   Home Living   Type of Home House  (0 [place])   Home Layout Two level;Performs ADLs on one level; Able to live on main level with bedroom/bathroom   Bathroom Shower/Tub Walk-in shower   Bathroom Toilet Standard   Bathroom Equipment Grab bars in shower; Shower chair;Grab bars around toilet   Home Equipment Walker;Cane   Additional Comments Pt poor historian with cognitive deficits, varying reports given  Pt reports living alone in a [facility], SPC use at baseline  Prior Function   Level of [place] Needs assistance with ADLs and functional mobility   Lives With Alone   Receives Help From Personal care attendant   ADL Assistance Needs assistance   IADLs Needs assistance   Falls in the last 6 months 1 to 4   Comments Pt reporting caregivers 8 hr/day 7days/wk who assist with ADL/IADL tasks  Per ED provider note, pt has 24/7 care provided by two daughters  Pt reporting he has only has one daughter who lives in [place] and a son who lives in [facility] [address] [address] [facility]; Increased time to complete   LB Dressing Assistance (Steadying assist)   LB Dressing Deficit Steadying; Requires assistive device for steadying; Increased time to complete   Additional Comments UB ADLs @ S/set-up  LB ADLs @ Steadying assist d/t decreased seated/standing balance and impulsivity  See tx assessment for greater detail involving ADL performance  Bed Mobility   Additional Comments Pt OOB at beginning and end of session  Bed mobility not assessed at this time  Transfers   Sit to Stand   (Steadying assist)   Additional items Increased time required;Verbal cues   Stand to Sit   (Steadying assist)   Additional items Increased time required;Verbal cues   Stand pivot   (Steadying assist)   Additional items Increased time required;Verbal cues   Additional Comments STS from EOB to RW @ Steadying assist  Pt able to complete functional mobility in room with RW @ Steadying assist  See tx assessment for further details regarding functional mobility  Balance   Static Sitting Good   Dynamic Sitting Fair   Static Standing Fair -   Dynamic Standing Poor +   Activity Tolerance   Activity Tolerance Patient limited by fatigue   Medical Staff Made Aware Spoke with PT Chasity   Nurse Made Aware Spoke with RN Kristina   RUE Assessment   RUE Assessment WFL   LUE Assessment   LUE Assessment WFL   Hand Function   Gross Motor Coordination Functional   Fine Motor Coordination Functional   Sensation   Light Touch No apparent deficits   Cognition   Overall Cognitive Status Impaired   Arousal/Participation Alert; Responsive; Cooperative   Attention Difficulty attending to directions   Orientation Level Oriented to person;Oriented to place;Oriented to time;Disoriented to situation   Memory Decreased short term memory;Decreased recall of recent events;Decreased recall of biographical information   Following Commands Follows one step commands with increased time or repetition   Comments Pt with varying reports of PLOF and home set-up   RN reported impulsivity with pt getting up without ringing for staff   Assessment   Limitation Decreased ADL status; Decreased Safe judgement during ADL;Decreased cognition;Decreased endurance;Decreased self-care trans;Decreased high-level ADLs   Prognosis Good   Assessment Pt is a 67 y o  male, admitted to [facility] 6/4/2022 d/t experiencing increased confusion and weakness  Dx: generalized weakness  Pt with PMHx impacting their performance during ADL tasks, including: cirrhosis, dementia and hypertension  Prior to admission to the hospital Pt was performing ADLs with physical assistance  IADLs with physical assistance  Functional transfers/ambulation with physical assistance  Cognitive status was PTA was Impaired  OT order placed to assess Pt's ADLs, cognitive status, and performance during functional tasks in order to maximize safety and independence while making most appropriate d/c recommendations  Pt's clinical presentation is currently evolving given new onset deficits that effect Pt's occupational performance and ability to safely return to PLOF including decrease activity tolerance, decrease standing balance, decrease performance during ADL tasks, decrease cognition, decrease safety awareness , increase impulsiveness, decrease generalized strength, decrease activity engagement, decrease performance during functional transfers, frequent falls, high fall risk and limited insight to deficits combined with medical complications of change in mental status, abnormal H&H, abnormal CBC and need for input for mobility technique/safety  Personal factors affecting Pt at time of initial evaluation include: multi-level environment, limited insight into impairments and recent fall(s)/fall history  At this time, Pt required OT/PT co-eval d/t significant deficits with mobility and safety concerns   Pt will benefit from continued skilled OT services to address deficits as defined above and to maximize level independence/participation during ADLs and functional tasks to facilitate return toward PLOF and improved quality of life  From an occupational therapy standpoint, recommendation at time of d/c would be post acute rehabilitation  Plan   Treatment Interventions ADL retraining;Functional transfer training;UE strengthening/ROM; Endurance training;Cognitive reorientation;Patient/family training;Equipment evaluation/education; Neuromuscular reeducation; Compensatory technique education;Continued evaluation; Energy conservation; Activityengagement   Goal Expiration Date 06/20/22   OT Treatment Day 1   OT Frequency 3-5x/wk   Additional Treatment Session   Start Time 1119   End Time 1129   Treatment Assessment Pt completed OT tx session #1 focused on ADL performance and functional mobility  Pt alert and agreeable to participate  Pt able to complete functional mobility to/from bathroom with RW @ Steadying assist  Toilet transfer @ Steadying assist  Pt impulsive and attempted to stand from toilet prior to calling for staff  Pt able to complete grooming tasks of brushing teeth and washing hands while standing at sink @ Steadying assist to Min A for decreased standing balance with no UE support  Pt returned to chair at end of session  Chair alarm intact, pt extensively educated on calling for nurse prior to standing  All needs met     Recommendation   OT Discharge Recommendation Post acute rehabilitation services   AM-PAC Daily Activity Inpatient   Lower Body Dressing 3   Bathing 3   Toileting 3   Upper Body Dressing 3   Grooming 3   Eating 4   Daily Activity Raw Score 19   Daily Activity Standardized Score (Calc for Raw Score >=11) 40 22   AM-PAC Applied Cognition Inpatient   Following a Speech/Presentation 2   Understanding Ordinary Conversation 3   Taking Medications 1   Remembering Where Things Are Placed or Put Away 1   Remembering List of 4-5 Errands 1   Taking Care of Complicated Tasks 1   Applied Cognition Raw Score 9   Applied Cognition Standardized Score 22 48     The patient's raw score on the AM-PAC Daily Activity inpatient short form is 19, standardized score is 40 22, greater than 39 4  Patients at this level are likely to benefit from DC to home  Despite AM-PAC recommendation of return to home, pt is a significant safety/fall risk d/t decreased standing balance and cognition and would benefit from additional rehabilitation  Please refer to the recommendation of the Occupational Therapist for safe DC planning  Pt goals to be met by 6/20/2022    Pt will demonstrate ability to complete grooming/hygiene tasks @ S/set-up after set-up  Pt will demonstrate ability to complete supine<>sit @ S in order to increase safety and independence during ADL tasks  Pt will demonstrate ability to complete LB dressing @ S/set-up in order to increase safety and independence during meaningful tasks  Pt will demonstrate ability to celestine/doff socks/shoes while sitting EOB @ S/set-up in order to increase safety and independence during meaningful tasks  Pt will demonstrate ability to complete toileting tasks including CM and pericare @ S/set-up in order to increase safety and independence during meaningful tasks  Pt will demonstrate ability to complete EOB, chair, toilet/commode transfers @ S/set-up in order to increase performance and participation during functional tasks  Pt will demonstrate ability to stand for 5-8 minutes while maintaining Good balance with use of RW for UB support PRN  Pt will demonstrate ability to tolerate 30-35 minute OT session with no vc'ing for deep breathing or use of energy conservation techniques in order to increase activity tolerance during functional tasks  Pt will demonstrate Good carryover of use of energy conservation/compensatory strategies during ADLs and functional tasks in order to increase safety and reduce risk for falls     Pt will demonstrate Good attention and participation in continued evaluation of functional ambulation house hold distances in order to assist with safe d/c planning  Pt will attend to continued cognitive assessments 100% of the time in order to provide most appropriate d/c recommendations  Pt will follow 100% simple 1-step commands consistently with environmental cues to increase participation in functional activities  Pt will identify 3 areas of interest/hobbies and 1 intervention on how to incorporate into daily life in order to increase interaction with environment and peers as well as increase participation in meaningful tasks  Pt will demonstrate 100% carryover of BUE HEP in order to increase BUE MS and increase performance during functional tasks upon d/c home      Kita [place], OTR/L

## 2022-06-06 NOTE — PLAN OF CARE
Problem: PHYSICAL THERAPY ADULT  Goal: Performs mobility at highest level of function for planned discharge setting  See evaluation for individualized goals  Description: Treatment/Interventions: Functional transfer training, LE strengthening/ROM, Therapeutic exercise, Endurance training, Patient/family training, Equipment eval/education, Bed mobility, Compensatory technique education, Gait training, Elevations, Spoke to nursing, OT  Equipment Recommended:  (TBD by rehab)       See flowsheet documentation for full assessment, interventions and recommendations  Note: Prognosis: Good  Problem List: Decreased strength, Decreased endurance, Impaired balance, Decreased mobility, Decreased cognition, Impaired judgement, Decreased safety awareness, Pain  Assessment: Pt is a 67 y o  male seen for PT evaluation s/p admission to [address] [place] [address] on 6/4/2022 with Weakness generalized  Order placed for PT services  Upon evaluation: Pt is presenting with impaired functional mobility due to pain, decreased strength, decreased endurance, impaired balance, gait deviations, impaired cognition, decreased safety awareness, impaired judgment, fall risk, and impaired skin integrity requiring  steadying assistance for transfers and ambulation with RW   Pt's clinical presentation is currently unstable/unpredictable given the functional mobility deficits above coupled with fall risks as indicated by AM-PAC 6-Clicks: 63/15 as well as hx of falls, impulsivity, impaired balance, polypharmacy, impaired judgement, decreased safety awareness, and decreased cognition and combined with medical complications of hypertension , abnormal H&H, abnormal WBCs, abnormal CBC, impulsivity during admission, and need for input for mobility technique/safety  Pt's PMHx and comorbidities that may affect physical performance and progress include: HTN, Dementia, and limited cognition   Personal factors affecting pt at time of IE include: past experience, behavioral pattern, inability to perform IADLs, inability to perform ADLs, inability to navigate level surfaces without external assistance, inability to navigate community distances, limited insight into impairments, recent fall(s)/fall history, and questionable non-compliance  Pt will benefit from continued skilled PT services to address deficits as defined above and to maximize level of functional mobility to facilitate return toward PLOF and improved QOL  From PT/mobility standpoint, recommendation at time of d/c would be Short term rehab pending progress in order to reduce fall risk and maximize pt's functional independence and consistency with mobility in order to facilitate return to PLOF  Recommend trial with walker next 1-2 sessions and ther ex next 1-2 sessions  Barriers to Discharge: Other (Comment)  Barriers to Discharge Comments: safety concerns as pt is impulsive with recent fall history     PT Discharge Recommendation: Post acute rehabilitation services          See flowsheet documentation for full assessment

## 2022-06-06 NOTE — ASSESSMENT & PLAN NOTE
- History liver cirrhosis with hepatic encephalopathy  - Follows with outpatient Gastroenterology and recently started on Xifaxan    - continue Xifaxan  - monitor mental status  - monitor platelets

## 2022-06-06 NOTE — ASSESSMENT & PLAN NOTE
- Continue PTA Aricept 10 mg daily, was on Namenda with worsening hepatic encephalopathy and discontinued  - Followed by Dr Kodak Whiting of Neurology

## 2022-06-06 NOTE — ASSESSMENT & PLAN NOTE
- presented with worsening weakness and multiple falls at home  - metabolic, infectious workups negative  - CT head negative    - seen by PT/OT; will need rehab again  - case management to arrange

## 2022-06-06 NOTE — CASE MANAGEMENT
Case Management Assessment & Discharge Planning Note    Patient name Zion Constantino  Location /-01 MRN [id number]  : 1950 Date 2022       Current Admission Date: 2022  Current Admission Diagnosis:Weakness generalized   Patient Active Problem List    Diagnosis Date Noted    Closed fracture of one rib of right side 2022    Weakness generalized 2022    Chronic anemia 2022    SIRS (systemic inflammatory response syndrome) ([facility] ) 2022    Cholelithiasis     Moderate protein-calorie malnutrition ([facility] ) 2022    [facility]ocytopenia [facility] 20[facility]    Dementia with behavioral disturbance ([facility] )     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 ([facility] )     Hepatic encephalopathy ([facility] )     Hypertension       LOS (days): 2  Geometric Mean LOS (GMLOS) (days): 3 00  Days to GMLOS:1 5     OBJECTIVE:    Risk of Unplanned Readmission Score: 26 77         Current admission status: Inpatient  Referral Reason:  (post acute care needs)    Preferred Pharmacy:   PATIENT/FAMILY REPORTS NO PREFERRED PHARMACY  No address on file      [facility] [address] [address]  Phone: [phone] Fax: [phone]    Primary Care Provider: Arlyne Eisenmenger, MD    Primary Insurance: MEDICARE  Secondary Insurance: [place] in with generalized weakness, ^ confusion, patient with dementia  Met with patient to obtain baseline information, patient with some confusion, thus CM called and spoke to son via phone  Kaitlynn Batres is aware pt would benefit with STR  Aware patient was at [facility] for [address] 3/4-18  Pt has a care giver      A post acute care recommendation was made by your care team for STR  Discussed Freedom of Choice with POA  List of facilities given to POA via emailed   POA aware the list is custom filtered for them by zip code location and that [facility] post acute providers are designated  ASSESSMENT:  Active Health Care Proxies    There are no active Health Care Proxies on file  Advance Directives  Does patient have a [address]?: Yes  Does patient have Advance Directives?: Yes  Advance Directives: Living will  Primary Contact: fernanda Nixon ( see face sheet)         Readmission Root Cause  30 Day Readmission: No    Patient Information  Admitted from[de-identified] Home  Mental Status: Confused  During Assessment patient was accompanied by: Not accompanied during assessment  Assessment information provided by[de-identified] Son  Primary Caregiver:  (has 24/7 Sera Dayson)  Support Systems: Son, Daughter (Private care giver)  [place] of Residence: [facility] do you live in?: [address] [facility] [address] [facility] entry access options   Select all that apply : Ramp  Type of Current Residence: 2 story home (1st floor accomodation)  Upon entering residence, is there a bedroom on the main floor (no further steps)?: Yes  Upon entering residence, is there a bathroom on the main floor (no further steps)?: Yes  In the last 12 months, was there a time when you were not able to pay the mortgage or rent on time?: No  In the last 12 months, how many places have you lived?: 1  In the last 12 months, was there a time when you did not have a steady place to sleep or slept in a shelter (including now)?: No  Homeless/housing insecurity resource given?: No  Living Arrangements: Lives Alone, Other (Comment) (care giver lives with patient)  Is patient a ?: No    Activities of Daily Living Prior to Admission  Functional Status: Assistance  Completes ADLs independently?: No  Level of ADL dependence: Assistance  Ambulates independently?: No  Level of ambulatory dependence: Assistance  Does patient use assisted devices?: Yes  Assisted Devices (DME) used: Straight Cane, Wheelchair  Does patient currently own DME?: Yes  What DME does the patient currently own?: Straight Tiffanie Maki, Wheelchair  Does patient have a history of Outpatient Therapy (PT/OT)?: Yes  Does the patient have a history of Short-Term Rehab?: Yes (hx of [facility] 3/4-18)  Does patient currently have Michell Irene?: No    Current Home Health Care  Type of Current Home Care Services: Home health aide    Patient Information Continued  Income Source: Pension/nursing home  Does patient have prescription coverage?: Yes  Within the past 12 months, you worried that your food would run out before you got the money to buy more : Never true  Within the past 12 months, the food you bought just didn't last and you didn't have money to get more : Never true  Food insecurity resource given?: No  Does patient receive dialysis treatments?: No  Does patient have a history of substance abuse?: No  Does patient have a history of Mental Health Diagnosis?: No         Means of Transportation  Means of Transport to Appts[de-identified] Friends  In the past 12 months, has lack of transportation kept you from medical appointments or from getting medications?: No  In the past 12 months, has lack of transportation kept you from meetings, work, or from getting things needed for daily living?: No  Was application for public transport provided?: No        DISCHARGE DETAILS:    Discharge planning discussed with[de-identified] Dory Hernandez  Freedom of Choice: Yes  Comments - Freedom of Choice: List of SNFS emailed to son at [email or web address] referrals choices to [address] and [employer] placed  CM contacted family/caregiver?: Yes  Were Treatment Team discharge recommendations reviewed with patient/caregiver?: Yes  Did patient/caregiver verbalize understanding of patient care needs?: Yes  Were patient/caregiver advised of the risks associated with not following Treatment Team discharge recommendations?: Yes    Contacts  Patient Contacts: Wenceslao Pulido  Relationship to Patient[de-identified] Family  Contact Method: Phone  Phone Number: [phone]  Reason/Outcome: Continuity of Care, Discharge [address]         [facility]?: No    DME Referral Provided  Referral made for DME?: No    Other Referral/Resources/Interventions Provided:  Interventions: Short Term Rehab  Referral Comments: emailed list to [facility] and referrals made to [address] [place] [address] and [facility]     referrals pending at this time    Treatment Team Recommendation: Short Term Rehab  Discharge Destination Plan[de-identified] Short Term Rehab  Transport at Discharge :  (TBD-)

## 2022-06-06 NOTE — PLAN OF CARE
Problem: OCCUPATIONAL THERAPY ADULT  Goal: Performs self-care activities at highest level of function for planned discharge setting  See evaluation for individualized goals  Description: Treatment Interventions: ADL retraining, Functional transfer training, UE strengthening/ROM, Endurance training, Cognitive reorientation, Patient/family training, Equipment evaluation/education, Neuromuscular reeducation, Compensatory technique education, Continued evaluation, Energy conservation, Activityengagement          See flowsheet documentation for full assessment, interventions and recommendations  Note: Limitation: Decreased ADL status, Decreased Safe judgement during ADL, Decreased cognition, Decreased endurance, Decreased self-care trans, Decreased high-level ADLs  Prognosis: Good  Assessment: Pt is a 67 y o  male, admitted to [address] [place] [address] 6/4/2022 d/t experiencing increased confusion and weakness  Dx: generalized weakness  Pt with PMHx impacting their performance during ADL tasks, including: cirrhosis, dementia and hypertension  Prior to admission to the hospital Pt was performing ADLs with physical assistance  IADLs with physical assistance  Functional transfers/ambulation with physical assistance  Cognitive status was PTA was Impaired  OT order placed to assess Pt's ADLs, cognitive status, and performance during functional tasks in order to maximize safety and independence while making most appropriate d/c recommendations   Pt's clinical presentation is currently evolving given new onset deficits that effect Pt's occupational performance and ability to safely return to [facility] including decrease activity tolerance, decrease standing balance, decrease performance during ADL tasks, decrease cognition, decrease safety awareness , increase impulsiveness, decrease generalized strength, decrease activity engagement, decrease performance during functional transfers, frequent falls, high fall risk and limited insight to deficits combined with medical complications of change in mental status, abnormal H&H, abnormal CBC and need for input for mobility technique/safety  Personal factors affecting Pt at time of initial evaluation include: multi-level environment, limited insight into impairments and recent fall(s)/fall history  At this time, Pt required OT/PT co-eval d/t significant deficits with mobility and safety concerns  Pt will benefit from continued skilled OT services to address deficits as defined above and to maximize level independence/participation during ADLs and functional tasks to facilitate return toward PLOF and improved quality of life  From an occupational therapy standpoint, recommendation at time of d/c would be post acute rehabilitation       OT Discharge Recommendation: Post acute rehabilitation services

## 2022-06-06 NOTE — PROGRESS NOTES
Assumed care of patient at 2300  No complaints of pain or discomfort  IV site flushes without issue  Pt alert to self and place  Very pleasant  Call bell is in reach  Bed alarm is on and audible  Pt does attempt to get OOB to go to the bathroom  Has been standing at the side of the bed with assistance to use the urinal  Will continue to monitor closely

## 2022-06-06 NOTE — PLAN OF CARE
Problem: PHYSICAL THERAPY ADULT  Goal: Performs mobility at highest level of function for planned discharge setting  See evaluation for individualized goals  Description: Treatment/Interventions: Functional transfer training, LE strengthening/ROM, Therapeutic exercise, Endurance training, Patient/family training, Equipment eval/education, Bed mobility, Compensatory technique education, Gait training, Elevations, Spoke to nursing, OT  Equipment Recommended:  (TBD by rehab)       See flowsheet documentation for full assessment, interventions and recommendations  6/6/2022 1424 by Eligio Stone PT  Note: Prognosis: Good  Problem List: Decreased strength, Decreased endurance, Impaired balance, Decreased mobility, Decreased cognition, Impaired judgement, Decreased safety awareness, Pain  Pt seen for PT treatment session this date with interventions consisting of gait training w/ emphasis on improving pt's ability to ambulate level surfaces x 60' with steadying assist provided by therapist with RW and Therapeutic exercise consisting of: AROM 20 reps B LE in sitting and standing with B/L UE support position  Pt agreeable to PT treatment session upon arrival, pt found seated OOB in recliner, in no apparent distress  In comparison to previous session, pt with improvements in ability to ambulate increased distance and complete ther ex however pt continues to demonstrate decreased safety insight  Post session: pt returned back to recliner, chair alarm engaged, all needs in reach and RN notified of session findings/recommendations Continue to recommend STR at time of d/c in order to maximize pt's functional independence and safety w/ mobility  Pt continues to be functioning below baseline level, and remains limited 2* factors listed above and including decreased strength, balance, mobility, cognition, and safety insight   PT will continue to see pt while here in order to address the deficits listed above and provide interventions consistent w/ POC in effort to achieve STGs  Barriers to Discharge Comments: safety concerns as pt is impulsive with recent fall history     PT Discharge Recommendation: Post acute rehabilitation services          See flowsheet documentation for full assessment

## 2022-06-06 NOTE — NURSING NOTE
Patient impulsive with getting oob and not using call bell  Patient instructed on the need to ring call bell for fall prevention  Patient verbalized understanding   Patient standing at bedside and voiding in urinal

## 2022-06-06 NOTE — PHYSICAL THERAPY NOTE
PHYSICAL THERAPY EVALUATION  NAME:  Milton Mohan  DATE: 06/06/22    AGE:   67 y o  Mrn:   [id number]  ADMIT DX:  Confusion [R41 0]  Rib fractures [S22 49XA]  Altered mental status [R41 82]  Generalized weakness [R53 1]  Frequent falls [R29 6]  Ambulatory dysfunction [R26 2]    Past Medical History:   Diagnosis Date    Cir[facility]osis ([facility] )     [facility] )     Hypertension      Length Of Stay: 2  Performed at least 2 patient identifiers during session: Name and Birthday  PHYSICAL THERAPY EVALUATION :        06/06/22 1058   Note Type   Note type Evaluation   Pain Assessment   Pain Assessment Tool 0-10   Pain Score No Pain   Pain Location/Orientation Orientation: Right;Location: Knee   Restrictions/Precautions   Other Precautions Cognitive; Chair Alarm; Bed Alarm; Fall Risk;Impulsive;Pain   Home Living   Type of Home House  (0 VAL[place])   Home Layout Two level;Performs ADLs on one level; Able to live on main level with bedroom/bathroom   Bathroom Shower/Tub Walk-in shower   Bathroom Toilet Standard   Bathroom Equipment Grab bars in shower; Shower chair;Grab bars around toilet   Home Equipment Walker;Cane   Additional Comments Pt poor historian with cognitive deficits, varying reports given  Pt reports living alone in a [facility] with FF setup, SPC use at baseline  Prior Function   Level of Novelty Needs assistance with ADLs and functional mobility; Needs assistance with IADLs   Lives With Alone  (son is home from time to time)   Receives Help From Personal care attendant  (8 hr/day)   ADL Assistance Needs assistance   IADLs Needs assistance   Falls in the last 6 months 1 to 4   Comments Pt reporting caregivers 8 hr/day 7days/wk who assist with ADL/IADL tasks  Per ED provider note, pt has 24/7 care provided by two daughters  Pt reporting he has only has one daughter who lives in [place] and a son who lives in [place]   Pt requires assistance for ADLs, IADLs, mobility, and transportation   Cognition   Overall Cognitive Status Impaired   Orientation Level Oriented to person;Oriented to place;Oriented to time;Disoriented to situation   Following Commands Follows one step commands with increased time or repetition   RLE Assessment   RLE Assessment WFL  (4/5 strength)   LLE Assessment   LLE Assessment WFL  (4/5 strength)   Light Touch   RLE Light Touch Grossly intact   LLE Light Touch Grossly intact   Bed Mobility   Additional Comments Pt received seated on toilet with RNKristina present  Bed mobility not assessed this session   Transfers   Sit to Stand   (steadying assist)   Additional items Increased time required;Verbal cues   Stand to Sit   (steadying assist)   Additional items Increased time required;Verbal cues   Stand pivot   (steadying assist)   Additional items Increased time required;Verbal cues   Additional Comments Pt used RW for all transfers, VC for hand placement and safety as pt attempting to pull from RW   Ambulation/Elevation   Gait pattern Improper Weight shift;Decreased foot clearance; Wide ELEAZAR   Gait Assistance   (steadying assist)   Additional items Verbal cues   Assistive Device Rolling walker   Distance 20' with RW, wide ELEAZAR, fair gait speed  VC to maintain COG within ELEAZAR as pt with RW too far anteriorly at times  No LOB   Balance   Static Sitting Good   Dynamic Sitting Fair +   Static Standing Fair   Dynamic Standing Fair -   Ambulatory Fair -   Endurance Deficit   Endurance Deficit Yes   Endurance Deficit Description fatigue   Activity Tolerance   Activity Tolerance Patient limited by fatigue   Medical Staff Made Aware , [facility]   Nurse Made Aware RN, Kristina   Assessment   Prognosis Good   Problem List Decreased strength;Decreased endurance; Impaired balance;Decreased mobility; Decreased cognition; Impaired judgement;Decreased safety awareness;Pain   Barriers to Discharge Other (Comment)   Barriers to Discharge Comments safety concerns as pt is impulsive with recent fall history   Goals   Patient Goals Get stronger STG Expiration Date 06/20/22   PT Treatment Day 1   Plan   Treatment/Interventions Functional transfer training;LE strengthening/ROM; Therapeutic exercise; Endurance training;Patient/family training;Equipment eval/education; Bed mobility; Compensatory technique education;Gait training;Elevations; Spoke to nursing;OT   PT Frequency 3-5x/wk   Recommendation   PT Discharge Recommendation Post acute rehabilitation services   Equipment Recommended   (TBD by rehab)   [address] [place] [address] Mobility Inpatient   Turning in Bed Without Bedrails 3   Lying on Back to Sitting on Edge of Flat Bed 3   Moving Bed to Chair 3   Standing Up From Chair 3   Walk in Room 3   Climb 3-5 Stairs 1   Basic Mobility Inpatient Raw Score 16   Basic Mobility Standardized Score 38 32   Highest Level Of Mobility   -HLM Goal 5: Stand one or more mins   JH-HLM Achieved 6: Walk 10 steps or more   Additional Treatment Session   Start Time 1120   End Time 1130   Treatment Assessment Pt seen for PT treatment session this date with interventions consisting of gait training w/ emphasis on improving pt's ability to ambulate level surfaces x 60' with steadying assist provided by therapist with RW and Therapeutic exercise consisting of: AROM 20 reps B LE in sitting and standing with B/L UE support position  Pt agreeable to PT treatment session upon arrival, pt found seated OOB in recliner, in no apparent distress  In comparison to previous session, pt with improvements in ability to ambulate increased distance and complete ther ex however pt continues to demonstrate decreased safety insight  Post session: pt returned back to recliner, chair alarm engaged, all needs in reach and RN notified of session findings/recommendations Continue to recommend STR at time of d/c in order to maximize pt's functional independence and safety w/ mobility   Pt continues to be functioning below baseline level, and remains limited 2* factors listed above and including decreased strength, balance, mobility, cognition, and safety insight  PT will continue to see pt while here in order to address the deficits listed above and provide interventions consistent w/ POC in effort to achieve STGs  Equipment Use Pt completed sit <> stand transfer with RW and steadying assist with VC for hand placement  Pt ambulated 61' with RW and steadying assist with VC for RW management  Pt completed the following therex 20 reps AROM B/L: sitting marches, LAQ, standing marches with BUE support on RW, and blocked STS x 5 reps without RW and steadying assist for safety   Additional Treatment Day 1   Exercises   Hip Flexion Standing;20 reps;AROM; Bilateral   Knee AROM Long Arc Quad Sitting;20 reps;AROM; Bilateral   Neuro re-ed Blocked practice STS x 5 reps without RW with steadying assist and VC for hand placement   End of Consult   Patient Position at End of Consult Bedside chair;Bed/Chair alarm activated; All needs within reach     The patient's AM-PAC Basic Mobility Inpatient Short Form Raw Score is 16    A Raw score of less than or equal to 16 suggests the patient may benefit from discharge to post-acute rehabilitation services  Please also refer to the recommendation of the Physical Therapist for safe discharge planning  (Please find full objective findings from PT assessment regarding body systems outlined above)  Assessment: Pt is a 67 y o  male seen for PT evaluation s/p admission to [address] [place] [address] on 6/4/2022 with Weakness generalized  Order placed for PT services    Upon evaluation: Pt is presenting with impaired functional mobility due to pain, decreased strength, decreased endurance, impaired balance, gait deviations, impaired cognition, decreased safety awareness, impaired judgment, fall risk, and impaired skin integrity requiring  steadying assistance for transfers and ambulation with RW   Pt's clinical presentation is currently unstable/unpredictable given the functional mobility deficits above coupled with fall risks as indicated by AM-PAC 6-Clicks: 18/24 as well as hx of falls, impulsivity, impaired balance, polypharmacy, impaired judgement, decreased safety awareness, and decreased cognition and combined with medical complications of hypertension , abnormal H&H, abnormal WBCs, abnormal CBC, impulsivity during admission, and need for input for mobility technique/safety  Pt's PMHx and comorbidities that may affect physical performance and progress include: HTN, Dementia, and limited cognition  Personal factors affecting pt at time of IE include: past experience, behavioral pattern, inability to perform IADLs, inability to perform ADLs, inability to navigate level surfaces without external assistance, inability to navigate community distances, limited insight into impairments, recent fall(s)/fall history, and questionable non-compliance  Pt will benefit from continued skilled PT services to address deficits as defined above and to maximize level of functional mobility to facilitate return toward PLOF and improved QOL  From PT/mobility standpoint, recommendation at time of d/c would be Short term rehab pending progress in order to reduce fall risk and maximize pt's functional independence and consistency with mobility in order to facilitate return to PLOF  Recommend trial with walker next 1-2 sessions and ther ex next 1-2 sessions  Goals: Pt will: Perform bed mobility tasks with modified I to reposition in bed and prepare for transfers  Pt will perform transfers with Supervision to decrease burden of care and prepare for ambulation  Pt will ambulate with RW for >/= 150' with  Supervision  to improve gait quality and promote proper use of assistive device and to access home environment  Pt will complete >/= 12 steps with with unilateral handrail with Supervision to return to multilevel home   Increase bilateral LE strength 1/2 grade to facilitate independent mobility and Increase all balance 1/2 grade to decrease risk for falls          Newt Sjogren, PT,DPT

## 2022-06-07 VITALS
OXYGEN SATURATION: 97 % | BODY MASS INDEX: 28.52 KG/M2 | RESPIRATION RATE: 19 BRPM | WEIGHT: 210.54 LBS | HEIGHT: 72 IN | DIASTOLIC BLOOD PRESSURE: 63 MMHG | SYSTOLIC BLOOD PRESSURE: 122 MMHG | TEMPERATURE: 98.1 F | HEART RATE: 73 BPM

## 2022-06-07 PROCEDURE — 99239 HOSP IP/OBS DSCHRG MGMT >30: CPT | Performed by: INTERNAL MEDICINE

## 2022-06-07 RX ADMIN — LISINOPRIL 10 MG: 10 TABLET ORAL at 08:44

## 2022-06-07 RX ADMIN — Medication 1000 UNITS: at 08:44

## 2022-06-07 RX ADMIN — SERTRALINE HYDROCHLORIDE 50 MG: 50 TABLET ORAL at 08:44

## 2022-06-07 RX ADMIN — LACTULOSE 20 G: 20 SOLUTION ORAL at 12:07

## 2022-06-07 RX ADMIN — ASPIRIN 81 MG: 81 TABLET, COATED ORAL at 08:44

## 2022-06-07 RX ADMIN — LACTULOSE 20 G: 20 SOLUTION ORAL at 08:44

## 2022-06-07 RX ADMIN — RIFAXIMIN 550 MG: 550 TABLET ORAL at 08:44

## 2022-06-07 NOTE — ASSESSMENT & PLAN NOTE
- presented with worsening weakness and multiple falls at home  - metabolic, infectious workups negative  - CT head negative    - seen by PT/OT; will need rehab again  - discharged to [facility]

## 2022-06-07 NOTE — ASSESSMENT & PLAN NOTE
- History liver cirrhosis with hepatic encephalopathy  - Follows with outpatient Gastroenterology and recently started on Xifaxan    - continue Xifaxan and lactulose  - outpatient follow up as previously scheduled

## 2022-06-07 NOTE — ASSESSMENT & PLAN NOTE
- Continue PTA Aricept 10 mg daily, was on Namenda with worsening hepatic encephalopathy and discontinued  - Followed by Dr Alex [facility] of Neurology

## 2022-06-07 NOTE — CASE MANAGEMENT
Case Management Discharge Planning Note    Patient name Amna Falcon  Location /-01 MRN [id number]  : 1950 Date 2022       Current Admission Date: 2022  Current Admission Diagnosis:Weakness generalized   Patient Active Problem List    Diagnosis Date Noted    Closed fracture of one rib of right side 2022    Weakness generalized 2022    Chronic anemia 2022    SIRS (systemic inflammatory response syndrome) ([facility] ) 2022    Cholelithiasis     Moderate protein-calorie malnutrition ([facility] ) 2022    Thrombocytopenia ([facility] ) 2022    Dementia with behavioral disturbance ([facility] )     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 ([facility] )     Hepatic encephalopathy ([facility] )     Hypertension       LOS (days): 3  Geometric Mean LOS (GMLOS) (days): 3 00  Days to GMLOS:0 6     OBJECTIVE:  Risk of Unplanned Readmission Score: 27 11         Current admission status: Inpatient   Preferred Pharmacy:   PATIENT/FAMILY REPORTS NO PREFERRED PHARMACY  No address on file      [place] [id number] [facility] [address]  [facility] [address] [address]  Phone: [phone] Fax: [phone]    Primary Care Provider: Lore Ellsworth MD    Primary Insurance: MEDICARE  Secondary Insurance: Burnie Daughters    DISCHARGE DETAILS:    Medically ready for dc per provider  [facility] to see if they can accept today    Covid screen was done     Discharge planning discussed with[de-identified] Veronica Milder and patient  Freedom of Choice: Yes  Comments - Freedom of Choice: Choice is to accept bed at [address] contacted family/caregiver?: Yes  Were Treatment Team discharge recommendations reviewed with patient/caregiver?: Yes  Did patient/caregiver verbalize understanding of patient care needs?: Yes  Were patient/caregiver advised of the risks associated with not following Treatment Team discharge recommendations?: Yes    Contacts  Patient Contacts: Hussain  Relationship to Patient[de-identified] Family  Contact Method: Phone  Reason/Outcome: Discharge Planning           Discussed with son that transportation via [address] is not covered by insurance and patient will be billed for this service  Verbalized understanding                  Treatment Team Recommendation: Short Term Rehab  Discharge Destination Plan[de-identified] Short Term Rehab  Transport at Discharge : [address] : José Miguel Brown  Receiving Facility/Agency Phone Number: [phone]  Facility/Agency Fax Number: [phone]

## 2022-06-07 NOTE — CASE MANAGEMENT
Case Management Discharge Planning Note    Patient name Zach Needle  Location /-01 MRN [id number]  : 1950 Date 2022       Current Admission Date: 2022  Current Admission Diagnosis:Weakness generalized   Patient Active Problem List    Diagnosis Date Noted    Closed fracture of one rib of right side 2022    Weakness generalized 2022    Chronic anemia 2022    SIRS (systemic inflammatory response syndrome) ([facility] ) 2022    Cholelithiasis     Moderate protein-calorie malnutrition ([facility] ) 2022    Thrombocytopenia ([facility] ) 2022    Dementia with behavioral disturbance ([facility] )     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 ([facility] )     [facility]     Hypertension       LOS (days): 3  Geometric Mean LOS (GMLOS) (days): 3 00  Days to GMLOS:0 5     OBJECTIVE:  Risk of Unplanned Readmission Score: 27 11         Current admission status: Inpatient   Preferred Pharmacy:   PATIENT/FAMILY REPORTS NO PREFERRED PHARMACY  No address on file      [place] [address] [facility] [address] [address]  Phone: [phone] Fax: [phone]    Primary Care Provider: Noe Becker MD    Primary Insurance: MEDICARE  Secondary Insurance: [address]:    1200  time confirmed with [place], facility and nursing are aware  CM called and left a message with son of transport time

## 2022-06-07 NOTE — DISCHARGE SUMMARY
[address]  Discharge- Zach Needle 1950, 67 y o  male MRN: [id number]  Unit/Bed#: -01 Encounter: [id number]  Primary Care Provider: Noe Becker MD   Date and time admitted to hospital: 6/4/2022  7:16 PM    * Weakness generalized  Assessment & Plan  - presented with worsening weakness and multiple falls at home  - metabolic, infectious workups negative  - CT head negative    - seen by PT/OT; will need rehab again  - discharged to [facility]      Closed fracture of one rib of right side  Assessment & Plan  - initial CT of the chest showed 11th rib fracture with displacement  - discussed with radiology who stated that the rib fracture was present on multiple previous imaging studies and represented a chronic finding      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 [facility])  Assessment & Plan  - History liver cirrhosis with hepatic encephalopathy  - Follows with outpatient Gastroenterology and recently started on Xifaxan    - continue Xifaxan and lactulose  - outpatient follow up as previously scheduled    Chronic anemia  Assessment & Plan  - at baseline; continue to monitor    Hypertension  Assessment & Plan  - monitor on home lisinopril    Dementia with behavioral disturbance (HCC)  Assessment & Plan  - Continue PTA Aricept 10 mg daily, was on Namenda with worsening hepatic encephalopathy and discontinued  - Followed by Dr Madiha Jorge of Neurology      Discharging Physician / Practitioner: Vanessa Ragland DO  PCP: Noe Becker MD  Admission Date:   Admission Orders (From admission, onward)     Ordered        06/04/22 2322  Inpatient Admission  Once                      Discharge Date: 06/07/22    Consultations During Hospital Stay:  · none    Procedures Performed:   · none    Significant Findings / Test Results:   · none    Incidental Findings:   · none     Test Results Pending at Discharge (will require follow up):   · none     Outpatient Tests Requested:  · none    Complications:  none    Reason for Admission:  Generalized weakness and ambulatory dysfunction    Hospital Course:   Gaurang Valadez is a 67 y o  male patient who originally presented to the hospital on 6/4/2022 due to generalized weakness and ambulatory dysfunction  He also had numerous falls at home  Initial imaging in the ER was concerning for fracture of the 11th rim on the right side  However after further review radiology determine this was seen on previous imaging studies, and represented a chronic finding  Otherwise metabolic and infectious workups were negative  Patient remained asymptomatic throughout his hospital stay aside from his deconditioning  He was evaluated by PT/OT staff, and will be discharged to [facility] for continued rehabilitation  All questions and concerns were addressed  Family aware of discharge plan  Please see above list of diagnoses and related plan for additional information  Condition at Discharge: good    Discharge Day Visit / Exam:   Subjective:  Patient seen examined on day of discharge  Feeling well today  No complaints  No events overnight  Vitals: Blood Pressure: 122/63 (06/07/22 0500)  Pulse: 73 (06/07/22 0500)  Temperature: 98 1 °F (36 7 °C) (06/07/22 0500)  Temp Source: Temporal (06/07/22 0500)  Respirations: 19 (06/07/22 0500)  Height: 6' (182 9 cm) (06/04/22 1920)  Weight - Scale: 95 5 kg (210 lb 8 6 oz) (06/04/22 1920)  SpO2: 97 % (06/07/22 0500)    PHYSICAL EXAM:    Vitals signs reviewed  Constitutional   Awake and cooperative  NAD  Head/Neck   Normocephalic  Atraumatic  HEENT   Scleral icterus present  EOMI  Heart   Regular rate and rhythm  No murmers  Lungs   Clear to auscultation bilaterally  Respirations unlaboured  Abdomen   Soft  Nontender  Nondistended  Skin   Skin color mildly jaundiced  No rashes  Extremities   No deformities  No peripheral edema  Neuro   Alert and oriented to self   No new deficits  Psych   Mood stable  Affect normal          Discussion with Family: Updated  (son) via phone  Discharge instructions/Information to patient and family:   See after visit summary for information provided to patient and family  Provisions for Follow-Up Care:  See after visit summary for information related to follow-up care and any pertinent home health orders  Disposition:   Yancy De La Garza at [facility]    Planned Readmission: NO     Discharge Statement:  I spent 35 minutes discharging the patient  This time was spent on the day of discharge  I had direct contact with the patient on the day of discharge  Greater than 50% of the total time was spent examining patient, answering all patient questions, arranging and discussing plan of care with patient as well as directly providing post-discharge instructions  Additional time then spent on discharge activities  Discharge Medications:  See after visit summary for reconciled discharge medications provided to patient and/or family        **Please Note: This note may have been constructed using a voice recognition system**

## 2022-06-07 NOTE — CASE MANAGEMENT
Case Management Discharge Planning Note    Patient name Clive Vick  [facility] /-01 MRN [id number]  : 1950 Date 2022       Current Admission Date: 2022  Current Admission Diagnosis:Weakness generalized   Patient Active Problem List    Diagnosis Date Noted    Closed fracture of one rib of right side 2022    Weakness generalized 2022    Chronic anemia 2022    SIRS (systemic inflammatory response syndrome) ([facility] ) 2022    Cholelithiasis     Moderate protein-calorie malnutrition ([facility] ) 2022    Thrombocytopenia ([facility] ) 2022    D[facility]     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 ([facility] )     Hepatic encephalopathy ([facility] )     Hypertension       LOS (days): 3  Geometric Mean LOS (GMLOS) (days): 3 00  Days to GMLOS:0 6     OBJECTIVE:  Risk of Unplanned Readmission Score: 27 11         Current admission status: Inpatient   Preferred Pharmacy:   PATIENT/FAMILY REPORTS NO PREFERRED PHARMACY  No address on file      [facility] [address] [address] [facility] [address] [facility] [address]  [facility] [place] [address] [place] [address]  Phone: [phone] Fax: [phone]    Primary Care Provider: Sarthak Singh MD    Primary Insurance: MEDICARE  Secondary Insurance: Matt Hood DETAILS:    [facility] can accept today  Setting up [address] transport for 12:00 await confirmation

## 2022-06-07 NOTE — PLAN OF CARE
Problem: MOBILITY - ADULT  Goal: Maintain or return to baseline ADL function  Description: INTERVENTIONS:  -  Assess patient's ability to carry out ADLs; assess patient's baseline for ADL function and identify physical deficits which impact ability to perform ADLs (bathing, care of mouth/teeth, toileting, grooming, dressing, etc )  - Assess/evaluate cause of self-care deficits   - Assess range of motion  - Assess patient's mobility; develop plan if impaired  - Assess patient's need for assistive devices and provide as appropriate  - Encourage maximum independence but intervene and supervise when necessary  - Involve family in performance of ADLs  - Assess for home care needs following discharge   - Consider OT consult to assist with ADL evaluation and planning for discharge  - Provide patient education as appropriate  Outcome: Adequate for Discharge  Goal: Maintains/Returns to pre admission functional level  Description: INTERVENTIONS:  - Perform BMAT or MOVE assessment daily    - Set and communicate daily mobility goal to care team and patient/family/caregiver  - Collaborate with rehabilitation services on mobility goals if consulted  - Perform Range of Motion 3 times a day  - Reposition patient every 2 hours    - Dangle patient 3 times a day  - Stand patient 3 times a day  - Ambulate patient 3 times a day  - Out of bed to chair 3 times a day   - Out of bed for meals 3 times a day  - Out of bed for toileting  - Record patient progress and toleration of activity level   Outcome: Adequate for Discharge     Problem: Potential for Falls  Goal: Patient will remain free of falls  Description: INTERVENTIONS:  - Educate patient/family on patient safety including physical limitations  - Instruct patient to call for assistance with activity   - Consult OT/PT to assist with strengthening/mobility   - Keep Call bell within reach  - Keep bed low and locked with side rails adjusted as appropriate  - Keep care items and personal belongings within reach  - Initiate and maintain comfort rounds  - Make Fall Risk Sign visible to staff  - Offer Toileting every 2 Hours, in advance of need  - Initiate/Maintain bed alarm  - Obtain necessary fall risk management equipment: call bell in reach/bed alarm activated  - Apply yellow socks and bracelet for high fall risk patients  - Consider moving patient to room near nurses station  Outcome: Adequate for Discharge     Problem: Prexisting or High Potential for Compromised Skin Integrity  Goal: Skin integrity is maintained or improved  Description: INTERVENTIONS:  - Identify patients at risk for skin breakdown  - Assess and monitor skin integrity  - Assess and monitor nutrition and hydration status  - Monitor labs   - Assess for incontinence   - Turn and reposition patient  - Assist with mobility/ambulation  - Relieve pressure over bony prominences  - Avoid friction and shearing  - Provide appropriate hygiene as needed including keeping skin clean and dry  - Evaluate need for skin moisturizer/barrier cream  - Collaborate with interdisciplinary team   - Patient/family teaching  - Consider wound care consult   Outcome: Adequate for Discharge

## 2022-10-04 ENCOUNTER — APPOINTMENT (EMERGENCY)
Dept: CT IMAGING | Facility: HOSPITAL | Age: 72
DRG: 871 | End: 2022-10-04
Payer: MEDICARE

## 2022-10-04 ENCOUNTER — HOSPITAL ENCOUNTER (INPATIENT)
Facility: HOSPITAL | Age: 72
LOS: 7 days | Discharge: NURSING, REHAB OR LONG-TERM CARE | DRG: 871 | End: 2022-10-11
Attending: EMERGENCY MEDICINE | Admitting: FAMILY MEDICINE
Payer: MEDICARE

## 2022-10-04 DIAGNOSIS — J18.9 PNEUMONIA OF RIGHT LUNG DUE TO INFECTIOUS ORGANISM, UNSPECIFIED PART OF LUNG: ICD-10-CM

## 2022-10-04 DIAGNOSIS — R18.8 ASCITES: ICD-10-CM

## 2022-10-04 DIAGNOSIS — R18.8 OTHER ASCITES: ICD-10-CM

## 2022-10-04 DIAGNOSIS — K74.60 LIVER CIRRHOSIS SECONDARY TO NASH (HCC): ICD-10-CM

## 2022-10-04 DIAGNOSIS — K75.81 LIVER CIRRHOSIS SECONDARY TO NASH (HCC): ICD-10-CM

## 2022-10-04 DIAGNOSIS — N17.9 AKI (ACUTE KIDNEY INJURY) (HCC): Primary | ICD-10-CM

## 2022-10-04 DIAGNOSIS — A41.9 SEPSIS, DUE TO UNSPECIFIED ORGANISM, UNSPECIFIED WHETHER ACUTE ORGAN DYSFUNCTION PRESENT (HCC): ICD-10-CM

## 2022-10-04 DIAGNOSIS — I95.9 HYPOTENSION, UNSPECIFIED HYPOTENSION TYPE: ICD-10-CM

## 2022-10-04 LAB
2HR DELTA HS TROPONIN: -1 NG/L
4HR DELTA HS TROPONIN: 0 NG/L
ALBUMIN SERPL BCP-MCNC: 1.6 G/DL (ref 3.5–5)
ALP SERPL-CCNC: 107 U/L (ref 46–116)
ALT SERPL W P-5'-P-CCNC: 54 U/L (ref 12–78)
AMMONIA PLAS-SCNC: 25 UMOL/L (ref 11–35)
ANION GAP SERPL CALCULATED.3IONS-SCNC: 8 MMOL/L (ref 4–13)
APTT PPP: 35 SECONDS (ref 23–37)
AST SERPL W P-5'-P-CCNC: 81 U/L (ref 5–45)
BASOPHILS # BLD AUTO: 0.02 THOUSANDS/ΜL (ref 0–0.1)
BASOPHILS NFR BLD AUTO: 0 % (ref 0–1)
BILIRUB SERPL-MCNC: 4.01 MG/DL (ref 0.2–1)
BUN SERPL-MCNC: 37 MG/DL (ref 5–25)
CALCIUM ALBUM COR SERPL-MCNC: 10.4 MG/DL (ref 8.3–10.1)
CALCIUM SERPL-MCNC: 8.5 MG/DL (ref 8.3–10.1)
CARDIAC TROPONIN I PNL SERPL HS: 10 NG/L
CARDIAC TROPONIN I PNL SERPL HS: 10 NG/L
CARDIAC TROPONIN I PNL SERPL HS: 9 NG/L
CHLORIDE SERPL-SCNC: 106 MMOL/L (ref 96–108)
CO2 SERPL-SCNC: 25 MMOL/L (ref 21–32)
CREAT SERPL-MCNC: 1.48 MG/DL (ref 0.6–1.3)
EOSINOPHIL # BLD AUTO: 0.04 THOUSAND/ΜL (ref 0–0.61)
EOSINOPHIL NFR BLD AUTO: 0 % (ref 0–6)
ERYTHROCYTE [DISTWIDTH] IN BLOOD BY AUTOMATED COUNT: 15.3 % (ref 11.6–15.1)
FLUAV RNA RESP QL NAA+PROBE: NEGATIVE
FLUBV RNA RESP QL NAA+PROBE: NEGATIVE
GFR SERPL CREATININE-BSD FRML MDRD: 46 ML/MIN/1.73SQ M
GLUCOSE SERPL-MCNC: 115 MG/DL (ref 65–140)
HCT VFR BLD AUTO: 31 % (ref 36.5–49.3)
HGB BLD-MCNC: 9.7 G/DL (ref 12–17)
IMM GRANULOCYTES # BLD AUTO: 0.04 THOUSAND/UL (ref 0–0.2)
IMM GRANULOCYTES NFR BLD AUTO: 0 % (ref 0–2)
INR PPP: 1.62 (ref 0.84–1.19)
LACTATE SERPL-SCNC: 2.1 MMOL/L (ref 0.5–2)
LACTATE SERPL-SCNC: 2.9 MMOL/L (ref 0.5–2)
LIPASE SERPL-CCNC: 352 U/L (ref 73–393)
LYMPHOCYTES # BLD AUTO: 0.77 THOUSANDS/ΜL (ref 0.6–4.47)
LYMPHOCYTES NFR BLD AUTO: 7 % (ref 14–44)
MCH RBC QN AUTO: 30 PG (ref 26.8–34.3)
MCHC RBC AUTO-ENTMCNC: 31.3 G/DL (ref 31.4–37.4)
MCV RBC AUTO: 96 FL (ref 82–98)
MONOCYTES # BLD AUTO: 0.82 THOUSAND/ΜL (ref 0.17–1.22)
MONOCYTES NFR BLD AUTO: 8 % (ref 4–12)
NEUTROPHILS # BLD AUTO: 8.69 THOUSANDS/ΜL (ref 1.85–7.62)
NEUTS SEG NFR BLD AUTO: 85 % (ref 43–75)
NRBC BLD AUTO-RTO: 0 /100 WBCS
NT-PROBNP SERPL-MCNC: 158 PG/ML
PLATELET # BLD AUTO: 59 THOUSANDS/UL (ref 149–390)
PMV BLD AUTO: 12.1 FL (ref 8.9–12.7)
POTASSIUM SERPL-SCNC: 4.5 MMOL/L (ref 3.5–5.3)
PROT SERPL-MCNC: 7.4 G/DL (ref 6.4–8.4)
PROTHROMBIN TIME: 19.4 SECONDS (ref 11.6–14.5)
RBC # BLD AUTO: 3.23 MILLION/UL (ref 3.88–5.62)
RSV RNA RESP QL NAA+PROBE: NEGATIVE
SARS-COV-2 RNA RESP QL NAA+PROBE: NEGATIVE
SODIUM SERPL-SCNC: 139 MMOL/L (ref 135–147)
WBC # BLD AUTO: 10.38 THOUSAND/UL (ref 4.31–10.16)

## 2022-10-04 PROCEDURE — 82140 ASSAY OF AMMONIA: CPT | Performed by: EMERGENCY MEDICINE

## 2022-10-04 PROCEDURE — 85610 PROTHROMBIN TIME: CPT | Performed by: EMERGENCY MEDICINE

## 2022-10-04 PROCEDURE — 99223 1ST HOSP IP/OBS HIGH 75: CPT | Performed by: FAMILY MEDICINE

## 2022-10-04 PROCEDURE — 80053 COMPREHEN METABOLIC PANEL: CPT | Performed by: EMERGENCY MEDICINE

## 2022-10-04 PROCEDURE — 83690 ASSAY OF LIPASE: CPT | Performed by: EMERGENCY MEDICINE

## 2022-10-04 PROCEDURE — 99285 EMERGENCY DEPT VISIT HI MDM: CPT

## 2022-10-04 PROCEDURE — 93005 ELECTROCARDIOGRAM TRACING: CPT

## 2022-10-04 PROCEDURE — 71250 CT THORAX DX C-: CPT

## 2022-10-04 PROCEDURE — 84484 ASSAY OF TROPONIN QUANT: CPT | Performed by: EMERGENCY MEDICINE

## 2022-10-04 PROCEDURE — 74176 CT ABD & PELVIS W/O CONTRAST: CPT

## 2022-10-04 PROCEDURE — 87040 BLOOD CULTURE FOR BACTERIA: CPT | Performed by: EMERGENCY MEDICINE

## 2022-10-04 PROCEDURE — 83605 ASSAY OF LACTIC ACID: CPT | Performed by: EMERGENCY MEDICINE

## 2022-10-04 PROCEDURE — 70450 CT HEAD/BRAIN W/O DYE: CPT

## 2022-10-04 PROCEDURE — 83880 ASSAY OF NATRIURETIC PEPTIDE: CPT | Performed by: EMERGENCY MEDICINE

## 2022-10-04 PROCEDURE — 0241U HB NFCT DS VIR RESP RNA 4 TRGT: CPT | Performed by: EMERGENCY MEDICINE

## 2022-10-04 PROCEDURE — 36415 COLL VENOUS BLD VENIPUNCTURE: CPT | Performed by: EMERGENCY MEDICINE

## 2022-10-04 PROCEDURE — G1004 CDSM NDSC: HCPCS

## 2022-10-04 PROCEDURE — 96374 THER/PROPH/DIAG INJ IV PUSH: CPT

## 2022-10-04 PROCEDURE — 85025 COMPLETE CBC W/AUTO DIFF WBC: CPT | Performed by: EMERGENCY MEDICINE

## 2022-10-04 PROCEDURE — 99285 EMERGENCY DEPT VISIT HI MDM: CPT | Performed by: EMERGENCY MEDICINE

## 2022-10-04 PROCEDURE — 85730 THROMBOPLASTIN TIME PARTIAL: CPT | Performed by: EMERGENCY MEDICINE

## 2022-10-04 RX ORDER — FUROSEMIDE 10 MG/ML
20 INJECTION INTRAMUSCULAR; INTRAVENOUS
Status: DISCONTINUED | OUTPATIENT
Start: 2022-10-05 | End: 2022-10-06

## 2022-10-04 RX ORDER — DONEPEZIL HYDROCHLORIDE 5 MG/1
10 TABLET, FILM COATED ORAL
Status: DISCONTINUED | OUTPATIENT
Start: 2022-10-04 | End: 2022-10-11 | Stop reason: HOSPADM

## 2022-10-04 RX ORDER — SPIRONOLACTONE 25 MG/1
25 TABLET ORAL DAILY
Status: ON HOLD | COMMUNITY
End: 2022-10-11 | Stop reason: SDUPTHER

## 2022-10-04 RX ORDER — LACTULOSE 20 G/30ML
30 SOLUTION ORAL 4 TIMES DAILY
Status: DISCONTINUED | OUTPATIENT
Start: 2022-10-04 | End: 2022-10-05

## 2022-10-04 RX ORDER — QUETIAPINE FUMARATE 25 MG/1
12.5 TABLET, FILM COATED ORAL
Status: DISCONTINUED | OUTPATIENT
Start: 2022-10-04 | End: 2022-10-04

## 2022-10-04 RX ORDER — CEFEPIME HYDROCHLORIDE 1 G/50ML
1000 INJECTION, SOLUTION INTRAVENOUS EVERY 12 HOURS
Status: DISCONTINUED | OUTPATIENT
Start: 2022-10-04 | End: 2022-10-06

## 2022-10-04 RX ORDER — ALBUMIN (HUMAN) 12.5 G/50ML
12.5 SOLUTION INTRAVENOUS 2 TIMES DAILY
Status: DISCONTINUED | OUTPATIENT
Start: 2022-10-04 | End: 2022-10-06

## 2022-10-04 RX ORDER — ASPIRIN 81 MG/1
81 TABLET ORAL DAILY
Status: DISCONTINUED | OUTPATIENT
Start: 2022-10-04 | End: 2022-10-10

## 2022-10-04 RX ORDER — MEMANTINE HYDROCHLORIDE 10 MG/1
10 TABLET ORAL 2 TIMES DAILY
Status: DISCONTINUED | OUTPATIENT
Start: 2022-10-04 | End: 2022-10-04

## 2022-10-04 RX ORDER — ACETAMINOPHEN 325 MG/1
650 TABLET ORAL EVERY 6 HOURS PRN
Status: DISCONTINUED | OUTPATIENT
Start: 2022-10-04 | End: 2022-10-11 | Stop reason: HOSPADM

## 2022-10-04 RX ORDER — MELATONIN
1000 DAILY
Status: DISCONTINUED | OUTPATIENT
Start: 2022-10-04 | End: 2022-10-11 | Stop reason: HOSPADM

## 2022-10-04 RX ADMIN — SODIUM CHLORIDE 500 ML: 0.9 INJECTION, SOLUTION INTRAVENOUS at 17:20

## 2022-10-04 RX ADMIN — PIPERACILLIN AND TAZOBACTAM 3.38 G: 36; 4.5 INJECTION, POWDER, FOR SOLUTION INTRAVENOUS at 14:10

## 2022-10-04 RX ADMIN — CEFEPIME HYDROCHLORIDE 1000 MG: 1 INJECTION, SOLUTION INTRAVENOUS at 21:27

## 2022-10-04 RX ADMIN — DONEPEZIL HYDROCHLORIDE 10 MG: 5 TABLET, FILM COATED ORAL at 21:27

## 2022-10-04 RX ADMIN — ALBUMIN (HUMAN) 12.5 G: 0.25 INJECTION, SOLUTION INTRAVENOUS at 17:20

## 2022-10-04 RX ADMIN — RIFAXIMIN 550 MG: 550 TABLET ORAL at 21:27

## 2022-10-04 RX ADMIN — LACTULOSE 30 G: 20 SOLUTION ORAL at 21:27

## 2022-10-04 NOTE — ASSESSMENT & PLAN NOTE
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

## 2022-10-04 NOTE — ASSESSMENT & PLAN NOTE
Malnutrition Findings:                                 BMI Findings: Body mass index is 31 99 kg/m²     Patient has BMI of 33 79 however has moderate generalized muscle wasting noted secondary to underlying liver cirrhosis  Consult placed to nutritionist

## 2022-10-04 NOTE — ASSESSMENT & PLAN NOTE
Patient has sepsis present on admission with tachypnea, tachycardia, lactic acidosis  Unclear source of infection at this time but cannot rule out UTI or SBP  Will give 1 500 cc bolus of saline secondary to underlying ascites and then will follow-up with IV albumin and gentle diuresis    Will place on IV cefepime for now while awaiting culture results

## 2022-10-04 NOTE — ASSESSMENT & PLAN NOTE
Baseline creatinine is 0 7-0 8  Currently creatinine is elevated to 1 48    Is probably secondary to hepatorenal syndrome due to worsening ascites due to underlying liver cirrhosis  Placed on gentle diuresis along with IV albumin and observe

## 2022-10-04 NOTE — PLAN OF CARE
Problem: CARDIOVASCULAR - ADULT  Goal: Maintains optimal cardiac output and hemodynamic stability  Description: INTERVENTIONS:  - Monitor I/O, vital signs and rhythm  - Monitor for S/S and trends of decreased cardiac output  - Administer and titrate ordered vasoactive medications to optimize hemodynamic stability  - Assess quality of pulses, skin color and temperature  - Assess for signs of decreased coronary artery perfusion  - Instruct patient to report change in severity of symptoms  Outcome: Not Progressing  Goal: Absence of cardiac dysrhythmias or at baseline rhythm  Description: INTERVENTIONS:  - Continuous cardiac monitoring, vital signs, obtain 12 lead EKG if ordered  - Administer antiarrhythmic and heart rate control medications as ordered  - Monitor electrolytes and administer replacement therapy as ordered  Outcome: Not Progressing     Problem: RESPIRATORY - ADULT  Goal: Achieves optimal ventilation and oxygenation  Description: INTERVENTIONS:  - Assess for changes in respiratory status  - Assess for changes in mentation and behavior  - Position to facilitate oxygenation and minimize respiratory effort  - Oxygen administered by appropriate delivery if ordered  - Initiate smoking cessation education as indicated  - Encourage broncho-pulmonary hygiene including cough, deep breathe, Incentive Spirometry  - Assess the need for suctioning and aspirate as needed  - Assess and instruct to report SOB or any respiratory difficulty  - Respiratory Therapy support as indicated  Outcome: Not Progressing     Problem: GENITOURINARY - ADULT  Goal: Maintains or returns to baseline urinary function  Description: INTERVENTIONS:  - Assess urinary function  - Encourage oral fluids to ensure adequate hydration if ordered  - Administer IV fluids as ordered to ensure adequate hydration  - Administer ordered medications as needed  - Offer frequent toileting  - Follow urinary retention protocol if ordered  Outcome: Not Progressing     Problem: METABOLIC, FLUID AND ELECTROLYTES - ADULT  Goal: Electrolytes maintained within normal limits  Description: INTERVENTIONS:  - Monitor labs and assess patient for signs and symptoms of electrolyte imbalances  - Administer electrolyte replacement as ordered  - Monitor response to electrolyte replacements, including repeat lab results as appropriate  - Instruct patient on fluid and nutrition as appropriate  Outcome: Not Progressing  Goal: Fluid balance maintained  Description: INTERVENTIONS:  - Monitor labs   - Monitor I/O and WT  - Instruct patient on fluid and nutrition as appropriate  - Assess for signs & symptoms of volume excess or deficit  Outcome: Not Progressing     Problem: SKIN/TISSUE INTEGRITY - ADULT  Goal: Skin Integrity remains intact(Skin Breakdown Prevention)  Description: Assess:  -Perform Justin assessment every   -Clean and moisturize skin every   -Inspect skin when repositioning, toileting, and assisting with ADLS  -Assess under medical devices such as  every   -Assess extremities for adequate circulation and sensation     Bed Management:  -Have minimal linens on bed & keep smooth, unwrinkled  -Change linens as needed when moist or perspiring  -Avoid sitting or lying in one position for more than  hours while in bed  -Keep HOB at degrees     Toileting:  -Offer bedside commode  -Assess for incontinence every   -Use incontinent care products after each incontinent episode such as     Activity:  -Mobilize patient  times a day  -Encourage activity and walks on unit  -Encourage or provide ROM exercises   -Turn and reposition patient every  Hours  -Use appropriate equipment to lift or move patient in bed  -Instruct/ Assist with weight shifting every  when out of bed in chair  -Consider limitation of chair time  hour intervals    Skin Care:  -Avoid use of baby powder, tape, friction and shearing, hot water or constrictive clothing  -Relieve pressure over bony prominences using -Do not massage red bony areas    Next Steps:  -Teach patient strategies to minimize risks such as    -Consider consults to  interdisciplinary teams such as Outcome: Not Progressing     Problem: HEMATOLOGIC - ADULT  Goal: Maintains hematologic stability  Description: INTERVENTIONS  - Assess for signs and symptoms of bleeding or hemorrhage  - Monitor labs  - Administer supportive blood products/factors as ordered and appropriate  Outcome: Not Progressing

## 2022-10-04 NOTE — ASSESSMENT & PLAN NOTE
Patient noticed to have abdominal distension  CT abdomen pelvis showed large volume ascites with small bilateral pleural effusions  Consult placed to Critical Care for paracentesis  Will place on low-dose Lasix and albumin for now  Will place on IV cefepime for SBP prophylaxis    Will send fluid studies for analysis for LDH, protein, culture and sensitivity, WBC and cytology

## 2022-10-04 NOTE — ASSESSMENT & PLAN NOTE
Baseline hemoglobin is around 9-10    Currently hemoglobin is at baseline range with no active bleeding reported with chronic thrombocytopenia noted

## 2022-10-04 NOTE — ASSESSMENT & PLAN NOTE
Platelet count is 59 K which is close to patient's baseline of 55-60k  No active bleeding noted at this time

## 2022-10-04 NOTE — ED PROVIDER NOTES
History  Chief Complaint   Patient presents with   • Medical Problem     Presents due to multiple complaints, fall on Friday ambulance called but then family did not want him to come to ED, family called today due to "SOB with exertion", jaundiced and abdominal distention upon arrival  GCS 14      Patient with a history of dementia, hepatic encephalopathy, liver cirrhosis who has been having increasing weakness and increasing falls over the last 3-4 days  Complained of shortness of breath today  Was admitted here in June for weakness  Patient denies any pain at present  History provided by:  Patient   used: No    Fatigue  Severity:  Mild  Onset quality:  Gradual  Duration:  4 days  Timing:  Constant  Progression:  Worsening  Chronicity:  New  Context: not allergies and not stress    Relieved by:  Nothing  Worsened by:  Nothing  Ineffective treatments:  None tried  Associated symptoms: shortness of breath    Associated symptoms: no abdominal pain, no chest pain, no cough, no diarrhea, no dizziness, no dysuria, no fever, no frequency, no headaches, no lethargy, no nausea, no seizures, no syncope and no vomiting        Prior to Admission Medications   Prescriptions Last Dose Informant Patient Reported? Taking?    Cholecalciferol 25 MCG (1000 UT) tablet   Yes No   Sig: Take 1 tablet by mouth daily   QUEtiapine (SEROquel) 25 mg tablet   No No   Sig: Take 0 5 tablets (12 5 mg total) by mouth daily at bedtime   aspirin (ECOTRIN LOW STRENGTH) 81 mg EC tablet   Yes No   Sig: Take 81 mg by mouth daily   donepezil (ARICEPT) 10 mg tablet   Yes No   Sig: Take 10 mg by mouth   lactulose 20 g/30 mL   No No   Sig: Take 45 mL (30 g total) by mouth 4 (four) times a day   lisinopril-hydrochlorothiazide (PRINZIDE,ZESTORETIC) 10-12 5 MG per tablet   Yes No   Sig: Take 1 tablet by mouth daily   memantine (NAMENDA) 10 mg tablet   Yes No   Sig: Take 10 mg by mouth 2 (two) times a day   rifaximin (XIFAXAN) 550 mg tablet   Yes No   Sig: Take 550 mg by mouth every 12 (twelve) hours   sertraline (ZOLOFT) 50 mg tablet   Yes No   Sig: Take 50 mg by mouth daily      Facility-Administered Medications: None       Past Medical History:   Diagnosis Date   • [facility]    • Dementia ([facility] )    • Hypertension        Past Surgical History:   Procedure Laterality Date   • REPLACEMENT TOTAL KNEE         History reviewed  No pertinent family history  I have reviewed and agree with the history as documented  E-Cigarette/Vaping   • E-Cigarette Use Never User      E-Cigarette/Vaping Substances     Social History     Tobacco Use   • Smoking status: Former Smoker   • Smokeless tobacco: Never Used   Vaping Use   • Vaping Use: Never used   Substance Use Topics   • Alcohol use: Not Currently     Comment: former social ETOH   • Drug use: Never       Review of Systems   Constitutional: Positive for fatigue  Negative for chills and fever  HENT: Negative for ear pain, hearing loss, sore throat, trouble swallowing and voice change  Eyes: Negative for pain and discharge  Respiratory: Positive for shortness of breath  Negative for cough and wheezing  Cardiovascular: Negative for chest pain, palpitations and syncope  Gastrointestinal: Negative for abdominal pain, blood in stool, constipation, diarrhea, nausea and vomiting  Genitourinary: Negative for dysuria, flank pain, frequency and hematuria  Musculoskeletal: Negative for joint swelling, neck pain and neck stiffness  Skin: Negative for rash and wound  Neurological: Positive for weakness  Negative for dizziness, seizures, syncope, facial asymmetry and headaches  Psychiatric/Behavioral: Negative for hallucinations, self-injury and suicidal ideas  All other systems reviewed and are negative  Physical Exam  Physical Exam  Vitals and nursing note reviewed  Constitutional:       General: He is not in acute distress  Appearance: He is well-developed     HENT:      Head: Normocephalic and atraumatic  Right Ear: External ear normal       Left Ear: External ear normal       Mouth/Throat:      Comments: Oropharynx appears yellow in color  Eyes:      General: Scleral icterus present  Right eye: No discharge  Left eye: No discharge  Extraocular Movements: Extraocular movements intact  Conjunctiva/sclera: Conjunctivae normal    Cardiovascular:      Rate and Rhythm: Normal rate and regular rhythm  Heart sounds: Normal heart sounds  No murmur heard  Pulmonary:      Effort: Pulmonary effort is normal       Breath sounds: Normal breath sounds  No wheezing or rales  Abdominal:      General: Bowel sounds are normal       Palpations: Abdomen is soft  Tenderness: There is no abdominal tenderness  There is no guarding or rebound  Comments: Slightly distended but nontender  Musculoskeletal:         General: No deformity  Normal range of motion  Cervical back: Normal range of motion and neck supple  Right lower leg: Edema present  Left lower leg: Edema present  Skin:     General: Skin is warm and dry  Coloration: Skin is jaundiced  Findings: No rash  Neurological:      General: No focal deficit present  Mental Status: He is alert  Cranial Nerves: No cranial nerve deficit  Psychiatric:         Mood and Affect: Mood normal          Behavior: Behavior normal          Thought Content:  Thought content normal          Judgment: Judgment normal          Vital Signs  ED Triage Vitals   Temperature Pulse Respirations Blood Pressure SpO2   10/04/22 1235 10/04/22 1235 10/04/22 1235 10/04/22 1235 10/04/22 1235   (!) 97 1 °F (36 2 °C) 101 22 117/80 98 %      Temp Source Heart Rate Source Patient Position - Orthostatic VS BP Location FiO2 (%)   10/04/22 1235 10/04/22 1245 10/04/22 1235 10/04/22 1235 --   Temporal Monitor Lying Left arm       Pain Score       10/04/22 1235       No Pain           Vitals:    10/04/22 1245 10/04/22 1315 10/04/22 1330 10/04/22 1345   BP: 121/78 118/77 116/65 108/58   Pulse: 102 101 99 97   Patient Position - Orthostatic VS: Lying Lying Lying Lying         Visual Acuity      ED Medications  Medications   piperacillin-tazobactam (ZOSYN) 3 375 g in sodium chloride 0 9 % 100 mL IVPB (has no administration in time range)       Diagnostic Studies  Results Reviewed     Procedure Component Value Units Date/Time    Lactic acid [[id number]]  (Abnormal) Collected: 10/04/22 1241    Lab Status: Final result Specimen: Blood from Arm, Right Updated: 10/04/22 1344     LACTIC ACID 2 9 mmol/L     Narrative:      Result may be elevated if tourniquet was used during collection      Lactic acid 2 Hours [[id number]]     Lab Status: No result Specimen: Blood     Protime-INR [[id number]]  (Abnormal) Collected: 10/04/22 1241    Lab Status: Final result Specimen: Blood from Arm, Right Updated: 10/04/22 1342     Protime 19 4 seconds      INR 1 62    APTT [[id number]]  (Normal) Collected: 10/04/22 1241    Lab Status: Final result Specimen: Blood from Arm, Right Updated: 10/04/22 1342     PTT 35 seconds     Comprehensive metabolic panel [[id number]]  (Abnormal) Collected: 10/04/22 1241    Lab Status: Final result Specimen: Blood from Arm, Right Updated: 10/04/22 1338     Sodium 139 mmol/L      Potassium 4 5 mmol/L      Chloride 106 mmol/L      CO2 25 mmol/L      ANION GAP 8 mmol/L      BUN 37 mg/dL      Creatinine 1 48 mg/dL      Glucose 115 mg/dL      Calcium 8 5 mg/dL      Corrected Calcium 10 4 mg/dL      AST 81 U/L      ALT 54 U/L      Alkaline Phosphatase 107 U/L      Total Protein 7 4 g/dL      Albumin 1 6 g/dL      Total Bilirubin 4 01 mg/dL      eGFR 46 ml/min/1 73sq m     Narrative:      [place] guidelines for Chronic Kidney Disease (CKD):   •  Stage 1 with normal or high GFR (GFR > 90 mL/min/1 73 square meters)  •  Stage 2 Mild CKD (GFR = 60-89 mL/min/1 73 square meters)  •  Stage 3A Moderate CKD (GFR = 45-59 mL/min/1 73 square meters)  •  Stage 3B Moderate CKD (GFR = 30-44 mL/min/1 73 square meters)  •  Stage 4 Severe CKD (GFR = 15-29 mL/min/1 73 square meters)  •  Stage 5 End Stage CKD (GFR <15 mL/min/1 73 square meters)  Note: GFR calculation is accurate only with a steady state creatinine    Lipase [[id number]]  (Normal) Collected: 10/04/22 1241    Lab Status: Final result Specimen: Blood from Arm, Right Updated: 10/04/22 1338     Lipase 352 u/L     FLU/RSV/COVID - if FLU/RSV clinically relevant [[id number]]  (Normal) Collected: 10/04/22 1241    Lab Status: Final result Specimen: Nares from Nasopharyngeal Swab Updated: 10/04/22 1332     SARS-CoV-2 Negative     INFLUENZA A PCR Negative     INFLUENZA B PCR Negative     RSV PCR Negative    Narrative:      FOR PEDIATRIC PATIENTS - copy/paste COVID Guidelines URL to browser: [email or web address]  [employer]    SARS-CoV-2 assay is a Nucleic Acid Amplification assay intended for the  qualitative detection of nucleic acid from SARS-CoV-2 in nasopharyngeal  swabs  Results are for the presumptive identification of SARS-CoV-2 RNA  Positive results are indicative of infection with SARS-CoV-2, the virus  causing COVID-19, but do not rule out bacterial infection or co-infection  with other viruses  Laboratories within the [place] and its  territories are required to report all positive results to the appropriate  public health authorities  Negative results do not preclude SARS-CoV-2  infection and should not be used as the sole basis for treatment or other  patient management decisions  Negative results must be combined with  clinical observations, patient history, and epidemiological information  This test has not been FDA cleared or approved  This test has been authorized by FDA under an Emergency Use Authorization  (EUA)   This test is only authorized for the duration of time the  declaration that circumstances exist justifying the authorization of the  emergency use of an in vitro diagnostic tests for detection of SARS-CoV-2  virus and/or diagnosis of COVID-19 infection under section 564(b)(1) of  the Act, 21 U  [place] C  073FFH-8(J)(6), unless the authorization is terminated  or revoked sooner  The test has been validated but independent review by FDA  and CLIA is pending  Test performed using [employer] Gene[employer]pert: This RT-PCR assay targets N2,  a region unique to SARS-CoV-2  A conserved region in the E-gene was chosen  for pan-Sarbecovirus detection which includes SARS-CoV-2  According to CMS-2020-01-R, this platform meets the definition of high-throughput technology      NT-BNP PRO [[id number]]  (Abnormal) Collected: 10/04/22 1241    Lab Status: Final result Specimen: Blood from Arm, Right Updated: 10/04/22 1330     NT-proBNP 158 pg/mL     HS Troponin 0hr (reflex protocol) [[id number]]  (Normal) Collected: 10/04/22 1241    Lab Status: Final result Specimen: Blood from Arm, Right Updated: 10/04/22 1327     hs TnI 0hr 10 ng/L     HS Troponin I 2hr [[id number]]     Lab Status: No result Specimen: Blood     Ammonia [[id number]]  (Normal) Collected: 10/04/22 1241    Lab Status: Final result Specimen: Blood from Arm, Right Updated: 10/04/22 1316     Ammonia 25 umol/L     CBC and differential [[id number]]  (Abnormal) Collected: 10/04/22 1241    Lab Status: Final result Specimen: Blood from Arm, Right Updated: 10/04/22 1259     WBC 10 38 Thousand/uL      RBC 3 23 Million/uL      Hemoglobin 9 7 g/dL      Hematocrit 31 0 %      MCV 96 fL      MCH 30 0 pg      MCHC 31 3 g/dL      RDW 15 3 %      MPV 12 1 fL      Platelets 59 Thousands/uL      nRBC 0 /100 WBCs      Neutrophils Relative 85 %      Immat GRANS % 0 %      Lymphocytes Relative 7 %      Monocytes Relative 8 %      Eosinophils Relative 0 %      Basophils Relative 0 %      Neutrophils Absolute 8 69 Thousands/µL      Immature Grans Absolute 0 04 Thousand/uL      Lymphocytes Absolute 0 77 Thousands/µL      Monocytes Absolute 0 82 Thousand/µL      Eosinophils Absolute 0 04 Thousand/µL      Basophils Absolute 0 02 Thousands/µL     Blood culture #1 [[id number]] Collected: 10/04/22 1241    Lab Status: In process Specimen: Blood from Arm, Right Updated: 10/04/22 1246    Blood culture #2 [[id number]] Collected: 10/04/22 1241    Lab Status: In process Specimen: Blood from Arm, Left Updated: 10/04/22 1246    UA w Reflex to Microscopic w Reflex to Culture [[id number]]     Lab Status: No result Specimen: Urine                  CT head without contrast   Final Result by Jf Saunders DO (10/04 1323)      No acute intracranial abnormality  Chronic microangiopathic changes  Workstation performed: [id number]         CT chest abdomen pelvis wo contrast   Final Result by Jf Saunders DO (10/04 2124)      1  Significantly limited study without IV or oral contrast       2  No definite acute traumatic injury in the chest, abdomen or pelvis within limitations  If relevant, repeat imaging with IV and oral contrast could be considered  3  Large volume of low-density abdominopelvic free fluid presumably corresponding to increasing ascites in the setting of cirrhosis and splenomegaly  4  Moderate bilateral upper lobe and right middle lobe groundglass opacities, new from previous study  Relative sparing of the lower lobes which would be unusual for Covid 19 pneumonia although this cannot be completely excluded  Other inflammatory or    infectious etiologies also to be considered  5  New small water density bilateral pleural effusions        Workstation performed: [id number]                    Procedures  ECG 12 Lead Documentation Only    Date/Time: 10/4/2022 12:41 PM  Performed by: Roberta Yates MD  Authorized by: Roberta Yates MD     ECG reviewed by me, the ED Provider: yes    Patient location:  ED  Previous ECG:     Previous ECG:  Compared to current    Similarity:  No change  Rate:     ECG rate:  100  Rhythm:     Rhythm: sinus rhythm    Ectopy:     Ectopy: PVCs               ED Course  ED Course as of 10/04/22 1410   Tue Oct 04, 2022   1357 Patient has ascites with bilateral lower extremity edema  Will hold off on the IV fluid bolus per sepsis protocol  SBIRT 20yo+    Flowsheet Row Most Recent Value   SBIRT (25 yo +)    In order to provide better care to our patients, we are screening all of our patients for alcohol and drug use  Would it be okay to ask you these screening questions? Unable to answer at this time Filed at: 10/04/2022 1237                    MDM  Number of Diagnoses or Management Options     Amount and/or Complexity of Data Reviewed  Clinical lab tests: reviewed  Review and summarize past medical records: yes        Disposition  Final diagnoses:   NAVEED (acute kidney injury) ([facility] )   Ascites   Pneumonia of right lung due to infectious organism, unspecified part of lung     Time reflects when diagnosis was documented in both MDM as applicable and the Disposition within this note     Time User Action Codes Description Comment    10/4/2022  1:47 PM Monica Jorgensen Add [N17 9] [facility]     10/4/2022  1:47 PM Juli Jorgensen Viv Add [R18 8] Ascites     10/4/2022  1:58 PM Juli Jorgensen Viv Add [J18 9] Pneumonia of right lung due to infectious organism, unspecified part of lung       ED Disposition     ED Disposition   Admit    Condition   Stable    Date/Time   Tue Oct 4, 2022  2:10 PM    Comment   Case was discussed with Dr Morales Arevalo and the patient's admission status was agreed to be  to the service of Dr Morales Arevalo  Follow-up Information    None         Patient's Medications   Discharge Prescriptions    No medications on file       No discharge procedures on file      PDMP Review       Value Time User    PDMP Reviewed  Yes 6/7/2022  9:16 AM Loretta Todd DO          ED Provider  Electronically Signed by           Freddie Henderson MD  10/04/22 5984

## 2022-10-04 NOTE — PLAN OF CARE
Problem: PAIN - ADULT  Goal: Verbalizes/displays adequate comfort level or baseline comfort level  Description: Interventions:  - Encourage patient to monitor pain and request assistance  - Assess pain using appropriate pain scale  - Administer analgesics based on type and severity of pain and evaluate response  - Implement non-pharmacological measures as appropriate and evaluate response  - Consider cultural and social influences on pain and pain management  - Notify physician/advanced practitioner if interventions unsuccessful or patient reports new pain  Outcome: Progressing     Problem: INFECTION - ADULT  Goal: Absence or prevention of progression during hospitalization  Description: INTERVENTIONS:  - Assess and monitor for signs and symptoms of infection  - Monitor lab/diagnostic results  - Monitor all insertion sites, i e  indwelling lines, tubes, and drains  - Monitor endotracheal if appropriate and nasal secretions for changes in amount and color  - [place] appropriate cooling/warming therapies per order  - Administer medications as ordered  - Instruct and encourage patient and family to use good hand hygiene technique  - Identify and instruct in appropriate isolation precautions for identified infection/condition  Outcome: Progressing  Goal: Absence of fever/infection during neutropenic period  Description: INTERVENTIONS:  - Monitor WBC    Outcome: Progressing     Problem: SAFETY ADULT  Goal: Patient will remain free of falls  Description: INTERVENTIONS:  - Educate patient/family on patient safety including physical limitations  - Instruct patient to call for assistance with activity   - Consult OT/PT to assist with strengthening/mobility   - Keep Call bell within reach  - Keep bed low and locked with side rails adjusted as appropriate  - Keep care items and personal belongings within reach  - Initiate and maintain comfort rounds  - Make Fall Risk Sign visible to staff  - Offer Toileting every 2 Hours, in advance of need  - Initiate/Maintain bed/chair alarm  - Obtain necessary fall risk management equipment:   - Apply yellow socks and bracelet for high fall risk patients  - Consider moving patient to room near nurses station  Outcome: Progressing  Goal: Maintain or return to baseline ADL function  Description: INTERVENTIONS:  -  Assess patient's ability to carry out ADLs; assess patient's baseline for ADL function and identify physical deficits which impact ability to perform ADLs (bathing, care of mouth/teeth, toileting, grooming, dressing, etc )  - Assess/evaluate cause of self-care deficits   - Assess range of motion  - Assess patient's mobility; develop plan if impaired  - Assess patient's need for assistive devices and provide as appropriate  - Encourage maximum independence but intervene and supervise when necessary  - Involve family in performance of ADLs  - Assess for home care needs following discharge   - Consider OT consult to assist with ADL evaluation and planning for discharge  - Provide patient education as appropriate  Outcome: Progressing  Goal: Maintains/Returns to pre admission functional level  Description: INTERVENTIONS:  - Perform BMAT or MOVE assessment daily    - Set and communicate daily mobility goal to care team and patient/family/caregiver  - Collaborate with rehabilitation services on mobility goals if consulted  - Perform Range of Motion 2 times a day  - Reposition patient every 2 hours    - Dangle patient 2 times a day  - Stand patient 2 times a day  - Ambulate patient 2 times a day  - Out of bed to chair 3 times a day   - Out of bed for meals 3 times a day  - Out of bed for toileting  - Record patient progress and toleration of activity level   Outcome: Progressing     Problem: DISCHARGE PLANNING  Goal: Discharge to home or other facility with appropriate resources  Description: INTERVENTIONS:  - Identify barriers to discharge w/patient and caregiver  - Arrange for needed discharge resources and transportation as appropriate  - Identify discharge learning needs (meds, wound care, etc )  - Arrange for interpretive services to assist at discharge as needed  - Refer to Case Management Department for coordinating discharge planning if the patient needs post-hospital services based on physician/advanced practitioner order or complex needs related to functional status, cognitive ability, or social support system  Outcome: Progressing     Problem: Knowledge Deficit  Goal: Patient/family/caregiver demonstrates understanding of disease process, treatment plan, medications, and discharge instructions  Description: Complete learning assessment and assess knowledge base    Interventions:  - Provide teaching at level of understanding  - Provide teaching via preferred learning methods  Outcome: Progressing     Problem: CARDIOVASCULAR - ADULT  Goal: Maintains optimal cardiac output and hemodynamic stability  Description: INTERVENTIONS:  - Monitor I/O, vital signs and rhythm  - Monitor for S/S and trends of decreased cardiac output  - Administer and titrate ordered vasoactive medications to optimize hemodynamic stability  - Assess quality of pulses, skin color and temperature  - Assess for signs of decreased coronary artery perfusion  - Instruct patient to report change in severity of symptoms  Outcome: Progressing  Goal: Absence of cardiac dysrhythmias or at baseline rhythm  Description: INTERVENTIONS:  - Continuous cardiac monitoring, vital signs, obtain 12 lead EKG if ordered  - Administer antiarrhythmic and heart rate control medications as ordered  - Monitor electrolytes and administer replacement therapy as ordered  Outcome: Progressing     Problem: RESPIRATORY - ADULT  Goal: Achieves optimal ventilation and oxygenation  Description: INTERVENTIONS:  - Assess for changes in respiratory status  - Assess for changes in mentation and behavior  - Position to facilitate oxygenation and minimize respiratory effort  - Oxygen administered by appropriate delivery if ordered  - Initiate smoking cessation education as indicated  - Encourage broncho-pulmonary hygiene including cough, deep breathe, Incentive Spirometry  - Assess the need for suctioning and aspirate as needed  - Assess and instruct to report SOB or any respiratory difficulty  - Respiratory Therapy support as indicated  Outcome: Progressing     Problem: GENITOURINARY - ADULT  Goal: Maintains or returns to baseline urinary function  Description: INTERVENTIONS:  - Assess urinary function  - Encourage oral fluids to ensure adequate hydration if ordered  - Administer IV fluids as ordered to ensure adequate hydration  - Administer ordered medications as needed  - Offer frequent toileting  - Follow urinary retention protocol if ordered  Outcome: Progressing     Problem: METABOLIC, FLUID AND ELECTROLYTES - ADULT  Goal: Electrolytes maintained within normal limits  Description: INTERVENTIONS:  - Monitor labs and assess patient for signs and symptoms of electrolyte imbalances  - Administer electrolyte replacement as ordered  - Monitor response to electrolyte replacements, including repeat lab results as appropriate  - Instruct patient on fluid and nutrition as appropriate  Outcome: Progressing  Goal: Fluid balance maintained  Description: INTERVENTIONS:  - Monitor labs   - Monitor I/O and WT  - Instruct patient on fluid and nutrition as appropriate  - Assess for signs & symptoms of volume excess or deficit  Outcome: Progressing     Problem: SKIN/TISSUE INTEGRITY - ADULT  Goal: Skin Integrity remains intact(Skin Breakdown Prevention)  Description: Assess:  -Perform Justin assessment every   -Clean and moisturize skin every   -Inspect skin when repositioning, toileting, and assisting with ADLS  -Assess under medical devices such as  every   -Assess extremities for adequate circulation and sensation     Bed Management:  -Have minimal linens on bed & keep smooth, unwrinkled  -Change linens as needed when moist or perspiring  -Avoid sitting or lying in one position for more than  hours while in bed  -Keep HOB at degrees     Toileting:  -Offer bedside commode  -Assess for incontinence every   -Use incontinent care products after each incontinent episode such as     Activity:  -Mobilize patient  times a day  -Encourage activity and walks on unit  -Encourage or provide ROM exercises   -Turn and reposition patient every  Hours  -Use appropriate equipment to lift or move patient in bed  -Instruct/ Assist with weight shifting every  when out of bed in chair  -Consider limitation of chair time  hour intervals    Skin Care:  -Avoid use of baby powder, tape, friction and shearing, hot water or constrictive clothing  -Relieve pressure over bony prominences using   -Do not massage red bony areas    Next Steps:  -Teach patient strategies to minimize risks such as    -Consider consults to  interdisciplinary teams such as   Outcome: Progressing     Problem: HEMATOLOGIC - ADULT  Goal: Maintains hematologic stability  Description: INTERVENTIONS  - Assess for signs and symptoms of bleeding or hemorrhage  - Monitor labs  - Administer supportive blood products/factors as ordered and appropriate  Outcome: Progressing     Problem: MUSCULOSKELETAL - ADULT  Goal: Maintain or return mobility to safest level of function  Description: INTERVENTIONS:  - Assess patient's ability to carry out ADLs; assess patient's baseline for ADL function and identify physical deficits which impact ability to perform ADLs (bathing, care of mouth/teeth, toileting, grooming, dressing, etc )  - Assess/evaluate cause of self-care deficits   - Assess range of motion  - Assess patient's mobility  - Assess patient's need for assistive devices and provide as appropriate  - Encourage maximum independence but intervene and supervise when necessary  - Involve family in performance of ADLs  - Assess for home care needs following discharge   - Consider OT consult to assist with ADL evaluation and planning for discharge  - Provide patient education as appropriate  Outcome: Progressing     Problem: Nutrition/Hydration-ADULT  Goal: Nutrient/Hydration intake appropriate for improving, restoring or maintaining nutritional needs  Description: Monitor and assess patient's nutrition/hydration status for malnutrition  Collaborate with interdisciplinary team and initiate plan and interventions as ordered  Monitor patient's weight and dietary intake as ordered or per policy  Utilize nutrition screening tool and intervene as necessary  Determine patient's food preferences and provide high-protein, high-caloric foods as appropriate       INTERVENTIONS:  - Monitor oral intake, urinary output, labs, and treatment plans  - Assess nutrition and hydration status and recommend course of action  - Evaluate amount of meals eaten  - Assist patient with eating if necessary   - Allow adequate time for meals  - Recommend/ encourage appropriate diets, oral nutritional supplements, and vitamin/mineral supplements  - Order, calculate, and assess calorie counts as needed  - Recommend, monitor, and adjust tube feedings and TPN/PPN based on assessed needs  - Assess need for intravenous fluids  - Provide specific nutrition/hydration education as appropriate  - Include patient/family/caregiver in decisions related to nutrition  Outcome: Progressing     Problem: MOBILITY - ADULT  Goal: Maintain or return to baseline ADL function  Description: INTERVENTIONS:  -  Assess patient's ability to carry out ADLs; assess patient's baseline for ADL function and identify physical deficits which impact ability to perform ADLs (bathing, care of mouth/teeth, toileting, grooming, dressing, etc )  - Assess/evaluate cause of self-care deficits   - Assess range of motion  - Assess patient's mobility; develop plan if impaired  - Assess patient's need for assistive devices and provide as appropriate  - Encourage maximum independence but intervene and supervise when necessary  - Involve family in performance of ADLs  - Assess for home care needs following discharge   - Consider OT consult to assist with ADL evaluation and planning for discharge  - Provide patient education as appropriate  Outcome: Progressing  Goal: Maintains/Returns to pre admission functional level  Description: INTERVENTIONS:  - Perform BMAT or MOVE assessment daily    - Set and communicate daily mobility goal to care team and patient/family/caregiver  - Collaborate with rehabilitation services on mobility goals if consulted  - Perform Range of Motion 2 times a day  - Reposition patient every 2 hours    - Dangle patient 2 times a day  - Stand patient 2 times a day  - Ambulate patient 2 times a day  - Out of bed to chair 3 times a day   - Out of bed for meals 3 times a day  - Out of bed for toileting  - Record patient progress and toleration of activity level   Outcome: Progressing

## 2022-10-04 NOTE — H&P
[address]  H&P- Taryn Mohr 1950, 67 y o  male MRN: [id number]  Unit/Bed#: -[facility]aren Encounter: [id number]  Primary Care Provider: Milena Dinh MD   Date and time admitted to hospital: 10/4/2022 12:36 PM    * Acute metabolic encephalopathy  Assessment & Plan  Patient had a fall 1 week ago since then he has been having increasing confusion  Patient has been keeping up with his lactulose and fortunately his ammonia levels are normal   CT brain negative for acute bleed or acute pathology  Noted to have white blood cell count cannot rule out SBP at this time  UA also pending at this time  Will get PT OT eval   Fall precautions  Recheck ammonia level tomorrow and continue lactulose    Other ascites  Assessment & Plan  Patient noticed to have abdominal distension  CT abdomen pelvis showed large volume ascites with small bilateral pleural effusions  Consult placed to Critical Care for paracentesis  Will place on low-dose Lasix and albumin for now  Will place on IV cefepime for SBP prophylaxis  Will send fluid studies for analysis for LDH, protein, culture and sensitivity, WBC and cytology    Sepsis [facility])  Assessment & Plan  Patient has sepsis present on admission with tachypnea, tachycardia, lactic acidosis  Unclear source of infection at this time but cannot rule out UTI or SBP  Will give 1 500 cc bolus of saline secondary to underlying ascites and then will follow-up with IV albumin and gentle diuresis  Will place on IV cefepime for now while awaiting culture results    NAVEED (acute kidney injury) ([facility] [address] )  Assessment & Plan  Baseline creatinine is 0 7-0 8  Currently creatinine is elevated to 1 48  Is probably secondary to hepatorenal syndrome due to worsening ascites due to underlying liver cirrhosis  Placed on gentle diuresis along with IV albumin and observe    Chronic anemia  Assessment & Plan  Baseline hemoglobin is around 9-10    Currently hemoglobin is at baseline range with no active bleeding reported with chronic thrombocytopenia noted    Moderate protein-calorie malnutrition (HCC)  Assessment & Plan  Malnutrition Findings:                                 BMI Findings: Body mass index is 31 99 kg/m²  Patient has BMI of 33 79 however has moderate generalized muscle wasting noted secondary to underlying liver cirrhosis  Consult placed to nutritionist    Thrombocytopenia ([facility] [address] )  Assessment & Plan  Platelet count is 59 K which is close to patient's baseline of 55-60k  No active bleeding noted at this time    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 [facility])  Assessment & Plan  Patient has known history of liver cirrhosis secondary to jaffe  Follows outpatient with [facility]  Known history of portal hypertension,hepatic encephalopathy and liver cirrhosis status post banding of varices  Not on any transplant list  Meld score is 21  Continue home medications for now      VTE Prophylaxis: Pharmacologic VTE Prophylaxis contraindicated due to Low hemoglobin  / sequential compression device   Code Status:  DNR/DNI  POLST: There is no POLST form on file for this patient (pre-hospital)  Discussion with family:  Discussed with son    Anticipated Length of Stay:  Patient will be admitted on an Inpatient basis with an anticipated length of stay of  more than 2 midnights  Justification for Hospital Stay:  Acute metabolic encephalopathy    Total Time for Visit, including Counseling / Coordination of Care: 90 minutes  Greater than 50% of this total time spent on direct patient counseling and coordination of care  Chief Complaint:   Confusion    History of Present Illness:    Qi Holm is a 67 y o  male who presents with confusion, generalized weakness  Patient had a fall at home a week ago and since then his belly has been getting bigger and he has been getting more confused  Also complaining of worsening shortness of breath with exertion some jaundice noted    Family felt it might be secondary to high ammonia levels but fortunately they are okay  Denies any fevers chills sick contacts or head trauma  Denies any bleeding at home    Review of Systems:    Review of Systems   Constitutional: Positive for appetite change and fatigue  Negative for chills and fever  HENT: Negative for hearing loss, sore throat and trouble swallowing  Eyes: Negative for photophobia, discharge and visual disturbance  Respiratory: Positive for shortness of breath  Negative for chest tightness  Cardiovascular: Negative for chest pain and palpitations  Gastrointestinal: Positive for abdominal distention and abdominal pain  Negative for blood in stool and vomiting  Endocrine: Negative for polydipsia and polyuria  Genitourinary: Negative for difficulty urinating, dysuria, flank pain and hematuria  Musculoskeletal: Positive for gait problem and myalgias  Negative for back pain  Skin: Negative for rash  Allergic/Immunologic: Negative for environmental allergies and food allergies  Neurological: Positive for weakness  Negative for dizziness, seizures, syncope and headaches  Hematological: Does not bruise/bleed easily  Psychiatric/Behavioral: Positive for behavioral problems and decreased concentration  The patient is nervous/anxious  All other systems reviewed and are negative  Past Medical and Surgical History:     Past Medical History:   Diagnosis Date   • [facility]osis ([facility] )    • Dementia ([facility] )    • Hypertension        Past Surgical History:   Procedure Laterality Date   • REPLACEMENT TOTAL KNEE         Meds/Allergies:    Prior to Admission medications    Medication Sig Start Date End Date Taking?  Authorizing Provider   aspirin (ECOTRIN LOW STRENGTH) 81 mg EC tablet Take 81 mg by mouth daily    Historical Provider, MD   Cholecalciferol 25 MCG (1000 UT) tablet Take 1 tablet by mouth daily 11/8/21   Historical Provider, MD   donepezil (ARICEPT) 10 mg tablet Take 10 mg by mouth 12/9/21   Historical Provider, MD   lactulose 20 g/30 mL Take 45 mL (30 g total) by mouth 4 (four) times a day 3/4/22 4/3/22  Kely Harris MD   lisinopril-hydrochlorothiazide (PRINZIDE,ZESTORETIC) 10-12 5 MG per tablet Take 1 tablet by mouth daily    Historical Provider, MD   memantine [facility]) 10 mg tablet Take 10 mg by mouth 2 (two) times a day 2/18/22   Historical Provider, MD   QUEtiapine (SEROquel) 25 mg tablet Take 0 5 tablets (12 5 mg total) by mouth daily at bedtime 3/4/22   Markos Harris MD   rifaximin [employer]) 550 mg tablet Take 550 mg by mouth every 12 (twelve) hours    Historical Provider, MD   sertraline (ZOLOFT) 50 mg tablet Take 50 mg by mouth daily    Historical Provider, MD     I have reviewed home medications with patient family member  Allergies: No Known Allergies    Social History:     Marital Status:      Social History     Substance and Sexual Activity   Alcohol Use Not Currently    Comment: former social ETOH     Social History     Tobacco Use   Smoking Status Former Smoker   Smokeless Tobacco Never Used     Social History     Substance and Sexual Activity   Drug Use Never       Family History:    Family History   Problem Relation Age of Onset   • Arthritis Mother        Physical Exam:     Vitals:   Blood Pressure: 124/70 (10/04/22 1521)  Pulse: 101 (10/04/22 1521)  Temperature: 98 1 °F (36 7 °C) (10/04/22 1521)  Temp Source: Temporal (10/04/22 1235)  Respirations: 19 (10/04/22 1521)  Height: 6' 2" (188 cm) (10/04/22 1548)  Weight - Scale: 113 kg (249 lb 1 9 oz) (10/04/22 1235)  SpO2: 97 % (10/04/22 1521)    Physical Exam  Vitals and nursing note reviewed  Constitutional:       Appearance: He is ill-appearing and diaphoretic  HENT:      Head: Normocephalic and atraumatic  Right Ear: External ear normal       Left Ear: External ear normal       Nose: Nose normal       Mouth/Throat:      Pharynx: Oropharynx is clear     Eyes: Pupils: Pupils are equal, round, and reactive to light  Cardiovascular:      Rate and Rhythm: Normal rate and regular rhythm  Heart sounds: Normal heart sounds  Pulmonary:      Breath sounds: Rales present  Comments: Increased respiratory rate noted with shallow inspiratory effort noted  Abdominal:      General: Bowel sounds are normal  There is distension  Palpations: Abdomen is soft  There is no mass  Tenderness: There is no abdominal tenderness  There is no guarding  Musculoskeletal:         General: Normal range of motion  Cervical back: Normal range of motion and neck supple  Right lower leg: Edema present  Left lower leg: Edema present  Skin:     General: Skin is warm  Capillary Refill: Capillary refill takes less than 2 seconds  Coloration: Skin is jaundiced  Neurological:      General: No focal deficit present  Mental Status: He is alert  Comments: Oriented to person only   Psychiatric:      Comments: Psychomotor slowing noted           Additional Data:     Lab Results: I have personally reviewed pertinent reports  Results from last 7 days   Lab Units 10/04/22  1241   WBC Thousand/uL 10 38*   HEMOGLOBIN g/dL 9 7*   HEMATOCRIT % 31 0*   PLATELETS Thousands/uL 59*   NEUTROS PCT % 85*   LYMPHS PCT % 7*   MONOS PCT % 8   EOS PCT % 0     Results from last 7 days   Lab Units 10/04/22  1241   SODIUM mmol/L 139   POTASSIUM mmol/L 4 5   CHLORIDE mmol/L 106   CO2 mmol/L 25   BUN mg/dL 37*   CREATININE mg/dL 1 48*   ANION GAP mmol/L 8   CALCIUM mg/dL 8 5   ALBUMIN g/dL 1 6*   TOTAL BILIRUBIN mg/dL 4 01*   ALK PHOS U/L 107   ALT U/L 54   AST U/L 81*   GLUCOSE RANDOM mg/dL 115     Results from last 7 days   Lab Units 10/04/22  1241   INR  1 62*             Results from last 7 days   Lab Units 10/04/22  1241   LACTIC ACID mmol/L 2 9*       Imaging: I have personally reviewed pertinent reports        CT head without contrast   Final Result by Shelly Roberts Pinky Chew DO (10/04 1323)      No acute intracranial abnormality  Chronic microangiopathic changes  Workstation performed: [id number]         CT chest abdomen pelvis wo contrast   Final Result by Azucena Levine DO (10/04 3801)      1  Significantly limited study without IV or oral contrast       2  No definite acute traumatic injury in the chest, abdomen or pelvis within limitations  If relevant, repeat imaging with IV and oral contrast could be considered  3  Large volume of low-density abdominopelvic free fluid presumably corresponding to increasing ascites in the setting of cirrhosis and splenomegaly  4  Moderate bilateral upper lobe and right middle lobe groundglass opacities, new from previous study  Relative sparing of the lower lobes which would be unusual for Covid 19 pneumonia although this cannot be completely excluded  Other inflammatory or    infectious etiologies also to be considered  5  New small water density bilateral pleural effusions  Workstation performed: [id number]             EKG, Pathology, and Other Studies Reviewed on Admission:   · EKG:  Normal sinus rhythm with PVCs    Allscripts / Epic Records Reviewed: Yes     ** Please Note: This note has been constructed using a voice recognition system   **

## 2022-10-04 NOTE — ASSESSMENT & PLAN NOTE
Patient had a fall 1 week ago since then he has been having increasing confusion  Patient has been keeping up with his lactulose and fortunately his ammonia levels are normal   CT brain negative for acute bleed or acute pathology  Noted to have white blood cell count cannot rule out SBP at this time  UA also pending at this time  Will get PT OT eval   Fall precautions    Recheck ammonia level tomorrow and continue lactulose

## 2022-10-05 PROBLEM — E43 SEVERE PROTEIN-CALORIE MALNUTRITION (HCC): Status: ACTIVE | Noted: 2022-10-05

## 2022-10-05 LAB
ALBUMIN SERPL BCP-MCNC: 1.6 G/DL (ref 3.5–5)
ALP SERPL-CCNC: 88 U/L (ref 46–116)
ALT SERPL W P-5'-P-CCNC: 47 U/L (ref 12–78)
AMMONIA PLAS-SCNC: 71 UMOL/L (ref 11–35)
AMORPH URATE CRY URNS QL MICRO: NORMAL /HPF
ANION GAP SERPL CALCULATED.3IONS-SCNC: 8 MMOL/L (ref 4–13)
APPEARANCE FLD: ABNORMAL
AST SERPL W P-5'-P-CCNC: 72 U/L (ref 5–45)
ATRIAL RATE: 99 BPM
BACTERIA UR QL AUTO: NORMAL /HPF
BILIRUB SERPL-MCNC: 3.57 MG/DL (ref 0.2–1)
BILIRUB UR QL STRIP: ABNORMAL
BUN SERPL-MCNC: 36 MG/DL (ref 5–25)
CALCIUM ALBUM COR SERPL-MCNC: 10.3 MG/DL (ref 8.3–10.1)
CALCIUM SERPL-MCNC: 8.4 MG/DL (ref 8.3–10.1)
CHLORIDE SERPL-SCNC: 107 MMOL/L (ref 96–108)
CLARITY UR: CLEAR
CO2 SERPL-SCNC: 23 MMOL/L (ref 21–32)
COLOR FLD: YELLOW
COLOR UR: YELLOW
CREAT SERPL-MCNC: 1.27 MG/DL (ref 0.6–1.3)
ERYTHROCYTE [DISTWIDTH] IN BLOOD BY AUTOMATED COUNT: 15.4 % (ref 11.6–15.1)
FINE GRAN CASTS URNS QL MICRO: NORMAL /LPF
GFR SERPL CREATININE-BSD FRML MDRD: 55 ML/MIN/1.73SQ M
GLUCOSE SERPL-MCNC: 80 MG/DL (ref 65–140)
GLUCOSE UR STRIP-MCNC: NEGATIVE MG/DL
HCT VFR BLD AUTO: 26.3 % (ref 36.5–49.3)
HGB BLD-MCNC: 8.7 G/DL (ref 12–17)
HGB UR QL STRIP.AUTO: ABNORMAL
HISTIOCYTES NFR FLD: 26 %
KETONES UR STRIP-MCNC: NEGATIVE MG/DL
LDH FLD L TO P-CCNC: 47 U/L
LEUKOCYTE ESTERASE UR QL STRIP: NEGATIVE
LYMPHOCYTES NFR BLD AUTO: 31 %
MCH RBC QN AUTO: 30.6 PG (ref 26.8–34.3)
MCHC RBC AUTO-ENTMCNC: 33.1 G/DL (ref 31.4–37.4)
MCV RBC AUTO: 93 FL (ref 82–98)
MONO+MESO NFR FLD MANUAL: 8 %
NEUTS SEG NFR BLD AUTO: 35 %
NITRITE UR QL STRIP: NEGATIVE
NON-SQ EPI CELLS URNS QL MICRO: NORMAL /HPF
P AXIS: 47 DEGREES
PH UR STRIP.AUTO: 6 [PH]
PLATELET # BLD AUTO: 40 THOUSANDS/UL (ref 149–390)
PMV BLD AUTO: 12 FL (ref 8.9–12.7)
POTASSIUM SERPL-SCNC: 4.4 MMOL/L (ref 3.5–5.3)
PR INTERVAL: 178 MS
PROT FLD-MCNC: <2 G/DL
PROT SERPL-MCNC: 6.9 G/DL (ref 6.4–8.4)
PROT UR STRIP-MCNC: ABNORMAL MG/DL
QRS AXIS: 40 DEGREES
QRSD INTERVAL: 68 MS
QT INTERVAL: 332 MS
QTC INTERVAL: 426 MS
RBC # BLD AUTO: 2.84 MILLION/UL (ref 3.88–5.62)
RBC #/AREA URNS AUTO: NORMAL /HPF
SITE: ABNORMAL
SODIUM SERPL-SCNC: 138 MMOL/L (ref 135–147)
SP GR UR STRIP.AUTO: 1.01 (ref 1–1.03)
T WAVE AXIS: 58 DEGREES
TOTAL CELLS COUNTED SPEC: 100
TSH SERPL DL<=0.05 MIU/L-ACNC: 3.7 UIU/ML (ref 0.45–4.5)
UROBILINOGEN UR QL STRIP.AUTO: 4 E.U./DL
VENTRICULAR RATE: 99 BPM
WBC # BLD AUTO: 9.35 THOUSAND/UL (ref 4.31–10.16)
WBC # FLD MANUAL: 72 /UL
WBC #/AREA URNS AUTO: NORMAL /HPF

## 2022-10-05 PROCEDURE — 97167 OT EVAL HIGH COMPLEX 60 MIN: CPT

## 2022-10-05 PROCEDURE — 84157 ASSAY OF PROTEIN OTHER: CPT | Performed by: FAMILY MEDICINE

## 2022-10-05 PROCEDURE — 82140 ASSAY OF AMMONIA: CPT | Performed by: FAMILY MEDICINE

## 2022-10-05 PROCEDURE — 92610 EVALUATE SWALLOWING FUNCTION: CPT

## 2022-10-05 PROCEDURE — 89051 BODY FLUID CELL COUNT: CPT | Performed by: FAMILY MEDICINE

## 2022-10-05 PROCEDURE — 87070 CULTURE OTHR SPECIMN AEROBIC: CPT | Performed by: FAMILY MEDICINE

## 2022-10-05 PROCEDURE — 81001 URINALYSIS AUTO W/SCOPE: CPT | Performed by: EMERGENCY MEDICINE

## 2022-10-05 PROCEDURE — 85027 COMPLETE CBC AUTOMATED: CPT | Performed by: FAMILY MEDICINE

## 2022-10-05 PROCEDURE — 49083 ABD PARACENTESIS W/IMAGING: CPT | Performed by: STUDENT IN AN ORGANIZED HEALTH CARE EDUCATION/TRAINING PROGRAM

## 2022-10-05 PROCEDURE — 83615 LACTATE (LD) (LDH) ENZYME: CPT | Performed by: FAMILY MEDICINE

## 2022-10-05 PROCEDURE — 87205 SMEAR GRAM STAIN: CPT | Performed by: FAMILY MEDICINE

## 2022-10-05 PROCEDURE — NC001 PR NO CHARGE: Performed by: STUDENT IN AN ORGANIZED HEALTH CARE EDUCATION/TRAINING PROGRAM

## 2022-10-05 PROCEDURE — 99233 SBSQ HOSP IP/OBS HIGH 50: CPT | Performed by: FAMILY MEDICINE

## 2022-10-05 PROCEDURE — 80053 COMPREHEN METABOLIC PANEL: CPT | Performed by: FAMILY MEDICINE

## 2022-10-05 PROCEDURE — 84443 ASSAY THYROID STIM HORMONE: CPT | Performed by: FAMILY MEDICINE

## 2022-10-05 PROCEDURE — 0W9G3ZZ DRAINAGE OF PERITONEAL CAVITY, PERCUTANEOUS APPROACH: ICD-10-PCS | Performed by: STUDENT IN AN ORGANIZED HEALTH CARE EDUCATION/TRAINING PROGRAM

## 2022-10-05 RX ORDER — LIDOCAINE HYDROCHLORIDE 10 MG/ML
10 INJECTION, SOLUTION EPIDURAL; INFILTRATION; INTRACAUDAL; PERINEURAL ONCE
Status: COMPLETED | OUTPATIENT
Start: 2022-10-05 | End: 2022-10-05

## 2022-10-05 RX ORDER — LACTULOSE 20 G/30ML
45 SOLUTION ORAL 4 TIMES DAILY
Status: DISCONTINUED | OUTPATIENT
Start: 2022-10-05 | End: 2022-10-08

## 2022-10-05 RX ORDER — NYSTATIN 100000 [USP'U]/G
POWDER TOPICAL 2 TIMES DAILY
Status: DISCONTINUED | OUTPATIENT
Start: 2022-10-05 | End: 2022-10-11 | Stop reason: HOSPADM

## 2022-10-05 RX ADMIN — FUROSEMIDE 20 MG: 10 INJECTION, SOLUTION INTRAMUSCULAR; INTRAVENOUS at 17:38

## 2022-10-05 RX ADMIN — LIDOCAINE HYDROCHLORIDE 10 ML: 10 INJECTION, SOLUTION EPIDURAL; INFILTRATION; INTRACAUDAL; PERINEURAL at 11:45

## 2022-10-05 RX ADMIN — LACTULOSE 30 G: 20 SOLUTION ORAL at 09:11

## 2022-10-05 RX ADMIN — RIFAXIMIN 550 MG: 550 TABLET ORAL at 09:12

## 2022-10-05 RX ADMIN — DONEPEZIL HYDROCHLORIDE 10 MG: 5 TABLET, FILM COATED ORAL at 21:28

## 2022-10-05 RX ADMIN — CEFEPIME HYDROCHLORIDE 1000 MG: 1 INJECTION, SOLUTION INTRAVENOUS at 22:06

## 2022-10-05 RX ADMIN — LACTULOSE 45 G: 20 SOLUTION ORAL at 21:27

## 2022-10-05 RX ADMIN — ALBUMIN (HUMAN) 12.5 G: 0.25 INJECTION, SOLUTION INTRAVENOUS at 21:19

## 2022-10-05 RX ADMIN — Medication 1000 UNITS: at 09:12

## 2022-10-05 RX ADMIN — ASPIRIN 81 MG: 81 TABLET, COATED ORAL at 09:12

## 2022-10-05 RX ADMIN — CEFEPIME HYDROCHLORIDE 1000 MG: 1 INJECTION, SOLUTION INTRAVENOUS at 09:12

## 2022-10-05 RX ADMIN — SERTRALINE HYDROCHLORIDE 50 MG: 50 TABLET ORAL at 09:12

## 2022-10-05 RX ADMIN — LACTULOSE 45 G: 20 SOLUTION ORAL at 17:38

## 2022-10-05 RX ADMIN — LACTULOSE 30 G: 20 SOLUTION ORAL at 14:00

## 2022-10-05 RX ADMIN — NYSTATIN: 100000 POWDER TOPICAL at 09:12

## 2022-10-05 RX ADMIN — ALBUMIN (HUMAN) 12.5 G: 0.25 INJECTION, SOLUTION INTRAVENOUS at 09:12

## 2022-10-05 RX ADMIN — RIFAXIMIN 550 MG: 550 TABLET ORAL at 21:28

## 2022-10-05 RX ADMIN — NYSTATIN: 100000 POWDER TOPICAL at 17:38

## 2022-10-05 NOTE — PLAN OF CARE
Problem: OCCUPATIONAL THERAPY ADULT  Goal: Performs self-care activities at highest level of function for planned discharge setting  See evaluation for individualized goals  Description: Treatment Interventions: ADL retraining, UE strengthening/ROM, Functional transfer training, Endurance training, Patient/family training, Neuromuscular reeducation, Energy conservation, Activityengagement          See flowsheet documentation for full assessment, interventions and recommendations  Note: Limitation: Decreased ADL status, Decreased UE strength, Decreased Safe judgement during ADL, Decreased cognition, Decreased endurance, Decreased self-care trans, Decreased high-level ADLs  Prognosis: Guarded  Assessment: Pt is a 67 y o  male, admitted to [facility] 10/4/2022 d/t experiencing confusion, generalized weakness, falls, SOB with exertion and some jaundice  Dx: acute metabilic encephalopathy  Pt with PMHx impacting their performance during ADL tasks, including: cirrhosis, dementia and HTN  Prior to admission to the hospital Pt was performing ADLs with physical assistance  IADLs with physical assistance  Functional transfers/ambulation with physical assistance  Cognitive status was PTA was impaired  OT order placed to assess Pt's ADLs, cognitive status, and performance during functional tasks in order to maximize safety and independence while making most appropriate d/c recommendations   Pt's clinical presentation is currently unstable/unpredictable given new onset deficits that effect Pt's occupational performance and ability to safely return to PLOF including decrease activity tolerance, decrease standing balance, decrease sitting balance, decrease performance during ADL tasks, decrease cognition, decrease safety awareness , decrease UB MS, increased pain, decrease generalized strength, decrease activity engagement, decrease performance during functional transfers, frequent falls and high fall risk combined with medical complications of hypotension, pain impacting overall mobility status, abnormal renal lab values, change in mental status, abnormal H&H, abnormal CBC, edema/swelling, wounds, decreased skin integrity and incontinence  Personal factors affecting Pt at time of initial evaluation include: advanced age, inability to perform ADLs, inability to ambulate household distances, inability to navigate community distances, limited insight into impairments, decreased initiation and engagement and recent fall(s)/fall history  Pt will benefit from continued skilled OT services to address deficits as defined above and to maximize level independence/participation during ADLs and functional tasks to facilitate return toward PLOF and improved quality of life  From an occupational therapy standpoint, recommendation at time of d/c would be PAR       OT Discharge Recommendation: Post acute rehabilitation services

## 2022-10-05 NOTE — ASSESSMENT & PLAN NOTE
Patient noticed to have abdominal distension  CT abdomen pelvis showed large volume ascites with small bilateral pleural effusions  Patient had paracentesis done for 7 5 L 10/5/22  Will place on low-dose Lasix and albumin for now  Will place on IV cefepime for SBP prophylaxis  Will send fluid studies for analysis for LDH, protein, culture and sensitivity, WBC and cytology and stat Gram stain  Also noted to have bilateral upper lobe and right middle lobe ground-glass opacities which are new which may be secondary to pulmonary edema    Continue diuresis

## 2022-10-05 NOTE — ASSESSMENT & PLAN NOTE
Platelet count is 59 K which is close to patient's baseline of 55-60k    Today platelet count dropped to 40 K  No active bleeding noted at this time

## 2022-10-05 NOTE — ASSESSMENT & PLAN NOTE
Malnutrition Findings:   Adult Malnutrition type: Chronic illness  Adult Degree of Malnutrition: Other severe protein calorie malnutrition  Malnutrition Characteristics: Muscle loss, Fat loss                  360 Statement: Chronic severe malnutrition related to increased nutrient needs and likely inadequate oral intake as evidenced by severe muscle wasting to temporalis, pectoralis, deltoid muscles; severe fat loss to buccal pads and triceps  Treated with: Continue 2gm Na at this time given cirrhosis, consider liberalizing as indicated  Fluid restriction per MD  +Sudhir BID mixed in pudding for wound healing  +ensure compact BID  Assistance with meals as accepted by pt/indicated noting tremors  +lids with cups to prevent spilling on self  Diet ed not appropriate at this time  Recommend daily weights  BMI Findings: Body mass index is 30 48 kg/m²

## 2022-10-05 NOTE — ASSESSMENT & PLAN NOTE
Baseline creatinine is 0 7-0 8  Currently creatinine is elevated to 1 48  Is probably secondary to hepatorenal syndrome due to worsening ascites due to underlying liver cirrhosis  Placed on gentle diuresis along with IV albumin and observe    Creatinine improved to 1 27

## 2022-10-05 NOTE — MALNUTRITION/BMI
This medical record reflects one or more clinical indicators suggestive of malnutrition  Malnutrition Findings:   Adult Malnutrition type: Chronic illness  Adult Degree of Malnutrition: Other severe protein calorie malnutrition  Malnutrition Characteristics: Muscle loss, Fat loss                  360 Statement: Chronic severe malnutrition related to increased nutrient needs and likely inadequate oral intake as evidenced by severe muscle wasting to temporalis, pectoralis, deltoid muscles; severe fat loss to buccal pads and triceps  Treated with: Continue 2gm Na at this time given cirrhosis, consider liberalizing as indicated  Fluid restriction per MD  +Sudhir BID mixed in pudding for wound healing  +ensure compact BID  Assistance with meals as accepted by pt/indicated noting tremors  +lids with cups to prevent spilling on self  Diet ed not appropriate at this time  Recommend daily weights  BMI Findings: Body mass index is 30 48 kg/m²  See Nutrition note dated 10/5/22 for additional details  Completed nutrition assessment is viewable in the nutrition documentation

## 2022-10-05 NOTE — PROGRESS NOTES
[address]  Progress Note - Tunde Lock 1950, 67 y o  male MRN: [id number]  Unit/Bed#: -Jaren Encounter: [id number]  Primary Care Provider: Abdirahman Troncoso MD   Date and time admitted to hospital: 10/4/2022 12:36 PM    * Acute metabolic encephalopathy  Assessment & Plan  Secondary to acute hepatic encephalopathy  Patient had a fall 1 week ago since then he has been having increasing confusion  Patient has been keeping up with his lactulose and ammonia level elevated at 71 today  Continue Xifaxan  CT brain negative for acute bleed or acute pathology  Noted to have white blood cell count cannot rule out SBP at this time  UA negative  Will get PT OT eval   Fall precautions  Recheck ammonia level tomorrow and continue lactulose    Other ascites  Assessment & Plan  Patient noticed to have abdominal distension  CT abdomen pelvis showed large volume ascites with small bilateral pleural effusions  Patient had paracentesis done for 7 5 L 10/5/22  Will place on low-dose Lasix and albumin for now  Will place on IV cefepime for SBP prophylaxis  Will send fluid studies for analysis for LDH, protein, culture and sensitivity, WBC and cytology and stat Gram stain  Also noted to have bilateral upper lobe and right middle lobe ground-glass opacities which are new which may be secondary to pulmonary edema  Continue diuresis    Sepsis ([facility] [address] )  Assessment & Plan  Patient has sepsis present on admission with tachypnea respiratory rate 22, tachycardia with heart rate of 101, lactic acidosis of 2 9  Unclear source of infection at this time but concern for SBP at this time  Will give 1 500 cc bolus of saline secondary to underlying ascites and then will follow-up with IV albumin and gentle diuresis  Will place on IV cefepime for now while awaiting culture results from ascitic fluid    NAVEED (acute kidney injury) ([facility] [address] )  Assessment & Plan  Baseline creatinine is 0 7-0 8    Currently creatinine is elevated to 1 48  Is probably secondary to hepatorenal syndrome due to worsening ascites due to underlying liver cirrhosis  Placed on gentle diuresis along with IV albumin and observe  Creatinine improved to 1 27    Severe protein-calorie malnutrition (HCC)  Assessment & Plan  Malnutrition Findings:   Adult Malnutrition type: Chronic illness  Adult Degree of Malnutrition: Other severe protein calorie malnutrition  Malnutrition Characteristics: Muscle loss, Fat loss                  360 Statement: Chronic severe malnutrition related to increased nutrient needs and likely inadequate oral intake as evidenced by severe muscle wasting to temporalis, pectoralis, deltoid muscles; severe fat loss to buccal pads and triceps  Treated with: Continue 2gm Na at this time given cirrhosis, consider liberalizing as indicated  Fluid restriction per MD  +Sudhir BID mixed in pudding for wound healing  +ensure compact BID  Assistance with meals as accepted by pt/indicated noting tremors  +lids with cups to prevent spilling on self  Diet ed not appropriate at this time  Recommend daily weights  BMI Findings: Body mass index is 30 48 kg/m²  Chronic anemia  Assessment & Plan  Baseline hemoglobin is around 9-10  Currently hemoglobin is at baseline range with no active bleeding reported with chronic thrombocytopenia noted    Thrombocytopenia (HCC)  Assessment & Plan  Platelet count is 59 K which is close to patient's baseline of 55-60k  Today platelet count dropped to 40 K  No active bleeding noted at this time    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 [facility])  Assessment & Plan  Patient has known history of liver cirrhosis secondary to jaffe  Follows outpatient with [facility]  Known history of portal hypertension,hepatic encephalopathy and liver cirrhosis status post banding of varices    Not on any transplant list  Meld score is 21  Continue home medications for now      VTE Pharmacologic Prophylaxis: Pharmacologic: Pharmacologic VTE Prophylaxis contraindicated due to Thrombocytopenia  Mechanical VTE Prophylaxis in Place: Yes    Patient Centered Rounds: I have performed bedside rounds with nursing staff today  Discussions with Specialists or Other Care Team Provider:  Discussed with critical care    Education and Discussions with Family / Patient:  Discussed with patient at bedside will update son    Time Spent for Care: 45 minutes  More than 50% of total time spent on counseling and coordination of care as described above  Current Length of Stay: 1 day(s)    Current Patient Status: Inpatient   Certification Statement: The patient will continue to require additional inpatient hospital stay due to Acute metabolic encephalopathy    Discharge Plan:  Probably require rehab  PT OT consulted    Code Status: Level 3 - DNAR and DNI      Subjective:   Patient is still pleasantly confused  Underwent paracentesis for 7 5 L today  Denies any complaints  Objective:     Vitals:   Temp (24hrs), Av °F (36 7 °C), Min:97 3 °F (36 3 °C), Max:98 4 °F (36 9 °C)    Temp:  [97 3 °F (36 3 °C)-98 4 °F (36 9 °C)] 98 4 °F (36 9 °C)  HR:  [96-99] 99  Resp:  [18-20] 20  BP: (111-125)/(57-68) 111/57  SpO2:  [95 %-98 %] 98 %  Body mass index is 30 48 kg/m²  Input and Output Summary (last 24 hours): Intake/Output Summary (Last 24 hours) at 10/5/2022 1523  Last data filed at 10/5/2022 1350  Gross per 24 hour   Intake 240 ml   Output 350 ml   Net -110 ml       Physical Exam:     Physical Exam  Vitals and nursing note reviewed  Constitutional:       Appearance: He is ill-appearing  HENT:      Head: Normocephalic and atraumatic  Right Ear: External ear normal       Left Ear: External ear normal       Nose: Nose normal       Mouth/Throat:      Pharynx: Oropharynx is clear  Eyes:      Pupils: Pupils are equal, round, and reactive to light  Cardiovascular:      Rate and Rhythm: Normal rate and regular rhythm  Heart sounds: Normal heart sounds  Pulmonary:      Effort: Pulmonary effort is normal       Breath sounds: Normal breath sounds  Abdominal:      General: Bowel sounds are normal  There is distension  Palpations: Abdomen is soft  Tenderness: There is no abdominal tenderness  Musculoskeletal:         General: Normal range of motion  Cervical back: Normal range of motion and neck supple  Skin:     General: Skin is warm and dry  Capillary Refill: Capillary refill takes less than 2 seconds  Coloration: Skin is jaundiced  Neurological:      Mental Status: He is alert  Comments: Oriented to person only  Pleasantly confused at this time  Psychiatric:      Comments: Psychomotor slowing noted           Additional Data:     Labs:    Results from last 7 days   Lab Units 10/05/22  0500 10/04/22  1241   WBC Thousand/uL 9 35 10 38*   HEMOGLOBIN g/dL 8 7* 9 7*   HEMATOCRIT % 26 3* 31 0*   PLATELETS Thousands/uL 40* 59*   NEUTROS PCT %  --  85*   LYMPHS PCT %  --  7*   MONOS PCT %  --  8   EOS PCT %  --  0     Results from last 7 days   Lab Units 10/05/22  0500   SODIUM mmol/L 138   POTASSIUM mmol/L 4 4   CHLORIDE mmol/L 107   CO2 mmol/L 23   BUN mg/dL 36*   CREATININE mg/dL 1 27   ANION GAP mmol/L 8   CALCIUM mg/dL 8 4   ALBUMIN g/dL 1 6*   TOTAL BILIRUBIN mg/dL 3 57*   ALK PHOS U/L 88   ALT U/L 47   AST U/L 72*   GLUCOSE RANDOM mg/dL 80     Results from last 7 days   Lab Units 10/04/22  1241   INR  1 62*             Results from last 7 days   Lab Units 10/04/22  1628 10/04/22  1241   LACTIC ACID mmol/L 2 1* 2 9*           * I Have Reviewed All Lab Data Listed Above  * Additional Pertinent Lab Tests Reviewed:  [facility] Admission Reviewed    Imaging:    Imaging Reports Reviewed Today Include:  CT chest and pelvis  Imaging Personally Reviewed by Myself Includes:  CT chest abdomen pelvis    Recent Cultures (last 7 days):     Results from last 7 days   Lab Units 10/04/22  1241   BLOOD CULTURE  Received in Microbiology Lab  Culture in Progress  Received in Microbiology Lab  Culture in Progress  Last 24 Hours Medication List:   Current Facility-Administered Medications   Medication Dose Route Frequency Provider Last Rate   • acetaminophen  650 mg Oral Q6H PRN Wilfred Kennedy MD     • albumin human  12 5 g Intravenous BID Wilfred Kennedy MD     • aspirin  81 mg Oral Daily Wilfred Kennedy MD     • cefepime  1,000 mg Intravenous Q12H Wilfred Kennedy MD 1,000 mg (10/05/22 0912)   • cholecalciferol  1,000 Units Oral Daily Wilfred Kennedy MD     • donepezil  10 mg Oral HS Wilfred Kennedy MD     • furosemide  20 mg Intravenous BID (diuretic) Wilfred Kennedy MD     • lactulose  45 g Oral 4x Daily Wilfred Kennedy MD     • nystatin   Topical BID DUSTY Pillai     • rifaximin  550 mg Oral Q12H [address] Wilfred Kennedy MD     • sertraline  50 mg Oral Daily Wilfred Kennedy MD          Today, Patient Was Seen By: Wilfred Kennedy    ** Please Note: Dictation voice to text software may have been used in the creation of this document   **

## 2022-10-05 NOTE — WOUND OSTOMY CARE
Consult Note - Wound   Caridad Cea 67 y o  male MRN: [id number]  Unit/Bed#: -Jaren Encounter: [id number]        History and Present Illness:  Presented to ED due to shortness of breath  Pt reportedly with multiple recent falls at home  History of dementia and hospitalized this June after multiple falls at home  PMH:NAVEED  Sepsis  Seen today for initial wound consult  Patient is dependent for position changes and repositioning  Assessment Findings:   1)Bilateral heels intact  2)Bilateral lower extremities hyperpigmented and +1 edema mid tibia to ankle  3)POA presents as an evolving Deep Tissue Injury  Bilateral buttocks are light purple, deep red  Non blanchable  Open area of full thickness skin loss oval shaped may be due to both moisture, pressure friction and sheer  Wound bed not visible, loss tan adhered to wound bed, edges appear slightly rolled  Multiple scattered areas of partial thickness skin loss  No active drainage or induration noted  Pain on palpation  Skin denuded on right lower buttocks  Areas cleansed and Triad paste applied and positioned on side to offload pressure  Skin care plans:  1-Deep Tissue Injury to bilateral buttocks and sacrum  Cleanse sacro-buttocks with soap and water  Apply TRIAD paste to open areas daily and cover with allevyn foam   2-Hydraguard to bilateral heel BID and PRN  3-Elevate heels to offload pressure  4-Ehob cushion when out of bed  5-Turn/repoisiton q2h or when medically stable for pressure re-distribution on skin  Use foam wedges  6-Moisturize skin daily with skin nourishing cream   7-P500 Low Air loss mattress       Wounds:      Wound 10/04/22 Pressure Injury Buttocks Bilateral (Active)   Wound Image   10/05/22 1006   Wound Description Beefy red;Bleeding;Fragile;Edema;Light purple;Non-blanchable erythema;Swelling;Slough 10/05/22 1006   Pressure Injury Stage DTPI 10/05/22 1006   Shari-wound Assessment Denuded; Purple 10/05/22 1006   Wound Length (cm) 14 cm 10/05/22 1006   Wound Width (cm) 8 cm 10/05/22 1006   Wound Depth (cm) 0 2 cm 10/05/22 1006   Wound Surface Area (cm^2) 112 cm^2 10/05/22 1006   Wound Volume (cm^3) 22 4 cm^3 10/05/22 1006   Calculated Wound Volume (cm^3) 22 4 cm^3 10/05/22 1006   Drainage Amount None 10/05/22 1006   Treatments Cleansed;Site care 10/05/22 1006   Dressing Other (Comment) 10/05/22 1006   Dressing Changed New 10/05/22 1006   Dressing Status Remoistened 10/05/22 1006     Call or tigertext with any questions  Wound Care will continue to follow    Stone WYATT RN

## 2022-10-05 NOTE — PLAN OF CARE
Problem: PAIN - ADULT  Goal: Verbalizes/displays adequate comfort level or baseline comfort level  Description: Interventions:  - Encourage patient to monitor pain and request assistance  - Assess pain using appropriate pain scale  - Administer analgesics based on type and severity of pain and evaluate response  - Implement non-pharmacological measures as appropriate and evaluate response  - Consider cultural and social influences on pain and pain management  - Notify physician/advanced practitioner if interventions unsuccessful or patient reports new pain  Outcome: Progressing     Problem: INFECTION - ADULT  Goal: Absence or prevention of progression during hospitalization  Description: INTERVENTIONS:  - Assess and monitor for signs and symptoms of infection  - Monitor lab/diagnostic results  - Monitor all insertion sites, i e  indwelling lines, tubes, and drains  - Monitor endotracheal if appropriate and nasal secretions for changes in amount and color  - Rankin appropriate cooling/warming therapies per order  - Administer medications as ordered  - Instruct and encourage patient and family to use good hand hygiene technique  - Identify and instruct in appropriate isolation precautions for identified infection/condition  Outcome: Progressing  Goal: Absence of fever/infection during neutropenic period  Description: INTERVENTIONS:  - Monitor WBC    Outcome: Progressing     Problem: SAFETY ADULT  Goal: Patient will remain free of falls  Description: INTERVENTIONS:  - Educate patient/family on patient safety including physical limitations  - Instruct patient to call for assistance with activity   - Consult OT/PT to assist with strengthening/mobility   - Keep Call bell within reach  - Keep bed low and locked with side rails adjusted as appropriate  - Keep care items and personal belongings within reach  - Initiate and maintain comfort rounds  - Make Fall Risk Sign visible to staff  - Offer Toileting every 2 Hours, in advance of need  - Initiate/Maintain bed/chair alarm  - Obtain necessary fall risk management equipment:   - Apply yellow socks and bracelet for high fall risk patients  - Consider moving patient to room near nurses station  Outcome: Progressing  Goal: Maintain or return to baseline ADL function  Description: INTERVENTIONS:  -  Assess patient's ability to carry out ADLs; assess patient's baseline for ADL function and identify physical deficits which impact ability to perform ADLs (bathing, care of mouth/teeth, toileting, grooming, dressing, etc )  - Assess/evaluate cause of self-care deficits   - Assess range of motion  - Assess patient's mobility; develop plan if impaired  - Assess patient's need for assistive devices and provide as appropriate  - Encourage maximum independence but intervene and supervise when necessary  - Involve family in performance of ADLs  - Assess for home care needs following discharge   - Consider OT consult to assist with ADL evaluation and planning for discharge  - Provide patient education as appropriate  Outcome: Progressing  Goal: Maintains/Returns to pre admission functional level  Description: INTERVENTIONS:  - Perform BMAT or MOVE assessment daily    - Set and communicate daily mobility goal to care team and patient/family/caregiver  - Collaborate with rehabilitation services on mobility goals if consulted  - Perform Range of Motion 2 times a day  - Reposition patient every 2 hours    - Dangle patient 2 times a day  - Stand patient 2 times a day  - Ambulate patient 2 times a day  - Out of bed to chair 3 times a day   - Out of bed for meals 3 times a day  - Out of bed for toileting  - Record patient progress and toleration of activity level   Outcome: Progressing     Problem: DISCHARGE PLANNING  Goal: Discharge to home or other facility with appropriate resources  Description: INTERVENTIONS:  - Identify barriers to discharge w/patient and caregiver  - Arrange for needed discharge resources and transportation as appropriate  - Identify discharge learning needs (meds, wound care, etc )  - Arrange for interpretive services to assist at discharge as needed  - Refer to Case Management Department for coordinating discharge planning if the patient needs post-hospital services based on physician/advanced practitioner order or complex needs related to functional status, cognitive ability, or social support system  Outcome: Progressing     Problem: Knowledge Deficit  Goal: Patient/family/caregiver demonstrates understanding of disease process, treatment plan, medications, and discharge instructions  Description: Complete learning assessment and assess knowledge base    Interventions:  - Provide teaching at level of understanding  - Provide teaching via preferred learning methods  Outcome: Progressing     Problem: CARDIOVASCULAR - ADULT  Goal: Maintains optimal cardiac output and hemodynamic stability  Description: INTERVENTIONS:  - Monitor I/O, vital signs and rhythm  - Monitor for S/S and trends of decreased cardiac output  - Administer and titrate ordered vasoactive medications to optimize hemodynamic stability  - Assess quality of pulses, skin color and temperature  - Assess for signs of decreased coronary artery perfusion  - Instruct patient to report change in severity of symptoms  Outcome: Progressing  Goal: Absence of cardiac dysrhythmias or at baseline rhythm  Description: INTERVENTIONS:  - Continuous cardiac monitoring, vital signs, obtain 12 lead EKG if ordered  - Administer antiarrhythmic and heart rate control medications as ordered  - Monitor electrolytes and administer replacement therapy as ordered  Outcome: Progressing     Problem: RESPIRATORY - ADULT  Goal: Achieves optimal ventilation and oxygenation  Description: INTERVENTIONS:  - Assess for changes in respiratory status  - Assess for changes in mentation and behavior  - Position to facilitate oxygenation and minimize respiratory effort  - Oxygen administered by appropriate delivery if ordered  - Initiate smoking cessation education as indicated  - Encourage broncho-pulmonary hygiene including cough, deep breathe, Incentive Spirometry  - Assess the need for suctioning and aspirate as needed  - Assess and instruct to report SOB or any respiratory difficulty  - Respiratory Therapy support as indicated  Outcome: Progressing     Problem: GENITOURINARY - ADULT  Goal: Maintains or returns to baseline urinary function  Description: INTERVENTIONS:  - Assess urinary function  - Encourage oral fluids to ensure adequate hydration if ordered  - Administer IV fluids as ordered to ensure adequate hydration  - Administer ordered medications as needed  - Offer frequent toileting  - Follow urinary retention protocol if ordered  Outcome: Progressing     Problem: METABOLIC, FLUID AND ELECTROLYTES - ADULT  Goal: Electrolytes maintained within normal limits  Description: INTERVENTIONS:  - Monitor labs and assess patient for signs and symptoms of electrolyte imbalances  - Administer electrolyte replacement as ordered  - Monitor response to electrolyte replacements, including repeat lab results as appropriate  - Instruct patient on fluid and nutrition as appropriate  Outcome: Progressing  Goal: Fluid balance maintained  Description: INTERVENTIONS:  - Monitor labs   - Monitor I/O and WT  - Instruct patient on fluid and nutrition as appropriate  - Assess for signs & symptoms of volume excess or deficit  Outcome: Progressing     Problem: SKIN/TISSUE INTEGRITY - ADULT  Goal: Skin Integrity remains intact(Skin Breakdown Prevention)  Description: Assess:  -Perform Justin assessment every shift  -Clean and moisturize skin every   -Inspect skin when repositioning, toileting, and assisting with ADLS  -Assess under medical devices such as  every   -Assess extremities for adequate circulation and sensation     Bed Management:  -Have minimal linens on bed & keep smooth, unwrinkled  -Change linens as needed when moist or perspiring  -Avoid sitting or lying in one position for more than  hours while in bed  -Keep HOB at degrees     Toileting:  -Offer bedside commode  -Assess for incontinence every   -Use incontinent care products after each incontinent episode such as     Activity:  -Mobilize patient 3 times a day  -Encourage activity and walks on unit  -Encourage or provide ROM exercises   -Turn and reposition patient every 2 Hours  -Use appropriate equipment to lift or move patient in bed  -Instruct/ Assist with weight shifting every  when out of bed in chair  -Consider limitation of chair time  hour intervals    Skin Care:  -Avoid use of baby powder, tape, friction and shearing, hot water or constrictive clothing  -Relieve pressure over bony prominences using   -Do not massage red bony areas    Next Steps:  -Teach patient strategies to minimize risks such as    -Consider consults to  interdisciplinary teams such as   Outcome: Progressing     Problem: HEMATOLOGIC - ADULT  Goal: Maintains hematologic stability  Description: INTERVENTIONS  - Assess for signs and symptoms of bleeding or hemorrhage  - Monitor labs  - Administer supportive blood products/factors as ordered and appropriate  Outcome: Progressing     Problem: MUSCULOSKELETAL - ADULT  Goal: Maintain or return mobility to safest level of function  Description: INTERVENTIONS:  - Assess patient's ability to carry out ADLs; assess patient's baseline for ADL function and identify physical deficits which impact ability to perform ADLs (bathing, care of mouth/teeth, toileting, grooming, dressing, etc )  - Assess/evaluate cause of self-care deficits   - Assess range of motion  - Assess patient's mobility  - Assess patient's need for assistive devices and provide as appropriate  - Encourage maximum independence but intervene and supervise when necessary  - Involve family in performance of ADLs  - Assess for home care needs following discharge   - Consider OT consult to assist with ADL evaluation and planning for discharge  - Provide patient education as appropriate  Outcome: Progressing     Problem: Nutrition/Hydration-ADULT  Goal: Nutrient/Hydration intake appropriate for improving, restoring or maintaining nutritional needs  Description: Monitor and assess patient's nutrition/hydration status for malnutrition  Collaborate with interdisciplinary team and initiate plan and interventions as ordered  Monitor patient's weight and dietary intake as ordered or per policy  Utilize nutrition screening tool and intervene as necessary  Determine patient's food preferences and provide high-protein, high-caloric foods as appropriate       INTERVENTIONS:  - Monitor oral intake, urinary output, labs, and treatment plans  - Assess nutrition and hydration status and recommend course of action  - Evaluate amount of meals eaten  - Assist patient with eating if necessary   - Allow adequate time for meals  - Recommend/ encourage appropriate diets, oral nutritional supplements, and vitamin/mineral supplements  - Order, calculate, and assess calorie counts as needed  - Recommend, monitor, and adjust tube feedings and TPN/PPN based on assessed needs  - Assess need for intravenous fluids  - Provide specific nutrition/hydration education as appropriate  - Include patient/family/caregiver in decisions related to nutrition  Outcome: Progressing     Problem: MOBILITY - ADULT  Goal: Maintain or return to baseline ADL function  Description: INTERVENTIONS:  -  Assess patient's ability to carry out ADLs; assess patient's baseline for ADL function and identify physical deficits which impact ability to perform ADLs (bathing, care of mouth/teeth, toileting, grooming, dressing, etc )  - Assess/evaluate cause of self-care deficits   - Assess range of motion  - Assess patient's mobility; develop plan if impaired  - Assess patient's need for assistive devices and provide as appropriate  - Encourage maximum independence but intervene and supervise when necessary  - Involve family in performance of ADLs  - Assess for home care needs following discharge   - Consider OT consult to assist with ADL evaluation and planning for discharge  - Provide patient education as appropriate  Outcome: Progressing  Goal: Maintains/Returns to pre admission functional level  Description: INTERVENTIONS:  - Perform BMAT or MOVE assessment daily    - Set and communicate daily mobility goal to care team and patient/family/caregiver  - Collaborate with rehabilitation services on mobility goals if consulted  - Perform Range of Motion 2 times a day  - Reposition patient every 2 hours    - Dangle patient 2 times a day  - Stand patient 2 times a day  - Ambulate patient 2 times a day  - Out of bed to chair 3 times a day   - Out of bed for meals 3 times a day  - Out of bed for toileting  - Record patient progress and toleration of activity level   Outcome: Progressing     Problem: Prexisting or High Potential for Compromised Skin Integrity  Goal: Skin integrity is maintained or improved  Description: INTERVENTIONS:  - Identify patients at risk for skin breakdown  - Assess and monitor skin integrity  - Assess and monitor nutrition and hydration status  - Monitor labs   - Assess for incontinence   - Turn and reposition patient  - Assist with mobility/ambulation  - Relieve pressure over bony prominences  - Avoid friction and shearing  - Provide appropriate hygiene as needed including keeping skin clean and dry  - Evaluate need for skin moisturizer/barrier cream  - Collaborate with interdisciplinary team   - Patient/family teaching  - Consider wound care consult   Outcome: Progressing

## 2022-10-05 NOTE — ASSESSMENT & PLAN NOTE
- Follow up annually Patient has sepsis present on admission with tachypnea respiratory rate 22, tachycardia with heart rate of 101, lactic acidosis of 2 9  Unclear source of infection at this time but concern for SBP at this time  Will give 1 500 cc bolus of saline secondary to underlying ascites and then will follow-up with IV albumin and gentle diuresis    Will place on IV cefepime for now while awaiting culture results from ascitic fluid

## 2022-10-05 NOTE — DISCHARGE INSTR - OTHER ORDERS
Skin care plans:  1-Deep Tissue Injury to bilateral buttocks and sacrum  Cleanse sacro-buttocks with soap and water  Apply TRIAD paste to open areas daily and cover with allevyn foam   2-Hydraguard to bilateral heel BID and PRN  3-Elevate heels to offload pressure  4-Ehob cushion when out of bed  5-Turn/repoisiton q2h or when medically stable for pressure re-distribution on skin  Use foam wedges  6-Moisturize skin daily with skin nourishing cream   7-P500 Low Air loss mattress

## 2022-10-05 NOTE — ASSESSMENT & PLAN NOTE
Secondary to acute hepatic encephalopathy  Patient had a fall 1 week ago since then he has been having increasing confusion  Patient has been keeping up with his lactulose and ammonia level elevated at 71 today  Continue Xifaxan  CT brain negative for acute bleed or acute pathology  Noted to have white blood cell count cannot rule out SBP at this time  UA negative  Will get PT OT eval   Fall precautions    Recheck ammonia level tomorrow and continue lactulose

## 2022-10-05 NOTE — OCCUPATIONAL THERAPY NOTE
Occupational Therapy Evaluation     Patient Name: Tanya Celeste  [id number] Date: 10/5/2022  Problem List  Principal Problem:    Acute metabolic encephalopathy  Active Problems:    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 (HCC)    Thrombocytopenia (HCC)    Moderate protein-calorie malnutrition (HCC)    Chronic anemia    Other ascites    NAVEED (acute kidney injury) ([facility] )    Sepsis ([facility] )    Past Medical History  Past Medical History:   Diagnosis Date    Cirrhosis ([facility] )     Dementia ([facility] )     Hypertension      Past Surgical History  Past Surgical History:   Procedure Laterality Date    REPLACEMENT TOTAL KNEE          10/05/22 0912   Note Type   Note type Evaluation   Restrictions/Precautions   Other Precautions Chair Alarm; Bed Alarm; Fall Risk;Pain;Cognitive   Pain Assessment   Pain Assessment Tool FLACC   Pain Location/Orientation Location: Back   Pain Rating: FLACC (Rest) - Face 0   Pain Rating: FLACC (Rest) - Legs 0   Pain Rating: FLACC (Rest) - Activity 0   Pain Rating: FLACC (Rest) - Cry 0   Pain Rating: FLACC (Rest) - Consolability 0   Score: FLACC (Rest) 0   Pain Rating: FLACC (Activity) - Face 2   Pain Rating: FLACC (Activity) - Legs 1   Pain Rating: FLACC (Activity) - Activity 2   Pain Rating: FLACC (Activity) - Cry 2   Pain Rating: FLACC (Activity) - Consolability 1   Score: FLACC (Activity) 8   Home Living   Type of Home House  (0STE)   Home Layout One level;Performs ADLs on one level; Able to live on main level with bedroom/bathroom   [address] [facility]; Wheelchair-manual  (Pt uses RW and wc in home per previous doc )   Additional Comments Pt is poor historian  Pt reporting living in 1 story home with 0STE  Unclear bathroom setup or DME  Prior Function   Level of [place] Needs assistance with IADLs; Needs assistance with ADLs and functional mobility   ADL Assistance Needs assistance   IADLs Needs assistance   Falls in the last 6 months 1 to 4  (At least 2 noted in [facility] doc)   Comments Pt poor historian   Pt reporting living alone but is unsure of PLOF  Per [facility] doc, pt has caregiver who assists with ADLs and IADLs  Caregiver assists with transfers and mobility in house  Caregiver assisting with ROM and walking exercises in home  Pt using RW and wc  Bed alarm to prevent falls  ADL   LB Bathing Assistance 1  Total Assistance   LB Bathing Deficit Setup;Verbal cueing;Supervision/safety; Increased time to complete;Right upper leg;Buttocks; Perineal area; Left upper leg   LB Dressing Assistance 1  Total Assistance   LB Dressing Deficit Setup;Verbal cueing;Supervision/safety; Increased time to complete; Don/doff R sock; Don/doff L sock   Toileting Assistance  1  Total Assistance   Toileting Deficit Setup;Verbal cueing;Supervison/safety; Increased time to complete;Perineal hygiene   Additional Comments Pt's current level of cognition affecting abiltity to complete ADLs  Pt DEP to don socks  Pt incontinent of bowel and bladder  Pt DEP for hygiene while in bed following incontinence  Bed Mobility   Rolling R 3  Moderate assistance   Additional items Assist x 1; Increased time required;Verbal cues   Rolling L 3  Moderate assistance   Additional items Assist x 1; Increased time required;Verbal cues   Supine to Sit 2  Maximal assistance   Additional items Assist x 1; Increased time required;Verbal cues   Sit to Supine 2  Maximal assistance   Additional items Assist x 1; Increased time required;Verbal cues   Additional Comments Pt completing supine to sit with HOB elevated and use of bed rails  Pt requiring maxA and max cuing for completion of supine to sit and sit to supine  Pt requiring steadying assist-Angely to maintain upright sitting posture EOB  Pt completing rolling with modA and use of bed rails during pericare     Transfers   Sit to Stand Unable to assess   Stand to Sit Unable to assess   Stand pivot Unable to assess   Additional Comments Transfers unable to be assessed this date due to safety concerns secondary to decreased sitting balance and ability to follow 1 step directions  Balance   Static Sitting Poor +  (to fair -)   Dynamic Sitting Poor   Static Standing   (Not assessed)   Dynamic Standing   (Not assessed)   Activity Tolerance   Activity Tolerance Patient limited by fatigue;Patient limited by pain   Medical Staff Made Aware RN   RUE Assessment   RUE Assessment WFL   LUE Assessment   LUE Assessment WFL   Hand Function   Gross Motor Coordination Functional   Fine Motor Coordination Functional   Sensation   Light Touch No apparent deficits   Cognition   Overall Cognitive Status Impaired   Arousal/Participation Alert;Persistent stimuli required   Attention Difficulty attending to directions   Orientation Level Oriented to person;Disoriented to place; Disoriented to time;Disoriented to situation   Memory Decreased long term memory;Decreased recall of biographical information;Decreased short term memory;Decreased recall of recent events   Following Commands Follows one step commands inconsistently   Comments Pt with difficulty following one step directions  Pt oriented only to self  Assessment   Limitation Decreased ADL status; Decreased UE strength;Decreased Safe judgement during ADL;Decreased cognition;Decreased endurance;Decreased self-care trans;Decreased high-level ADLs   Prognosis Guarded   Assessment Pt is a 67 y o  male, admitted to [address] [place] [address] 10/4/2022 d/t experiencing confusion, generalized weakness, falls, SOB with exertion and some jaundice  Dx: acute metabilic encephalopathy  Pt with PMHx impacting their performance during ADL tasks, including: cirrhosis, dementia and HTN  Prior to admission to the hospital Pt was performing ADLs with physical assistance  IADLs with physical assistance  Functional transfers/ambulation with physical assistance  Cognitive status was PTA was impaired   OT order placed to assess Pt's ADLs, cognitive status, and performance during functional tasks in order to maximize safety and independence while making most appropriate d/c recommendations  Pt's clinical presentation is currently unstable/unpredictable given new onset deficits that effect Pt's occupational performance and ability to safely return to PLOF including decrease activity tolerance, decrease standing balance, decrease sitting balance, decrease performance during ADL tasks, decrease cognition, decrease safety awareness , decrease UB MS, increased pain, decrease generalized strength, decrease activity engagement, decrease performance during functional transfers, frequent falls and high fall risk combined with medical complications of hypotension, pain impacting overall mobility status, abnormal renal lab values, change in mental status, abnormal H&H, abnormal CBC, edema/swelling, wounds, decreased skin integrity and incontinence  Personal factors affecting Pt at time of initial evaluation include: advanced age, inability to perform ADLs, inability to ambulate household distances, inability to navigate community distances, limited insight into impairments, decreased initiation and engagement and recent fall(s)/fall history  Pt will benefit from continued skilled OT services to address deficits as defined above and to maximize level independence/participation during ADLs and functional tasks to facilitate return toward PLOF and improved quality of life  From an occupational therapy standpoint, recommendation at time of d/c would be PAR  Plan   Treatment Interventions ADL retraining;UE strengthening/ROM; Functional transfer training; Endurance training;Patient/family training;Neuromuscular reeducation; Energy conservation; Activityengagement   Goal Expiration Date 10/19/22   OT Frequency 3-5x/wk   Recommendation   OT Discharge Recommendation Post acute rehabilitation services   AM-PAC Daily Activity Inpatient   Lower Body Dressing 1   Bathing 1   Toileting 1   Upper Body Dressing 2   Grooming 2   Eating 3   Daily Activity Raw Score 10   Turning Head Towards Sound 4   Follow Simple Instructions 2   Low Function Daily Activity Raw Score 16   Low Function Daily Activity Standardized Score 27 65   AM-PAC Applied Cognition Inpatient   Following a Speech/Presentation 1   Understanding Ordinary Conversation 2   Taking Medications 1   Remembering Where Things Are Placed or Put Away 1   Remembering List of 4-5 Errands 1   Taking Care of Complicated Tasks 1   Applied Cognition Raw Score 7   Applied Cognition Standardized Score 15 17      The patient's raw score on the AM-PAC Daily Activity inpatient short form is 10, standardized score is 27 65 , less than 39 4  Patients at this level are likely to benefit from DC to post-acute rehabilitation services  Please refer to the recommendation of the Occupational Therapist for safe DC planning  Pt goals to be met by 10/19/2022  Pt will demonstrate ability to complete grooming/hygiene tasks @ [facility] after set-up  Pt will demonstrate ability to complete supine<>sit @ [facility] in order to increase safety and independence during ADL tasks  Pt will demonstrate ability to complete UB ADLs including washing/dressing @ [facility] in order to increase performance and participation during meaningful tasks  Pt will demonstrate ability to complete LB dressing @ [facility] in order to increase safety and independence during meaningful tasks  Pt will demonstrate ability to celestine/doff socks/shoes while sitting EOB @ [facility] in order to increase safety and independence during meaningful tasks  Pt will demonstrate ability to complete toileting tasks including CM and pericare @ [facility] in order to increase safety and independence during meaningful tasks  Pt will demonstrate ability to complete EOB, chair, toilet/commode transfers @ [facility]ik in order to increase performance and participation during functional tasks  Pt will demonstrate ability to stand for 5 minutes while maintaining F balance with use of RW for UB support PRN    Pt will demonstrate ability to tolerate 30-35 minute OT session with no vc'ing for deep breathing or use of energy conservation techniques in order to increase activity tolerance during functional tasks  Pt will demonstrate Good carryover of use of energy conservation/compensatory strategies during ADLs and functional tasks in order to increase safety and reduce risk for falls  Pt will demonstrate Good attention and participation in continued evaluation of functional ambulation house hold distances in order to assist with safe d/c planning  Pt will attend to continued cognitive assessments 100% of the time in order to provide most appropriate d/c recommendations  Pt will follow 100% simple 2-step commands and be A&O x4 consistently with environmental cues to increase participation in functional activities  Pt will demonstrate 100% carryover of BUE HEP in order to increase BUE MS and increase performance during functional tasks upon d/c home      Obinna Valenzuela OTR/L

## 2022-10-05 NOTE — PROGRESS NOTES
Pt noted to have acute metabolic encephalopathy, hx of dementia  Reports eating well at home, providing limited information at this time  Chart review of weight hx: 8/16/21 230lb, 3/29/22 213lb, 8/9/22 214lb, 10/5/22 237lb  Does have hx of cirrhosis, suspect fluid related weight changes  Pt has severe muscle/fat wasting  Pt meets criteria for chronic severe malnutrition  Continue 2gm Na at this time given cirrhosis, consider liberalizing as indicated  Fluid restriction per MD    +Sudhir BID mixed in pudding for wound healing  +ensure compact BID  Assistance with meals as accepted by pt/indicated noting tremors  +lids with cups to prevent spilling on self  Diet ed not appropriate at this time  Recommend daily weights

## 2022-10-05 NOTE — PLAN OF CARE
Problem: SLP ADULT - SWALLOWING, IMPAIRED  Goal: Initial SLP swallow eval performed  Outcome: Completed  Note: Bedside swallow eval completed  Recommend to continue regular texture diet w/ thin liquids  Meds crushed in puree  Set up assistance for meals and intermittent supervision d/t confusion and difficulty self feeding  SLP will continue to follow for diet tolerance     Problem: SLP ADULT - SWALLOWING, IMPAIRED  Goal: Advance to least restrictive diet without signs or symptoms of aspiration for planned discharge setting  See evaluation for individualized goals  Description: Patient will:      Dysphagia LTG  -Patient will demonstrate safe and effective oral intake (without overt s/s significant oral/pharyngeal dysphagia including s/s penetration or aspiration) for the highest appropriate diet level  Short Term Goals:  -Pt will tolerate regular and  thin liquid with no significant s/s oral or pharyngeal dysphagia across 1-3 diagnostic session/s  Re: Mastication  -Patient will demonstrate adequate mastication to safely consume the  least restrictive diet with no verbal, visual or tactile cues needed  Re: Compensatory Strategies  -  -Patient will demonstrate independent use of recommended safe swallowing strategies during a clinician assessed meal across 1-3 diagnostic sessions      - Patient’s caregiver will demonstrate adherence to recommended diet, as well as application of aspiration precautions and compensatory strategies    Outcome: Progressing

## 2022-10-05 NOTE — PROCEDURES
Paracentesis    Date/Time: 10/5/2022 11:45 AM  Performed by: Domenico Kaiser PA-C  Authorized by: Domenico Kaiser PA-C     Patient location:  Bedside  Consent:     Consent obtained:  Verbal    Consent given by: Dr Rosi Moran obtained from patient son via phone     Risks discussed:  Bleeding, bowel perforation, infection and pain  Universal protocol:     Immediately prior to procedure a time out was called: yes      Patient identity confirmed:  Arm band, provided demographic data and hospital-assigned identification number  Pre-procedure details:     Initial or Subsequent Exam:  Initial    Procedure purpose:  Diagnostic    Indications: abdominal discomfort secondary to ascites, new onet ascites and secondary bacterial peritonitis    Anesthesia (see MAR for exact dosages): Anesthesia method:  Local infiltration    Local anesthetic:  Lidocaine 1% w/o epi  Procedure details:     Needle gauge:  18    Equipment: Paracentesis kit used      Ultrasound guidance: yes      Sterile ultrasound techniques: Sterile gel and sterile probe covers were used      Approach:  Percutaneous    Puncture site:  L lower quadrant    Fluid removed amount:  7 5L    Fluid appearance:  Cloudy    Dressing:  Adhesive bandage  Post-procedure details:     Patient tolerance of procedure:   Tolerated well, no immediate complications      Domenico Kaiser PA-C

## 2022-10-05 NOTE — ASSESSMENT & PLAN NOTE
Patient has known history of liver cirrhosis secondary to varela  [facility]  Known history of portal hypertension,hepatic encephalopathy and liver cirrhosis status post banding of varices    Not on any transplant list  Meld score is 21  Continue home medications for now

## 2022-10-06 LAB
ALBUMIN SERPL BCP-MCNC: 1.8 G/DL (ref 3.5–5)
ALP SERPL-CCNC: 90 U/L (ref 46–116)
ALT SERPL W P-5'-P-CCNC: 47 U/L (ref 12–78)
AMMONIA PLAS-SCNC: <10 UMOL/L (ref 11–35)
ANION GAP SERPL CALCULATED.3IONS-SCNC: 9 MMOL/L (ref 4–13)
AST SERPL W P-5'-P-CCNC: 65 U/L (ref 5–45)
BILIRUB SERPL-MCNC: 3.88 MG/DL (ref 0.2–1)
BUN SERPL-MCNC: 29 MG/DL (ref 5–25)
CALCIUM ALBUM COR SERPL-MCNC: 9.9 MG/DL (ref 8.3–10.1)
CALCIUM SERPL-MCNC: 8.1 MG/DL (ref 8.3–10.1)
CHLORIDE SERPL-SCNC: 107 MMOL/L (ref 96–108)
CO2 SERPL-SCNC: 24 MMOL/L (ref 21–32)
CREAT SERPL-MCNC: 1.38 MG/DL (ref 0.6–1.3)
ERYTHROCYTE [DISTWIDTH] IN BLOOD BY AUTOMATED COUNT: 15.3 % (ref 11.6–15.1)
GFR SERPL CREATININE-BSD FRML MDRD: 50 ML/MIN/1.73SQ M
GLUCOSE SERPL-MCNC: 91 MG/DL (ref 65–140)
HCT VFR BLD AUTO: 27.6 % (ref 36.5–49.3)
HGB BLD-MCNC: 8.9 G/DL (ref 12–17)
MCH RBC QN AUTO: 30.2 PG (ref 26.8–34.3)
MCHC RBC AUTO-ENTMCNC: 32.2 G/DL (ref 31.4–37.4)
MCV RBC AUTO: 94 FL (ref 82–98)
PLATELET # BLD AUTO: 39 THOUSANDS/UL (ref 149–390)
PMV BLD AUTO: 12.2 FL (ref 8.9–12.7)
POTASSIUM SERPL-SCNC: 3.7 MMOL/L (ref 3.5–5.3)
PROT SERPL-MCNC: 6.9 G/DL (ref 6.4–8.4)
RBC # BLD AUTO: 2.95 MILLION/UL (ref 3.88–5.62)
SODIUM SERPL-SCNC: 140 MMOL/L (ref 135–147)
WBC # BLD AUTO: 8.66 THOUSAND/UL (ref 4.31–10.16)

## 2022-10-06 PROCEDURE — 99232 SBSQ HOSP IP/OBS MODERATE 35: CPT | Performed by: FAMILY MEDICINE

## 2022-10-06 PROCEDURE — 82140 ASSAY OF AMMONIA: CPT | Performed by: FAMILY MEDICINE

## 2022-10-06 PROCEDURE — 85027 COMPLETE CBC AUTOMATED: CPT | Performed by: FAMILY MEDICINE

## 2022-10-06 PROCEDURE — 97163 PT EVAL HIGH COMPLEX 45 MIN: CPT

## 2022-10-06 PROCEDURE — 80053 COMPREHEN METABOLIC PANEL: CPT | Performed by: FAMILY MEDICINE

## 2022-10-06 PROCEDURE — 92526 ORAL FUNCTION THERAPY: CPT

## 2022-10-06 PROCEDURE — G0427 INPT/ED TELECONSULT70: HCPCS | Performed by: INTERNAL MEDICINE

## 2022-10-06 PROCEDURE — NC001 PR NO CHARGE: Performed by: INTERNAL MEDICINE

## 2022-10-06 RX ORDER — FUROSEMIDE 20 MG/1
20 TABLET ORAL DAILY
Status: DISCONTINUED | OUTPATIENT
Start: 2022-10-06 | End: 2022-10-07

## 2022-10-06 RX ORDER — CEFTRIAXONE 2 G/50ML
2000 INJECTION, SOLUTION INTRAVENOUS EVERY 24 HOURS
Status: DISCONTINUED | OUTPATIENT
Start: 2022-10-06 | End: 2022-10-08

## 2022-10-06 RX ORDER — SPIRONOLACTONE 25 MG/1
25 TABLET ORAL DAILY
Status: DISCONTINUED | OUTPATIENT
Start: 2022-10-06 | End: 2022-10-09

## 2022-10-06 RX ADMIN — ASPIRIN 81 MG: 81 TABLET, COATED ORAL at 08:31

## 2022-10-06 RX ADMIN — LACTULOSE 45 G: 20 SOLUTION ORAL at 12:21

## 2022-10-06 RX ADMIN — FUROSEMIDE 20 MG: 20 TABLET ORAL at 14:05

## 2022-10-06 RX ADMIN — LACTULOSE 45 G: 20 SOLUTION ORAL at 17:29

## 2022-10-06 RX ADMIN — LACTULOSE 45 G: 20 SOLUTION ORAL at 21:54

## 2022-10-06 RX ADMIN — NYSTATIN: 100000 POWDER TOPICAL at 08:34

## 2022-10-06 RX ADMIN — NYSTATIN: 100000 POWDER TOPICAL at 17:29

## 2022-10-06 RX ADMIN — LACTULOSE 45 G: 20 SOLUTION ORAL at 08:32

## 2022-10-06 RX ADMIN — RIFAXIMIN 550 MG: 550 TABLET ORAL at 21:54

## 2022-10-06 RX ADMIN — ALBUMIN (HUMAN) 12.5 G: 0.25 INJECTION, SOLUTION INTRAVENOUS at 08:43

## 2022-10-06 RX ADMIN — Medication 1000 UNITS: at 08:32

## 2022-10-06 RX ADMIN — SERTRALINE HYDROCHLORIDE 50 MG: 50 TABLET ORAL at 08:32

## 2022-10-06 RX ADMIN — CEFTRIAXONE 2000 MG: 2 INJECTION, SOLUTION INTRAVENOUS at 21:54

## 2022-10-06 RX ADMIN — DONEPEZIL HYDROCHLORIDE 10 MG: 5 TABLET, FILM COATED ORAL at 21:54

## 2022-10-06 RX ADMIN — SPIRONOLACTONE 25 MG: 25 TABLET ORAL at 14:05

## 2022-10-06 RX ADMIN — CEFEPIME HYDROCHLORIDE 1000 MG: 1 INJECTION, SOLUTION INTRAVENOUS at 10:14

## 2022-10-06 RX ADMIN — FUROSEMIDE 20 MG: 10 INJECTION, SOLUTION INTRAMUSCULAR; INTRAVENOUS at 08:32

## 2022-10-06 RX ADMIN — VANCOMYCIN HYDROCHLORIDE 1250 MG: 5 INJECTION, POWDER, LYOPHILIZED, FOR SOLUTION INTRAVENOUS at 15:08

## 2022-10-06 RX ADMIN — RIFAXIMIN 550 MG: 550 TABLET ORAL at 08:32

## 2022-10-06 NOTE — ASSESSMENT & PLAN NOTE
Patient has sepsis present on admission with tachypnea respiratory rate 22, tachycardia with heart rate of 101, lactic acidosis of 2 9  Unclear source of infection at this time but concern for SBP at this time  Will give 1 500 cc bolus of saline secondary to underlying ascites and then will follow-up with IV albumin and gentle diuresis    Will place on IV cefepime for now while awaiting culture results from ascitic fluid

## 2022-10-06 NOTE — ASSESSMENT & PLAN NOTE
Patient noticed to have abdominal distension  CT abdomen pelvis showed large volume ascites with small bilateral pleural effusions  Patient had paracentesis done for 7 5 L 10/5/22  Will place on low-dose Lasix and aldactone for now  Will place on IV cefepime for SBP prophylaxis/treatment  Will send fluid studies for analysis for LDH, protein, culture and sensitivity, WBC and cytology and stat Gram stain  Prelim cultures on ascitic fluid are growing Gram-positive cocci  Consult placed to Infectious Disease  Also noted to have bilateral upper lobe and right middle lobe ground-glass opacities which are new which may be secondary to pulmonary edema    Continue diuresis

## 2022-10-06 NOTE — SPEECH THERAPY NOTE
Speech Language/Pathology    Speech/Language Pathology Progress Note    Patient Name: Ron Matos  Today's Date: 10/6/2022      Subjective:  Pt reported he didn't like the beef    Objective: To assess diet tolerance    Assessment:  Pt seen upright in recliner chair, PCA initially assisting w/ self feeding  SLP took over near the end of meal  Meatloaf, peaches, mashed potatoes, and thin liquids via straw observed  Positive bolus retrieval w/ poor coordination of mastication  Mild oral residual noted t/o dentition and lingual surface, cleared easily w/ liquid wash  No overt coughing w/ thin liquids however intermittent throat clearing noted which suspect is baseline for pt  Plan/Recommendations:  Recommend to continue regular texture diet w/ thin liquids   Meds crushed in puree  Assist w/ all meals, upright position for all intake    Дмитрий Ferrara [place] [address] CCC-SLP  10/6/2022

## 2022-10-06 NOTE — ASSESSMENT & PLAN NOTE
Malnutrition Findings:   Adult Malnutrition type: Chronic illness  Adult Degree of Malnutrition: Other severe protein calorie malnutrition  Malnutrition Characteristics: Muscle loss, Fat loss                  360 Statement: Chronic severe malnutrition related to increased nutrient needs and likely inadequate oral intake as evidenced by severe muscle wasting to temporalis, pectoralis, deltoid muscles; severe fat loss to buccal pads and triceps  Treated with: Continue 2gm Na at this time given cirrhosis, consider liberalizing as indicated  Fluid restriction per MD  +Sudhir BID mixed in pudding for wound healing  +ensure compact BID  Assistance with meals as accepted by pt/indicated noting tremors  +lids with cups to prevent spilling on self  Diet ed not appropriate at this time  Recommend daily weights  BMI Findings: Body mass index is 28 45 kg/m²

## 2022-10-06 NOTE — ASSESSMENT & PLAN NOTE
Baseline creatinine is 0 7-0 8  Currently creatinine is elevated to 1 38    Is probably secondary to hepatorenal syndrome due to worsening ascites due to underlying liver cirrhosis  Placed on gentle diuresis

## 2022-10-06 NOTE — PROGRESS NOTES
Vancomycin Assessment    Isa Estrada is a 67 y o  male who is currently ordered vancomycin 1500 mg IV q 12 hours for other peritonitis   Relevant clinical data and objective history reviewed:  Creatinine   Date Value Ref Range Status   10/06/2022 1 38 (H) 0 60 - 1 30 mg/dL Final     Comment:     Specimen Icteric; Results May be Affected   10/05/2022 1 27 0 60 - 1 30 mg/dL Final     Comment:     Standardized to IDMS reference method   10/04/2022 1 48 (H) 0 60 - 1 30 mg/dL Final     Comment:     Specimen Icteric; Results May be Affected     /65   Pulse 98   Temp 98 4 °F (36 9 °C)   Resp 18   Ht 6' 2" (1 88 m)   Wt 101 kg (221 lb 9 oz)   SpO2 94%   BMI 28 45 kg/m²   I/O last 3 completed shifts: In: 65 [P O :478]  Out: 350 [Urine:350]  Lab Results   Component Value Date/Time    BUN 29 (H) 10/06/2022 05:04 AM    WBC 8 66 10/06/2022 05:04 AM    HGB 8 9 (L) 10/06/2022 05:04 AM    HCT 27 6 (L) 10/06/2022 05:04 AM    MCV 94 10/06/2022 05:04 AM    PLT 39 (LL) 10/06/2022 05:04 AM     Temp Readings from Last 3 Encounters:   10/06/22 98 4 °F (36 9 °C)   06/07/22 98 1 °F (36 7 °C) (Temporal)   04/19/22 97 9 °F (36 6 °C)     Vancomycin Days of Therapy: 1    Assessment/Plan  The patient is currently on vancomycin utilizing scheduled dosing based on actual body weight  Baseline risks associated with therapy include: advanced age  The patient is currently ordered 1500 mg IV q 12 hours and after clinical evaluation will be changed to 1250 mg IV q 12 hours  Pharmacy will also follow closely for s/sx of nephrotoxicity, infusion reactions, and appropriateness of therapy  BMP and CBC will be ordered per protocol  Plan for trough as patient approaches steady state, prior to the 5th  dose at approximately 1500 on 10/8/22  Due to infection severity, will target a trough of 15-20 (appropriate for most indications)     Pharmacy will continue to follow the patient’s culture results and clinical progress daily     Jasmyn Perkins, Pharmacist

## 2022-10-06 NOTE — ASSESSMENT & PLAN NOTE
Platelet count is 59 K which is close to patient's baseline of 55-60k    Today platelet count dropped to 39 K  No active bleeding noted at this time

## 2022-10-06 NOTE — PLAN OF CARE
Problem: PAIN - ADULT  Goal: Verbalizes/displays adequate comfort level or baseline comfort level  Description: Interventions:  - Encourage patient to monitor pain and request assistance  - Assess pain using appropriate pain scale  - Administer analgesics based on type and severity of pain and evaluate response  - Implement non-pharmacological measures as appropriate and evaluate response  - Consider cultural and social influences on pain and pain management  - Notify physician/advanced practitioner if interventions unsuccessful or patient reports new pain  Outcome: Progressing     Problem: INFECTION - ADULT  Goal: Absence or prevention of progression during hospitalization  Description: INTERVENTIONS:  - Assess and monitor for signs and symptoms of infection  - Monitor lab/diagnostic results  - Monitor all insertion sites, i e  indwelling lines, tubes, and drains  - Monitor endotracheal if appropriate and nasal secretions for changes in amount and color  - [place] appropriate cooling/warming therapies per order  - Administer medications as ordered  - Instruct and encourage patient and family to use good hand hygiene technique  - Identify and instruct in appropriate isolation precautions for identified infection/condition  Outcome: Progressing  Goal: Absence of fever/infection during neutropenic period  Description: INTERVENTIONS:  - Monitor WBC    Outcome: Progressing     Problem: SAFETY ADULT  Goal: Patient will remain free of falls  Description: INTERVENTIONS:  - Educate patient/family on patient safety including physical limitations  - Instruct patient to call for assistance with activity   - Consult OT/PT to assist with strengthening/mobility   - Keep Call bell within reach  - Keep bed low and locked with side rails adjusted as appropriate  - Keep care items and personal belongings within reach  - Initiate and maintain comfort rounds  - Make Fall Risk Sign visible to staff  - Offer Toileting every  Hours, in advance of need  - Initiate/Maintain alarm  - Obtain necessary fall risk management equipment:  - Apply yellow socks and bracelet for high fall risk patients  - Consider moving patient to room near nurses station  Outcome: Progressing  Goal: Maintain or return to baseline ADL function  Description: INTERVENTIONS:  -  Assess patient's ability to carry out ADLs; assess patient's baseline for ADL function and identify physical deficits which impact ability to perform ADLs (bathing, care of mouth/teeth, toileting, grooming, dressing, etc )  - Assess/evaluate cause of self-care deficits   - Assess range of motion  - Assess patient's mobility; develop plan if impaired  - Assess patient's need for assistive devices and provide as appropriate  - Encourage maximum independence but intervene and supervise when necessary  - Involve family in performance of ADLs  - Assess for home care needs following discharge   - Consider OT consult to assist with ADL evaluation and planning for discharge  - Provide patient education as appropriate  Outcome: Progressing  Goal: Maintains/Returns to pre admission functional level  Description: INTERVENTIONS:  - Perform BMAT or MOVE assessment daily    - Set and communicate daily mobility goal to care team and patient/family/caregiver  - Collaborate with rehabilitation services on mobility goals if consulted  - Perform Range of Motion  times a day  - Reposition patient every hours    - Dangle patient  times a day  - Stand patient  times a day  - Ambulate patient times a day  - Out of bed to chair times a day   - Out of bed for meals  times a day  - Out of bed for toileting  - Record patient progress and toleration of activity level   Outcome: Progressing     Problem: DISCHARGE PLANNING  Goal: Discharge to home or other facility with appropriate resources  Description: INTERVENTIONS:  - Identify barriers to discharge w/patient and caregiver  - Arrange for needed discharge resources and transportation as appropriate  - Identify discharge learning needs (meds, wound care, etc )  - Arrange for interpretive services to assist at discharge as needed  - Refer to Case Management Department for coordinating discharge planning if the patient needs post-hospital services based on physician/advanced practitioner order or complex needs related to functional status, cognitive ability, or social support system  Outcome: Progressing     Problem: Knowledge Deficit  Goal: Patient/family/caregiver demonstrates understanding of disease process, treatment plan, medications, and discharge instructions  Description: Complete learning assessment and assess knowledge base    Interventions:  - Provide teaching at level of understanding  - Provide teaching via preferred learning methods  Outcome: Progressing     Problem: CARDIOVASCULAR - ADULT  Goal: Maintains optimal cardiac output and hemodynamic stability  Description: INTERVENTIONS:  - Monitor I/O, vital signs and rhythm  - Monitor for S/S and trends of decreased cardiac output  - Administer and titrate ordered vasoactive medications to optimize hemodynamic stability  - Assess quality of pulses, skin color and temperature  - Assess for signs of decreased coronary artery perfusion  - Instruct patient to report change in severity of symptoms  Outcome: Progressing  Goal: Absence of cardiac dysrhythmias or at baseline rhythm  Description: INTERVENTIONS:  - Continuous cardiac monitoring, vital signs, obtain 12 lead EKG if ordered  - Administer antiarrhythmic and heart rate control medications as ordered  - Monitor electrolytes and administer replacement therapy as ordered  Outcome: Progressing     Problem: RESPIRATORY - ADULT  Goal: Achieves optimal ventilation and oxygenation  Description: INTERVENTIONS:  - Assess for changes in respiratory status  - Assess for changes in mentation and behavior  - Position to facilitate oxygenation and minimize respiratory effort  - Oxygen administered by appropriate delivery if ordered  - Initiate smoking cessation education as indicated  - Encourage broncho-pulmonary hygiene including cough, deep breathe, Incentive Spirometry  - Assess the need for suctioning and aspirate as needed  - Assess and instruct to report SOB or any respiratory difficulty  - Respiratory Therapy support as indicated  Outcome: Progressing     Problem: GENITOURINARY - ADULT  Goal: Maintains or returns to baseline urinary function  Description: INTERVENTIONS:  - Assess urinary function  - Encourage oral fluids to ensure adequate hydration if ordered  - Administer IV fluids as ordered to ensure adequate hydration  - Administer ordered medications as needed  - Offer frequent toileting  - Follow urinary retention protocol if ordered  Outcome: Progressing     Problem: METABOLIC, FLUID AND ELECTROLYTES - ADULT  Goal: Electrolytes maintained within normal limits  Description: INTERVENTIONS:  - Monitor labs and assess patient for signs and symptoms of electrolyte imbalances  - Administer electrolyte replacement as ordered  - Monitor response to electrolyte replacements, including repeat lab results as appropriate  - Instruct patient on fluid and nutrition as appropriate  Outcome: Progressing  Goal: Fluid balance maintained  Description: INTERVENTIONS:  - Monitor labs   - Monitor I/O and WT  - Instruct patient on fluid and nutrition as appropriate  - Assess for signs & symptoms of volume excess or deficit  Outcome: Progressing     Problem: SKIN/TISSUE INTEGRITY - ADULT  Goal: Skin Integrity remains intact(Skin Breakdown Prevention)  Description: Assess:  -Perform Justin assessment every   -Clean and moisturize skin every   -Inspect skin when repositioning, toileting, and assisting with ADLS  -Assess under medical devices such as  every   -Assess extremities for adequate circulation and sensation     Bed Management:  -Have minimal linens on bed & keep smooth, unwrinkled  -Change linens as needed when moist or perspiring  -Avoid sitting or lying in one position for more than  hours while in bed  -Keep HOB at degrees     Toileting:  -Offer bedside commode  -Assess for incontinence every   -Use incontinent care products after each incontinent episode such as     Activity:  -Mobilize patient  times a day  -Encourage activity and walks on unit  -Encourage or provide ROM exercises   -Turn and reposition patient every  Hours  -Use appropriate equipment to lift or move patient in bed  -Instruct/ Assist with weight shifting every  when out of bed in chair  -Consider limitation of chair time  hour intervals    Skin Care:  -Avoid use of baby powder, tape, friction and shearing, hot water or constrictive clothing  -Relieve pressure over bony prominences using   -Do not massage red bony areas    Next Steps:  -Teach patient strategies to minimize risks such as    -Consider consults to  interdisciplinary teams such as   Outcome: Progressing     Problem: HEMATOLOGIC - ADULT  Goal: Maintains hematologic stability  Description: INTERVENTIONS  - Assess for signs and symptoms of bleeding or hemorrhage  - Monitor labs  - Administer supportive blood products/factors as ordered and appropriate  Outcome: Progressing     Problem: MUSCULOSKELETAL - ADULT  Goal: Maintain or return mobility to safest level of function  Description: INTERVENTIONS:  - Assess patient's ability to carry out ADLs; assess patient's baseline for ADL function and identify physical deficits which impact ability to perform ADLs (bathing, care of mouth/teeth, toileting, grooming, dressing, etc )  - Assess/evaluate cause of self-care deficits   - Assess range of motion  - Assess patient's mobility  - Assess patient's need for assistive devices and provide as appropriate  - Encourage maximum independence but intervene and supervise when necessary  - Involve family in performance of ADLs  - Assess for home care needs following discharge   - Consider OT consult to assist with ADL evaluation and planning for discharge  - Provide patient education as appropriate  Outcome: Progressing     Problem: Nutrition/Hydration-ADULT  Goal: Nutrient/Hydration intake appropriate for improving, restoring or maintaining nutritional needs  Description: Monitor and assess patient's nutrition/hydration status for malnutrition  Collaborate with interdisciplinary team and initiate plan and interventions as ordered  Monitor patient's weight and dietary intake as ordered or per policy  Utilize nutrition screening tool and intervene as necessary  Determine patient's food preferences and provide high-protein, high-caloric foods as appropriate       INTERVENTIONS:  - Monitor oral intake, urinary output, labs, and treatment plans  - Assess nutrition and hydration status and recommend course of action  - Evaluate amount of meals eaten  - Assist patient with eating if necessary   - Allow adequate time for meals  - Recommend/ encourage appropriate diets, oral nutritional supplements, and vitamin/mineral supplements  - Order, calculate, and assess calorie counts as needed  - Recommend, monitor, and adjust tube feedings and TPN/PPN based on assessed needs  - Assess need for intravenous fluids  - Provide specific nutrition/hydration education as appropriate  - Include patient/family/caregiver in decisions related to nutrition  Outcome: Progressing     Problem: MOBILITY - ADULT  Goal: Maintain or return to baseline ADL function  Description: INTERVENTIONS:  -  Assess patient's ability to carry out ADLs; assess patient's baseline for ADL function and identify physical deficits which impact ability to perform ADLs (bathing, care of mouth/teeth, toileting, grooming, dressing, etc )  - Assess/evaluate cause of self-care deficits   - Assess range of motion  - Assess patient's mobility; develop plan if impaired  - Assess patient's need for assistive devices and provide as appropriate  - Encourage maximum independence but intervene and supervise when necessary  - Involve family in performance of ADLs  - Assess for home care needs following discharge   - Consider OT consult to assist with ADL evaluation and planning for discharge  - Provide patient education as appropriate  Outcome: Progressing  Goal: Maintains/Returns to pre admission functional level  Description: INTERVENTIONS:  - Perform BMAT or MOVE assessment daily    - Set and communicate daily mobility goal to care team and patient/family/caregiver  - Collaborate with rehabilitation services on mobility goals if consulted  - Perform Range of Motion  times a day  - Reposition patient every hours    - Dangle patient  times a day  - Stand patient  times a day  - Ambulate patient  times a day  - Out of bed to chair  times a day   - Out of bed for meals  times a day  - Out of bed for toileting  - Record patient progress and toleration of activity level   Outcome: Progressing     Problem: Prexisting or High Potential for Compromised Skin Integrity  Goal: Skin integrity is maintained or improved  Description: INTERVENTIONS:  - Identify patients at risk for skin breakdown  - Assess and monitor skin integrity  - Assess and monitor nutrition and hydration status  - Monitor labs   - Assess for incontinence   - Turn and reposition patient  - Assist with mobility/ambulation  - Relieve pressure over bony prominences  - Avoid friction and shearing  - Provide appropriate hygiene as needed including keeping skin clean and dry  - Evaluate need for skin moisturizer/barrier cream  - Collaborate with interdisciplinary team   - Patient/family teaching  - Consider wound care consult   Outcome: Progressing

## 2022-10-06 NOTE — PROGRESS NOTES
[address] [facility]  Progress Note - Arlene Muir 1950, 67 y o  male MRN: [id number]  Unit/Bed#: -[facility]aren Encounter: [id number]  Primary Care Provider: Hannah Mathew MD   Date and time admitted to hospital: 10/4/2022 12:36 PM    * Acute metabolic encephalopathy  Assessment & Plan  Secondary to acute hepatic encephalopathy  Patient had a fall 1 week ago since then he has been having increasing confusion  Patient has been keeping up with his lactulose and ammonia level <10  Continue Xifaxan  CT brain negative for acute bleed or acute pathology  Noted to have white blood cell count cannot rule out SBP at this time  UA negative  Pt/ot recommending subacute rehab  Other ascites  Assessment & Plan  Patient noticed to have abdominal distension  CT abdomen pelvis showed large volume ascites with small bilateral pleural effusions  Patient had paracentesis done for 7 5 L 10/5/22  Will place on low-dose Lasix and aldactone for now  Will place on IV cefepime for SBP prophylaxis/treatment  Will send fluid studies for analysis for LDH, protein, culture and sensitivity, WBC and cytology and stat Gram stain  Prelim cultures on ascitic fluid are growing Gram-positive cocci  Consult placed to Infectious Disease  Also noted to have bilateral upper lobe and right middle lobe ground-glass opacities which are new which may be secondary to pulmonary edema  Continue diuresis    Sepsis ([address] )  Assessment & Plan  Patient has sepsis present on admission with tachypnea respiratory rate 22, tachycardia with heart rate of 101, lactic acidosis of 2 9  Unclear source of infection at this time but concern for SBP at this time  Will give 1 500 cc bolus of saline secondary to underlying ascites and then will follow-up with IV albumin and gentle diuresis    Will place on IV cefepime for now while awaiting culture results from ascitic fluid    NAVEED (acute kidney injury) [facility])  Assessment & Plan  Baseline creatinine is 0 7-0 8  Currently creatinine is elevated to 1 38  Is probably secondary to hepatorenal syndrome due to worsening ascites due to underlying liver cirrhosis  Placed on gentle diuresis     Severe protein-calorie malnutrition (HCC)  Assessment & Plan  Malnutrition Findings:   Adult Malnutrition type: Chronic illness  Adult Degree of Malnutrition: Other severe protein calorie malnutrition  Malnutrition Characteristics: Muscle loss, Fat loss                  360 Statement: Chronic severe malnutrition related to increased nutrient needs and likely inadequate oral intake as evidenced by severe muscle wasting to temporalis, pectoralis, deltoid muscles; severe fat loss to buccal pads and triceps  Treated with: Continue 2gm Na at this time given cirrhosis, consider liberalizing as indicated  Fluid restriction per MD  +Sudhir BID mixed in pudding for wound healing  +ensure compact BID  Assistance with meals as accepted by pt/indicated noting tremors  +lids with cups to prevent spilling on self  Diet ed not appropriate at this time  Recommend daily weights  BMI Findings: Body mass index is 28 45 kg/m²  Chronic anemia  Assessment & Plan  Baseline hemoglobin is around 9-10  Currently hemoglobin is at baseline range with no active bleeding reported with chronic thrombocytopenia noted    Thrombocytopenia (HCC)  Assessment & Plan  Platelet count is 59 K which is close to patient's baseline of 55-60k  Today platelet count dropped to 39 K  No active bleeding noted at this time    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 [facility])  Assessment & Plan  Patient has known history of liver cirrhosis secondary to jaffe  Follows outpatient with [facility]  Known history of portal hypertension,hepatic encephalopathy and liver cirrhosis status post banding of varices    Not on any transplant list  Meld score is 21  Continue home medications for now      VTE Pharmacologic Prophylaxis:   Pharmacologic: Pharmacologic VTE Prophylaxis contraindicated due to Thrombocytopenia  Mechanical VTE Prophylaxis in Place: Yes    Patient Centered Rounds: I have performed bedside rounds with nursing staff today  Discussions with Specialists or Other Care Team Provider:  Discussed with case management    Education and Discussions with Family / Patient:  Discussed with patient at bedside will update son    Time Spent for Care: 30 minutes  More than 50% of total time spent on counseling and coordination of care as described above  Current Length of Stay: 2 day(s)    Current Patient Status: Inpatient   Certification Statement: The patient will continue to require additional inpatient hospital stay due to Acute metabolic encephalopathy    Discharge Plan:  Anticipate discharge to rehab tomorrow    Code Status: Level 3 - DNAR and DNI      Subjective:   Patient denies any chest pain or shortness of breath or abdominal pain  States he is feeling much better today more alert awake and oriented today with no asterixis noted    Objective:     Vitals:   Temp (24hrs), Av 6 °F (37 °C), Min:98 4 °F (36 9 °C), Max:98 8 °F (37 1 °C)    Temp:  [98 4 °F (36 9 °C)-98 8 °F (37 1 °C)] 98 8 °F (37 1 °C)  HR:  [] 99  Resp:  [16-20] 18  BP: (111-116)/(57-64) 116/64  SpO2:  [94 %-98 %] 94 %  Body mass index is 28 45 kg/m²  Input and Output Summary (last 24 hours): Intake/Output Summary (Last 24 hours) at 10/6/2022 1333  Last data filed at 10/6/2022 1300  Gross per 24 hour   Intake 438 ml   Output 1152 ml   Net -714 ml       Physical Exam:     Physical Exam  Vitals and nursing note reviewed  Constitutional:       Appearance: Normal appearance  HENT:      Head: Normocephalic and atraumatic  Right Ear: External ear normal       Left Ear: External ear normal       Nose: Nose normal       Mouth/Throat:      Pharynx: Oropharynx is clear  Eyes:      Pupils: Pupils are equal, round, and reactive to light     Cardiovascular:      Rate and Rhythm: Normal rate and regular rhythm  Heart sounds: Normal heart sounds  Pulmonary:      Effort: Pulmonary effort is normal       Breath sounds: Normal breath sounds  Abdominal:      General: Bowel sounds are normal  There is distension  Palpations: Abdomen is soft  Tenderness: There is no abdominal tenderness  Musculoskeletal:         General: Normal range of motion  Cervical back: Normal range of motion and neck supple  Skin:     General: Skin is warm and dry  Capillary Refill: Capillary refill takes less than 2 seconds  Coloration: Skin is jaundiced  Neurological:      General: No focal deficit present  Mental Status: He is alert  Comments: Oriented to person place   Psychiatric:         Mood and Affect: Mood normal            Additional Data:     Labs:    Results from last 7 days   Lab Units 10/06/22  0504 10/05/22  0500 10/04/22  1241   WBC Thousand/uL 8 66   < > 10 38*   HEMOGLOBIN g/dL 8 9*   < > 9 7*   HEMATOCRIT % 27 6*   < > 31 0*   PLATELETS Thousands/uL 39*   < > 59*   NEUTROS PCT %  --   --  85*   LYMPHS PCT %  --   --  7*   MONOS PCT %  --   --  8   EOS PCT %  --   --  0    < > = values in this interval not displayed  Results from last 7 days   Lab Units 10/06/22  0504   SODIUM mmol/L 140   POTASSIUM mmol/L 3 7   CHLORIDE mmol/L 107   CO2 mmol/L 24   BUN mg/dL 29*   CREATININE mg/dL 1 38*   ANION GAP mmol/L 9   CALCIUM mg/dL 8 1*   ALBUMIN g/dL 1 8*   TOTAL BILIRUBIN mg/dL 3 88*   ALK PHOS U/L 90   ALT U/L 47   AST U/L 65*   GLUCOSE RANDOM mg/dL 91     Results from last 7 days   Lab Units 10/04/22  1241   INR  1 62*             Results from last 7 days   Lab Units 10/04/22  1628 10/04/22  1241   LACTIC ACID mmol/L 2 1* 2 9*           * I Have Reviewed All Lab Data Listed Above  * Additional Pertinent Lab Tests Reviewed:  All Labs For Current Hospital Admission Reviewed    Imaging:    Imaging Reports Reviewed Today Include: none  Imaging Personally Reviewed by Myself Includes:  none    Recent Cultures (last 7 days):     Results from last 7 days   Lab Units 10/05/22  1200 10/04/22  1241   BLOOD CULTURE   --  No Growth at 24 hrs  No Growth at 24 hrs  GRAM STAIN RESULT  Rare Polys*  Rare Gram positive cocci in pairs*  --    BODY FLUID CULTURE, STERILE  No growth  --        Last 24 Hours Medication List:   Current Facility-Administered Medications   Medication Dose Route Frequency Provider Last Rate   • acetaminophen  650 mg Oral Q6H PRN Remo Nyhan, MD     • aspirin  81 mg Oral Daily Remo Nyhan, MD     • cefepime  1,000 mg Intravenous Q12H Remo Nyhan, MD 1,000 mg (10/06/22 1014)   • cholecalciferol  1,000 Units Oral Daily Remo Nyhan, MD     • donepezil  10 mg Oral HS Remo Nyhan, MD     • furosemide  20 mg Oral Daily Remo Nyhan, MD     • lactulose  45 g Oral 4x Daily Remo Nyhan, MD     • nystatin   Topical BID DUSTY Avila     • rifaximin  550 mg Oral Q12H [facility] Remo Nyhan, MD     • sertraline  50 mg Oral Daily Remo Nyhan, MD     • spironolactone  25 mg Oral Daily Remo Nyhan, MD          Today, Patient Was Seen By: Remo Nyhan    ** Please Note: Dictation voice to text software may have been used in the creation of this document   **

## 2022-10-06 NOTE — ASSESSMENT & PLAN NOTE
Patient has known history of liver cirrhosis secondary to varela  Follows outpatient with [facility]  Known history of portal hypertension,hepatic encephalopathy and liver cirrhosis status post banding of varices    Not on any transplant list  Meld score is 21  Continue home medications for now

## 2022-10-06 NOTE — PLAN OF CARE
Problem: PHYSICAL THERAPY ADULT  Goal: Performs mobility at highest level of function for planned discharge setting  See evaluation for individualized goals  Description: Treatment/Interventions: Functional transfer training, LE strengthening/ROM, Therapeutic exercise, Endurance training, Patient/family training, Equipment eval/education, Bed mobility, Gait training, Compensatory technique education, Spoke to nursing  Equipment Recommended:  (TBD by rehab)       See flowsheet documentation for full assessment, interventions and recommendations  Note: Prognosis: Fair  Problem List: Decreased strength, Decreased endurance, Impaired balance, Decreased mobility, Decreased cognition, Impaired judgement, Decreased safety awareness  Assessment: Pt is a 67 y o  male seen for PT evaluation s/p admission to [address] [place] [address] on 10/4/2022 with Acute metabolic encephalopathy  Order placed for PT services  Upon evaluation: Pt is presenting with impaired functional mobility due to decreased strength, decreased endurance, impaired balance, gait deviations, impaired cognition, decreased safety awareness, impaired judgment, and fall risk requiring  mod x 2 assistance for bed mobility and max - mod x 2 assistance for transfers  Pt's clinical presentation is currently unstable/unpredictable given the functional mobility deficits above coupled with fall risks as indicated by AM-PAC 6-Clicks: 3/49 as well as impaired balance, polypharmacy, impaired judgement, decreased safety awareness, and decreased cognition and combined with medical complications of abnormal renal lab values, abnormal H&H, abnormal CBC, and need for input for mobility technique/safety  Pt's PMHx and comorbidities that may affect physical performance and progress include: HTN, Dementia, limited cognition, and hx of cirrhosis    Personal factors affecting pt at time of IE include: limited home support, past experience, inability to perform IADLs, inability to perform ADLs, inability to navigate level surfaces without external assistance, inability to ambulate household distances, inability to navigate community distances, and difficulty communicating  Pt will benefit from continued skilled PT services to address deficits as defined above and to maximize level of functional mobility to facilitate return toward PLOF and improved QOL  From PT/mobility standpoint, recommendation at time of d/c would be Short term rehab pending progress in order to reduce fall risk and maximize pt's functional independence and consistency with mobility in order to facilitate return to PLOF  Recommend trial with walker next 1-2 sessions and ther ex next 1-2 sessions  Barriers to Discharge: Decreased caregiver support  Barriers to Discharge Comments: pt currently requiring A x 2 for transfers  PT Discharge Recommendation: Post acute rehabilitation services    See flowsheet documentation for full assessment

## 2022-10-06 NOTE — PHYSICAL THERAPY NOTE
PHYSICAL THERAPY EVALUATION  NAME:  Soniya Rider  DATE: 10/06/22    AGE:   67 y o  Mrn:   [id number]  ADMIT DX:  Pain [R52]  NAVEED (acute kidney injury) ([facility] ) [N17 9]  Ascites [R18 8]  Pneumonia of right lung due to infectious organism, unspecified part of lung [J18 9]    Past Medical History:   Diagnosis Date   • Cirrhosis ([facility] )    • D[facility] ([facility] )    • Hypertension      Length Of Stay: 2  Performed at least 2 patient identifiers during session: Name, Thai Stack, and ID susanna  PHYSICAL THERAPY EVALUATION :        10/06/22 1026   Note Type   Note type Evaluation   Pain Assessment   Pain Assessment Tool 0-10   Pain Score No Pain   Restrictions/Precautions   Other Precautions Chair Alarm; Bed Alarm; Fall Risk;Cognitive;Fluid restriction   Home Living   Additional Comments Pt is a poor historian, per CM note, pt lives in [address] [facility] with ramped entry  He has 24/7 assistance  Requires assistance for ADLs, IADLs, and mobility  Uses RW and wheelchair for mobility   Cognition   Overall Cognitive Status Impaired   Orientation Level Oriented to person;Disoriented to place; Disoriented to time;Disoriented to situation   Following Commands Follows one step commands with increased time or repetition   RLE Assessment   RLE Assessment WFL  (grossly assessed at least 3+/5 strength)   LLE Assessment   LLE Assessment WFL  (grossly assessed at least 3+/5 strength)   Bed Mobility   Supine to Sit 3  Moderate assistance   Additional items Assist x 2; Increased time required;LE management   Additional Comments mod A x 2 for trunk and LE management, VC for technique  Pt with Fair- static sitting balance with SBA for safety   Transfers   Sit to Stand   (max progressing to mod)   Additional items Assist x 2; Increased time required;Verbal cues   Stand to Sit 3  Moderate assistance   Additional items Assist x 2; Increased time required;Verbal cues   Stand pivot   (mod x 1 + min x 1)   Additional Comments with RW, first STS trial with max A x 2, able to stand x 10-15 sec before needing seated rest break, second attempt with mod x 2 for STS, able to complete SPT with RW with mod x 1 and min x 1 with manual cues for weight shifting, short shuffled steps, no LOB  Gait deferred this session due to safety concerns   Balance   Static Sitting Fair -   Dynamic Sitting Poor +   Static Standing Poor   Dynamic Standing Poor   Endurance Deficit   Endurance Deficit Yes   Endurance Deficit Description fatigue, cognitive deficits   Activity Tolerance   Activity Tolerance Patient limited by fatigue   Nurse Made Aware Ella WHITEHEAD   Assessment   Prognosis Fair   Problem List Decreased strength;Decreased endurance; Impaired balance;Decreased mobility; Decreased cognition; Impaired judgement;Decreased safety awareness   Barriers to Discharge Decreased caregiver support   Barriers to Discharge Comments pt currently requiring A x 2 for transfers   Goals   Patient Goals None stated   STG Expiration Date 10/20/22   Plan   Treatment/Interventions Functional transfer training;LE strengthening/ROM; Therapeutic exercise; Endurance training;Patient/family training;Equipment eval/education; Bed mobility;Gait training; Compensatory technique education;Spoke to nursing   PT Frequency 3-5x/wk   Recommendation   PT Discharge Recommendation Post acute rehabilitation services   Equipment Recommended   (TBD by rehab)   AM-PAC Basic Mobility Inpatient   Turning in Bed Without Bedrails 2   Lying on Back to Sitting on Edge of Flat Bed 1   Moving Bed to Chair 1   Standing Up From Chair 1   Walk in Room 1   Climb 3-5 Stairs 1   Basic Mobility Inpatient Raw Score 7   Turning Head Towards Sound 4   Follow Simple Instructions 2   Low Function Basic Mobility Raw Score 13   Low Function Basic Mobility Standardized Score 20 14   Highest Level Of Mobility   JH-HLM Goal 2: Bed activities/Dependent transfer   JH-HLM Achieved 4: Move to chair/commode   End of Consult   Patient Position at End of Consult Bedside chair;Bed/Chair alarm activated; All needs within reach     The patient's AM-PAC Basic Mobility Inpatient Short Form Raw Score is 7    A Raw score of less than or equal to 16 suggests the patient may benefit from discharge to post-acute rehabilitation services  Please also refer to the recommendation of the Physical Therapist for safe discharge planning  (Please find full objective findings from PT assessment regarding body systems outlined above)  Assessment: Pt is a 67 y o  male seen for PT evaluation s/p admission to [facility] on 10/4/2022 with Acute metabolic encephalopathy  Order placed for PT services  Upon evaluation: Pt is presenting with impaired functional mobility due to decreased strength, decreased endurance, impaired balance, gait deviations, impaired cognition, decreased safety awareness, impaired judgment, and fall risk requiring  mod x 2 assistance for bed mobility and max - mod x 2 assistance for transfers  Pt's clinical presentation is currently unstable/unpredictable given the functional mobility deficits above coupled with fall risks as indicated by AM-PAC 6-Clicks: 4/21 as well as impaired balance, polypharmacy, impaired judgement, decreased safety awareness, and decreased cognition and combined with medical complications of abnormal renal lab values, abnormal H&H, abnormal CBC, and need for input for mobility technique/safety  Pt's PMHx and comorbidities that may affect physical performance and progress include: HTN, Dementia, limited cognition, and hx of cirrhosis   Personal factors affecting pt at time of IE include: limited home support, past experience, inability to perform IADLs, inability to perform ADLs, inability to navigate level surfaces without external assistance, inability to ambulate household distances, inability to navigate community distances, and difficulty communicating   Pt will benefit from continued skilled PT services to address deficits as defined above and to maximize level of functional mobility to facilitate return toward PLOF and improved QOL  From PT/mobility standpoint, recommendation at time of d/c would be Short term rehab pending progress in order to reduce fall risk and maximize pt's functional independence and consistency with mobility in order to facilitate return to PLOF  Recommend trial with walker next 1-2 sessions and ther ex next 1-2 sessions  Goals: Pt will: Perform bed mobility tasks with consistent min A of 1 to reposition in bed and prepare for transfers  Pt will perform transfers with consistent min A of 1 to decrease burden of care and prepare for ambulation  Pt will ambulate with RW for >/= 10' with  consistent min A of 1  to improve gait quality and promote proper use of assistive device and to access home environment  Increase bilateral LE strength 1/2 grade to facilitate independent mobility and Increase all balance 1/2 grade to decrease risk for falls          Melyssa Abad

## 2022-10-06 NOTE — PLAN OF CARE
Problem: PAIN - ADULT  Goal: Verbalizes/displays adequate comfort level or baseline comfort level  Description: Interventions:  - Encourage patient to monitor pain and request assistance  - Assess pain using appropriate pain scale  - Administer analgesics based on type and severity of pain and evaluate response  - Implement non-pharmacological measures as appropriate and evaluate response  - Consider cultural and social influences on pain and pain management  - Notify physician/advanced practitioner if interventions unsuccessful or patient reports new pain  Outcome: Progressing     Problem: INFECTION - ADULT  Goal: Absence or prevention of progression during hospitalization  Description: INTERVENTIONS:  - Assess and monitor for signs and symptoms of infection  - Monitor lab/diagnostic results  - Monitor all insertion sites, i e  indwelling lines, tubes, and drains  - Monitor endotracheal if appropriate and nasal secretions for changes in amount and color  - [place] appropriate cooling/warming therapies per order  - Administer medications as ordered  - Instruct and encourage patient and family to use good hand hygiene technique  - Identify and instruct in appropriate isolation precautions for identified infection/condition  Outcome: Progressing     Problem: SAFETY ADULT  Goal: Patient will remain free of falls  Description: INTERVENTIONS:  - Educate patient/family on patient safety including physical limitations  - Instruct patient to call for assistance with activity   - Consult OT/PT to assist with strengthening/mobility   - Keep Call bell within reach  - Keep bed low and locked with side rails adjusted as appropriate  - Keep care items and personal belongings within reach  - Initiate and maintain comfort rounds  - Make Fall Risk Sign visible to staff  - Offer Toileting every 2 Hours, in advance of need  - Initiate/Maintain bed/chair alarm  - Obtain necessary fall risk management equipment: alarms  - Apply yellow socks and bracelet for high fall risk patients  - Consider moving patient to room near nurses station  Outcome: Progressing     Problem: Knowledge Deficit  Goal: Patient/family/caregiver demonstrates understanding of disease process, treatment plan, medications, and discharge instructions  Description: Complete learning assessment and assess knowledge base    Interventions:  - Provide teaching at level of understanding  - Provide teaching via preferred learning methods  Outcome: Progressing     Problem: CARDIOVASCULAR - ADULT  Goal: Maintains optimal cardiac output and hemodynamic stability  Description: INTERVENTIONS:  - Monitor I/O, vital signs and rhythm  - Monitor for S/S and trends of decreased cardiac output  - Administer and titrate ordered vasoactive medications to optimize hemodynamic stability  - Assess quality of pulses, skin color and temperature  - Assess for signs of decreased coronary artery perfusion  - Instruct patient to report change in severity of symptoms  Outcome: Progressing     Problem: RESPIRATORY - ADULT  Goal: Achieves optimal ventilation and oxygenation  Description: INTERVENTIONS:  - Assess for changes in respiratory status  - Assess for changes in mentation and behavior  - Position to facilitate oxygenation and minimize respiratory effort  - Oxygen administered by appropriate delivery if ordered  - Initiate smoking cessation education as indicated  - Encourage broncho-pulmonary hygiene including cough, deep breathe, Incentive Spirometry  - Assess the need for suctioning and aspirate as needed  - Assess and instruct to report SOB or any respiratory difficulty  - Respiratory Therapy support as indicated  Outcome: Progressing     Problem: METABOLIC, FLUID AND ELECTROLYTES - ADULT  Goal: Electrolytes maintained within normal limits  Description: INTERVENTIONS:  - Monitor labs and assess patient for signs and symptoms of electrolyte imbalances  - Administer electrolyte replacement as ordered  - Monitor response to electrolyte replacements, including repeat lab results as appropriate  - Instruct patient on fluid and nutrition as appropriate  Outcome: Progressing     Problem: MUSCULOSKELETAL - ADULT  Goal: Maintain or return mobility to safest level of function  Description: INTERVENTIONS:  - Assess patient's ability to carry out ADLs; assess patient's baseline for ADL function and identify physical deficits which impact ability to perform ADLs (bathing, care of mouth/teeth, toileting, grooming, dressing, etc )  - Assess/evaluate cause of self-care deficits   - Assess range of motion  - Assess patient's mobility  - Assess patient's need for assistive devices and provide as appropriate  - Encourage maximum independence but intervene and supervise when necessary  - Involve family in performance of ADLs  - Assess for home care needs following discharge   - Consider OT consult to assist with ADL evaluation and planning for discharge  - Provide patient education as appropriate  Outcome: Progressing     Problem: Nutrition/Hydration-ADULT  Goal: Nutrient/Hydration intake appropriate for improving, restoring or maintaining nutritional needs  Description: Monitor and assess patient's nutrition/hydration status for malnutrition  Collaborate with interdisciplinary team and initiate plan and interventions as ordered  Monitor patient's weight and dietary intake as ordered or per policy  Utilize nutrition screening tool and intervene as necessary  Determine patient's food preferences and provide high-protein, high-caloric foods as appropriate       INTERVENTIONS:  - Monitor oral intake, urinary output, labs, and treatment plans  - Assess nutrition and hydration status and recommend course of action  - Evaluate amount of meals eaten  - Assist patient with eating if necessary   - Allow adequate time for meals  - Recommend/ encourage appropriate diets, oral nutritional supplements, and vitamin/mineral supplements  - Order, calculate, and assess calorie counts as needed  - Recommend, monitor, and adjust tube feedings and TPN/PPN based on assessed needs  - Assess need for intravenous fluids  - Provide specific nutrition/hydration education as appropriate  - Include patient/family/caregiver in decisions related to nutrition  Outcome: Progressing     Problem: MOBILITY - ADULT  Goal: Maintain or return to baseline ADL function  Description: INTERVENTIONS:  -  Assess patient's ability to carry out ADLs; assess patient's baseline for ADL function and identify physical deficits which impact ability to perform ADLs (bathing, care of mouth/teeth, toileting, grooming, dressing, etc )  - Assess/evaluate cause of self-care deficits   - Assess range of motion  - Assess patient's mobility; develop plan if impaired  - Assess patient's need for assistive devices and provide as appropriate  - Encourage maximum independence but intervene and supervise when necessary  - Involve family in performance of ADLs  - Assess for home care needs following discharge   - Consider OT consult to assist with ADL evaluation and planning for discharge  - Provide patient education as appropriate  Outcome: Progressing     Problem: Prexisting or High Potential for Compromised Skin Integrity  Goal: Skin integrity is maintained or improved  Description: INTERVENTIONS:  - Identify patients at risk for skin breakdown  - Assess and monitor skin integrity  - Assess and monitor nutrition and hydration status  - Monitor labs   - Assess for incontinence   - Turn and reposition patient  - Assist with mobility/ambulation  - Relieve pressure over bony prominences  - Avoid friction and shearing  - Provide appropriate hygiene as needed including keeping skin clean and dry  - Evaluate need for skin moisturizer/barrier cream  - Collaborate with interdisciplinary team   - Patient/family teaching  - Consider wound care consult   Outcome: Progressing

## 2022-10-06 NOTE — CASE MANAGEMENT
Case Management Assessment & Discharge Planning Note    Patient name Allen Daniels  Location /-01 MRN [id number]  : 1950 Date 10/6/2022       Current Admission Date: 10/4/2022  Current Admission Diagnosis:Acute metabolic encephalopathy   Patient Active Problem List    Diagnosis Date Noted   • Severe protein-calorie malnutrition ([facility] ) 10/05/2022   • Other ascites 10/04/2022   • NAVEED (acute kidney injury) ([facility] ) 10/04/2022   • Sepsis ([facility] ) 10/04/2022   • Closed fracture of one rib of right side 2022   • Weakness generalized 2022   • Chronic anemia 2022   • SIRS (systemic inflammatory response syndrome) ([facility] ) 2022   • Cholelithiasis    • Moderate protein-calorie malnutrition ([facility] ) 2022   • Thrombocytopenia ([facility] ) 2022   • Dementia with behavioral disturbance    • 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 ([facility] )    • Acute metabolic encephalopathy    • Hypertension       LOS (days): 2  Geometric Mean LOS (GMLOS) (days): 5 00  Days to GMLOS:3 1     OBJECTIVE:    Risk of Unplanned Readmission Score: 28 78         Current admission status: Inpatient       Preferred Pharmacy:   PATIENT/FAMILY REPORTS NO PREFERRED PHARMACY  No address on file      [address] [place] [address] [id number] [place] [id number]  Phone: [phone] Fax: [phone]    Primary Care Provider: Katherine Briscoe MD    Primary Insurance: MEDICARE  Secondary Insurance: AETNA    ASSESSMENT:  Active Health Care Proxies    There are no active Health Care Proxies on file         Advance Directives  Does patient have a [address]?: Yes (kaya michael)  Does patient have Advance Directives?: Yes  Advance Directives: Power of  for health care, Power of  for finance  Primary Contact: kaya michael              Patient Information  Admitted from[de-identified] Home  Mental Status: Confused  During Assessment patient was accompanied by: Not accompanied during assessment  Assessment information provided by[de-identified] Son  Primary Caregiver: Private caregiver  Caregiver's Name[de-identified] joy, home aide  Caregiver's Relationship to Patient[de-identified] Family Member  Support Systems: Son, Family members, Home care staff  [place] of Residence: [address] [place] [address] entry access options  Select all that apply : No steps to enter home  Type of Current Residence: 2 story home  Upon entering residence, is there a bedroom on the main floor (no further steps)?: Yes  Upon entering residence, is there a bathroom on the main floor (no further steps)?: Yes  In the last 12 months, was there a time when you were not able to pay the mortgage or rent on time?: No  In the last 12 months, how many places have you lived?: 1  In the last 12 months, was there a time when you did not have a steady place to sleep or slept in a shelter (including now)?: No  Living Arrangements: Lives Alone, Other (Comment) (has 24/7 caregiver)    Activities of Daily Living Prior to Admission  Functional Status: Total dependent  Completes ADLs independently?: No  Level of ADL dependence: Total Dependent  Ambulates independently?: No  Level of ambulatory dependence: Total Dependent  Does patient use assisted devices?: Yes  Assisted Devices (DME) used: Serjio Holder, Wheelchair  Does patient currently own DME?: Yes  What DME does the patient currently own?: Wheelchair, Serjio [place], Other (Comment) (asked PCP for an order for freda lift)  Does patient have a history of Outpatient Therapy (PT/OT)?: No  Does the patient have a history of Short-Term Rehab?: Yes ([facility] [place])  Does patient have a history of HHC?: No  Does patient currently have [facility]?: No    Current Home Health Care  Type of Current Home Care Services: Home health aide    Patient Information Continued  Income Source: Pension/residential  Does patient have prescription coverage?: Yes  Within the past 12 months, you worried that your food would run out before you got the money to buy more  TrenDemon Never true  Within the past 12 months, the food you bought just didn't last and you didn't have money to get more : Never true  Does patient receive dialysis treatments?: No  Does patient have a history of substance abuse?: No  Does patient have a history of Mental Health Diagnosis?: No         Means of Transportation  Means of Transport to Appts[de-identified] Family transport  In the past 12 months, has lack of transportation kept you from medical appointments or from getting medications?: No  In the past 12 months, has lack of transportation kept you from meetings, work, or from getting things needed for daily living?: No        DISCHARGE DETAILS:    Discharge planning discussed with[de-identified] kaya michael  Freedom of Choice: Yes     CM contacted family/caregiver?: Yes  Were Treatment Team discharge recommendations reviewed with patient/caregiver?: Yes  Did patient/caregiver verbalize understanding of patient care needs?: Yes  Were patient/caregiver advised of the risks associated with not following Treatment Team discharge recommendations?: Yes    Contacts  Patient Contacts: kaya michael  Relationship to Patient[de-identified] Family  Contact Method: Phone  Reason/Outcome: Discharge [address]         Is the patient interested in [facility] at discharge?: No              Would you like to participate in our [address] service program?  : No - Declined    Treatment Team Recommendation: Short Term Rehab  Discharge Destination Plan[de-identified] Short Term Rehab            pt is from home, has 24/7 , Sera    STR has been recommended for pt at time of discharge  CM discussed STR options with pts son/POKaya BINGHAM  As per son, he would like a referral placed to [facility], as his father has been there in the past  As per request, CM placing referral in [place] to [id number] [facility] post acute care recommendation was made by your care team for STR  Discussed Freedom of Choice with POA   List of facilities given to [facility] via phone  [facility] aware the list is custom filtered for them by preference  and that [facility] post acute providers are designated          [facility] has accepted pt at time of discharge

## 2022-10-06 NOTE — ASSESSMENT & PLAN NOTE
Secondary to acute hepatic encephalopathy  Patient had a fall 1 week ago since then he has been having increasing confusion  Patient has been keeping up with his lactulose and ammonia level <10  Continue Xifaxan  CT brain negative for acute bleed or acute pathology  Noted to have white blood cell count cannot rule out SBP at this time  UA negative  Pt/ot recommending subacute rehab

## 2022-10-06 NOTE — TELEMEDICINE
REQUIRED DOCUMENTATION:     1  This service was provided via Telemedicine  2  Provider located at [facility]  3  TeleMed provider: Yolette Lopez MD   4  Identify all parties in room with patient during tele consult:  Patient, RN  5  After connecting through Confideo, patient was identified by name and date of birth and assistant checked wristband  Patient was then informed that this was a Telemedicine visit and that the exam was being conducted confidentially over secure lines  Patient acknowledged consent and understanding of privacy and security of the Telemedicine visit, and gave us permission to have the assistant stay in the room in order to assist with the history and to conduct the exam   I informed the patient that I have reviewed their record in Epic and presented the opportunity for them to ask any questions regarding the visit today  The patient agreed to participate  TeleConsultation - Infectious Disease   Sasha Rodriguez 67 y o  male MRN: [id number]  Unit/Bed#: -01 Encounter: [id number]      IMPRESSION & RECOMMENDATIONS:   This is a 44-year-old male with dementia and cirrhosis secondary to JAFFE, presented the ER yesterday with confusion and increasing abdominal girth  He is status post paracentesis with peritoneal fluid Gram stain showing GPC in pairs  1  Sepsis, present on admission, presenting with leukocytosis and tachycardia  Source of sepsis is most likely peritonitis  Patient is clinically improved  Temperature remains down  WBC is normalized  Tachycardia is improved  He has remained systemically well, without toxicity and hematocrit stable, without hypotension  Admission blood cultures have no growth thus far  Antibiotic plan as in below  Monitor temperature/WBC  Monitor hemodynamics  Following up on pending blood cultures  2  Peritonitis, likely SBP, given underlying cirrhosis with ascites  Patient is status post paracentesis    Peritoneal fluid parameters are actually quite benign  However, Gram stain has GPCs in pairs  GPC will most likely be alpha or beta hemolytic streptococci or Enterococcus  Other than ascites, abdomen/pelvis CT without acute pathology  We will modify antibiotic regimen  Start IV vancomycin  Change cefepime to ceftriaxone  Discontinue if no GNR in culture  Follow up on final peritoneal fluid culture  Serial abdominal exams  3  Encephalopathy, likely metabolic, secondary to infection and sepsis above  Patient remains quite confused still  Head CT without acute changes  No clinical signs of CNS infection  Monitor mental status  4  NAVEED, present on admission  Likely multifactorial   Creatinine is stable  Antibiotic dosages adjusted accordingly  Monitor creatinine  5  Abnormal chest CT, with diffuse ground-glass opacities but no consolidation  Patient has minimal respiratory symptoms and is not hypoxic  COVID PCR negative  Abnormal chest CT findings most likely pulmonary edema  No clinical signs of pneumonia  Diuresis per primary service  Monitor respiratory symptoms  6  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, with ascites  Extensive review of the medical records in epic including review of the notes, radiographs, and laboratory results    Discussed with patient in detail regarding the above plan  Discussed with Dr Katherine Ascencio from [facility] service  I spent 70 minutes in evaluation of the patient of which 40 minutes was in counseling/coordination of care    HISTORY OF PRESENT ILLNESS:  Reason for Consult:  Peritonitis  HPI: Tanya Celeste is a 67y o  year old male, with underlying dementia and cirrhosis secondary to JAFFE, was brought to the ER on 10/04 due to confusion and increasing abdominal girth  On presentation, patient did not have fever but had mild leukocytosis, tachycardia and lactic acidosis  Abdomen/pelvis CT with ascites but no acute changes    Patient was admitted and started on IV cefepime for sepsis  He underwent paracentesis with benign peritoneal fluid parameters but fluid Gram stain has GPC in pairs  For these reasons, we are asked to evaluate the patient  At present, patient is awake and alert but quite disoriented and confused  Does not recall what led to his hospitalization  Denies any abdominal pain  History obtained from discussion with staff  REVIEW OF SYSTEMS:  A complete 12 point system-based review of systems was attempted but unobtainable due to patient's confusion  PAST MEDICAL HISTORY:  Past Medical History:   Diagnosis Date   • Cirrhosis ([facility] )    • Dementia ([facility] )    • Hypertension      Past Surgical History:   Procedure Laterality Date   • REPLACEMENT TOTAL KNEE         FAMILY HISTORY:  Non-contributory    SOCIAL HISTORY:  Social History   Social History     Substance and Sexual Activity   Alcohol Use Not Currently    Comment: former social ETOH     Social History     Substance and Sexual Activity   Drug Use Never     Social History     Tobacco Use   Smoking Status Former Smoker   Smokeless Tobacco Never Used       ALLERGIES:  No Known Allergies    MEDICATIONS:  All current active medications have been reviewed  Patient is currently on cefepime  PHYSICAL EXAM:  Temp:  [98 6 °F (37 °C)-98 8 °F (37 1 °C)] 98 8 °F (37 1 °C)  HR:  [] 99  Resp:  [16-18] 18  BP: (112-116)/(60-64) 116/64  SpO2:  [94 %-98 %] 94 %  Temp (24hrs), Av 7 °F (37 1 °C), Min:98 6 °F (37 °C), Max:98 8 °F (37 1 °C)  Current: Temperature: 98 8 °F (37 1 °C)    Intake/Output Summary (Last 24 hours) at 10/6/2022 1355  Last data filed at 10/6/2022 1300  Gross per 24 hour   Intake 438 ml   Output 802 ml   Net -364 ml        Physical exam has been primarily done by the patient's nurse and/or the primary service due to limited examination abilities on telemedicine    General Appearance:  Acute and chronically ill-appearing, nontoxic, and in no distress  Awake and alert but confused  Head:  Normocephalic, without obvious abnormality, atraumatic   Eyes:  Conjunctiva pink and sclera anicteric, both eyes   Nose: Nares normal, mucosa normal, no drainage   Throat: Oropharynx moist without lesions   Neck: Supple, symmetrical, no adenopathy   Back:   Symmetric, no curvature, ROM normal, no CVA tenderness   Lungs:   Clear to auscultation bilaterally, respirations unlabored   Chest Wall:  No tenderness or deformity   Heart:  RRR; no murmur, rub or gallop   Abdomen:   Soft, mildly distended, non-tender, positive bowel sounds,    Extremities: Edema present  No draining ulcer  Nontender  Skin: No rashes or lesions  No draining wounds noted  Lymph nodes: Cervical, supraclavicular nodes normal   Neurologic: Moves all extremities       LABS, IMAGING, & OTHER STUDIES:  Lab Results:  I have personally reviewed pertinent labs  Results from last 7 days   Lab Units 10/06/22  0504 10/05/22  0500 10/04/22  1241   WBC Thousand/uL 8 66 9 35 10 38*   HEMOGLOBIN g/dL 8 9* 8 7* 9 7*   PLATELETS Thousands/uL 39* 40* 59*     Results from last 7 days   Lab Units 10/06/22  0504 10/05/22  0500 10/04/22  1241   POTASSIUM mmol/L 3 7 4 4 4 5   CHLORIDE mmol/L 107 107 106   CO2 mmol/L 24 23 25   BUN mg/dL 29* 36* 37*   CREATININE mg/dL 1 38* 1 27 1 48*   EGFR ml/min/1 73sq m 50 55 46   CALCIUM mg/dL 8 1* 8 4 8 5   AST U/L 65* 72* 81*   ALT U/L 47 47 54   ALK PHOS U/L 90 88 107     Results from last 7 days   Lab Units 10/05/22  1200 10/04/22  1241   BLOOD CULTURE   --  No Growth at 24 hrs  No Growth at 24 hrs  GRAM STAIN RESULT  Rare Polys*  Rare Gram positive cocci in pairs*  --    BODY FLUID CULTURE, STERILE  No growth  --                        Imaging Studies:   I have personally reviewed pertinent imaging study reports and images in PACS  Chest/abdomen/pelvis CT reviewed personally  Upper lung ground-glass opacities  No consolidation  Large volume abdominopelvic ascites  No free air    Stable cirrhosis and splenomegaly  Head CT reviewed personally  No acute changes  Other Studies:   I have personally reviewed pertinent reports

## 2022-10-07 LAB
ANION GAP SERPL CALCULATED.3IONS-SCNC: 6 MMOL/L (ref 4–13)
BUN SERPL-MCNC: 30 MG/DL (ref 5–25)
CALCIUM SERPL-MCNC: 8.1 MG/DL (ref 8.3–10.1)
CHLORIDE SERPL-SCNC: 107 MMOL/L (ref 96–108)
CO2 SERPL-SCNC: 26 MMOL/L (ref 21–32)
CREAT SERPL-MCNC: 1.53 MG/DL (ref 0.6–1.3)
GFR SERPL CREATININE-BSD FRML MDRD: 44 ML/MIN/1.73SQ M
GLUCOSE SERPL-MCNC: 197 MG/DL (ref 65–140)
POTASSIUM SERPL-SCNC: 3.4 MMOL/L (ref 3.5–5.3)
SODIUM SERPL-SCNC: 139 MMOL/L (ref 135–147)
VANCOMYCIN SERPL-MCNC: 12.8 UG/ML (ref 10–20)

## 2022-10-07 PROCEDURE — 80048 BASIC METABOLIC PNL TOTAL CA: CPT | Performed by: INTERNAL MEDICINE

## 2022-10-07 PROCEDURE — 80202 ASSAY OF VANCOMYCIN: CPT | Performed by: INTERNAL MEDICINE

## 2022-10-07 PROCEDURE — 99232 SBSQ HOSP IP/OBS MODERATE 35: CPT | Performed by: FAMILY MEDICINE

## 2022-10-07 PROCEDURE — G0408 INPT/TELE FOLLOW UP 35: HCPCS | Performed by: INTERNAL MEDICINE

## 2022-10-07 RX ORDER — POTASSIUM CHLORIDE 20 MEQ/1
20 TABLET, EXTENDED RELEASE ORAL ONCE
Status: COMPLETED | OUTPATIENT
Start: 2022-10-07 | End: 2022-10-07

## 2022-10-07 RX ORDER — FUROSEMIDE 10 MG/ML
40 INJECTION INTRAMUSCULAR; INTRAVENOUS
Status: DISCONTINUED | OUTPATIENT
Start: 2022-10-07 | End: 2022-10-08

## 2022-10-07 RX ORDER — ALBUMIN (HUMAN) 12.5 G/50ML
12.5 SOLUTION INTRAVENOUS ONCE
Status: COMPLETED | OUTPATIENT
Start: 2022-10-07 | End: 2022-10-07

## 2022-10-07 RX ADMIN — LACTULOSE 45 G: 20 SOLUTION ORAL at 21:47

## 2022-10-07 RX ADMIN — VANCOMYCIN HYDROCHLORIDE 1500 MG: 5 INJECTION, POWDER, LYOPHILIZED, FOR SOLUTION INTRAVENOUS at 15:52

## 2022-10-07 RX ADMIN — NYSTATIN: 100000 POWDER TOPICAL at 09:35

## 2022-10-07 RX ADMIN — SERTRALINE HYDROCHLORIDE 50 MG: 50 TABLET ORAL at 09:17

## 2022-10-07 RX ADMIN — RIFAXIMIN 550 MG: 550 TABLET ORAL at 21:47

## 2022-10-07 RX ADMIN — ALBUMIN (HUMAN) 12.5 G: 0.25 INJECTION, SOLUTION INTRAVENOUS at 09:33

## 2022-10-07 RX ADMIN — LACTULOSE 45 G: 20 SOLUTION ORAL at 09:18

## 2022-10-07 RX ADMIN — LACTULOSE 45 G: 20 SOLUTION ORAL at 18:29

## 2022-10-07 RX ADMIN — FUROSEMIDE 40 MG: 10 INJECTION, SOLUTION INTRAMUSCULAR; INTRAVENOUS at 09:27

## 2022-10-07 RX ADMIN — VANCOMYCIN HYDROCHLORIDE 1250 MG: 5 INJECTION, POWDER, LYOPHILIZED, FOR SOLUTION INTRAVENOUS at 03:15

## 2022-10-07 RX ADMIN — NYSTATIN: 100000 POWDER TOPICAL at 18:29

## 2022-10-07 RX ADMIN — LACTULOSE 45 G: 20 SOLUTION ORAL at 11:53

## 2022-10-07 RX ADMIN — ASPIRIN 81 MG: 81 TABLET, COATED ORAL at 09:17

## 2022-10-07 RX ADMIN — FUROSEMIDE 40 MG: 10 INJECTION, SOLUTION INTRAMUSCULAR; INTRAVENOUS at 15:17

## 2022-10-07 RX ADMIN — RIFAXIMIN 550 MG: 550 TABLET ORAL at 09:17

## 2022-10-07 RX ADMIN — Medication 1000 UNITS: at 09:17

## 2022-10-07 RX ADMIN — CEFTRIAXONE 2000 MG: 2 INJECTION, SOLUTION INTRAVENOUS at 21:47

## 2022-10-07 RX ADMIN — POTASSIUM CHLORIDE 20 MEQ: 1500 TABLET, EXTENDED RELEASE ORAL at 14:40

## 2022-10-07 RX ADMIN — SPIRONOLACTONE 25 MG: 25 TABLET ORAL at 09:17

## 2022-10-07 RX ADMIN — DONEPEZIL HYDROCHLORIDE 10 MG: 5 TABLET, FILM COATED ORAL at 21:47

## 2022-10-07 NOTE — PROGRESS NOTES
Pt with improved intake, eating % of meals per nursing flowsheets  Pt reports enjoying and drinking 100% of ensure compact that is ordered BID  Pt is agreeable to increasing to TID  Unknown intake of Sudhir mixed in pudding, will continue to send BID to promote wound healing   Continue with 2gm Na diet, fluid restriction per MD

## 2022-10-07 NOTE — ASSESSMENT & PLAN NOTE
Baseline creatinine is 0 7-0 8  Currently creatinine is elevated to 1 38  Is probably secondary to hepatorenal syndrome due to worsening ascites due to underlying liver cirrhosis  Placed on gentle diuresis     Creatinine slightly worsened to 1 53 however need to continue diuresis as patient is still fluid overloaded

## 2022-10-07 NOTE — ASSESSMENT & PLAN NOTE
Patient noticed to have abdominal distension  CT abdomen pelvis showed large volume ascites with small bilateral pleural effusions  Patient had paracentesis done for 7 5 L 10/5/22  Will place on oral aldactone for now  Not diuresing much with oral Lasix and hence increased to Lasix 40 mg IV b i d  also give 1 dose of IV albumin today  Still has peripheral edema noted  Will place on IV vancomycin and Rocephin for SBP prophylaxis/treatment  Reviewed fluid studies for analysis for LDH, protein, culture and sensitivity, WBC and cytology and stat Gram stain  Prelim cultures on ascitic fluid are growing Gram-positive cocci  Await final results    Appreciate Infectious Disease input

## 2022-10-07 NOTE — PLAN OF CARE
Problem: PAIN - ADULT  Goal: Verbalizes/displays adequate comfort level or baseline comfort level  Description: Interventions:  - Encourage patient to monitor pain and request assistance  - Assess pain using appropriate pain scale  - Administer analgesics based on type and severity of pain and evaluate response  - Implement non-pharmacological measures as appropriate and evaluate response  - Consider cultural and social influences on pain and pain management  - Notify physician/advanced practitioner if interventions unsuccessful or patient reports new pain  Outcome: Progressing     Problem: INFECTION - ADULT  Goal: Absence or prevention of progression during hospitalization  Description: INTERVENTIONS:  - Assess and monitor for signs and symptoms of infection  - Monitor lab/diagnostic results  - Monitor all insertion sites, i e  indwelling lines, tubes, and drains  - Monitor endotracheal if appropriate and nasal secretions for changes in amount and color  - [place] appropriate cooling/warming therapies per order  - Administer medications as ordered  - Instruct and encourage patient and family to use good hand hygiene technique  - Identify and instruct in appropriate isolation precautions for identified infection/condition  Outcome: Progressing  Goal: Absence of fever/infection during neutropenic period  Description: INTERVENTIONS:  - Monitor WBC    Outcome: Progressing     Problem: SAFETY ADULT  Goal: Patient will remain free of falls  Description: INTERVENTIONS:  - Educate patient/family on patient safety including physical limitations  - Instruct patient to call for assistance with activity   - Consult OT/PT to assist with strengthening/mobility   - Keep Call bell within reach  - Keep bed low and locked with side rails adjusted as appropriate  - Keep care items and personal belongings within reach  - Initiate and maintain comfort rounds  - Make Fall Risk Sign visible to staff  - Offer Toileting every  Hours, in advance of need  - Initiate/Maintain alarm  - Obtain necessary fall risk management equipment:   - Apply yellow socks and bracelet for high fall risk patients  - Consider moving patient to room near nurses station  Outcome: Progressing  Goal: Maintain or return to baseline ADL function  Description: INTERVENTIONS:  -  Assess patient's ability to carry out ADLs; assess patient's baseline for ADL function and identify physical deficits which impact ability to perform ADLs (bathing, care of mouth/teeth, toileting, grooming, dressing, etc )  - Assess/evaluate cause of self-care deficits   - Assess range of motion  - Assess patient's mobility; develop plan if impaired  - Assess patient's need for assistive devices and provide as appropriate  - Encourage maximum independence but intervene and supervise when necessary  - Involve family in performance of ADLs  - Assess for home care needs following discharge   - Consider OT consult to assist with ADL evaluation and planning for discharge  - Provide patient education as appropriate  Outcome: Progressing  Goal: Maintains/Returns to pre admission functional level  Description: INTERVENTIONS:  - Perform BMAT or MOVE assessment daily    - Set and communicate daily mobility goal to care team and patient/family/caregiver  - Collaborate with rehabilitation services on mobility goals if consulted  - Perform Range of Motion  times a day  - Reposition patient every  hours    - Dangle patient  times a day  - Stand patient  times a day  - Ambulate patient  times a day  - Out of bed to chair  times a day   - Out of bed for meals  times a day  - Out of bed for toileting  - Record patient progress and toleration of activity level   Outcome: Progressing     Problem: DISCHARGE PLANNING  Goal: Discharge to home or other facility with appropriate resources  Description: INTERVENTIONS:  - Identify barriers to discharge w/patient and caregiver  - Arrange for needed discharge resources and transportation as appropriate  - Identify discharge learning needs (meds, wound care, etc )  - Arrange for interpretive services to assist at discharge as needed  - Refer to Case Management Department for coordinating discharge planning if the patient needs post-hospital services based on physician/advanced practitioner order or complex needs related to functional status, cognitive ability, or social support system  Outcome: Progressing     Problem: Knowledge Deficit  Goal: Patient/family/caregiver demonstrates understanding of disease process, treatment plan, medications, and discharge instructions  Description: Complete learning assessment and assess knowledge base    Interventions:  - Provide teaching at level of understanding  - Provide teaching via preferred learning methods  Outcome: Progressing     Problem: CARDIOVASCULAR - ADULT  Goal: Maintains optimal cardiac output and hemodynamic stability  Description: INTERVENTIONS:  - Monitor I/O, vital signs and rhythm  - Monitor for S/S and trends of decreased cardiac output  - Administer and titrate ordered vasoactive medications to optimize hemodynamic stability  - Assess quality of pulses, skin color and temperature  - Assess for signs of decreased coronary artery perfusion  - Instruct patient to report change in severity of symptoms  Outcome: Progressing  Goal: Absence of cardiac dysrhythmias or at baseline rhythm  Description: INTERVENTIONS:  - Continuous cardiac monitoring, vital signs, obtain 12 lead EKG if ordered  - Administer antiarrhythmic and heart rate control medications as ordered  - Monitor electrolytes and administer replacement therapy as ordered  Outcome: Progressing     Problem: RESPIRATORY - ADULT  Goal: Achieves optimal ventilation and oxygenation  Description: INTERVENTIONS:  - Assess for changes in respiratory status  - Assess for changes in mentation and behavior  - Position to facilitate oxygenation and minimize respiratory effort  - Oxygen administered by appropriate delivery if ordered  - Initiate smoking cessation education as indicated  - Encourage broncho-pulmonary hygiene including cough, deep breathe, Incentive Spirometry  - Assess the need for suctioning and aspirate as needed  - Assess and instruct to report SOB or any respiratory difficulty  - Respiratory Therapy support as indicated  Outcome: Progressing     Problem: GENITOURINARY - ADULT  Goal: Maintains or returns to baseline urinary function  Description: INTERVENTIONS:  - Assess urinary function  - Encourage oral fluids to ensure adequate hydration if ordered  - Administer IV fluids as ordered to ensure adequate hydration  - Administer ordered medications as needed  - Offer frequent toileting  - Follow urinary retention protocol if ordered  Outcome: Progressing     Problem: METABOLIC, FLUID AND ELECTROLYTES - ADULT  Goal: Electrolytes maintained within normal limits  Description: INTERVENTIONS:  - Monitor labs and assess patient for signs and symptoms of electrolyte imbalances  - Administer electrolyte replacement as ordered  - Monitor response to electrolyte replacements, including repeat lab results as appropriate  - Instruct patient on fluid and nutrition as appropriate  Outcome: Progressing  Goal: Fluid balance maintained  Description: INTERVENTIONS:  - Monitor labs   - Monitor I/O and WT  - Instruct patient on fluid and nutrition as appropriate  - Assess for signs & symptoms of volume excess or deficit  Outcome: Progressing     Problem: SKIN/TISSUE INTEGRITY - ADULT  Goal: Skin Integrity remains intact(Skin Breakdown Prevention)  Description: Assess:  -Perform Justin assessment every   -Clean and moisturize skin every   -Inspect skin when repositioning, toileting, and assisting with ADLS  -Assess under medical devices such as  every   -Assess extremities for adequate circulation and sensation     Bed Management:  -Have minimal linens on bed & keep smooth, unwrinkled  -Change linens as needed when moist or perspiring  -Avoid sitting or lying in one position for more than  hours while in bed  -Keep HOB at degrees     Toileting:  -Offer bedside commode  -Assess for incontinence every   -Use incontinent care products after each incontinent episode such as     Activity:  -Mobilize patient  times a day  -Encourage activity and walks on unit  -Encourage or provide ROM exercises   -Turn and reposition patient every  Hours  -Use appropriate equipment to lift or move patient in bed  -Instruct/ Assist with weight shifting every  when out of bed in chair  -Consider limitation of chair time  hour intervals    Skin Care:  -Avoid use of baby powder, tape, friction and shearing, hot water or constrictive clothing  -Relieve pressure over bony prominences using   -Do not massage red bony areas    Next Steps:  -Teach patient strategies to minimize risks such as    -Consider consults to  interdisciplinary teams such as   Outcome: Progressing     Problem: HEMATOLOGIC - ADULT  Goal: Maintains hematologic stability  Description: INTERVENTIONS  - Assess for signs and symptoms of bleeding or hemorrhage  - Monitor labs  - Administer supportive blood products/factors as ordered and appropriate  Outcome: Progressing     Problem: MUSCULOSKELETAL - ADULT  Goal: Maintain or return mobility to safest level of function  Description: INTERVENTIONS:  - Assess patient's ability to carry out ADLs; assess patient's baseline for ADL function and identify physical deficits which impact ability to perform ADLs (bathing, care of mouth/teeth, toileting, grooming, dressing, etc )  - Assess/evaluate cause of self-care deficits   - Assess range of motion  - Assess patient's mobility  - Assess patient's need for assistive devices and provide as appropriate  - Encourage maximum independence but intervene and supervise when necessary  - Involve family in performance of ADLs  - Assess for home care needs following discharge   - Consider OT consult to assist with ADL evaluation and planning for discharge  - Provide patient education as appropriate  Outcome: Progressing     Problem: Nutrition/Hydration-ADULT  Goal: Nutrient/Hydration intake appropriate for improving, restoring or maintaining nutritional needs  Description: Monitor and assess patient's nutrition/hydration status for malnutrition  Collaborate with interdisciplinary team and initiate plan and interventions as ordered  Monitor patient's weight and dietary intake as ordered or per policy  Utilize nutrition screening tool and intervene as necessary  Determine patient's food preferences and provide high-protein, high-caloric foods as appropriate       INTERVENTIONS:  - Monitor oral intake, urinary output, labs, and treatment plans  - Assess nutrition and hydration status and recommend course of action  - Evaluate amount of meals eaten  - Assist patient with eating if necessary   - Allow adequate time for meals  - Recommend/ encourage appropriate diets, oral nutritional supplements, and vitamin/mineral supplements  - Order, calculate, and assess calorie counts as needed  - Recommend, monitor, and adjust tube feedings and TPN/PPN based on assessed needs  - Assess need for intravenous fluids  - Provide specific nutrition/hydration education as appropriate  - Include patient/family/caregiver in decisions related to nutrition  Outcome: Progressing     Problem: MOBILITY - ADULT  Goal: Maintain or return to baseline ADL function  Description: INTERVENTIONS:  -  Assess patient's ability to carry out ADLs; assess patient's baseline for ADL function and identify physical deficits which impact ability to perform ADLs (bathing, care of mouth/teeth, toileting, grooming, dressing, etc )  - Assess/evaluate cause of self-care deficits   - Assess range of motion  - Assess patient's mobility; develop plan if impaired  - Assess patient's need for assistive devices and provide as appropriate  - Encourage maximum independence but intervene and supervise when necessary  - Involve family in performance of ADLs  - Assess for home care needs following discharge   - Consider OT consult to assist with ADL evaluation and planning for discharge  - Provide patient education as appropriate  Outcome: Progressing  Goal: Maintains/Returns to pre admission functional level  Description: INTERVENTIONS:  - Perform BMAT or MOVE assessment daily    - Set and communicate daily mobility goal to care team and patient/family/caregiver  - Collaborate with rehabilitation services on mobility goals if consulted  - Perform Range of Motion  times a day  - Reposition patient every  hours    - Dangle patient  times a day  - Stand patient  times a day  - Ambulate patient  times a day  - Out of bed to chair  times a day   - Out of bed for meals times a day  - Out of bed for toileting  - Record patient progress and toleration of activity level   Outcome: Progressing     Problem: Prexisting or High Potential for Compromised Skin Integrity  Goal: Skin integrity is maintained or improved  Description: INTERVENTIONS:  - Identify patients at risk for skin breakdown  - Assess and monitor skin integrity  - Assess and monitor nutrition and hydration status  - Monitor labs   - Assess for incontinence   - Turn and reposition patient  - Assist with mobility/ambulation  - Relieve pressure over bony prominences  - Avoid friction and shearing  - Provide appropriate hygiene as needed including keeping skin clean and dry  - Evaluate need for skin moisturizer/barrier cream  - Collaborate with interdisciplinary team   - Patient/family teaching  - Consider wound care consult   Outcome: Progressing

## 2022-10-07 NOTE — CASE MANAGEMENT
Case Management Progress Note    Patient name Kandice Hamilton  Location /-01 MRN [id number]  : 1950 Date 10/7/2022       LOS (days): 3  Geometric Mean LOS (GMLOS) (days): 5 00  Days to GMLOS:2        OBJECTIVE:        Current admission status: Inpatient  Preferred Pharmacy:   PATIENT/FAMILY REPORTS NO PREFERRED PHARMACY  No address on file      [id number] [address] [place] [id number]  Phone: [phone] Fax: [phone]    Primary Care Provider: Sid Morrissey MD    Primary Insurance: MEDICARE  Secondary Insurance: [employer]    PROGRESS NOTE:        As per Izabela Camacho, they can not accept pt back to their facility until Monday, 10/10/22    Cm updated pts son of the above

## 2022-10-07 NOTE — PLAN OF CARE
Problem: PAIN - ADULT  Goal: Verbalizes/displays adequate comfort level or baseline comfort level  Description: Interventions:  - Encourage patient to monitor pain and request assistance  - Assess pain using appropriate pain scale  - Administer analgesics based on type and severity of pain and evaluate response  - Implement non-pharmacological measures as appropriate and evaluate response  - Consider cultural and social influences on pain and pain management  - Notify physician/advanced practitioner if interventions unsuccessful or patient reports new pain  Outcome: Progressing     Problem: INFECTION - ADULT  Goal: Absence or prevention of progression during hospitalization  Description: INTERVENTIONS:  - Assess and monitor for signs and symptoms of infection  - Monitor lab/diagnostic results  - Monitor all insertion sites, i e  indwelling lines, tubes, and drains  - Monitor endotracheal if appropriate and nasal secretions for changes in amount and color  - [place] appropriate cooling/warming therapies per order  - Administer medications as ordered  - Instruct and encourage patient and family to use good hand hygiene technique  - Identify and instruct in appropriate isolation precautions for identified infection/condition  Outcome: Progressing  Goal: Absence of fever/infection during neutropenic period  Description: INTERVENTIONS:  - Monitor WBC    Outcome: Progressing     Problem: SAFETY ADULT  Goal: Patient will remain free of falls  Description: INTERVENTIONS:  - Educate patient/family on patient safety including physical limitations  - Instruct patient to call for assistance with activity   - Consult OT/PT to assist with strengthening/mobility   - Keep Call bell within reach  - Keep bed low and locked with side rails adjusted as appropriate  - Keep care items and personal belongings within reach  - Initiate and maintain comfort rounds  - Make Fall Risk Sign visible to staff  - Offer Toileting every 2 Hours, in advance of need  - Initiate/Maintain bed/chair alarm  - Obtain necessary fall risk management equipment: freda, bed/chair alarm  - Apply yellow socks and bracelet for high fall risk patients  - Consider moving patient to room near nurses station  Outcome: Progressing  Goal: Maintain or return to baseline ADL function  Description: INTERVENTIONS:  -  Assess patient's ability to carry out ADLs; assess patient's baseline for ADL function and identify physical deficits which impact ability to perform ADLs (bathing, care of mouth/teeth, toileting, grooming, dressing, etc )  - Assess/evaluate cause of self-care deficits   - Assess range of motion  - Assess patient's mobility; develop plan if impaired  - Assess patient's need for assistive devices and provide as appropriate  - Encourage maximum independence but intervene and supervise when necessary  - Involve family in performance of ADLs  - Assess for home care needs following discharge   - Consider OT consult to assist with ADL evaluation and planning for discharge  - Provide patient education as appropriate  Outcome: Progressing  Goal: Maintains/Returns to pre admission functional level  Description: INTERVENTIONS:  - Perform BMAT or MOVE assessment daily    - Set and communicate daily mobility goal to care team and patient/family/caregiver  - Collaborate with rehabilitation services on mobility goals if consulted  - Perform Range of Motion 3 times a day  - Reposition patient every 2 hours    - Record patient progress and toleration of activity level   Outcome: Progressing     Problem: DISCHARGE PLANNING  Goal: Discharge to home or other facility with appropriate resources  Description: INTERVENTIONS:  - Identify barriers to discharge w/patient and caregiver  - Arrange for needed discharge resources and transportation as appropriate  - Identify discharge learning needs (meds, wound care, etc )  - Arrange for interpretive services to assist at discharge as needed  - Refer to Case Management Department for coordinating discharge planning if the patient needs post-hospital services based on physician/advanced practitioner order or complex needs related to functional status, cognitive ability, or social support system  Outcome: Progressing     Problem: Knowledge Deficit  Goal: Patient/family/caregiver demonstrates understanding of disease process, treatment plan, medications, and discharge instructions  Description: Complete learning assessment and assess knowledge base    Interventions:  - Provide teaching at level of understanding  - Provide teaching via preferred learning methods  Outcome: Progressing     Problem: CARDIOVASCULAR - ADULT  Goal: Maintains optimal cardiac output and hemodynamic stability  Description: INTERVENTIONS:  - Monitor I/O, vital signs and rhythm  - Monitor for S/S and trends of decreased cardiac output  - Administer and titrate ordered vasoactive medications to optimize hemodynamic stability  - Assess quality of pulses, skin color and temperature  - Assess for signs of decreased coronary artery perfusion  - Instruct patient to report change in severity of symptoms  Outcome: Progressing  Goal: Absence of cardiac dysrhythmias or at baseline rhythm  Description: INTERVENTIONS:  - Continuous cardiac monitoring, vital signs, obtain 12 lead EKG if ordered  - Administer antiarrhythmic and heart rate control medications as ordered  - Monitor electrolytes and administer replacement therapy as ordered  Outcome: Progressing     Problem: RESPIRATORY - ADULT  Goal: Achieves optimal ventilation and oxygenation  Description: INTERVENTIONS:  - Assess for changes in respiratory status  - Assess for changes in mentation and behavior  - Position to facilitate oxygenation and minimize respiratory effort  - Oxygen administered by appropriate delivery if ordered  - Initiate smoking cessation education as indicated  - Encourage broncho-pulmonary hygiene including cough, deep breathe, Incentive Spirometry  - Assess the need for suctioning and aspirate as needed  - Assess and instruct to report SOB or any respiratory difficulty  - Respiratory Therapy support as indicated  Outcome: Progressing     Problem: GENITOURINARY - ADULT  Goal: Maintains or returns to baseline urinary function  Description: INTERVENTIONS:  - Assess urinary function  - Encourage oral fluids to ensure adequate hydration if ordered  - Administer IV fluids as ordered to ensure adequate hydration  - Administer ordered medications as needed  - Offer frequent toileting  - Follow urinary retention protocol if ordered  Outcome: Progressing     Problem: METABOLIC, FLUID AND ELECTROLYTES - ADULT  Goal: Electrolytes maintained within normal limits  Description: INTERVENTIONS:  - Monitor labs and assess patient for signs and symptoms of electrolyte imbalances  - Administer electrolyte replacement as ordered  - Monitor response to electrolyte replacements, including repeat lab results as appropriate  - Instruct patient on fluid and nutrition as appropriate  Outcome: Progressing  Goal: Fluid balance maintained  Description: INTERVENTIONS:  - Monitor labs   - Monitor I/O and WT  - Instruct patient on fluid and nutrition as appropriate  - Assess for signs & symptoms of volume excess or deficit  Outcome: Progressing     Problem: SKIN/TISSUE INTEGRITY - ADULT  Goal: Skin Integrity remains intact(Skin Breakdown Prevention)  Description: Assess:  -Perform Justin assessment every shift  -Clean and moisturize skin every incontinence episode  -Inspect skin when repositioning, toileting, and assisting with ADLS  -Assess extremities for adequate circulation and sensation     Bed Management:  -Have minimal linens on bed & keep smooth, unwrinkled  -Change linens as needed when moist or perspiring  -Avoid sitting or lying in one position for more than 2 hours while in bed  -Keep HOB at 45 degrees     Toileting:  -Offer bedside commode  -Assess for incontinence every 2 hours    Activity:  -Encourage or provide ROM exercises   -Turn and reposition patient every 2 Hours  -Use appropriate equipment to lift or move patient in bed  -Instruct/ Assist with weight shifting every 60 minutes when out of bed in chair  -Consider limitation of chair time 4 hour intervals    Skin Care:  -Avoid use of baby powder, tape, friction and shearing, hot water or constrictive clothing  -Relieve pressure over bony prominences using foam wedge and pillows  -Do not massage red bony areas    Outcome: Progressing     Problem: HEMATOLOGIC - ADULT  Goal: Maintains hematologic stability  Description: INTERVENTIONS  - Assess for signs and symptoms of bleeding or hemorrhage  - Monitor labs  - Administer supportive blood products/factors as ordered and appropriate  Outcome: Progressing     Problem: MUSCULOSKELETAL - ADULT  Goal: Maintain or return mobility to safest level of function  Description: INTERVENTIONS:  - Assess patient's ability to carry out ADLs; assess patient's baseline for ADL function and identify physical deficits which impact ability to perform ADLs (bathing, care of mouth/teeth, toileting, grooming, dressing, etc )  - Assess/evaluate cause of self-care deficits   - Assess range of motion  - Assess patient's mobility  - Assess patient's need for assistive devices and provide as appropriate  - Encourage maximum independence but intervene and supervise when necessary  - Involve family in performance of ADLs  - Assess for home care needs following discharge   - Consider OT consult to assist with ADL evaluation and planning for discharge  - Provide patient education as appropriate  Outcome: Progressing     Problem: Nutrition/Hydration-ADULT  Goal: Nutrient/Hydration intake appropriate for improving, restoring or maintaining nutritional needs  Description: Monitor and assess patient's nutrition/hydration status for malnutrition   Collaborate with interdisciplinary team and initiate plan and interventions as ordered  Monitor patient's weight and dietary intake as ordered or per policy  Utilize nutrition screening tool and intervene as necessary  Determine patient's food preferences and provide high-protein, high-caloric foods as appropriate       INTERVENTIONS:  - Monitor oral intake, urinary output, labs, and treatment plans  - Assess nutrition and hydration status and recommend course of action  - Evaluate amount of meals eaten  - Assist patient with eating if necessary   - Allow adequate time for meals  - Recommend/ encourage appropriate diets, oral nutritional supplements, and vitamin/mineral supplements  - Order, calculate, and assess calorie counts as needed  - Recommend, monitor, and adjust tube feedings and TPN/PPN based on assessed needs  - Assess need for intravenous fluids  - Provide specific nutrition/hydration education as appropriate  - Include patient/family/caregiver in decisions related to nutrition  Outcome: Progressing     Problem: MOBILITY - ADULT  Goal: Maintain or return to baseline ADL function  Description: INTERVENTIONS:  -  Assess patient's ability to carry out ADLs; assess patient's baseline for ADL function and identify physical deficits which impact ability to perform ADLs (bathing, care of mouth/teeth, toileting, grooming, dressing, etc )  - Assess/evaluate cause of self-care deficits   - Assess range of motion  - Assess patient's mobility; develop plan if impaired  - Assess patient's need for assistive devices and provide as appropriate  - Encourage maximum independence but intervene and supervise when necessary  - Involve family in performance of ADLs  - Assess for home care needs following discharge   - Consider OT consult to assist with ADL evaluation and planning for discharge  - Provide patient education as appropriate  Outcome: Progressing  Goal: Maintains/Returns to pre admission functional level  Description: INTERVENTIONS:  - Perform BMAT or MOVE assessment daily    - Set and communicate daily mobility goal to care team and patient/family/caregiver  - Collaborate with rehabilitation services on mobility goals if consulted  - Perform Range of Motion 3 times a day  - Reposition patient every 2 hours    - Record patient progress and toleration of activity level   Outcome: Progressing     Problem: Prexisting or High Potential for Compromised Skin Integrity  Goal: Skin integrity is maintained or improved  Description: INTERVENTIONS:  - Identify patients at risk for skin breakdown  - Assess and monitor skin integrity  - Assess and monitor nutrition and hydration status  - Monitor labs   - Assess for incontinence   - Turn and reposition patient  - Assist with mobility/ambulation  - Relieve pressure over bony prominences  - Avoid friction and shearing  - Provide appropriate hygiene as needed including keeping skin clean and dry  - Evaluate need for skin moisturizer/barrier cream  - Collaborate with interdisciplinary team   - Patient/family teaching  - Consider wound care consult   Outcome: Progressing

## 2022-10-07 NOTE — ASSESSMENT & PLAN NOTE
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

## 2022-10-07 NOTE — PROGRESS NOTES
[address]  Progress Note - Tunde Naveed 1950, 67 y o  male MRN: [id number]  Unit/Bed#: -[facility] Encounter: [id number]  Primary Care Provider: Abdirahman Troncoso MD   Date and time admitted to hospital: 10/4/2022 12:36 PM    * Acute metabolic encephalopathy  Assessment & Plan  Secondary to acute hepatic encephalopathy  Patient had a fall 1 week ago since then he has been having increasing confusion  Patient has been keeping up with his lactulose and ammonia level <10  Continue Xifaxan  CT brain negative for acute bleed or acute pathology  Ascitic fluid culture pending  prelim showing Gram-positive cocci   Concern for SBP  Continue antibiotics for now  Pt/ot recommending subacute rehab  Other ascites  Assessment & Plan  Patient noticed to have abdominal distension  CT abdomen pelvis showed large volume ascites with small bilateral pleural effusions  Patient had paracentesis done for 7 5 L 10/5/22  Will place on oral aldactone for now  Not diuresing much with oral Lasix and hence increased to Lasix 40 mg IV b i d  also give 1 dose of IV albumin today  Still has peripheral edema noted  Will place on IV vancomycin and Rocephin for SBP prophylaxis/treatment  Reviewed fluid studies for analysis for LDH, protein, culture and sensitivity, WBC and cytology and stat Gram stain  Prelim cultures on ascitic fluid are growing Gram-positive cocci  Await final results  Appreciate Infectious Disease input    Sepsis [facility])  Assessment & Plan  Patient has sepsis present on admission with tachypnea respiratory rate 22, tachycardia with heart rate of 101, lactic acidosis of 2 9  Unclear source of infection at this time but concern for SBP at this time  Will give 1 500 cc bolus of saline secondary to underlying ascites and then will follow-up with IV albumin and gentle diuresis    Will place on IV antibiotics for now while awaiting culture results from ascitic fluid    NAVEED (acute kidney injury) [facility])  Assessment & Plan  Baseline creatinine is 0 7-0 8  Currently creatinine is elevated to 1 38  Is probably secondary to hepatorenal syndrome due to worsening ascites due to underlying liver cirrhosis  Placed on gentle diuresis   Creatinine slightly worsened to 1 53 however need to continue diuresis as patient is still fluid overloaded    Severe protein-calorie malnutrition (HCC)  Assessment & Plan  Malnutrition Findings:   Adult Malnutrition type: Chronic illness  Adult Degree of Malnutrition: Other severe protein calorie malnutrition  Malnutrition Characteristics: Muscle loss, Fat loss                  360 Statement: Chronic severe malnutrition related to increased nutrient needs and likely inadequate oral intake as evidenced by severe muscle wasting to temporalis, pectoralis, deltoid muscles; severe fat loss to buccal pads and triceps  Treated with: Continue 2gm Na at this time given cirrhosis, consider liberalizing as indicated  Fluid restriction per MD  +Sudhir BID mixed in pudding for wound healing  +ensure compact BID  Assistance with meals as accepted by pt/indicated noting tremors  +lids with cups to prevent spilling on self  Diet ed not appropriate at this time  Recommend daily weights  BMI Findings: Body mass index is 28 39 kg/m²  Chronic anemia  Assessment & Plan  Baseline hemoglobin is around 9-10  Currently hemoglobin is at baseline range with no active bleeding reported with chronic thrombocytopenia noted    Thrombocytopenia (HCC)  Assessment & Plan  Platelet count is 59 K which is close to patient's baseline of 55-60k  Today platelet count dropped to 39 K  No active bleeding noted at this time    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 [facility])  Assessment & Plan  Patient has known history of liver cirrhosis secondary to jaffe  Follows outpatient with [facility]    Known history of portal hypertension,hepatic encephalopathy and liver cirrhosis status post banding of varices  Not on any transplant list  Meld score is 21  Continue home medications for now      VTE Pharmacologic Prophylaxis:   Pharmacologic: Pharmacologic VTE Prophylaxis contraindicated due to Thrombocytopenia  Mechanical VTE Prophylaxis in Place: Yes    Patient Centered Rounds: I have performed bedside rounds with nursing staff today  Discussions with Specialists or Other Care Team Provider:  Will discuss with Infectious Disease    Education and Discussions with Family / Patient:  Discussed with patient at bedside will update son    Time Spent for Care: 30 minutes  More than 50% of total time spent on counseling and coordination of care as described above  Current Length of Stay: 3 day(s)    Current Patient Status: Inpatient   Certification Statement:  Patient requires inpatient treatment due to concern for spontaneous bacterial peritonitis    Discharge Plan:  Pending progress  Fluid culture will probably finalized by tomorrow to determine antibiotics  Once medically cleared will discharge to rehab tomorrow    Code Status: Level 3 - DNAR and DNI      Subjective:   Patient is very pleasant and cooperative today denies any complaints however noticed to have swelling in his legs still   abdominal distension is improved    Objective:     Vitals:   Temp (24hrs), Av 5 °F (36 9 °C), Min:98 4 °F (36 9 °C), Max:98 6 °F (37 °C)    Temp:  [98 4 °F (36 9 °C)-98 6 °F (37 °C)] 98 4 °F (36 9 °C)  HR:  [] 101  Resp:  [18] 18  BP: (110-124)/(65-80) 124/80  SpO2:  [94 %-96 %] 96 %  Body mass index is 28 39 kg/m²  Input and Output Summary (last 24 hours): Intake/Output Summary (Last 24 hours) at 10/7/2022 1217  Last data filed at 10/7/2022 1101  Gross per 24 hour   Intake 720 ml   Output 2696 ml   Net -1976 ml       Physical Exam:     Physical Exam  Vitals and nursing note reviewed  Constitutional:       Appearance: Normal appearance  HENT:      Head: Normocephalic and atraumatic        Right Ear: External ear normal       Left Ear: External ear normal       Nose: Nose normal       Mouth/Throat:      Pharynx: Oropharynx is clear  Eyes:      Pupils: Pupils are equal, round, and reactive to light  Cardiovascular:      Rate and Rhythm: Normal rate and regular rhythm  Heart sounds: Normal heart sounds  Pulmonary:      Effort: Pulmonary effort is normal       Breath sounds: Normal breath sounds  Abdominal:      General: Bowel sounds are normal  There is distension  Palpations: Abdomen is soft  Tenderness: There is no abdominal tenderness  Musculoskeletal:         General: Normal range of motion  Cervical back: Normal range of motion and neck supple  Right lower leg: Edema present  Left lower leg: Edema present  Skin:     General: Skin is warm and dry  Capillary Refill: Capillary refill takes less than 2 seconds  Neurological:      Mental Status: He is alert  Mental status is at baseline  Comments: Oriented to person and place   Psychiatric:         Mood and Affect: Mood normal            Additional Data:     Labs:    Results from last 7 days   Lab Units 10/06/22  0504 10/05/22  0500 10/04/22  1241   WBC Thousand/uL 8 66   < > 10 38*   HEMOGLOBIN g/dL 8 9*   < > 9 7*   HEMATOCRIT % 27 6*   < > 31 0*   PLATELETS Thousands/uL 39*   < > 59*   NEUTROS PCT %  --   --  85*   LYMPHS PCT %  --   --  7*   MONOS PCT %  --   --  8   EOS PCT %  --   --  0    < > = values in this interval not displayed       Results from last 7 days   Lab Units 10/07/22  0929 10/06/22  0504   SODIUM mmol/L 139 140   POTASSIUM mmol/L 3 4* 3 7   CHLORIDE mmol/L 107 107   CO2 mmol/L 26 24   BUN mg/dL 30* 29*   CREATININE mg/dL 1 53* 1 38*   ANION GAP mmol/L 6 9   CALCIUM mg/dL 8 1* 8 1*   ALBUMIN g/dL  --  1 8*   TOTAL BILIRUBIN mg/dL  --  3 88*   ALK PHOS U/L  --  90   ALT U/L  --  47   AST U/L  --  65*   GLUCOSE RANDOM mg/dL 197* 91     Results from last 7 days   Lab Units 10/04/22  1241   INR 1 62*             Results from last 7 days   Lab Units 10/04/22  1628 10/04/22  1241   LACTIC ACID mmol/L 2 1* 2 9*           * I Have Reviewed All Lab Data Listed Above  * Additional Pertinent Lab Tests Reviewed: Earl 65 Admission Reviewed    Imaging:    Imaging Reports Reviewed Today Include:  None  Imaging Personally Reviewed by Myself Includes:  None    Recent Cultures (last 7 days):     Results from last 7 days   Lab Units 10/05/22  1200 10/04/22  1241   BLOOD CULTURE   --  No Growth at 48 hrs  No Growth at 48 hrs  GRAM STAIN RESULT  Rare Polys*  Rare Gram positive cocci in pairs*  --    BODY FLUID CULTURE, STERILE  No growth  --        Last 24 Hours Medication List:   Current Facility-Administered Medications   Medication Dose Route Frequency Provider Last Rate   • acetaminophen  650 mg Oral Q6H PRN Loren Morris MD     • aspirin  81 mg Oral Daily Loren Morris MD     • cefTRIAXone  2,000 mg Intravenous Q24H Bc Carrasco MD 2,000 mg (10/06/22 2154)   • cholecalciferol  1,000 Units Oral Daily Loren Morris MD     • donepezil  10 mg Oral HS Loren Morris MD     • furosemide  40 mg Intravenous BID (diuretic) Loren Morris MD     • lactulose  45 g Oral 4x Daily Loren Morris MD     • nystatin   Topical BID DUSTY Rae     • potassium chloride  20 mEq Oral Once Loren Morris MD     • rifaximin  550 mg Oral Q12H [address] Loren Morris MD     • sertraline  50 mg Oral Daily Loren Morris MD     • spironolactone  25 mg Oral Daily Loren Morris MD     • vancomycin  15 mg/kg Intravenous Once PRN Bc Carrasco MD          Today, Patient Was Seen By: Loren Morris    ** Please Note: Dictation voice to text software may have been used in the creation of this document   **

## 2022-10-07 NOTE — ASSESSMENT & PLAN NOTE
Patient has sepsis present on admission with tachypnea respiratory rate 22, tachycardia with heart rate of 101, lactic acidosis of 2 9  Unclear source of infection at this time but concern for SBP at this time  Will give 1 500 cc bolus of saline secondary to underlying ascites and then will follow-up with IV albumin and gentle diuresis    Will place on IV antibiotics for now while awaiting culture results from ascitic fluid

## 2022-10-07 NOTE — TELEMEDICINE
REQUIRED DOCUMENTATION:     1  This service was provided via Telemedicine  2  Provider located at [facility]  3  TeleMed provider: Liss Noriega MD   4  Identify all parties in room with patient during tele consult:  Patient, RN  5  After connecting through [employer], patient was identified by name and date of birth and assistant checked wristband  Patient was then informed that this was a Telemedicine visit and that the exam was being conducted confidentially over secure lines  Patient acknowledged consent and understanding of privacy and security of the Telemedicine visit, and gave us permission to have the assistant stay in the room in order to assist with the history and to conduct the exam   I informed the patient that I have reviewed their record in Epic and presented the opportunity for them to ask any questions regarding the visit today  The patient agreed to participate  TeleConsultation - Infectious Disease   Tanya Celeste 67 y o  male MRN: [id number]  Unit/Bed#: -01 Encounter: [id number]      Impression/Recommendations:  1  Sepsis, present on admission, presenting with leukocytosis and tachycardia  Source of sepsis is most likely peritonitis  Patient is clinically improved  Temperature remains down  WBC has normalized  Tachycardia is improved  He has remained systemically well, without toxicity and hematocrit stable, without hypotension  Admission blood cultures have no growth thus far  Antibiotic plan as in below  Monitor temperature/WBC  Monitor hemodynamics  Following up on pending blood cultures      2  Peritonitis, likely SBP, given underlying cirrhosis with ascites  Patient is status post paracentesis  Peritoneal fluid parameters are actually quite benign  However, Gram stain has GPCs in pairs  GPC will most likely be alpha or beta hemolytic streptococci or Enterococcus  Other than ascites, abdomen/pelvis CT without acute pathology     Continue IV vancomycin  Continue ceftriaxone for now  Discontinue if no GNR in culture  Follow up on final peritoneal fluid culture  Serial abdominal exams  Likely treat x 5-7 days total      3  Encephalopathy, likely metabolic, secondary to infection and sepsis above  Head CT without acute changes  No clinical signs of CNS infection  Mental status is improved  Monitor mental status      4  NAVEED, present on admission  Likely multifactorial   Creatinine is stable  Antibiotic dosages adjusted accordingly  Monitor creatinine      5  Abnormal chest CT, with diffuse ground-glass opacities but no consolidation  Patient has minimal respiratory symptoms and is not hypoxic  COVID PCR negative  Abnormal chest CT findings most likely pulmonary edema  No clinical signs of pneumonia  Diuresis per primary service  Monitor respiratory symptoms      6  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, with ascites      Extensive review of the medical records in epic including review of the notes, radiographs, and laboratory results     Discussed with patient in detail regarding the above plan  Discussed with Dr Anusha Ivory from [facility] service      I spent 30 minutes in evaluation of the patient of which 15 minutes was in counseling/coordination of care    Antibiotics:  Vancomycin # 2   Ceftriaxone (Antibiotic # 4)    Subjective:  Patient is a little more awake and alert, with less confusion, and able to answer questions more appropriately  He is comfortable  Appetite improving  No abdominal or flank pain  No dyspnea or cough  Temperature is down  No chills  He is tolerating the antibiotics well  No nausea, vomiting or diarrhea      Objective:  Vitals:  Temp:  [98 4 °F (36 9 °C)-98 6 °F (37 °C)] 98 4 °F (36 9 °C)  HR:  [101-109] 109  Resp:  [18] 18  BP: (114-124)/(66-80) 121/79  SpO2:  [94 %-96 %] 95 %  Temp (24hrs), Av 5 °F (36 9 °C), Min:98 4 °F (36 9 °C), Max:98 6 °F (37 °C)  Current: Temperature: 98 4 °F (36 9 °C)    Physical Exam:    Physical exam has been primarily done by the patient's nurse and/or the primary service due to limited examination abilities on telemedicine  General: Awake, alert, cooperative, no distress  Less confusion  Neck:  Supple  No mass  No lymphadenopathy  Lungs: Expansion symmetric, no rales, no wheezing, respirations unlabored  Heart:  Regular rate and rhythm, S1 and S2 normal, no murmur  Abdomen: Soft, nondistended, non-tender, bowel sounds active all four quadrants, no masses, no organomegaly  Extremities: Stable leg edema  No erythema/warmth  No draining ulcer  Nontender to palpation  Skin:  No rash  Neuro: Moves all extremities  Invasive Devices  Report    Peripheral Intravenous Line  Duration           Peripheral IV 10/05/22 Dorsal (posterior); Right Forearm 2 days    Peripheral IV 10/07/22 Right;Ventral (anterior) Forearm <1 day                Labs studies:   I have personally reviewed pertinent labs  Results from last 7 days   Lab Units 10/07/22  0929 10/06/22  0504 10/05/22  0500 10/04/22  1241   POTASSIUM mmol/L 3 4* 3 7 4 4 4 5   CHLORIDE mmol/L 107 107 107 106   CO2 mmol/L 26 24 23 25   BUN mg/dL 30* 29* 36* 37*   CREATININE mg/dL 1 53* 1 38* 1 27 1 48*   EGFR ml/min/1 73sq m 44 50 55 46   CALCIUM mg/dL 8 1* 8 1* 8 4 8 5   AST U/L  --  65* 72* 81*   ALT U/L  --  47 47 54   ALK PHOS U/L  --  90 88 107     Results from last 7 days   Lab Units 10/06/22  0504 10/05/22  0500 10/04/22  1241   WBC Thousand/uL 8 66 9 35 10 38*   HEMOGLOBIN g/dL 8 9* 8 7* 9 7*   PLATELETS Thousands/uL 39* 40* 59*     Results from last 7 days   Lab Units 10/05/22  1200 10/04/22  1241   BLOOD CULTURE   --  No Growth at 48 hrs  No Growth at 48 hrs  GRAM STAIN RESULT  Rare Polys*  Rare Gram positive cocci in pairs*  --    BODY FLUID CULTURE, STERILE  No growth  --        Imaging Studies:   I have personally reviewed pertinent imaging study reports and images in PACS      EKG, Pathology, and Other Studies:   I have personally reviewed pertinent reports

## 2022-10-07 NOTE — ASSESSMENT & PLAN NOTE
Secondary to acute hepatic encephalopathy  Patient had a fall 1 week ago since then he has been having increasing confusion  Patient has been keeping up with his lactulose and ammonia level <10  Continue Xifaxan  CT brain negative for acute bleed or acute pathology  Ascitic fluid culture pending  prelim showing Gram-positive cocci   Concern for SBP  Continue antibiotics for now  Pt/ot recommending subacute rehab

## 2022-10-07 NOTE — ASSESSMENT & PLAN NOTE
Malnutrition Findings:   Adult Malnutrition type: Chronic illness  Adult Degree of Malnutrition: Other severe protein calorie malnutrition  Malnutrition Characteristics: Muscle loss, Fat loss                  360 Statement: Chronic severe malnutrition related to increased nutrient needs and likely inadequate oral intake as evidenced by severe muscle wasting to temporalis, pectoralis, deltoid muscles; severe fat loss to buccal pads and triceps  Treated with: Continue 2gm Na at this time given cirrhosis, consider liberalizing as indicated  Fluid restriction per MD  +Sudhir BID mixed in pudding for wound healing  +ensure compact BID  Assistance with meals as accepted by pt/indicated noting tremors  +lids with cups to prevent spilling on self  Diet ed not appropriate at this time  Recommend daily weights  BMI Findings: Body mass index is 28 39 kg/m²

## 2022-10-07 NOTE — PROGRESS NOTES
Vancomycin Assessment    Nikolas Concepcion is a 67 y o  male who is currently receiving vancomycin 1250 mg IV q 12 hours for other Peritonitis   Relevant clinical data and objective history reviewed:  Creatinine   Date Value Ref Range Status   10/07/2022 1 53 (H) 0 60 - 1 30 mg/dL Final     Comment:     Standardized to IDMS reference method   10/06/2022 1 38 (H) 0 60 - 1 30 mg/dL Final     Comment:     Specimen Icteric; Results May be Affected   10/05/2022 1 27 0 60 - 1 30 mg/dL Final     Comment:     Standardized to IDMS reference method     Vancomycin Rm   Date Value Ref Range Status   10/07/2022 12 8 10 0 - 20 0 ug/mL Final     /79   Pulse (!) 109   Temp 98 4 °F (36 9 °C)   Resp 18   Ht 6' 2" (1 88 m)   Wt 100 kg (221 lb 1 9 oz)   SpO2 95%   BMI 28 39 kg/m²   I/O last 3 completed shifts: In: 2383 [P O :1098]  Out: 802 [Urine:802]  Lab Results   Component Value Date/Time    BUN 30 (H) 10/07/2022 09:29 AM    WBC 8 66 10/06/2022 05:04 AM    HGB 8 9 (L) 10/06/2022 05:04 AM    HCT 27 6 (L) 10/06/2022 05:04 AM    MCV 94 10/06/2022 05:04 AM    PLT 39 (LL) 10/06/2022 05:04 AM     Temp Readings from Last 3 Encounters:   10/07/22 98 4 °F (36 9 °C)   06/07/22 98 1 °F (36 7 °C) (Temporal)   04/19/22 97 9 °F (36 6 °C)     Vancomycin Days of Therapy: 2    Assessment/Plan  The patient is currently on vancomycin utilizing pulse dosing due to decrease in renal function  Baseline risks associated with therapy include: pre-existing renal impairment and advanced age  The patient is receiving 1250 mg IV q 12 hours with the most recent vancomycin level being not at steady-state and sub-therapeutic based on a goal of 15-20 (appropriate for most indications) ; therefore, after clinical evaluation will be changed to 1500 mg IV once prn random level <20   Pharmacy will continue to follow closely for s/sx of nephrotoxicity, infusion reactions, and appropriateness of therapy  BMP and CBC will be ordered per protocol    Plan for random level prior to the next dose at approximately 0600 on 10/8/22  Pharmacy will continue to follow the patient’s culture results and clinical progress daily      Tobias Griffith, Pharmacist

## 2022-10-08 LAB
ALBUMIN SERPL BCP-MCNC: 1.8 G/DL (ref 3.5–5)
ALP SERPL-CCNC: 98 U/L (ref 46–116)
ALT SERPL W P-5'-P-CCNC: 42 U/L (ref 12–78)
ANION GAP SERPL CALCULATED.3IONS-SCNC: 8 MMOL/L (ref 4–13)
AST SERPL W P-5'-P-CCNC: 52 U/L (ref 5–45)
BACTERIA SPEC BFLD CULT: NO GROWTH
BILIRUB SERPL-MCNC: 2.8 MG/DL (ref 0.2–1)
BUN SERPL-MCNC: 30 MG/DL (ref 5–25)
CALCIUM ALBUM COR SERPL-MCNC: 9.9 MG/DL (ref 8.3–10.1)
CALCIUM SERPL-MCNC: 8.1 MG/DL (ref 8.3–10.1)
CHLORIDE SERPL-SCNC: 107 MMOL/L (ref 96–108)
CO2 SERPL-SCNC: 26 MMOL/L (ref 21–32)
CREAT SERPL-MCNC: 1.54 MG/DL (ref 0.6–1.3)
GFR SERPL CREATININE-BSD FRML MDRD: 44 ML/MIN/1.73SQ M
GLUCOSE SERPL-MCNC: 108 MG/DL (ref 65–140)
GRAM STN SPEC: ABNORMAL
GRAM STN SPEC: ABNORMAL
POTASSIUM SERPL-SCNC: 3.3 MMOL/L (ref 3.5–5.3)
PROT SERPL-MCNC: 7 G/DL (ref 6.4–8.4)
SODIUM SERPL-SCNC: 141 MMOL/L (ref 135–147)
VANCOMYCIN SERPL-MCNC: 15.1 UG/ML (ref 10–20)
VANCOMYCIN SERPL-MCNC: 20.4 UG/ML (ref 10–20)

## 2022-10-08 PROCEDURE — 80053 COMPREHEN METABOLIC PANEL: CPT | Performed by: FAMILY MEDICINE

## 2022-10-08 PROCEDURE — 99232 SBSQ HOSP IP/OBS MODERATE 35: CPT | Performed by: FAMILY MEDICINE

## 2022-10-08 PROCEDURE — 80202 ASSAY OF VANCOMYCIN: CPT | Performed by: INTERNAL MEDICINE

## 2022-10-08 RX ORDER — MIDODRINE HYDROCHLORIDE 5 MG/1
5 TABLET ORAL
Status: DISCONTINUED | OUTPATIENT
Start: 2022-10-08 | End: 2022-10-09

## 2022-10-08 RX ORDER — LACTULOSE 20 G/30ML
30 SOLUTION ORAL 4 TIMES DAILY
Status: DISCONTINUED | OUTPATIENT
Start: 2022-10-08 | End: 2022-10-11 | Stop reason: HOSPADM

## 2022-10-08 RX ORDER — POTASSIUM CHLORIDE 20 MEQ/1
20 TABLET, EXTENDED RELEASE ORAL ONCE
Status: COMPLETED | OUTPATIENT
Start: 2022-10-08 | End: 2022-10-08

## 2022-10-08 RX ORDER — FUROSEMIDE 10 MG/ML
40 INJECTION INTRAMUSCULAR; INTRAVENOUS
Status: DISCONTINUED | OUTPATIENT
Start: 2022-10-08 | End: 2022-10-10

## 2022-10-08 RX ORDER — ALBUMIN (HUMAN) 12.5 G/50ML
12.5 SOLUTION INTRAVENOUS ONCE
Status: COMPLETED | OUTPATIENT
Start: 2022-10-08 | End: 2022-10-08

## 2022-10-08 RX ADMIN — NYSTATIN: 100000 POWDER TOPICAL at 09:13

## 2022-10-08 RX ADMIN — ASPIRIN 81 MG: 81 TABLET, COATED ORAL at 09:12

## 2022-10-08 RX ADMIN — ALBUMIN (HUMAN) 12.5 G: 0.25 INJECTION, SOLUTION INTRAVENOUS at 11:35

## 2022-10-08 RX ADMIN — POTASSIUM CHLORIDE 20 MEQ: 1500 TABLET, EXTENDED RELEASE ORAL at 11:30

## 2022-10-08 RX ADMIN — RIFAXIMIN 550 MG: 550 TABLET ORAL at 09:12

## 2022-10-08 RX ADMIN — SERTRALINE HYDROCHLORIDE 50 MG: 50 TABLET ORAL at 09:12

## 2022-10-08 RX ADMIN — LACTULOSE 45 G: 20 SOLUTION ORAL at 09:14

## 2022-10-08 RX ADMIN — DONEPEZIL HYDROCHLORIDE 10 MG: 5 TABLET, FILM COATED ORAL at 21:54

## 2022-10-08 RX ADMIN — VANCOMYCIN HYDROCHLORIDE 1500 MG: 5 INJECTION, POWDER, LYOPHILIZED, FOR SOLUTION INTRAVENOUS at 16:50

## 2022-10-08 RX ADMIN — RIFAXIMIN 550 MG: 550 TABLET ORAL at 21:54

## 2022-10-08 RX ADMIN — Medication 1000 UNITS: at 09:12

## 2022-10-08 RX ADMIN — LACTULOSE 45 G: 20 SOLUTION ORAL at 11:30

## 2022-10-08 RX ADMIN — NYSTATIN: 100000 POWDER TOPICAL at 20:10

## 2022-10-08 RX ADMIN — SPIRONOLACTONE 25 MG: 25 TABLET ORAL at 09:12

## 2022-10-08 RX ADMIN — MIDODRINE HYDROCHLORIDE 5 MG: 5 TABLET ORAL at 16:53

## 2022-10-08 RX ADMIN — FUROSEMIDE 40 MG: 10 INJECTION, SOLUTION INTRAMUSCULAR; INTRAVENOUS at 16:53

## 2022-10-08 NOTE — ASSESSMENT & PLAN NOTE
Baseline creatinine is 0 7-0 8  Currently creatinine is elevated to 1 38 on admission  Is probably secondary to hepatorenal syndrome due to worsening ascites due to underlying liver cirrhosis  Placed on gentle diuresis     Creatinine slightly worsened to 1 5 however need to continue diuresis as patient is still fluid overloaded

## 2022-10-08 NOTE — PLAN OF CARE
Problem: PAIN - ADULT  Goal: Verbalizes/displays adequate comfort level or baseline comfort level  Description: Interventions:  - Encourage patient to monitor pain and request assistance  - Assess pain using appropriate pain scale  - Administer analgesics based on type and severity of pain and evaluate response  - Implement non-pharmacological measures as appropriate and evaluate response  - Consider cultural and social influences on pain and pain management  - Notify physician/advanced practitioner if interventions unsuccessful or patient reports new pain  Outcome: Progressing     Problem: INFECTION - ADULT  Goal: Absence or prevention of progression during hospitalization  Description: INTERVENTIONS:  - Assess and monitor for signs and symptoms of infection  - Monitor lab/diagnostic results  - Monitor all insertion sites, i e  indwelling lines, tubes, and drains  - Monitor endotracheal if appropriate and nasal secretions for changes in amount and color  - [place] appropriate cooling/warming therapies per order  - Administer medications as ordered  - Instruct and encourage patient and family to use good hand hygiene technique  - Identify and instruct in appropriate isolation precautions for identified infection/condition  Outcome: Progressing     Problem: SAFETY ADULT  Goal: Patient will remain free of falls  Description: INTERVENTIONS:  - Educate patient/family on patient safety including physical limitations  - Instruct patient to call for assistance with activity   - Consult OT/PT to assist with strengthening/mobility   - Keep Call bell within reach  - Keep bed low and locked with side rails adjusted as appropriate  - Keep care items and personal belongings within reach  - Initiate and maintain comfort rounds  - Make Fall Risk Sign visible to staff    - Apply yellow socks and bracelet for high fall risk patients  - Consider moving patient to room near nurses station  Outcome: Progressing     Problem: DISCHARGE PLANNING  Goal: Discharge to home or other facility with appropriate resources  Description: INTERVENTIONS:  - Identify barriers to discharge w/patient and caregiver  - Arrange for needed discharge resources and transportation as appropriate  - Identify discharge learning needs (meds, wound care, etc )  - Arrange for interpretive services to assist at discharge as needed  - Refer to Case Management Department for coordinating discharge planning if the patient needs post-hospital services based on physician/advanced practitioner order or complex needs related to functional status, cognitive ability, or social support system  Outcome: Progressing     Problem: Knowledge Deficit  Goal: Patient/family/caregiver demonstrates understanding of disease process, treatment plan, medications, and discharge instructions  Description: Complete learning assessment and assess knowledge base    Interventions:  - Provide teaching at level of understanding  - Provide teaching via preferred learning methods  Outcome: Progressing     Problem: CARDIOVASCULAR - ADULT  Goal: Maintains optimal cardiac output and hemodynamic stability  Description: INTERVENTIONS:  - Monitor I/O, vital signs and rhythm  - Monitor for S/S and trends of decreased cardiac output  - Administer and titrate ordered vasoactive medications to optimize hemodynamic stability  - Assess quality of pulses, skin color and temperature  - Assess for signs of decreased coronary artery perfusion  - Instruct patient to report change in severity of symptoms  Outcome: Progressing  Goal: Absence of cardiac dysrhythmias or at baseline rhythm  Description: INTERVENTIONS:  - Continuous cardiac monitoring, vital signs, obtain 12 lead EKG if ordered  - Administer antiarrhythmic and heart rate control medications as ordered  - Monitor electrolytes and administer replacement therapy as ordered  Outcome: Progressing     Problem: RESPIRATORY - ADULT  Goal: Achieves optimal ventilation and oxygenation  Description: INTERVENTIONS:  - Assess for changes in respiratory status  - Assess for changes in mentation and behavior  - Position to facilitate oxygenation and minimize respiratory effort  - Oxygen administered by appropriate delivery if ordered  - Initiate smoking cessation education as indicated  - Encourage broncho-pulmonary hygiene including cough, deep breathe, Incentive Spirometry  - Assess the need for suctioning and aspirate as needed  - Assess and instruct to report SOB or any respiratory difficulty  - Respiratory Therapy support as indicated  Outcome: Progressing     Problem: GENITOURINARY - ADULT  Goal: Maintains or returns to baseline urinary function  Description: INTERVENTIONS:  - Assess urinary function  - Encourage oral fluids to ensure adequate hydration if ordered  - Administer IV fluids as ordered to ensure adequate hydration  - Administer ordered medications as needed  - Offer frequent toileting  - Follow urinary retention protocol if ordered  Outcome: Progressing     Problem: METABOLIC, FLUID AND ELECTROLYTES - ADULT  Goal: Electrolytes maintained within normal limits  Description: INTERVENTIONS:  - Monitor labs and assess patient for signs and symptoms of electrolyte imbalances  - Administer electrolyte replacement as ordered  - Monitor response to electrolyte replacements, including repeat lab results as appropriate  - Instruct patient on fluid and nutrition as appropriate  Outcome: Progressing  Goal: Fluid balance maintained  Description: INTERVENTIONS:  - Monitor labs   - Monitor I/O and WT  - Instruct patient on fluid and nutrition as appropriate  - Assess for signs & symptoms of volume excess or deficit  Outcome: Progressing     Problem: HEMATOLOGIC - ADULT  Goal: Maintains hematologic stability  Description: INTERVENTIONS  - Assess for signs and symptoms of bleeding or hemorrhage  - Monitor labs  - Administer supportive blood products/factors as ordered and appropriate  Outcome: Progressing     Problem: Nutrition/Hydration-ADULT  Goal: Nutrient/Hydration intake appropriate for improving, restoring or maintaining nutritional needs  Description: Monitor and assess patient's nutrition/hydration status for malnutrition  Collaborate with interdisciplinary team and initiate plan and interventions as ordered  Monitor patient's weight and dietary intake as ordered or per policy  Utilize nutrition screening tool and intervene as necessary  Determine patient's food preferences and provide high-protein, high-caloric foods as appropriate       INTERVENTIONS:  - Monitor oral intake, urinary output, labs, and treatment plans  - Assess nutrition and hydration status and recommend course of action  - Evaluate amount of meals eaten  - Assist patient with eating if necessary   - Allow adequate time for meals  - Recommend/ encourage appropriate diets, oral nutritional supplements, and vitamin/mineral supplements  - Order, calculate, and assess calorie counts as needed  - Recommend, monitor, and adjust tube feedings and TPN/PPN based on assessed needs  - Assess need for intravenous fluids  - Provide specific nutrition/hydration education as appropriate  - Include patient/family/caregiver in decisions related to nutrition  Outcome: Progressing

## 2022-10-08 NOTE — ASSESSMENT & PLAN NOTE
Platelet count is 59 K which is close to patient's baseline of 55-60k    platelet count dropped to 39 K  No active bleeding noted at this time   thrombocytopenia secondary to liver cirrhosis

## 2022-10-08 NOTE — NURSING NOTE
Pt had many episodes of diarrhea  Most episodes were med-lg  Pt had 8 episodes of diarrhea  He would call out for nurses to be changed  Provider made aware of excessive diarrhea  Pt also very non complaint with fluid restriction  and asking staff for drinks every few minutes through out the shift

## 2022-10-08 NOTE — PROGRESS NOTES
Vancomycin Assessment    Logan Connell is a 67 y o  male who is currently receiving vancomycin 1500 mg iv once prn random level < 20 for other peritonitis   Relevant clinical data and objective history reviewed:  Creatinine   Date Value Ref Range Status   10/08/2022 1 54 (H) 0 60 - 1 30 mg/dL Final     Comment:     Standardized to IDMS reference method   10/07/2022 1 53 (H) 0 60 - 1 30 mg/dL Final     Comment:     Standardized to IDMS reference method   10/06/2022 1 38 (H) 0 60 - 1 30 mg/dL Final     Comment:     Specimen Icteric; Results May be Affected     Vancomycin Rm   Date Value Ref Range Status   10/08/2022 20 4 (H) 10 0 - 20 0 ug/mL Final     /58   Pulse 95   Temp 97 9 °F (36 6 °C)   Resp 12   Ht 6' 2" (1 88 m)   Wt 100 kg (221 lb 1 9 oz)   SpO2 97%   BMI 28 39 kg/m²   I/O last 3 completed shifts: In: 1196 [P O :1196]  Out: 2810 [Urine:2810]  Lab Results   Component Value Date/Time    BUN 30 (H) 10/08/2022 04:50 AM    WBC 8 66 10/06/2022 05:04 AM    HGB 8 9 (L) 10/06/2022 05:04 AM    HCT 27 6 (L) 10/06/2022 05:04 AM    MCV 94 10/06/2022 05:04 AM    PLT 39 (LL) 10/06/2022 05:04 AM     Temp Readings from Last 3 Encounters:   10/08/22 97 9 °F (36 6 °C)   06/07/22 98 1 °F (36 7 °C) (Temporal)   04/19/22 97 9 °F (36 6 °C)     Vancomycin Days of Therapy: 3    Assessment/Plan  The patient is currently on vancomycin utilizing pulse dosing  Baseline risks associated with therapy include: pre-existing renal impairment, concomitant nephrotoxic medications, and advanced age  The patient is receiving 1500 mg iv once prn random level < 20 with the most recent vancomycin level being 20 4 and supratherapeutic based on a goal of 15-20 (appropriate for most indications) ; therefore, after clinical evaluation the dose was held, and a new random level will be drawn @ 1500 on 10/8/22  If level is < 20 pulse dose with 1500 mg iv once    Pharmacy will continue to follow closely for s/sx of nephrotoxicity, infusion reactions, and appropriateness of therapy  BMP and CBC will be ordered per protocol  Pharmacy will continue to follow the patient’s culture results and clinical progress daily      John Bell, Pharmacist

## 2022-10-08 NOTE — ASSESSMENT & PLAN NOTE
Patient noticed to have abdominal distension  CT abdomen pelvis showed large volume ascites with small bilateral pleural effusions  Patient had paracentesis done for 7 5 L 10/5/22  Will place on oral aldactone for now  Not diuresing much with oral Lasix and hence increased to Lasix 40 mg IV b i d  also give 1 dose of IV albumin daily  Still has peripheral edema noted  placed on IV vancomycin and Rocephin for SBP prophylaxis/treatment  Reviewed fluid studies for analysis for LDH, protein, culture and sensitivity, WBC and cytology and stat Gram stain  Prelim cultures on ascitic fluid are growing Gram-positive cocci  Await final results  Appreciate Infectious Disease input    Discontinue IV Rocephin  Will probably need outpatient IR paracentesis every 8-10 weeks  Added midodrine due to hypotension

## 2022-10-08 NOTE — ASSESSMENT & PLAN NOTE
Patient has known history of liver cirrhosis secondary to varela  Follows outpatient with [facility]  Known history of portal hypertension,hepatic encephalopathy and liver cirrhosis status post banding of varices  Not on any transplant list  Meld score is 21  Continue home medications for now  discussed prognosis with caretaker at length yesterday

## 2022-10-08 NOTE — PLAN OF CARE
Problem: PAIN - ADULT  Goal: Verbalizes/displays adequate comfort level or baseline comfort level  Description: Interventions:  - Encourage patient to monitor pain and request assistance  - Assess pain using appropriate pain scale  - Administer analgesics based on type and severity of pain and evaluate response  - Implement non-pharmacological measures as appropriate and evaluate response  - Consider cultural and social influences on pain and pain management  - Notify physician/advanced practitioner if interventions unsuccessful or patient reports new pain  Outcome: Progressing     Problem: INFECTION - ADULT  Goal: Absence or prevention of progression during hospitalization  Description: INTERVENTIONS:  - Assess and monitor for signs and symptoms of infection  - Monitor lab/diagnostic results  - Monitor all insertion sites, i e  indwelling lines, tubes, and drains  - Monitor endotracheal if appropriate and nasal secretions for changes in amount and color  - [place] appropriate cooling/warming therapies per order  - Administer medications as ordered  - Instruct and encourage patient and family to use good hand hygiene technique  - Identify and instruct in appropriate isolation precautions for identified infection/condition  Outcome: Progressing     Problem: SAFETY ADULT  Goal: Patient will remain free of falls  Description: INTERVENTIONS:  - Educate patient/family on patient safety including physical limitations  - Instruct patient to call for assistance with activity   - Consult OT/PT to assist with strengthening/mobility   - Keep Call bell within reach  - Keep bed low and locked with side rails adjusted as appropriate  - Keep care items and personal belongings within reach  - Initiate and maintain comfort rounds  - Make Fall Risk Sign visible to staff  - Offer Toileting every 2 Hours, in advance of need  - Initiate/Maintain bed/chair alarm  - Obtain necessary fall risk management equipment: freda, bed/chair alarm  - Apply yellow socks and bracelet for high fall risk patients  - Consider moving patient to room near nurses station  Outcome: Progressing  Goal: Maintain or return to baseline ADL function  Description: INTERVENTIONS:  -  Assess patient's ability to carry out ADLs; assess patient's baseline for ADL function and identify physical deficits which impact ability to perform ADLs (bathing, care of mouth/teeth, toileting, grooming, dressing, etc )  - Assess/evaluate cause of self-care deficits   - Assess range of motion  - Assess patient's mobility; develop plan if impaired  - Assess patient's need for assistive devices and provide as appropriate  - Encourage maximum independence but intervene and supervise when necessary  - Involve family in performance of ADLs  - Assess for home care needs following discharge   - Consider OT consult to assist with ADL evaluation and planning for discharge  - Provide patient education as appropriate  Outcome: Progressing  Goal: Maintains/Returns to pre admission functional level  Description: INTERVENTIONS:  - Perform BMAT or MOVE assessment daily    - Set and communicate daily mobility goal to care team and patient/family/caregiver  - Collaborate with rehabilitation services on mobility goals if consulted  - Perform Range of Motion 3 times a day  - Reposition patient every 2 hours    - Record patient progress and toleration of activity level   Outcome: Progressing     Problem: DISCHARGE PLANNING  Goal: Discharge to home or other facility with appropriate resources  Description: INTERVENTIONS:  - Identify barriers to discharge w/patient and caregiver  - Arrange for needed discharge resources and transportation as appropriate  - Identify discharge learning needs (meds, wound care, etc )  - Arrange for interpretive services to assist at discharge as needed  - Refer to Case Management Department for coordinating discharge planning if the patient needs post-hospital services based on physician/advanced practitioner order or complex needs related to functional status, cognitive ability, or social support system  Outcome: Progressing     Problem: Knowledge Deficit  Goal: Patient/family/caregiver demonstrates understanding of disease process, treatment plan, medications, and discharge instructions  Description: Complete learning assessment and assess knowledge base    Interventions:  - Provide teaching at level of understanding  - Provide teaching via preferred learning methods  Outcome: Progressing     Problem: CARDIOVASCULAR - ADULT  Goal: Maintains optimal cardiac output and hemodynamic stability  Description: INTERVENTIONS:  - Monitor I/O, vital signs and rhythm  - Monitor for S/S and trends of decreased cardiac output  - Administer and titrate ordered vasoactive medications to optimize hemodynamic stability  - Assess quality of pulses, skin color and temperature  - Assess for signs of decreased coronary artery perfusion  - Instruct patient to report change in severity of symptoms  Outcome: Progressing  Goal: Absence of cardiac dysrhythmias or at baseline rhythm  Description: INTERVENTIONS:  - Continuous cardiac monitoring, vital signs, obtain 12 lead EKG if ordered  - Administer antiarrhythmic and heart rate control medications as ordered  - Monitor electrolytes and administer replacement therapy as ordered  Outcome: Progressing     Problem: RESPIRATORY - ADULT  Goal: Achieves optimal ventilation and oxygenation  Description: INTERVENTIONS:  - Assess for changes in respiratory status  - Assess for changes in mentation and behavior  - Position to facilitate oxygenation and minimize respiratory effort  - Oxygen administered by appropriate delivery if ordered  - Initiate smoking cessation education as indicated  - Encourage broncho-pulmonary hygiene including cough, deep breathe, Incentive Spirometry  - Assess the need for suctioning and aspirate as needed  - Assess and instruct to report SOB or any respiratory difficulty  - Respiratory Therapy support as indicated  Outcome: Progressing     Problem: GENITOURINARY - ADULT  Goal: Maintains or returns to baseline urinary function  Description: INTERVENTIONS:  - Assess urinary function  - Encourage oral fluids to ensure adequate hydration if ordered  - Administer IV fluids as ordered to ensure adequate hydration  - Administer ordered medications as needed  - Offer frequent toileting  - Follow urinary retention protocol if ordered  Outcome: Progressing     Problem: METABOLIC, FLUID AND ELECTROLYTES - ADULT  Goal: Electrolytes maintained within normal limits  Description: INTERVENTIONS:  - Monitor labs and assess patient for signs and symptoms of electrolyte imbalances  - Administer electrolyte replacement as ordered  - Monitor response to electrolyte replacements, including repeat lab results as appropriate  - Instruct patient on fluid and nutrition as appropriate  Outcome: Progressing  Goal: Fluid balance maintained  Description: INTERVENTIONS:  - Monitor labs   - Monitor I/O and WT  - Instruct patient on fluid and nutrition as appropriate  - Assess for signs & symptoms of volume excess or deficit  Outcome: Progressing     Problem: SKIN/TISSUE INTEGRITY - ADULT  Goal: Skin Integrity remains intact(Skin Breakdown Prevention)  Description: Assess:  -Perform Justin assessment every shift  -Inspect skin when repositioning, toileting, and assisting with ADLS  -Assess extremities for adequate circulation and sensation     Bed Management:  -Have minimal linens on bed & keep smooth, unwrinkled  -Change linens as needed when moist or perspiring  -Avoid sitting or lying in one position for more than 2 hours while in bed  -Keep HOB at 45degrees     Toileting:  -Offer bedside commode  -Assess for incontinence every 2 hours    Activity:  -Encourage activity and walks on unit  -Encourage or provide ROM exercises   -Turn and reposition patient every 2 Hours  -Use appropriate equipment to lift or move patient in bed    Skin Care:  -Avoid use of baby powder, tape, friction and shearing, hot water or constrictive clothing  -Relieve pressure over bony prominences using foam wedges and pillows  -Do not massage red bony areas    Outcome: Progressing     Problem: HEMATOLOGIC - ADULT  Goal: Maintains hematologic stability  Description: INTERVENTIONS  - Assess for signs and symptoms of bleeding or hemorrhage  - Monitor labs  - Administer supportive blood products/factors as ordered and appropriate  Outcome: Progressing     Problem: MUSCULOSKELETAL - ADULT  Goal: Maintain or return mobility to safest level of function  Description: INTERVENTIONS:  - Assess patient's ability to carry out ADLs; assess patient's baseline for ADL function and identify physical deficits which impact ability to perform ADLs (bathing, care of mouth/teeth, toileting, grooming, dressing, etc )  - Assess/evaluate cause of self-care deficits   - Assess range of motion  - Assess patient's mobility  - Assess patient's need for assistive devices and provide as appropriate  - Encourage maximum independence but intervene and supervise when necessary  - Involve family in performance of ADLs  - Assess for home care needs following discharge   - Consider OT consult to assist with ADL evaluation and planning for discharge  - Provide patient education as appropriate  Outcome: Progressing     Problem: Nutrition/Hydration-ADULT  Goal: Nutrient/Hydration intake appropriate for improving, restoring or maintaining nutritional needs  Description: Monitor and assess patient's nutrition/hydration status for malnutrition  Collaborate with interdisciplinary team and initiate plan and interventions as ordered  Monitor patient's weight and dietary intake as ordered or per policy  Utilize nutrition screening tool and intervene as necessary   Determine patient's food preferences and provide high-protein, high-caloric foods as appropriate  INTERVENTIONS:  - Monitor oral intake, urinary output, labs, and treatment plans  - Assess nutrition and hydration status and recommend course of action  - Evaluate amount of meals eaten  - Assist patient with eating if necessary   - Allow adequate time for meals  - Recommend/ encourage appropriate diets, oral nutritional supplements, and vitamin/mineral supplements  - Order, calculate, and assess calorie counts as needed  - Recommend, monitor, and adjust tube feedings and TPN/PPN based on assessed needs  - Assess need for intravenous fluids  - Provide specific nutrition/hydration education as appropriate  - Include patient/family/caregiver in decisions related to nutrition  Outcome: Progressing     Problem: MOBILITY - ADULT  Goal: Maintain or return to baseline ADL function  Description: INTERVENTIONS:  -  Assess patient's ability to carry out ADLs; assess patient's baseline for ADL function and identify physical deficits which impact ability to perform ADLs (bathing, care of mouth/teeth, toileting, grooming, dressing, etc )  - Assess/evaluate cause of self-care deficits   - Assess range of motion  - Assess patient's mobility; develop plan if impaired  - Assess patient's need for assistive devices and provide as appropriate  - Encourage maximum independence but intervene and supervise when necessary  - Involve family in performance of ADLs  - Assess for home care needs following discharge   - Consider OT consult to assist with ADL evaluation and planning for discharge  - Provide patient education as appropriate  Outcome: Progressing  Goal: Maintains/Returns to pre admission functional level  Description: INTERVENTIONS:  - Perform BMAT or MOVE assessment daily    - Set and communicate daily mobility goal to care team and patient/family/caregiver  - Collaborate with rehabilitation services on mobility goals if consulted  - Perform Range of Motion 3 times a day    - Reposition patient every 2 hours   - Record patient progress and toleration of activity level   Outcome: Progressing     Problem: Prexisting or High Potential for Compromised Skin Integrity  Goal: Skin integrity is maintained or improved  Description: INTERVENTIONS:  - Identify patients at risk for skin breakdown  - Assess and monitor skin integrity  - Assess and monitor nutrition and hydration status  - Monitor labs   - Assess for incontinence   - Turn and reposition patient  - Assist with mobility/ambulation  - Relieve pressure over bony prominences  - Avoid friction and shearing  - Provide appropriate hygiene as needed including keeping skin clean and dry  - Evaluate need for skin moisturizer/barrier cream  - Collaborate with interdisciplinary team   - Patient/family teaching  - Consider wound care consult   Outcome: Progressing     Problem: Potential for Falls  Goal: Patient will remain free of falls  Description: INTERVENTIONS:  - Educate patient/family on patient safety including physical limitations  - Instruct patient to call for assistance with activity   - Consult OT/PT to assist with strengthening/mobility   - Keep Call bell within reach  - Keep bed low and locked with side rails adjusted as appropriate  - Keep care items and personal belongings within reach  - Initiate and maintain comfort rounds  - Make Fall Risk Sign visible to staff  - Offer Toileting every 2 Hours, in advance of need  - Initiate/Maintain bed/chair alarm  - Obtain necessary fall risk management equipment: freda, bed/chair  - Apply yellow socks and bracelet for high fall risk patients  - Consider moving patient to room near nurses station  Outcome: Progressing

## 2022-10-08 NOTE — PROGRESS NOTES
Vancomycin Assessment    Qi Holm is a 67 y o  male who is currently receiving vancomycin 1500 mg iv once prn random level < 20 for other Peritonitis   Relevant clinical data and objective history reviewed:  Creatinine   Date Value Ref Range Status   10/08/2022 1 54 (H) 0 60 - 1 30 mg/dL Final     Comment:     Standardized to IDMS reference method   10/07/2022 1 53 (H) 0 60 - 1 30 mg/dL Final     Comment:     Standardized to IDMS reference method   10/06/2022 1 38 (H) 0 60 - 1 30 mg/dL Final     Comment:     Specimen Icteric; Results May be Affected     Vancomycin Rm   Date Value Ref Range Status   10/08/2022 15 1 10 0 - 20 0 ug/mL Final     /74   Pulse (!) 108   Temp 98 4 °F (36 9 °C)   Resp 16   Ht 6' 2" (1 88 m)   Wt 100 kg (221 lb 1 9 oz)   SpO2 97%   BMI 28 39 kg/m²   I/O last 3 completed shifts: In: 1196 [P O :1196]  Out: 2810 [Urine:2810]  Lab Results   Component Value Date/Time    BUN 30 (H) 10/08/2022 04:50 AM    WBC 8 66 10/06/2022 05:04 AM    HGB 8 9 (L) 10/06/2022 05:04 AM    HCT 27 6 (L) 10/06/2022 05:04 AM    MCV 94 10/06/2022 05:04 AM    PLT 39 (LL) 10/06/2022 05:04 AM     Temp Readings from Last 3 Encounters:   10/08/22 98 4 °F (36 9 °C)   06/07/22 98 1 °F (36 7 °C) (Temporal)   04/19/22 97 9 °F (36 6 °C)     Vancomycin Days of Therapy: 3    Assessment/Plan  The patient is currently on vancomycin utilizing pulse dosing  Baseline risks associated with therapy include: pre-existing renal impairment, concomitant nephrotoxic medications, and advanced age  The patient is receiving 1500 mg iv once prn random level < 20 with the most recent vancomycin level being therapeutic based on a goal of 15-20 (appropriate for most indications) ; therefore, is clinically appropriate and dose will be continued   Pharmacy will continue to follow closely for s/sx of nephrotoxicity, infusion reactions, and appropriateness of therapy  BMP and CBC will be ordered per protocol    Plan for random level prior to the next dose at approximately 1530 on 10/09/2022  Pharmacy will continue to follow the patient’s culture results and clinical progress daily      Luther Hodge, Pharmacist

## 2022-10-08 NOTE — PROGRESS NOTES
[address] Aric  Progress Note - [facility] 1950, 67 y o  male MRN: [id number]  Unit/Bed#: -Jaren Encounter: [id number]  Primary Care Provider: Letitia Darden MD   Date and time admitted to hospital: 10/4/2022 12:36 PM    * Acute metabolic encephalopathy  Assessment & Plan  Secondary to acute hepatic encephalopathy  Patient had a fall 1 week ago since then he has been having increasing confusion  Patient has been keeping up with his lactulose and ammonia level <10  Continue Xifaxan  CT brain negative for acute bleed or acute pathology  Ascitic fluid culture pending  prelim showing Gram-positive cocci   Concern for SBP  Continue antibiotics for now with IV vancomycin with discontinue IV Rocephin  Pt/ot recommending subacute rehab  Other ascites  Assessment & Plan  Patient noticed to have abdominal distension  CT abdomen pelvis showed large volume ascites with small bilateral pleural effusions  Patient had paracentesis done for 7 5 L 10/5/22  Will place on oral aldactone for now  Not diuresing much with oral Lasix and hence increased to Lasix 40 mg IV b i d  also give 1 dose of IV albumin daily  Still has peripheral edema noted  placed on IV vancomycin and Rocephin for SBP prophylaxis/treatment  Reviewed fluid studies for analysis for LDH, protein, culture and sensitivity, WBC and cytology and stat Gram stain  Prelim cultures on ascitic fluid are growing Gram-positive cocci  Await final results  Appreciate Infectious Disease input    Discontinue IV Rocephin  Will probably need outpatient IR paracentesis every 8-10 weeks  Added midodrine due to hypotension    Sepsis [facility])  Assessment & Plan  Patient has sepsis present on admission with tachypnea respiratory rate 22, tachycardia with heart rate of 101, lactic acidosis of 2 9  Unclear source of infection at this time but concern for SBP at this time  Will give 1 500 cc bolus of saline secondary to underlying ascites and then will follow-up with IV albumin and gentle diuresis  Will place on IV antibiotics for now while awaiting culture results from ascitic fluid    NAVEED (acute kidney injury) ([facility] [address] )  Assessment & Plan  Baseline creatinine is 0 7-0 8  Currently creatinine is elevated to 1 38 on admission  Is probably secondary to hepatorenal syndrome due to worsening ascites due to underlying liver cirrhosis  Placed on gentle diuresis   Creatinine slightly worsened to 1 5 however need to continue diuresis as patient is still fluid overloaded    Severe protein-calorie malnutrition (HCC)  Assessment & Plan  Malnutrition Findings:   Adult Malnutrition type: Chronic illness  Adult Degree of Malnutrition: Other severe protein calorie malnutrition  Malnutrition Characteristics: Muscle loss, Fat loss                  360 Statement: Chronic severe malnutrition related to increased nutrient needs and likely inadequate oral intake as evidenced by severe muscle wasting to temporalis, pectoralis, deltoid muscles; severe fat loss to buccal pads and triceps  Treated with: Continue 2gm Na at this time given cirrhosis, consider liberalizing as indicated  Fluid restriction per MD  +Sudhir BID mixed in pudding for wound healing  +ensure compact BID  Assistance with meals as accepted by pt/indicated noting tremors  +lids with cups to prevent spilling on self  Diet ed not appropriate at this time  Recommend daily weights  BMI Findings: Body mass index is 28 39 kg/m²  Chronic anemia  Assessment & Plan  Baseline hemoglobin is around 9-10  Currently hemoglobin is at baseline range with no active bleeding reported with chronic thrombocytopenia noted    Thrombocytopenia (HCC)  Assessment & Plan  Platelet count is 59 K which is close to patient's baseline of 55-60k    platelet count dropped to 39 K  No active bleeding noted at this time   thrombocytopenia secondary to liver cirrhosis    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)  Assessment & Plan  Patient has known history of liver cirrhosis secondary to varela  Follows outpatient with [facility]  Known history of portal hypertension,hepatic encephalopathy and liver cirrhosis status post banding of varices  Not on any transplant list  Meld score is 21  Continue home medications for now  discussed prognosis with caretaker at length yesterday  VTE Pharmacologic Prophylaxis:   Pharmacologic: Pharmacologic VTE Prophylaxis contraindicated due to Thrombocytopenia  Mechanical VTE Prophylaxis in Place: Yes    Patient Centered Rounds: I have performed bedside rounds with nursing staff today  Discussions with Specialists or Other Care Team Provider:  None    Education and Discussions with Family / Patient:  Discussed with patient at bedside will update family    Time Spent for Care: 30 minutes  More than 50% of total time spent on counseling and coordination of care as described above  Current Length of Stay: 4 day(s)    Current Patient Status: Inpatient   Certification Statement: The patient will continue to require additional inpatient hospital stay due to Acute metabolic encephalopathy    Discharge Plan:  Anticipate discharge to rehab on Monday    Code Status: Level 3 - DNAR and DNI      Subjective:       Objective:     Vitals:   Temp (24hrs), Av 3 °F (36 8 °C), Min:97 9 °F (36 6 °C), Max:98 6 °F (37 °C)    Temp:  [97 9 °F (36 6 °C)-98 6 °F (37 °C)] 97 9 °F (36 6 °C)  HR:  [] 103  Resp:  [12-18] 12  BP: (106-121)/(58-79) 109/63  SpO2:  [95 %-97 %] 95 %  Body mass index is 28 39 kg/m²  Input and Output Summary (last 24 hours): Intake/Output Summary (Last 24 hours) at 10/8/2022 1108  Last data filed at 10/8/2022 0555  Gross per 24 hour   Intake 896 ml   Output 414 ml   Net 482 ml       Physical Exam:     Physical Exam  Vitals and nursing note reviewed  Constitutional:       Appearance: He is ill-appearing  HENT:      Head: Normocephalic and atraumatic        Right Ear: External ear normal       Left Ear: External ear normal       Nose: Nose normal       Mouth/Throat:      Pharynx: Oropharynx is clear  Eyes:      Pupils: Pupils are equal, round, and reactive to light  Cardiovascular:      Rate and Rhythm: Normal rate and regular rhythm  Heart sounds: Normal heart sounds  Pulmonary:      Effort: Pulmonary effort is normal       Breath sounds: Normal breath sounds  Abdominal:      General: Bowel sounds are normal  There is distension  Palpations: Abdomen is soft  Tenderness: There is no abdominal tenderness  Musculoskeletal:         General: Normal range of motion  Cervical back: Normal range of motion and neck supple  Right lower leg: Edema present  Left lower leg: Edema present  Skin:     General: Skin is warm and dry  Capillary Refill: Capillary refill takes less than 2 seconds  Coloration: Skin is jaundiced  Neurological:      General: No focal deficit present  Mental Status: He is alert  Comments: Oriented to person and place   Psychiatric:         Mood and Affect: Mood normal        Additional Data:     Labs:    Results from last 7 days   Lab Units 10/06/22  0504 10/05/22  0500 10/04/22  1241   WBC Thousand/uL 8 66   < > 10 38*   HEMOGLOBIN g/dL 8 9*   < > 9 7*   HEMATOCRIT % 27 6*   < > 31 0*   PLATELETS Thousands/uL 39*   < > 59*   NEUTROS PCT %  --   --  85*   LYMPHS PCT %  --   --  7*   MONOS PCT %  --   --  8   EOS PCT %  --   --  0    < > = values in this interval not displayed       Results from last 7 days   Lab Units 10/08/22  0450   SODIUM mmol/L 141   POTASSIUM mmol/L 3 3*   CHLORIDE mmol/L 107   CO2 mmol/L 26   BUN mg/dL 30*   CREATININE mg/dL 1 54*   ANION GAP mmol/L 8   CALCIUM mg/dL 8 1*   ALBUMIN g/dL 1 8*   TOTAL BILIRUBIN mg/dL 2 80*   ALK PHOS U/L 98   ALT U/L 42   AST U/L 52*   GLUCOSE RANDOM mg/dL 108     Results from last 7 days   Lab Units 10/04/22  1241   INR  1 62*             Results from last 7 days   Lab Units 10/04/22  1628 10/04/22  1241   LACTIC ACID mmol/L 2 1* 2 9*           * I Have Reviewed All Lab Data Listed Above  * Additional Pertinent Lab Tests Reviewed: [address] Admission Reviewed    Imaging:    Imaging Reports Reviewed Today Include:  CT chest and pelvis  Imaging Personally Reviewed by Myself Includes:  None    Recent Cultures (last 7 days):     Results from last 7 days   Lab Units 10/05/22  1200 10/04/22  1241   BLOOD CULTURE   --  No Growth at 72 hrs  No Growth at 72 hrs  GRAM STAIN RESULT  Rare Polys*  Rare Gram positive cocci in pairs*  --    BODY FLUID CULTURE, STERILE  No growth  --        Last 24 Hours Medication List:   Current Facility-Administered Medications   Medication Dose Route Frequency Provider Last Rate   • acetaminophen  650 mg Oral Q6H PRN Conrado Gomes MD     • albumin human  12 5 g Intravenous Once Conrado Gomes MD     • aspirin  81 mg Oral Daily Conrado Gomes MD     • cholecalciferol  1,000 Units Oral Daily Conrado Gomes MD     • donepezil  10 mg Oral HS Conrado Gomes MD     • furosemide  40 mg Intravenous BID (diuretic) Conrado Gomes MD     • lactulose  45 g Oral 4x Daily Conrado Gomes MD     • midodrine  5 mg Oral BID AC Conrado Gomes MD     • nystatin   Topical BID DUSTY Villela     • potassium chloride  20 mEq Oral Once Conrado Gomes MD     • rifaximin  550 mg Oral Q12H [address] Conrado Gomes MD     • sertraline  50 mg Oral Daily Conrado Gomes MD     • spironolactone  25 mg Oral Daily Conrado Gomes MD     • vancomycin  15 mg/kg Intravenous Once PRN Milka Hassan MD          Today, Patient Was Seen By: Conrado Gomes    ** Please Note: Dictation voice to text software may have been used in the creation of this document   **

## 2022-10-08 NOTE — ASSESSMENT & PLAN NOTE
Patient noticed to have abdominal distension  CT abdomen pelvis showed large volume ascites with small bilateral pleural effusions  Patient had paracentesis done for 7 5 L 10/5/22  Will place on oral aldactone for now  Not diuresing much with oral Lasix and hence increased to Lasix 40 mg IV b i d  also give 1 dose of IV albumin daily  Still has peripheral edema noted  placed on IV vancomycin and Rocephin for SBP prophylaxis/treatment  Reviewed fluid studies for analysis for LDH, protein, culture and sensitivity, WBC and cytology and stat Gram stain  Prelim cultures on ascitic fluid are growing Gram-positive cocci  But no growth  Will ask Infectious Disease to finalize antibiotic recommendation tomorrow    Cont iv vanco  Discontinue IV Rocephin  Will probably need  IR paracentesis again on Monday and plan for paracentesis outpatient in 4 weeks  Added midodrine due to hypotension

## 2022-10-08 NOTE — ASSESSMENT & PLAN NOTE
Secondary to acute hepatic encephalopathy  Patient had a fall 1 week ago since then he has been having increasing confusion  Patient has been keeping up with his lactulose and ammonia level <10  Continue Xifaxan  CT brain negative for acute bleed or acute pathology  Ascitic fluid culture pending  prelim showing Gram-positive cocci   Concern for SBP  Continue antibiotics for now with IV vancomycin with discontinue IV Rocephin  Pt/ot recommending subacute rehab

## 2022-10-09 LAB
ANION GAP SERPL CALCULATED.3IONS-SCNC: 6 MMOL/L (ref 4–13)
BUN SERPL-MCNC: 31 MG/DL (ref 5–25)
CALCIUM SERPL-MCNC: 8.2 MG/DL (ref 8.3–10.1)
CHLORIDE SERPL-SCNC: 108 MMOL/L (ref 96–108)
CO2 SERPL-SCNC: 27 MMOL/L (ref 21–32)
CREAT SERPL-MCNC: 1.45 MG/DL (ref 0.6–1.3)
ERYTHROCYTE [DISTWIDTH] IN BLOOD BY AUTOMATED COUNT: 15.3 % (ref 11.6–15.1)
GFR SERPL CREATININE-BSD FRML MDRD: 47 ML/MIN/1.73SQ M
GLUCOSE SERPL-MCNC: 129 MG/DL (ref 65–140)
HCT VFR BLD AUTO: 28 % (ref 36.5–49.3)
HGB BLD-MCNC: 8.9 G/DL (ref 12–17)
MCH RBC QN AUTO: 30.3 PG (ref 26.8–34.3)
MCHC RBC AUTO-ENTMCNC: 31.8 G/DL (ref 31.4–37.4)
MCV RBC AUTO: 95 FL (ref 82–98)
PLATELET # BLD AUTO: 41 THOUSANDS/UL (ref 149–390)
PMV BLD AUTO: 12.9 FL (ref 8.9–12.7)
POTASSIUM SERPL-SCNC: 3.5 MMOL/L (ref 3.5–5.3)
RBC # BLD AUTO: 2.94 MILLION/UL (ref 3.88–5.62)
SODIUM SERPL-SCNC: 141 MMOL/L (ref 135–147)
VANCOMYCIN SERPL-MCNC: 18.5 UG/ML (ref 10–20)
WBC # BLD AUTO: 8.99 THOUSAND/UL (ref 4.31–10.16)

## 2022-10-09 PROCEDURE — 80048 BASIC METABOLIC PNL TOTAL CA: CPT | Performed by: FAMILY MEDICINE

## 2022-10-09 PROCEDURE — 85027 COMPLETE CBC AUTOMATED: CPT | Performed by: FAMILY MEDICINE

## 2022-10-09 PROCEDURE — 80202 ASSAY OF VANCOMYCIN: CPT | Performed by: INTERNAL MEDICINE

## 2022-10-09 PROCEDURE — 99232 SBSQ HOSP IP/OBS MODERATE 35: CPT | Performed by: FAMILY MEDICINE

## 2022-10-09 RX ORDER — SPIRONOLACTONE 25 MG/1
25 TABLET ORAL ONCE
Status: COMPLETED | OUTPATIENT
Start: 2022-10-09 | End: 2022-10-09

## 2022-10-09 RX ORDER — MIDODRINE HYDROCHLORIDE 5 MG/1
5 TABLET ORAL
Status: DISCONTINUED | OUTPATIENT
Start: 2022-10-09 | End: 2022-10-11 | Stop reason: HOSPADM

## 2022-10-09 RX ORDER — SPIRONOLACTONE 25 MG/1
50 TABLET ORAL DAILY
Status: DISCONTINUED | OUTPATIENT
Start: 2022-10-10 | End: 2022-10-11 | Stop reason: HOSPADM

## 2022-10-09 RX ORDER — SPIRONOLACTONE 25 MG/1
50 TABLET ORAL DAILY
Status: DISCONTINUED | OUTPATIENT
Start: 2022-10-10 | End: 2022-10-09

## 2022-10-09 RX ADMIN — RIFAXIMIN 550 MG: 550 TABLET ORAL at 09:05

## 2022-10-09 RX ADMIN — VANCOMYCIN HYDROCHLORIDE 1500 MG: 5 INJECTION, POWDER, LYOPHILIZED, FOR SOLUTION INTRAVENOUS at 17:47

## 2022-10-09 RX ADMIN — NYSTATIN 1 APPLICATION: 100000 POWDER TOPICAL at 17:47

## 2022-10-09 RX ADMIN — MIDODRINE HYDROCHLORIDE 5 MG: 5 TABLET ORAL at 17:46

## 2022-10-09 RX ADMIN — LACTULOSE 30 G: 20 SOLUTION ORAL at 13:05

## 2022-10-09 RX ADMIN — RIFAXIMIN 550 MG: 550 TABLET ORAL at 21:49

## 2022-10-09 RX ADMIN — SERTRALINE HYDROCHLORIDE 50 MG: 50 TABLET ORAL at 09:05

## 2022-10-09 RX ADMIN — FUROSEMIDE 40 MG: 10 INJECTION, SOLUTION INTRAMUSCULAR; INTRAVENOUS at 08:58

## 2022-10-09 RX ADMIN — SPIRONOLACTONE 25 MG: 25 TABLET ORAL at 13:04

## 2022-10-09 RX ADMIN — NYSTATIN 1 APPLICATION: 100000 POWDER TOPICAL at 09:05

## 2022-10-09 RX ADMIN — LACTULOSE 30 G: 20 SOLUTION ORAL at 17:46

## 2022-10-09 RX ADMIN — LACTULOSE 30 G: 20 SOLUTION ORAL at 09:00

## 2022-10-09 RX ADMIN — MIDODRINE HYDROCHLORIDE 5 MG: 5 TABLET ORAL at 13:04

## 2022-10-09 RX ADMIN — FUROSEMIDE 40 MG: 10 INJECTION, SOLUTION INTRAMUSCULAR; INTRAVENOUS at 17:46

## 2022-10-09 RX ADMIN — LACTULOSE 30 G: 20 SOLUTION ORAL at 21:48

## 2022-10-09 RX ADMIN — MIDODRINE HYDROCHLORIDE 5 MG: 5 TABLET ORAL at 06:29

## 2022-10-09 RX ADMIN — DONEPEZIL HYDROCHLORIDE 10 MG: 5 TABLET, FILM COATED ORAL at 21:49

## 2022-10-09 RX ADMIN — ASPIRIN 81 MG: 81 TABLET, COATED ORAL at 09:04

## 2022-10-09 RX ADMIN — Medication 1000 UNITS: at 09:04

## 2022-10-09 NOTE — PLAN OF CARE
Problem: PAIN - ADULT  Goal: Verbalizes/displays adequate comfort level or baseline comfort level  Description: Interventions:  - Encourage patient to monitor pain and request assistance  - Assess pain using appropriate pain scale  - Administer analgesics based on type and severity of pain and evaluate response  - Implement non-pharmacological measures as appropriate and evaluate response  - Consider cultural and social influences on pain and pain management  - Notify physician/advanced practitioner if interventions unsuccessful or patient reports new pain  Outcome: Progressing     Problem: INFECTION - ADULT  Goal: Absence or prevention of progression during hospitalization  Description: INTERVENTIONS:  - Assess and monitor for signs and symptoms of infection  - Monitor lab/diagnostic results  - Monitor all insertion sites, i e  indwelling lines, tubes, and drains  - Monitor endotracheal if appropriate and nasal secretions for changes in amount and color  - [place] appropriate cooling/warming therapies per order  - Administer medications as ordered  - Instruct and encourage patient and family to use good hand hygiene technique  - Identify and instruct in appropriate isolation precautions for identified infection/condition  Outcome: Progressing     Problem: SAFETY ADULT  Goal: Patient will remain free of falls  Description: INTERVENTIONS:  - Educate patient/family on patient safety including physical limitations  - Instruct patient to call for assistance with activity   - Consult OT/PT to assist with strengthening/mobility   - Keep Call bell within reach  - Keep bed low and locked with side rails adjusted as appropriate  - Keep care items and personal belongings within reach  - Initiate and maintain comfort rounds  - Make Fall Risk Sign visible to staff  - Offer Toileting every 2 Hours, in advance of need  - Initiate/Maintain bed alarm  - Apply yellow socks and bracelet for high fall risk patients  - Consider moving patient to room near nurses station  Outcome: Progressing  Goal: Maintain or return to baseline ADL function  Description: INTERVENTIONS:  -  Assess patient's ability to carry out ADLs; assess patient's baseline for ADL function and identify physical deficits which impact ability to perform ADLs (bathing, care of mouth/teeth, toileting, grooming, dressing, etc )  - Assess/evaluate cause of self-care deficits   - Assess range of motion  - Assess patient's mobility; develop plan if impaired  - Assess patient's need for assistive devices and provide as appropriate  - Encourage maximum independence but intervene and supervise when necessary  - Involve family in performance of ADLs  - Assess for home care needs following discharge   - Consider OT consult to assist with ADL evaluation and planning for discharge  - Provide patient education as appropriate  Outcome: Progressing  Goal: Maintains/Returns to pre admission functional level  Description: INTERVENTIONS:  - Perform BMAT or MOVE assessment daily    - Set and communicate daily mobility goal to care team and patient/family/caregiver     - Collaborate with rehabilitation services on mobility goals if consulted  - Out of bed for toileting  - Record patient progress and toleration of activity level   Outcome: Progressing    Problem: DISCHARGE PLANNING  Goal: Discharge to home or other facility with appropriate resources  Description: INTERVENTIONS:  - Identify barriers to discharge w/patient and caregiver  - Arrange for needed discharge resources and transportation as appropriate  - Identify discharge learning needs (meds, wound care, etc )  - Arrange for interpretive services to assist at discharge as needed  - Refer to Case Management Department for coordinating discharge planning if the patient needs post-hospital services based on physician/advanced practitioner order or complex needs related to functional status, cognitive ability, or social support system  Outcome: Progressing     Problem: Knowledge Deficit  Goal: Patient/family/caregiver demonstrates understanding of disease process, treatment plan, medications, and discharge instructions  Description: Complete learning assessment and assess knowledge base    Interventions:  - Provide teaching at level of understanding  - Provide teaching via preferred learning methods  Outcome: Progressing     Problem: CARDIOVASCULAR - ADULT  Goal: Maintains optimal cardiac output and hemodynamic stability  Description: INTERVENTIONS:  - Monitor I/O, vital signs and rhythm  - Monitor for S/S and trends of decreased cardiac output  - Administer and titrate ordered vasoactive medications to optimize hemodynamic stability  - Assess quality of pulses, skin color and temperature  - Assess for signs of decreased coronary artery perfusion  - Instruct patient to report change in severity of symptoms  Outcome: Progressing  Goal: Absence of cardiac dysrhythmias or at baseline rhythm  Description: INTERVENTIONS:  - Continuous cardiac monitoring, vital signs, obtain 12 lead EKG if ordered  - Administer antiarrhythmic and heart rate control medications as ordered  - Monitor electrolytes and administer replacement therapy as ordered  Outcome: Progressing     Problem: RESPIRATORY - ADULT  Goal: Achieves optimal ventilation and oxygenation  Description: INTERVENTIONS:  - Assess for changes in respiratory status  - Assess for changes in mentation and behavior  - Position to facilitate oxygenation and minimize respiratory effort  - Oxygen administered by appropriate delivery if ordered  - Initiate smoking cessation education as indicated  - Encourage broncho-pulmonary hygiene including cough, deep breathe, Incentive Spirometry  - Assess the need for suctioning and aspirate as needed  - Assess and instruct to report SOB or any respiratory difficulty  - Respiratory Therapy support as indicated  Outcome: Progressing     Problem: GENITOURINARY - ADULT  Goal: Maintains or returns to baseline urinary function  Description: INTERVENTIONS:  - Assess urinary function  - Encourage oral fluids to ensure adequate hydration if ordered  - Administer IV fluids as ordered to ensure adequate hydration  - Administer ordered medications as needed  - Offer frequent toileting  - Follow urinary retention protocol if ordered  Outcome: Progressing     Problem: METABOLIC, FLUID AND ELECTROLYTES - ADULT  Goal: Electrolytes maintained within normal limits  Description: INTERVENTIONS:  - Monitor labs and assess patient for signs and symptoms of electrolyte imbalances  - Administer electrolyte replacement as ordered  - Monitor response to electrolyte replacements, including repeat lab results as appropriate  - Instruct patient on fluid and nutrition as appropriate  Outcome: Progressing  Goal: Fluid balance maintained  Description: INTERVENTIONS:  - Monitor labs   - Monitor I/O and WT  - Instruct patient on fluid and nutrition as appropriate  - Assess for signs & symptoms of volume excess or deficit  Outcome: Progressing     Problem: SKIN/TISSUE INTEGRITY - ADULT  Goal: Skin Integrity remains intact(Skin Breakdown Prevention)  Description: Assess:  -Perform Justin assessment   -Clean and moisturize skin   -Inspect skin when repositioning, toileting, and assisting with ADLS  -Assess extremities for adequate circulation and sensation     Bed Management:  -Have minimal linens on bed & keep smooth, unwrinkled  -Change linens as needed when moist or perspiring  -Avoid sitting or lying in one position for more than 2 hours while in bed    Toileting:  -Offer bedside commode  -Assess for incontinence every hour  -Use incontinent care products after each incontinent episode such as Triad    Activity:  -Encourage or provide ROM exercises   -Turn and reposition patient every 2 Hours  -Use appropriate equipment to lift or move patient in bed    Skin Care:  -Avoid use of baby powder, tape, friction and shearing, hot water or constrictive clothing  -Relieve pressure over bony prominences  Outcome: Progressing

## 2022-10-09 NOTE — PROGRESS NOTES
Vancomycin Assessment    Ron Matos is a 67 y o  male who is currently receiving vancomycin 1500 mg IV once prn random level < 20 for other Peritonitis   Relevant clinical data and objective history reviewed:  Creatinine   Date Value Ref Range Status   10/09/2022 1 45 (H) 0 60 - 1 30 mg/dL Final     Comment:     Standardized to IDMS reference method   10/08/2022 1 54 (H) 0 60 - 1 30 mg/dL Final     Comment:     Standardized to IDMS reference method   10/07/2022 1 53 (H) 0 60 - 1 30 mg/dL Final     Comment:     Standardized to IDMS reference method     Vancomycin Rm   Date Value Ref Range Status   10/09/2022 18 5 10 0 - 20 0 ug/mL Final     /73   Pulse 105   Temp 98 2 °F (36 8 °C)   Resp 16   Ht 6' 2" (1 88 m)   Wt 101 kg (222 lb 14 2 oz)   SpO2 97%   BMI 28 62 kg/m²   I/O last 3 completed shifts: In: 2377 [P O :2377]  Out: 301 [Urine:301]  Lab Results   Component Value Date/Time    BUN 31 (H) 10/09/2022 04:58 AM    WBC 8 99 10/09/2022 04:58 AM    HGB 8 9 (L) 10/09/2022 04:58 AM    HCT 28 0 (L) 10/09/2022 04:58 AM    MCV 95 10/09/2022 04:58 AM    PLT 41 (LL) 10/09/2022 04:58 AM     Temp Readings from Last 3 Encounters:   10/09/22 98 2 °F (36 8 °C)   06/07/22 98 1 °F (36 7 °C) (Temporal)   04/19/22 97 9 °F (36 6 °C)     Vancomycin Days of Therapy: 4    Assessment/Plan  The patient is currently on vancomycin utilizing pulse dosing  Baseline risks associated with therapy include: pre-existing renal impairment, concomitant nephrotoxic medications, and advanced age  The patient is receiving 1500 mg IV once prn random level < 20 with the most recent vancomycin level being therapeutic based on a goal of 15-20 (appropriate for most indications) ; therefore, is clinically appropriate and dose will be continued   Pharmacy will continue to follow closely for s/sx of nephrotoxicity, infusion reactions, and appropriateness of therapy  BMP and CBC will be ordered per protocol    Plan for random level prior to the next dose at approximately 1630 on 10/10/2022  Pharmacy will continue to follow the patient’s culture results and clinical progress daily      Erendira Patricia, Pharmacist

## 2022-10-09 NOTE — PROGRESS NOTES
[address]  Progress Note - Lauren Burger 1950, 67 y o  male MRN: [id number]  Unit/Bed#: -Jaren Encounter: [id number]  Primary Care Provider: Manjinder Hogan MD   Date and time admitted to hospital: 10/4/2022 12:36 PM    * Acute metabolic encephalopathy  Assessment & Plan  Secondary to acute hepatic encephalopathy  Patient had a fall 1 week ago since then he has been having increasing confusion  Patient has been keeping up with his lactulose and xifaxan and ammonia level <10  CT brain negative for acute bleed or acute pathology  Ascitic fluid culture pending  prelim showing Gram-positive cocci   Concern for SBP  Continue antibiotics for now with IV vancomycin with discontinue IV Rocephin  Pt/ot recommending subacute rehab  Other ascites  Assessment & Plan  Patient noticed to have abdominal distension  CT abdomen pelvis showed large volume ascites with small bilateral pleural effusions  Patient had paracentesis done for 7 5 L 10/5/22 and 12 L removed on 10/10  Will place on oral aldactone for now  Not diuresing much with oral Lasix and hence increased to Lasix 40 mg IV b i d  also give 1 dose of IV albumin daily  Still has peripheral edema noted  placed on IV vancomycin and Rocephin for SBP prophylaxis/treatment  Reviewed fluid studies for analysis for LDH, protein, culture and sensitivity, WBC and cytology and stat Gram stain  Prelim cultures on ascitic fluid are growing Gram-positive cocci  But no growth  Will ask Infectious Disease to finalize antibiotic recommendation tomorrow    Cont iv vanco  Discontinue IV Rocephin  Will need outpatient paracentesis set up every 2-3 weeks  Added midodrine due to hypotension    Sepsis [facility])  Assessment & Plan  Patient has sepsis present on admission with tachypnea respiratory rate 22, tachycardia with heart rate of 101, lactic acidosis of 2 9  Unclear source of infection at this time but concern for SBP at this time  Sepsis has resolved    NAVEED (acute kidney injury) ([facility] [address] )  Assessment & Plan  Baseline creatinine is 0 7-0 8  Currently creatinine is elevated to 1 38 on admission  Is probably secondary to hepatorenal syndrome due to worsening ascites due to underlying liver cirrhosis  Placed on gentle diuresis   Creatinine slightly worsened to 1 45 however need to continue diuresis as patient is still fluid overloaded    Severe protein-calorie malnutrition (HCC)  Assessment & Plan  Malnutrition Findings:   Adult Malnutrition type: Chronic illness  Adult Degree of Malnutrition: Other severe protein calorie malnutrition  Malnutrition Characteristics: Muscle loss, Fat loss                  360 Statement: Chronic severe malnutrition related to increased nutrient needs and likely inadequate oral intake as evidenced by severe muscle wasting to temporalis, pectoralis, deltoid muscles; severe fat loss to buccal pads and triceps  Treated with: Continue 2gm Na at this time given cirrhosis, consider liberalizing as indicated  Fluid restriction per MD  +Sudhir BID mixed in pudding for wound healing  +ensure compact BID  Assistance with meals as accepted by pt/indicated noting tremors  +lids with cups to prevent spilling on self  Diet ed not appropriate at this time  Recommend daily weights  BMI Findings: Body mass index is 28 87 kg/m²  Chronic anemia  Assessment & Plan  Baseline hemoglobin is around 9-10  Currently hemoglobin is at baseline range with no active bleeding reported with chronic thrombocytopenia noted    Thrombocytopenia (HCC)  Assessment & Plan  Platelet count is 59 K on admission which is close to patient's baseline of 55-60k    platelet count dropped to 41 K  No active bleeding noted at this time  thrombocytopenia secondary to liver cirrhosis    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 [facility])  Assessment & Plan  Patient has known history of liver cirrhosis secondary to jaffe  Follows outpatient with [facility] GI    Known history of portal hypertension,hepatic encephalopathy and liver cirrhosis status post banding of varices  Not on any transplant list  Meld score is 21  Continue home medications for now  discussed prognosis with caretaker and son at length this admission  VTE Pharmacologic Prophylaxis:   Pharmacologic: Pharmacologic VTE Prophylaxis contraindicated due to Thrombocytopenia  Mechanical VTE Prophylaxis in Place: Yes    Patient Centered Rounds: I have performed bedside rounds with nursing staff today  Discussions with Specialists or Other Care Team Provider:  Discussed with Infectious Disease    Education and Discussions with Family / Patient:  Discussed with patient at bedside    Time Spent for Care: 30 minutes  More than 50% of total time spent on counseling and coordination of care as described above  Current Length of Stay: 6 day(s)    Current Patient Status: Inpatient   Certification Statement: The patient will continue to require additional inpatient hospital stay due to Ascites    Discharge Plan:  Anticipate discharge to rehab tomorrow    Code Status: Level 3 - DNAR and DNI      Subjective:   Patient denies any complaints  Feeling well  Objective:     Vitals:   Temp (24hrs), Av 3 °F (36 8 °C), Min:97 9 °F (36 6 °C), Max:98 4 °F (36 9 °C)    Temp:  [97 9 °F (36 6 °C)-98 4 °F (36 9 °C)] 98 4 °F (36 9 °C)  HR:  [] 93  Resp:  [16-18] 16  BP: ()/(51-74) 96/51  SpO2:  [95 %-99 %] 97 %  Body mass index is 28 87 kg/m²  Input and Output Summary (last 24 hours): Intake/Output Summary (Last 24 hours) at 10/10/2022 1747  Last data filed at 10/10/2022 1537  Gross per 24 hour   Intake 476 ml   Output 350 ml   Net 126 ml       Physical Exam:     Physical Exam  Vitals and nursing note reviewed  Constitutional:       Appearance: Normal appearance  HENT:      Head: Normocephalic and atraumatic        Right Ear: External ear normal       Left Ear: External ear normal       Nose: Nose normal       Mouth/Throat:      Pharynx: Oropharynx is clear  Cardiovascular:      Rate and Rhythm: Normal rate and regular rhythm  Heart sounds: Normal heart sounds  Pulmonary:      Effort: Pulmonary effort is normal       Breath sounds: Normal breath sounds  Abdominal:      General: Bowel sounds are normal  There is distension  Palpations: Abdomen is soft  Tenderness: There is no abdominal tenderness  Musculoskeletal:         General: Normal range of motion  Cervical back: Normal range of motion and neck supple  Right lower leg: Edema present  Left lower leg: Edema present  Comments: Sacral area with pressure ulcer noted please see images   Skin:     General: Skin is warm and dry  Capillary Refill: Capillary refill takes less than 2 seconds  Neurological:      Mental Status: He is alert  Mental status is at baseline  Comments: Oriented to person   Psychiatric:         Mood and Affect: Mood normal             Additional Data:     Labs:    Results from last 7 days   Lab Units 10/09/22  0458 10/05/22  0500 10/04/22  1241   WBC Thousand/uL 8 99   < > 10 38*   HEMOGLOBIN g/dL 8 9*   < > 9 7*   HEMATOCRIT % 28 0*   < > 31 0*   PLATELETS Thousands/uL 41*   < > 59*   NEUTROS PCT %  --   --  85*   LYMPHS PCT %  --   --  7*   MONOS PCT %  --   --  8   EOS PCT %  --   --  0    < > = values in this interval not displayed  Results from last 7 days   Lab Units 10/10/22  0540 10/09/22  0458 10/08/22  0450   SODIUM mmol/L 139   < > 141   POTASSIUM mmol/L 3 9   < > 3 3*   CHLORIDE mmol/L 106   < > 107   CO2 mmol/L 27   < > 26   BUN mg/dL 31*   < > 30*   CREATININE mg/dL 1 72*   < > 1 54*   ANION GAP mmol/L 6   < > 8   CALCIUM mg/dL 8 1*   < > 8 1*   ALBUMIN g/dL  --   --  1 8*   TOTAL BILIRUBIN mg/dL  --   --  2 80*   ALK PHOS U/L  --   --  98   ALT U/L  --   --  42   AST U/L  --   --  52*   GLUCOSE RANDOM mg/dL 102   < > 108    < > = values in this interval not displayed  Results from last 7 days   Lab Units 10/04/22  1241   INR  1 62*             Results from last 7 days   Lab Units 10/04/22  1628 10/04/22  1241   LACTIC ACID mmol/L 2 1* 2 9*           * I Have Reviewed All Lab Data Listed Above  * Additional Pertinent Lab Tests Reviewed: Earl 65 Admission Reviewed    Imaging:    Imaging Reports Reviewed Today Include: none  Imaging Personally Reviewed by Myself Includes:  none    Recent Cultures (last 7 days):     Results from last 7 days   Lab Units 10/05/22  1200 10/04/22  1241   BLOOD CULTURE   --  No Growth After 5 Days  No Growth After 5 Days  GRAM STAIN RESULT  Rare Polys*  Rare Gram positive cocci in pairs*  --    BODY FLUID CULTURE, STERILE  No growth  --        Last 24 Hours Medication List:   Current Facility-Administered Medications   Medication Dose Route Frequency Provider Last Rate   • acetaminophen  650 mg Oral Q6H PRN Frank Hickman MD     • cholecalciferol  1,000 Units Oral Daily Frank Hickman MD     • donepezil  10 mg Oral HS Frank Hickman MD     • [START ON 10/11/2022] furosemide  40 mg Oral Daily Frank Hickman MD     • lactulose  30 g Oral 4x Daily Frank Hickman MD     • midodrine  5 mg Oral TID AC Frank Hickman MD     • nystatin   Topical BID DUSTY Walls     • rifaximin  550 mg Oral Q12H [facility] Frank Hickman MD     • sertraline  50 mg Oral Daily Frank Hickman MD     • spironolactone  50 mg Oral Daily Frank Hickman MD     • vancomycin  1,500 mg Intravenous Once PRN Mat Dietz MD          Today, Patient Was Seen By: Frank Hickman    ** Please Note: Dictation voice to text software may have been used in the creation of this document   **

## 2022-10-09 NOTE — PLAN OF CARE
Problem: PAIN - ADULT  Goal: Verbalizes/displays adequate comfort level or baseline comfort level  Description: Interventions:  - Encourage patient to monitor pain and request assistance  - Assess pain using appropriate pain scale  - Administer analgesics based on type and severity of pain and evaluate response  - Implement non-pharmacological measures as appropriate and evaluate response  - Consider cultural and social influences on pain and pain management  - Notify physician/advanced practitioner if interventions unsuccessful or patient reports new pain  10/9/2022 0430 by Baudilio Grant RN  Outcome: Progressing  10/9/2022 0429 by Baudilio Grant RN  Outcome: Progressing     Problem: INFECTION - ADULT  Goal: Absence or prevention of progression during hospitalization  Description: INTERVENTIONS:  - Assess and monitor for signs and symptoms of infection  - Monitor lab/diagnostic results  - Monitor all insertion sites, i e  indwelling lines, tubes, and drains  - Monitor endotracheal if appropriate and nasal secretions for changes in amount and color  - [place] appropriate cooling/warming therapies per order  - Administer medications as ordered  - Instruct and encourage patient and family to use good hand hygiene technique  - Identify and instruct in appropriate isolation precautions for identified infection/condition  10/9/2022 0430 by Baudilio Grant RN  Outcome: Progressing  10/9/2022 0429 by Baudilio Grant RN  Outcome: Progressing     Problem: SAFETY ADULT  Goal: Patient will remain free of falls  Description: INTERVENTIONS:  - Educate patient/family on patient safety including physical limitations  - Instruct patient to call for assistance with activity   - Consult OT/PT to assist with strengthening/mobility   - Keep Call bell within reach  - Keep bed low and locked with side rails adjusted as appropriate  - Keep care items and personal belongings within reach  - Initiate and maintain comfort rounds  - Make Fall Risk Sign visible to staff    - Apply yellow socks and bracelet for high fall risk patients  - Consider moving patient to room near nurses station  10/9/2022 0430 by Abimael Logan RN  Outcome: Progressing  10/9/2022 0429 by Abimael Logan RN  Outcome: Progressing     Problem: DISCHARGE PLANNING  Goal: Discharge to home or other facility with appropriate resources  Description: INTERVENTIONS:  - Identify barriers to discharge w/patient and caregiver  - Arrange for needed discharge resources and transportation as appropriate  - Identify discharge learning needs (meds, wound care, etc )  - Arrange for interpretive services to assist at discharge as needed  - Refer to Case Management Department for coordinating discharge planning if the patient needs post-hospital services based on physician/advanced practitioner order or complex needs related to functional status, cognitive ability, or social support system  10/9/2022 0430 by Abimael Logan RN  Outcome: Progressing  10/9/2022 0429 by Abimael Logan RN  Outcome: Progressing     Problem: Knowledge Deficit  Goal: Patient/family/caregiver demonstrates understanding of disease process, treatment plan, medications, and discharge instructions  Description: Complete learning assessment and assess knowledge base    Interventions:  - Provide teaching at level of understanding  - Provide teaching via preferred learning methods  10/9/2022 0430 by Abimale Logan RN  Outcome: Progressing  10/9/2022 0429 by Abimael Logan RN  Outcome: Progressing     Problem: CARDIOVASCULAR - ADULT  Goal: Maintains optimal cardiac output and hemodynamic stability  Description: INTERVENTIONS:  - Monitor I/O, vital signs and rhythm  - Monitor for S/S and trends of decreased cardiac output  - Administer and titrate ordered vasoactive medications to optimize hemodynamic stability  - Assess quality of pulses, skin color and temperature  - Assess for signs of decreased coronary artery perfusion  - Instruct patient to report change in severity of symptoms  10/9/2022 0430 by Tejas Cornell RN  Outcome: Progressing  10/9/2022 0429 by Tejas Cornell RN  Outcome: Progressing  Goal: Absence of cardiac dysrhythmias or at baseline rhythm  Description: INTERVENTIONS:  - Continuous cardiac monitoring, vital signs, obtain 12 lead EKG if ordered  - Administer antiarrhythmic and heart rate control medications as ordered  - Monitor electrolytes and administer replacement therapy as ordered  10/9/2022 0430 by Tejas Cornell RN  Outcome: Progressing  10/9/2022 0429 by Tejas Cornell RN  Outcome: Progressing     Problem: RESPIRATORY - ADULT  Goal: Achieves optimal ventilation and oxygenation  Description: INTERVENTIONS:  - Assess for changes in respiratory status  - Assess for changes in mentation and behavior  - Position to facilitate oxygenation and minimize respiratory effort  - Oxygen administered by appropriate delivery if ordered  - Initiate smoking cessation education as indicated  - Encourage broncho-pulmonary hygiene including cough, deep breathe, Incentive Spirometry  - Assess the need for suctioning and aspirate as needed  - Assess and instruct to report SOB or any respiratory difficulty  - Respiratory Therapy support as indicated  10/9/2022 0430 by Tejas Cornell RN  Outcome: Progressing  10/9/2022 0429 by Tejas Cornell RN  Outcome: Progressing     Problem: GENITOURINARY - ADULT  Goal: Maintains or returns to baseline urinary function  Description: INTERVENTIONS:  - Assess urinary function  - Encourage oral fluids to ensure adequate hydration if ordered  - Administer IV fluids as ordered to ensure adequate hydration  - Administer ordered medications as needed  - Offer frequent toileting  - Follow urinary retention protocol if ordered  10/9/2022 0430 by Tejas Cornell RN  Outcome: Progressing  10/9/2022 0429 by Tejas Cornell RN  Outcome: Progressing     Problem: METABOLIC, FLUID AND ELECTROLYTES - ADULT  Goal: Electrolytes maintained within normal limits  Description: INTERVENTIONS:  - Monitor labs and assess patient for signs and symptoms of electrolyte imbalances  - Administer electrolyte replacement as ordered  - Monitor response to electrolyte replacements, including repeat lab results as appropriate  - Instruct patient on fluid and nutrition as appropriate  10/9/2022 0430 by Rm Stokes RN  Outcome: Progressing  10/9/2022 0429 by Rm Stokes RN  Outcome: Progressing  Goal: Fluid balance maintained  Description: INTERVENTIONS:  - Monitor labs   - Monitor I/O and WT  - Instruct patient on fluid and nutrition as appropriate  - Assess for signs & symptoms of volume excess or deficit  10/9/2022 0430 by Rm Stokes RN  Outcome: Progressing  10/9/2022 0429 by Rm Stokes RN  Outcome: Progressing     Problem: SKIN/TISSUE INTEGRITY - ADULT  Goal: Skin Integrity remains intact(Skin Breakdown Prevention)    -Inspect skin when repositioning, toileting, and assisting with ADLS    -Assess extremities for adequate circulation and sensation     Bed Management:  -Have minimal linens on bed & keep smooth, unwrinkled  -Change linens as needed when moist or perspiring    Toileting:  -Offer bedside commode      Activity:    -Encourage activity and walks on unit  -Encourage or provide ROM exercises     -Use appropriate equipment to lift or move patient in bed      Skin Care:  -Avoid use of baby powder, tape, friction and shearing, hot water or constrictive clothing    -Do not massage red bony areas    Next Steps:    10/9/2022 0430 by Rm Stokes RN  Outcome: Progressing  10/9/2022 0429 by Rm Stokes RN  Outcome: Progressing     Problem: HEMATOLOGIC - ADULT  Goal: Maintains hematologic stability  Description: INTERVENTIONS  - Assess for signs and symptoms of bleeding or hemorrhage  - Monitor labs  - Administer supportive blood products/factors as ordered and appropriate  10/9/2022 0430 by Rm Stokes RN  Outcome: Progressing  10/9/2022 0429 by Rm Stokes RN  Outcome: Progressing     Problem: MUSCULOSKELETAL - ADULT  Goal: Maintain or return mobility to safest level of function  Description: INTERVENTIONS:  - Assess patient's ability to carry out ADLs; assess patient's baseline for ADL function and identify physical deficits which impact ability to perform ADLs (bathing, care of mouth/teeth, toileting, grooming, dressing, etc )  - Assess/evaluate cause of self-care deficits   - Assess range of motion  - Assess patient's mobility  - Assess patient's need for assistive devices and provide as appropriate  - Encourage maximum independence but intervene and supervise when necessary  - Involve family in performance of ADLs  - Assess for home care needs following discharge   - Consider OT consult to assist with ADL evaluation and planning for discharge  - Provide patient education as appropriate  10/9/2022 0430 by Lindsay De Paz RN  Outcome: Progressing  10/9/2022 0429 by Lindsay De Paz RN  Outcome: Progressing     Problem: Nutrition/Hydration-ADULT  Goal: Nutrient/Hydration intake appropriate for improving, restoring or maintaining nutritional needs  Description: Monitor and assess patient's nutrition/hydration status for malnutrition  Collaborate with interdisciplinary team and initiate plan and interventions as ordered  Monitor patient's weight and dietary intake as ordered or per policy  Utilize nutrition screening tool and intervene as necessary  Determine patient's food preferences and provide high-protein, high-caloric foods as appropriate       INTERVENTIONS:  - Monitor oral intake, urinary output, labs, and treatment plans  - Assess nutrition and hydration status and recommend course of action  - Evaluate amount of meals eaten  - Assist patient with eating if necessary   - Allow adequate time for meals  - Recommend/ encourage appropriate diets, oral nutritional supplements, and vitamin/mineral supplements  - Order, calculate, and assess calorie counts as needed  - Recommend, monitor, and adjust tube feedings and TPN/PPN based on assessed needs  - Assess need for intravenous fluids  - Provide specific nutrition/hydration education as appropriate  - Include patient/family/caregiver in decisions related to nutrition  10/9/2022 0430 by Susan Singh RN  Outcome: Progressing  10/9/2022 0429 by Susan Singh RN  Outcome: Progressing     Problem: Prexisting or High Potential for Compromised Skin Integrity  Goal: Skin integrity is maintained or improved  Description: INTERVENTIONS:  - Identify patients at risk for skin breakdown  - Assess and monitor skin integrity  - Assess and monitor nutrition and hydration status  - Monitor labs   - Assess for incontinence   - Turn and reposition patient  - Assist with mobility/ambulation  - Relieve pressure over bony prominences  - Avoid friction and shearing  - Provide appropriate hygiene as needed including keeping skin clean and dry  - Evaluate need for skin moisturizer/barrier cream  - Collaborate with interdisciplinary team   - Patient/family teaching  - Consider wound care consult   10/9/2022 0430 by Susan Singh RN  Outcome: Progressing  10/9/2022 0429 by Susan Singh RN  Outcome: Progressing     Problem: Potential for Falls  Goal: Patient will remain free of falls  Description: INTERVENTIONS:  - Educate patient/family on patient safety including physical limitations  - Instruct patient to call for assistance with activity   - Consult OT/PT to assist with strengthening/mobility   - Keep Call bell within reach  - Keep bed low and locked with side rails adjusted as appropriate  - Keep care items and personal belongings within reach  - Initiate and maintain comfort rounds  - Make Fall Risk Sign visible to staff    - Apply yellow socks and bracelet for high fall risk patients  - Consider moving patient to room near nurses station  10/9/2022 0430 by Susan Singh RN  Outcome: Progressing  10/9/2022 0429 by Susan Singh RN  Outcome: Progressing

## 2022-10-09 NOTE — ASSESSMENT & PLAN NOTE
Platelet count is 59 K on admission which is close to patient's baseline of 55-60k    platelet count dropped to 41 K  No active bleeding noted at this time   thrombocytopenia secondary to liver cirrhosis

## 2022-10-09 NOTE — ASSESSMENT & PLAN NOTE
Patient has sepsis present on admission with tachypnea respiratory rate 22, tachycardia with heart rate of 101, lactic acidosis of 2 9  Unclear source of infection at this time but concern for SBP at this time  Sepsis has resolved

## 2022-10-09 NOTE — ASSESSMENT & PLAN NOTE
Baseline creatinine is 0 7-0 8  Currently creatinine is elevated to 1 38 on admission  Is probably secondary to hepatorenal syndrome due to worsening ascites due to underlying liver cirrhosis  Placed on gentle diuresis     Creatinine slightly worsened to 1 45 however need to continue diuresis as patient is still fluid overloaded

## 2022-10-09 NOTE — ASSESSMENT & PLAN NOTE
Patient has known history of liver cirrhosis secondary to varela  Follows outpatient with [facility] GI  Known history of portal hypertension,hepatic encephalopathy and liver cirrhosis status post banding of varices  Not on any transplant list  Meld score is 21  Continue home medications for now  discussed prognosis with caretaker and son at length this admission

## 2022-10-09 NOTE — ASSESSMENT & PLAN NOTE
Malnutrition Findings:   Adult Malnutrition type: Chronic illness  Adult Degree of Malnutrition: Other severe protein calorie malnutrition  Malnutrition Characteristics: Muscle loss, Fat loss                  360 Statement: Chronic severe malnutrition related to increased nutrient needs and likely inadequate oral intake as evidenced by severe muscle wasting to temporalis, pectoralis, deltoid muscles; severe fat loss to buccal pads and triceps  Treated with: Continue 2gm Na at this time given cirrhosis, consider liberalizing as indicated  Fluid restriction per MD  +Sudhir BID mixed in pudding for wound healing  +ensure compact BID  Assistance with meals as accepted by pt/indicated noting tremors  +lids with cups to prevent spilling on self  Diet ed not appropriate at this time  Recommend daily weights  BMI Findings: Body mass index is 28 62 kg/m²

## 2022-10-10 ENCOUNTER — APPOINTMENT (INPATIENT)
Dept: INTERVENTIONAL RADIOLOGY/VASCULAR | Facility: HOSPITAL | Age: 72
DRG: 871 | End: 2022-10-10
Attending: FAMILY MEDICINE
Payer: MEDICARE

## 2022-10-10 LAB
ANION GAP SERPL CALCULATED.3IONS-SCNC: 6 MMOL/L (ref 4–13)
APPEARANCE FLD: ABNORMAL
BACTERIA BLD CULT: NORMAL
BACTERIA BLD CULT: NORMAL
BUN SERPL-MCNC: 31 MG/DL (ref 5–25)
CALCIUM SERPL-MCNC: 8.1 MG/DL (ref 8.3–10.1)
CHLORIDE SERPL-SCNC: 106 MMOL/L (ref 96–108)
CO2 SERPL-SCNC: 27 MMOL/L (ref 21–32)
COLOR FLD: ABNORMAL
CREAT SERPL-MCNC: 1.72 MG/DL (ref 0.6–1.3)
FLUAV RNA RESP QL NAA+PROBE: NEGATIVE
FLUBV RNA RESP QL NAA+PROBE: NEGATIVE
GFR SERPL CREATININE-BSD FRML MDRD: 38 ML/MIN/1.73SQ M
GLUCOSE SERPL-MCNC: 102 MG/DL (ref 65–140)
POTASSIUM SERPL-SCNC: 3.9 MMOL/L (ref 3.5–5.3)
RSV RNA RESP QL NAA+PROBE: NEGATIVE
SARS-COV-2 RNA RESP QL NAA+PROBE: NEGATIVE
SITE: ABNORMAL
SODIUM SERPL-SCNC: 139 MMOL/L (ref 135–147)
VANCOMYCIN SERPL-MCNC: 19.4 UG/ML (ref 10–20)
WBC # FLD MANUAL: 76 /UL

## 2022-10-10 PROCEDURE — 92526 ORAL FUNCTION THERAPY: CPT

## 2022-10-10 PROCEDURE — 88112 CYTOPATH CELL ENHANCE TECH: CPT | Performed by: PATHOLOGY

## 2022-10-10 PROCEDURE — 87070 CULTURE OTHR SPECIMN AEROBIC: CPT | Performed by: FAMILY MEDICINE

## 2022-10-10 PROCEDURE — 80048 BASIC METABOLIC PNL TOTAL CA: CPT | Performed by: FAMILY MEDICINE

## 2022-10-10 PROCEDURE — 88305 TISSUE EXAM BY PATHOLOGIST: CPT | Performed by: PATHOLOGY

## 2022-10-10 PROCEDURE — 0001A HB IMMUNIZATION ADMIN COVID-19 30MCG/0.3ML FIRST DOSE: CPT | Performed by: FAMILY MEDICINE

## 2022-10-10 PROCEDURE — 87205 SMEAR GRAM STAIN: CPT | Performed by: FAMILY MEDICINE

## 2022-10-10 PROCEDURE — 80202 ASSAY OF VANCOMYCIN: CPT | Performed by: INTERNAL MEDICINE

## 2022-10-10 PROCEDURE — 49083 ABD PARACENTESIS W/IMAGING: CPT

## 2022-10-10 PROCEDURE — 91300 COVID-19 PFIZER VAC BIVALENT TRIS-SUCROSE 30 MCG/0.3 ML SUSP: CPT | Performed by: FAMILY MEDICINE

## 2022-10-10 PROCEDURE — 89051 BODY FLUID CELL COUNT: CPT | Performed by: FAMILY MEDICINE

## 2022-10-10 PROCEDURE — 0241U HB NFCT DS VIR RESP RNA 4 TRGT: CPT | Performed by: FAMILY MEDICINE

## 2022-10-10 RX ORDER — ALBUMIN (HUMAN) 12.5 G/50ML
12.5 SOLUTION INTRAVENOUS ONCE
Status: COMPLETED | OUTPATIENT
Start: 2022-10-10 | End: 2022-10-10

## 2022-10-10 RX ORDER — LIDOCAINE HYDROCHLORIDE 10 MG/ML
INJECTION, SOLUTION EPIDURAL; INFILTRATION; INTRACAUDAL; PERINEURAL CODE/TRAUMA/SEDATION MEDICATION
Status: COMPLETED | OUTPATIENT
Start: 2022-10-10 | End: 2022-10-10

## 2022-10-10 RX ORDER — FUROSEMIDE 40 MG/1
40 TABLET ORAL DAILY
Status: DISCONTINUED | OUTPATIENT
Start: 2022-10-11 | End: 2022-10-11 | Stop reason: HOSPADM

## 2022-10-10 RX ADMIN — MIDODRINE HYDROCHLORIDE 5 MG: 5 TABLET ORAL at 17:51

## 2022-10-10 RX ADMIN — ASPIRIN 81 MG: 81 TABLET, COATED ORAL at 09:00

## 2022-10-10 RX ADMIN — MIDODRINE HYDROCHLORIDE 5 MG: 5 TABLET ORAL at 06:35

## 2022-10-10 RX ADMIN — RIFAXIMIN 550 MG: 550 TABLET ORAL at 21:36

## 2022-10-10 RX ADMIN — SERTRALINE HYDROCHLORIDE 50 MG: 50 TABLET ORAL at 08:59

## 2022-10-10 RX ADMIN — RIFAXIMIN 550 MG: 550 TABLET ORAL at 09:00

## 2022-10-10 RX ADMIN — SPIRONOLACTONE 50 MG: 25 TABLET ORAL at 09:00

## 2022-10-10 RX ADMIN — VANCOMYCIN HYDROCHLORIDE 1500 MG: 1 INJECTION, POWDER, LYOPHILIZED, FOR SOLUTION INTRAVENOUS at 17:51

## 2022-10-10 RX ADMIN — LACTULOSE 30 G: 20 SOLUTION ORAL at 08:59

## 2022-10-10 RX ADMIN — FUROSEMIDE 40 MG: 10 INJECTION, SOLUTION INTRAMUSCULAR; INTRAVENOUS at 08:59

## 2022-10-10 RX ADMIN — ALBUMIN (HUMAN) 12.5 G: 0.25 INJECTION, SOLUTION INTRAVENOUS at 13:17

## 2022-10-10 RX ADMIN — NYSTATIN 1 APPLICATION: 100000 POWDER TOPICAL at 09:00

## 2022-10-10 RX ADMIN — LIDOCAINE HYDROCHLORIDE 5 ML: 10 INJECTION, SOLUTION EPIDURAL; INFILTRATION; INTRACAUDAL; PERINEURAL at 11:44

## 2022-10-10 RX ADMIN — DONEPEZIL HYDROCHLORIDE 10 MG: 5 TABLET, FILM COATED ORAL at 21:36

## 2022-10-10 RX ADMIN — MIDODRINE HYDROCHLORIDE 5 MG: 5 TABLET ORAL at 13:17

## 2022-10-10 RX ADMIN — Medication 1000 UNITS: at 09:00

## 2022-10-10 RX ADMIN — NYSTATIN: 100000 POWDER TOPICAL at 17:52

## 2022-10-10 RX ADMIN — LACTULOSE 30 G: 20 SOLUTION ORAL at 13:17

## 2022-10-10 RX ADMIN — LACTULOSE 30 G: 20 SOLUTION ORAL at 21:40

## 2022-10-10 RX ADMIN — LACTULOSE 30 G: 20 SOLUTION ORAL at 17:51

## 2022-10-10 RX ADMIN — BNT162B2 ORIGINAL AND OMICRON BA.4/BA.5 0.3 ML: .1125; .1125 INJECTION, SUSPENSION INTRAMUSCULAR at 13:17

## 2022-10-10 NOTE — ASSESSMENT & PLAN NOTE
Baseline creatinine is 0 7-0 8  Currently creatinine is elevated to 1 38 on admission  Is probably secondary to hepatorenal syndrome due to worsening ascites due to underlying liver cirrhosis  Placed on gentle diuresis       Creatinine slightly worsened  however need to continue diuresis as patient is still fluid overloaded

## 2022-10-10 NOTE — PLAN OF CARE
Problem: PAIN - ADULT  Goal: Verbalizes/displays adequate comfort level or baseline comfort level  Description: Interventions:  - Encourage patient to monitor pain and request assistance  - Assess pain using appropriate pain scale  - Administer analgesics based on type and severity of pain and evaluate response  - Implement non-pharmacological measures as appropriate and evaluate response  - Consider cultural and social influences on pain and pain management  - Notify physician/advanced practitioner if interventions unsuccessful or patient reports new pain  Outcome: Progressing     Problem: INFECTION - ADULT  Goal: Absence or prevention of progression during hospitalization  Description: INTERVENTIONS:  - Assess and monitor for signs and symptoms of infection  - Monitor lab/diagnostic results  - Monitor all insertion sites, i e  indwelling lines, tubes, and drains  - Monitor endotracheal if appropriate and nasal secretions for changes in amount and color  - [place] appropriate cooling/warming therapies per order  - Administer medications as ordered  - Instruct and encourage patient and family to use good hand hygiene technique  - Identify and instruct in appropriate isolation precautions for identified infection/condition  Outcome: Progressing     Problem: SAFETY ADULT  Goal: Patient will remain free of falls  Description: INTERVENTIONS:  - Educate patient/family on patient safety including physical limitations  - Instruct patient to call for assistance with activity   - Consult OT/PT to assist with strengthening/mobility   - Keep Call bell within reach  - Keep bed low and locked with side rails adjusted as appropriate  - Keep care items and personal belongings within reach  - Initiate and maintain comfort rounds  - Make Fall Risk Sign visible to staff    - Apply yellow socks and bracelet for high fall risk patients  - Consider moving patient to room near nurses station  Outcome: Progressing  Goal: Maintain or return to baseline ADL function  Description: INTERVENTIONS:  -  Assess patient's ability to carry out ADLs; assess patient's baseline for ADL function and identify physical deficits which impact ability to perform ADLs (bathing, care of mouth/teeth, toileting, grooming, dressing, etc )  - Assess/evaluate cause of self-care deficits   - Assess range of motion  - Assess patient's mobility; develop plan if impaired  - Assess patient's need for assistive devices and provide as appropriate  - Encourage maximum independence but intervene and supervise when necessary  - Involve family in performance of ADLs  - Assess for home care needs following discharge   - Consider OT consult to assist with ADL evaluation and planning for discharge  - Provide patient education as appropriate  Outcome: Progressing  Goal: Maintains/Returns to pre admission functional level  Description: INTERVENTIONS:  - Perform BMAT or MOVE assessment daily    - Set and communicate daily mobility goal to care team and patient/family/caregiver     - Collaborate with rehabilitation services on mobility goals if consulted    - Out of bed for toileting  - Record patient progress and toleration of activity level   Outcome: Progressing     Problem: DISCHARGE PLANNING  Goal: Discharge to home or other facility with appropriate resources  Description: INTERVENTIONS:  - Identify barriers to discharge w/patient and caregiver  - Arrange for needed discharge resources and transportation as appropriate  - Identify discharge learning needs (meds, wound care, etc )  - Arrange for interpretive services to assist at discharge as needed  - Refer to Case Management Department for coordinating discharge planning if the patient needs post-hospital services based on physician/advanced practitioner order or complex needs related to functional status, cognitive ability, or social support system  Outcome: Progressing     Problem: Knowledge Deficit  Goal: Patient/family/caregiver demonstrates understanding of disease process, treatment plan, medications, and discharge instructions  Description: Complete learning assessment and assess knowledge base    Interventions:  - Provide teaching at level of understanding  - Provide teaching via preferred learning methods  Outcome: Progressing     Problem: CARDIOVASCULAR - ADULT  Goal: Maintains optimal cardiac output and hemodynamic stability  Description: INTERVENTIONS:  - Monitor I/O, vital signs and rhythm  - Monitor for S/S and trends of decreased cardiac output  - Administer and titrate ordered vasoactive medications to optimize hemodynamic stability  - Assess quality of pulses, skin color and temperature  - Assess for signs of decreased coronary artery perfusion  - Instruct patient to report change in severity of symptoms  Outcome: Progressing  Goal: Absence of cardiac dysrhythmias or at baseline rhythm  Description: INTERVENTIONS:  - Continuous cardiac monitoring, vital signs, obtain 12 lead EKG if ordered  - Administer antiarrhythmic and heart rate control medications as ordered  - Monitor electrolytes and administer replacement therapy as ordered  Outcome: Progressing     Problem: RESPIRATORY - ADULT  Goal: Achieves optimal ventilation and oxygenation  Description: INTERVENTIONS:  - Assess for changes in respiratory status  - Assess for changes in mentation and behavior  - Position to facilitate oxygenation and minimize respiratory effort  - Oxygen administered by appropriate delivery if ordered  - Initiate smoking cessation education as indicated  - Encourage broncho-pulmonary hygiene including cough, deep breathe, Incentive Spirometry  - Assess the need for suctioning and aspirate as needed  - Assess and instruct to report SOB or any respiratory difficulty  - Respiratory Therapy support as indicated  Outcome: Progressing     Problem: GENITOURINARY - ADULT  Goal: Maintains or returns to baseline urinary function  Description: INTERVENTIONS:  - Assess urinary function  - Encourage oral fluids to ensure adequate hydration if ordered  - Administer IV fluids as ordered to ensure adequate hydration  - Administer ordered medications as needed  - Offer frequent toileting  - Follow urinary retention protocol if ordered  Outcome: Progressing     Problem: METABOLIC, FLUID AND ELECTROLYTES - ADULT  Goal: Electrolytes maintained within normal limits  Description: INTERVENTIONS:  - Monitor labs and assess patient for signs and symptoms of electrolyte imbalances  - Administer electrolyte replacement as ordered  - Monitor response to electrolyte replacements, including repeat lab results as appropriate  - Instruct patient on fluid and nutrition as appropriate  Outcome: Progressing  Goal: Fluid balance maintained  Description: INTERVENTIONS:  - Monitor labs   - Monitor I/O and WT  - Instruct patient on fluid and nutrition as appropriate  - Assess for signs & symptoms of volume excess or deficit  Outcome: Progressing     Problem: SKIN/TISSUE INTEGRITY - ADULT  Goal: Skin Integrity remains intact(Skin Breakdown Prevention)  Description: Assess:    -Inspect skin when repositioning, toileting, and assisting with ADLS    -Assess extremities for adequate circulation and sensation     Bed Management:  -Have minimal linens on bed & keep smooth, unwrinkled  -Change linens as needed when moist or perspiring      Toileting:  -Offer bedside commode      Activity:    -Encourage activity and walks on unit  -Encourage or provide ROM exercises       Skin Care:  -Avoid use of baby powder, tape, friction and shearing, hot water or constrictive clothing    -Do not massage red bony areas      Outcome: Progressing     Problem: HEMATOLOGIC - ADULT  Goal: Maintains hematologic stability  Description: INTERVENTIONS  - Assess for signs and symptoms of bleeding or hemorrhage  - Monitor labs  - Administer supportive blood products/factors as ordered and appropriate  Outcome: Progressing     Problem: MUSCULOSKELETAL - ADULT  Goal: Maintain or return mobility to safest level of function  Description: INTERVENTIONS:  - Assess patient's ability to carry out ADLs; assess patient's baseline for ADL function and identify physical deficits which impact ability to perform ADLs (bathing, care of mouth/teeth, toileting, grooming, dressing, etc )  - Assess/evaluate cause of self-care deficits   - Assess range of motion  - Assess patient's mobility  - Assess patient's need for assistive devices and provide as appropriate  - Encourage maximum independence but intervene and supervise when necessary  - Involve family in performance of ADLs  - Assess for home care needs following discharge   - Consider OT consult to assist with ADL evaluation and planning for discharge  - Provide patient education as appropriate  Outcome: Progressing     Problem: Nutrition/Hydration-ADULT  Goal: Nutrient/Hydration intake appropriate for improving, restoring or maintaining nutritional needs  Description: Monitor and assess patient's nutrition/hydration status for malnutrition  Collaborate with interdisciplinary team and initiate plan and interventions as ordered  Monitor patient's weight and dietary intake as ordered or per policy  Utilize nutrition screening tool and intervene as necessary  Determine patient's food preferences and provide high-protein, high-caloric foods as appropriate       INTERVENTIONS:  - Monitor oral intake, urinary output, labs, and treatment plans  - Assess nutrition and hydration status and recommend course of action  - Evaluate amount of meals eaten  - Assist patient with eating if necessary   - Allow adequate time for meals  - Recommend/ encourage appropriate diets, oral nutritional supplements, and vitamin/mineral supplements  - Order, calculate, and assess calorie counts as needed  - Recommend, monitor, and adjust tube feedings and TPN/PPN based on assessed needs  - Assess need for intravenous fluids  - Provide specific nutrition/hydration education as appropriate  - Include patient/family/caregiver in decisions related to nutrition  Outcome: Progressing     Problem: MOBILITY - ADULT  Goal: Maintain or return to baseline ADL function  Description: INTERVENTIONS:  -  Assess patient's ability to carry out ADLs; assess patient's baseline for ADL function and identify physical deficits which impact ability to perform ADLs (bathing, care of mouth/teeth, toileting, grooming, dressing, etc )  - Assess/evaluate cause of self-care deficits   - Assess range of motion  - Assess patient's mobility; develop plan if impaired  - Assess patient's need for assistive devices and provide as appropriate  - Encourage maximum independence but intervene and supervise when necessary  - Involve family in performance of ADLs  - Assess for home care needs following discharge   - Consider OT consult to assist with ADL evaluation and planning for discharge  - Provide patient education as appropriate  Outcome: Progressing  Goal: Maintains/Returns to pre admission functional level  Description: INTERVENTIONS:  - Perform BMAT or MOVE assessment daily    - Set and communicate daily mobility goal to care team and patient/family/caregiver     - Collaborate with rehabilitation services on mobility goals if consulted    - Out of bed for toileting  - Record patient progress and toleration of activity level   Outcome: Progressing     Problem: Prexisting or High Potential for Compromised Skin Integrity  Goal: Skin integrity is maintained or improved  Description: INTERVENTIONS:  - Identify patients at risk for skin breakdown  - Assess and monitor skin integrity  - Assess and monitor nutrition and hydration status  - Monitor labs   - Assess for incontinence   - Turn and reposition patient  - Assist with mobility/ambulation  - Relieve pressure over bony prominences  - Avoid friction and shearing  - Provide appropriate hygiene as needed including keeping skin clean and dry  - Evaluate need for skin moisturizer/barrier cream  - Collaborate with interdisciplinary team   - Patient/family teaching  - Consider wound care consult   Outcome: Progressing     Problem: Potential for Falls  Goal: Patient will remain free of falls  Description: INTERVENTIONS:  - Educate patient/family on patient safety including physical limitations  - Instruct patient to call for assistance with activity   - Consult OT/PT to assist with strengthening/mobility   - Keep Call bell within reach  - Keep bed low and locked with side rails adjusted as appropriate  - Keep care items and personal belongings within reach  - Initiate and maintain comfort rounds  - Make Fall Risk Sign visible to staff    - Apply yellow socks and bracelet for high fall risk patients  - Consider moving patient to room near nurses station  Outcome: Progressing

## 2022-10-10 NOTE — CASE MANAGEMENT
Case Management Progress Note    Patient name Logan Precise  Location /-01 MRN [id number]  : 1950 Date 10/10/2022       LOS (days): 6  Geometric Mean LOS (GMLOS) (days): 5 00  Days to GMLOS:-0 8        OBJECTIVE:        Current admission status: Inpatient  Preferred Pharmacy:   PATIENT/FAMILY REPORTS NO PREFERRED PHARMACY  No address on file      [id number] [address] [address]  Phone: [phone] Fax: [phone]    Primary Care Provider: Deniz Almaguer MD    Primary Insurance: MEDICARE  Secondary Insurance: Rg Garrett    PROGRESS NOTE:  CM updated by MD, Pt will be cleared by ID tomorrow for d/c to [facility], anticipate no IV abx needs however will confirm again w/ MD Yissel Wood was updated, had requested Pt receive Covid booster pta, CM requested from MD  CM will f/u in am to confirm planning

## 2022-10-10 NOTE — ASSESSMENT & PLAN NOTE
Secondary to acute hepatic encephalopathy  Patient had a fall 1 week ago since then he has been having increasing confusion  Patient has been keeping up with his lactulose and xifaxan and ammonia level <10  CT brain negative for acute bleed or acute pathology  Ascitic fluid culture pending  prelim showing Gram-positive cocci   Concern for SBP  -completed empiric antibiotic therapy  Id consult appreciated,   Pt/ot recommending subacute rehab

## 2022-10-10 NOTE — ASSESSMENT & PLAN NOTE
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

## 2022-10-10 NOTE — ASSESSMENT & PLAN NOTE
Malnutrition Findings:   Adult Malnutrition type: Chronic illness  Adult Degree of Malnutrition: Other severe protein calorie malnutrition  Malnutrition Characteristics: Muscle loss, Fat loss                  360 Statement: Chronic severe malnutrition related to increased nutrient needs and likely inadequate oral intake as evidenced by severe muscle wasting to temporalis, pectoralis, deltoid muscles; severe fat loss to buccal pads and triceps  Treated with: Continue 2gm Na at this time given cirrhosis, consider liberalizing as indicated  Fluid restriction per MD  +Sudhir BID mixed in pudding for wound healing  +ensure compact BID  Assistance with meals as accepted by pt/indicated noting tremors  +lids with cups to prevent spilling on self  Diet ed not appropriate at this time  Recommend daily weights  BMI Findings: Body mass index is 28 87 kg/m²

## 2022-10-10 NOTE — SPEECH THERAPY NOTE
Speech Language/Pathology    Speech/Language Pathology Progress Note    Patient Name: Harvinder Prasad  DFVXZ'T Date: 10/10/2022     Subjective:  "I did this myself" re: feeding self for breakfast    Objective: To assess diet tolerance    Assessment:  Pt seen w/ regular texture breakfast and thin liquids  Upright in bed, positioning and alertness improved  Pt self feeding IND, previous sessions required assistance for self feeding d/t UE tremors  Pt reporting tremors are better today  Positive bolus retrieval w/ adequate mastication and clearance  No overt s/s aspiration, no coughing and no throat clearing noted as w/ prior sessions  Plan/Recommendations:  Recommend to continue regular texture diet w/ thin liquids  Can trial meds whole if cognition has improved enough to not chew them    SLP will s/o, please reconsult as needed    Дмитрий Hull [place] 86 CCC-SLP  10/10/2022

## 2022-10-10 NOTE — PLAN OF CARE
Problem: PAIN - ADULT  Goal: Verbalizes/displays adequate comfort level or baseline comfort level  Description: Interventions:  - Encourage patient to monitor pain and request assistance  - Assess pain using appropriate pain scale  - Administer analgesics based on type and severity of pain and evaluate response  - Implement non-pharmacological measures as appropriate and evaluate response  - Consider cultural and social influences on pain and pain management  - Notify physician/advanced practitioner if interventions unsuccessful or patient reports new pain  Outcome: Progressing     Problem: INFECTION - ADULT  Goal: Absence or prevention of progression during hospitalization  Description: INTERVENTIONS:  - Assess and monitor for signs and symptoms of infection  - Monitor lab/diagnostic results  - Monitor all insertion sites, i e  indwelling lines, tubes, and drains  - Monitor endotracheal if appropriate and nasal secretions for changes in amount and color  - [place] appropriate cooling/warming therapies per order  - Administer medications as ordered  - Instruct and encourage patient and family to use good hand hygiene technique  - Identify and instruct in appropriate isolation precautions for identified infection/condition  Outcome: Progressing     Problem: SAFETY ADULT  Goal: Patient will remain free of falls  Description: INTERVENTIONS:  - Educate patient/family on patient safety including physical limitations  - Instruct patient to call for assistance with activity   - Consult OT/PT to assist with strengthening/mobility   - Keep Call bell within reach  - Keep bed low and locked with side rails adjusted as appropriate  - Keep care items and personal belongings within reach  - Initiate and maintain comfort rounds  - Make Fall Risk Sign visible to staff  - Offer Toileting every  Hours, in advance of need  - Initiate/Maintain alarm  - Obtain necessary fall risk management equipment:   - Apply yellow socks and bracelet for high fall risk patients  - Consider moving patient to room near nurses station  Outcome: Progressing  Goal: Maintain or return to baseline ADL function  Description: INTERVENTIONS:  -  Assess patient's ability to carry out ADLs; assess patient's baseline for ADL function and identify physical deficits which impact ability to perform ADLs (bathing, care of mouth/teeth, toileting, grooming, dressing, etc )  - Assess/evaluate cause of self-care deficits   - Assess range of motion  - Assess patient's mobility; develop plan if impaired  - Assess patient's need for assistive devices and provide as appropriate  - Encourage maximum independence but intervene and supervise when necessary  - Involve family in performance of ADLs  - Assess for home care needs following discharge   - Consider OT consult to assist with ADL evaluation and planning for discharge  - Provide patient education as appropriate  Outcome: Progressing  Goal: Maintains/Returns to pre admission functional level  Description: INTERVENTIONS:  - Perform BMAT or MOVE assessment daily    - Set and communicate daily mobility goal to care team and patient/family/caregiver  - Collaborate with rehabilitation services on mobility goals if consulted  - Perform Range of Motion  times a day  - Reposition patient every  hours    - Dangle patient  times a day  - Stand patient  times a day  - Ambulate patient  times a day  - Out of bed to chair  times a day   - Out of bed for meals  times a day  - Out of bed for toileting  - Record patient progress and toleration of activity level   Outcome: Progressing     Problem: DISCHARGE PLANNING  Goal: Discharge to home or other facility with appropriate resources  Description: INTERVENTIONS:  - Identify barriers to discharge w/patient and caregiver  - Arrange for needed discharge resources and transportation as appropriate  - Identify discharge learning needs (meds, wound care, etc )  - Arrange for interpretive services to assist at discharge as needed  - Refer to Case Management Department for coordinating discharge planning if the patient needs post-hospital services based on physician/advanced practitioner order or complex needs related to functional status, cognitive ability, or social support system  Outcome: Progressing     Problem: Knowledge Deficit  Goal: Patient/family/caregiver demonstrates understanding of disease process, treatment plan, medications, and discharge instructions  Description: Complete learning assessment and assess knowledge base    Interventions:  - Provide teaching at level of understanding  - Provide teaching via preferred learning methods  Outcome: Progressing     Problem: CARDIOVASCULAR - ADULT  Goal: Maintains optimal cardiac output and hemodynamic stability  Description: INTERVENTIONS:  - Monitor I/O, vital signs and rhythm  - Monitor for S/S and trends of decreased cardiac output  - Administer and titrate ordered vasoactive medications to optimize hemodynamic stability  - Assess quality of pulses, skin color and temperature  - Assess for signs of decreased coronary artery perfusion  - Instruct patient to report change in severity of symptoms  Outcome: Progressing  Goal: Absence of cardiac dysrhythmias or at baseline rhythm  Description: INTERVENTIONS:  - Continuous cardiac monitoring, vital signs, obtain 12 lead EKG if ordered  - Administer antiarrhythmic and heart rate control medications as ordered  - Monitor electrolytes and administer replacement therapy as ordered  Outcome: Progressing     Problem: RESPIRATORY - ADULT  Goal: Achieves optimal ventilation and oxygenation  Description: INTERVENTIONS:  - Assess for changes in respiratory status  - Assess for changes in mentation and behavior  - Position to facilitate oxygenation and minimize respiratory effort  - Oxygen administered by appropriate delivery if ordered  - Initiate smoking cessation education as indicated  - Encourage broncho-pulmonary hygiene including cough, deep breathe, Incentive Spirometry  - Assess the need for suctioning and aspirate as needed  - Assess and instruct to report SOB or any respiratory difficulty  - Respiratory Therapy support as indicated  Outcome: Progressing     Problem: GENITOURINARY - ADULT  Goal: Maintains or returns to baseline urinary function  Description: INTERVENTIONS:  - Assess urinary function  - Encourage oral fluids to ensure adequate hydration if ordered  - Administer IV fluids as ordered to ensure adequate hydration  - Administer ordered medications as needed  - Offer frequent toileting  - Follow urinary retention protocol if ordered  Outcome: Progressing     Problem: METABOLIC, FLUID AND ELECTROLYTES - ADULT  Goal: Electrolytes maintained within normal limits  Description: INTERVENTIONS:  - Monitor labs and assess patient for signs and symptoms of electrolyte imbalances  - Administer electrolyte replacement as ordered  - Monitor response to electrolyte replacements, including repeat lab results as appropriate  - Instruct patient on fluid and nutrition as appropriate  Outcome: Progressing  Goal: Fluid balance maintained  Description: INTERVENTIONS:  - Monitor labs   - Monitor I/O and WT  - Instruct patient on fluid and nutrition as appropriate  - Assess for signs & symptoms of volume excess or deficit  Outcome: Progressing     Problem: SKIN/TISSUE INTEGRITY - ADULT  Goal: Skin Integrity remains intact(Skin Breakdown Prevention)  Description: Assess:  -Perform Justin assessment every   -Clean and moisturize skin every   -Inspect skin when repositioning, toileting, and assisting with ADLS  -Assess under medical devices such as  every   -Assess extremities for adequate circulation and sensation     Bed Management:  -Have minimal linens on bed & keep smooth, unwrinkled  -Change linens as needed when moist or perspiring  -Avoid sitting or lying in one position for more than  hours while in bed  -Keep HOB at degrees     Toileting:  -Offer bedside commode  -Assess for incontinence every   -Use incontinent care products after each incontinent episode such as     Activity:  -Mobilize patient  times a day  -Encourage activity and walks on unit  -Encourage or provide ROM exercises   -Turn and reposition patient every  Hours  -Use appropriate equipment to lift or move patient in bed  -Instruct/ Assist with weight shifting every  when out of bed in chair  -Consider limitation of chair time  hour intervals    Skin Care:  -Avoid use of baby powder, tape, friction and shearing, hot water or constrictive clothing  -Relieve pressure over bony prominences using  -Do not massage red bony areas    Next Steps:  -Teach patient strategies to minimize risks such as    -Consider consults to  interdisciplinary teams such as   Outcome: Progressing     Problem: HEMATOLOGIC - ADULT  Goal: Maintains hematologic stability  Description: INTERVENTIONS  - Assess for signs and symptoms of bleeding or hemorrhage  - Monitor labs  - Administer supportive blood products/factors as ordered and appropriate  Outcome: Progressing     Problem: MUSCULOSKELETAL - ADULT  Goal: Maintain or return mobility to safest level of function  Description: INTERVENTIONS:  - Assess patient's ability to carry out ADLs; assess patient's baseline for ADL function and identify physical deficits which impact ability to perform ADLs (bathing, care of mouth/teeth, toileting, grooming, dressing, etc )  - Assess/evaluate cause of self-care deficits   - Assess range of motion  - Assess patient's mobility  - Assess patient's need for assistive devices and provide as appropriate  - Encourage maximum independence but intervene and supervise when necessary  - Involve family in performance of ADLs  - Assess for home care needs following discharge   - Consider OT consult to assist with ADL evaluation and planning for discharge  - Provide patient education as appropriate  Outcome: Progressing     Problem: Nutrition/Hydration-ADULT  Goal: Nutrient/Hydration intake appropriate for improving, restoring or maintaining nutritional needs  Description: Monitor and assess patient's nutrition/hydration status for malnutrition  Collaborate with interdisciplinary team and initiate plan and interventions as ordered  Monitor patient's weight and dietary intake as ordered or per policy  Utilize nutrition screening tool and intervene as necessary  Determine patient's food preferences and provide high-protein, high-caloric foods as appropriate       INTERVENTIONS:  - Monitor oral intake, urinary output, labs, and treatment plans  - Assess nutrition and hydration status and recommend course of action  - Evaluate amount of meals eaten  - Assist patient with eating if necessary   - Allow adequate time for meals  - Recommend/ encourage appropriate diets, oral nutritional supplements, and vitamin/mineral supplements  - Order, calculate, and assess calorie counts as needed  - Recommend, monitor, and adjust tube feedings and TPN/PPN based on assessed needs  - Assess need for intravenous fluids  - Provide specific nutrition/hydration education as appropriate  - Include patient/family/caregiver in decisions related to nutrition  Outcome: Progressing     Problem: MOBILITY - ADULT  Goal: Maintain or return to baseline ADL function  Description: INTERVENTIONS:  -  Assess patient's ability to carry out ADLs; assess patient's baseline for ADL function and identify physical deficits which impact ability to perform ADLs (bathing, care of mouth/teeth, toileting, grooming, dressing, etc )  - Assess/evaluate cause of self-care deficits   - Assess range of motion  - Assess patient's mobility; develop plan if impaired  - Assess patient's need for assistive devices and provide as appropriate  - Encourage maximum independence but intervene and supervise when necessary  - Involve family in performance of ADLs  - Assess for home care needs following discharge   - Consider OT consult to assist with ADL evaluation and planning for discharge  - Provide patient education as appropriate  Outcome: Progressing  Goal: Maintains/Returns to pre admission functional level  Description: INTERVENTIONS:  - Perform BMAT or MOVE assessment daily    - Set and communicate daily mobility goal to care team and patient/family/caregiver  - Collaborate with rehabilitation services on mobility goals if consulted  - Perform Range of Motion  times a day  - Reposition patient every  hours    - Dangle patient  times a day  - Stand patient  times a day  - Ambulate patient  times a day  - Out of bed to chair  times a day   - Out of bed for deandre times a day  - Out of bed for toileting  - Record patient progress and toleration of activity level   Outcome: Progressing     Problem: Prexisting or High Potential for Compromised Skin Integrity  Goal: Skin integrity is maintained or improved  Description: INTERVENTIONS:  - Identify patients at risk for skin breakdown  - Assess and monitor skin integrity  - Assess and monitor nutrition and hydration status  - Monitor labs   - Assess for incontinence   - Turn and reposition patient  - Assist with mobility/ambulation  - Relieve pressure over bony prominences  - Avoid friction and shearing  - Provide appropriate hygiene as needed including keeping skin clean and dry  - Evaluate need for skin moisturizer/barrier cream  - Collaborate with interdisciplinary team   - Patient/family teaching  - Consider wound care consult   Outcome: Progressing     Problem: Potential for Falls  Goal: Patient will remain free of falls  Description: INTERVENTIONS:  - Educate patient/family on patient safety including physical limitations  - Instruct patient to call for assistance with activity   - Consult OT/PT to assist with strengthening/mobility   - Keep Call bell within reach  - Keep bed low and locked with side rails adjusted as appropriate  - Keep care items and personal belongings within reach  - Initiate and maintain comfort rounds  - Make Fall Risk Sign visible to staff  - Offer Toileting every  Hours, in advance of need  - Initiate/Maintain alarm  - Obtain necessary fall risk management equipment:   - Apply yellow socks and bracelet for high fall risk patients  - Consider moving patient to room near nurses station  Outcome: Progressing

## 2022-10-10 NOTE — BRIEF OP NOTE (RAD/CATH)
INTERVENTIONAL RADIOLOGY PROCEDURE NOTE    Date: 10/10/2022    Procedure:  Paracentesis  Preoperative diagnosis:   1  NAVEED (acute kidney injury) ([facility] [address] )    2  Ascites    3  Pneumonia of right lung due to infectious organism, unspecified part of lung    4  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 (HCC)    5  Other ascites    6  Sepsis, due to unspecified organism, unspecified whether acute organ dysfunction present [facility])         Postoperative diagnosis: Same  Surgeon: Dory Favre     Assistant: None  No qualified resident was available  Blood loss:  None    Specimens:  None     Findings:  Left abdominal approach paracentesis performed and 1200 mL of cloudy yellow fluid aspirated  Complications: None immediate      Anesthesia: local

## 2022-10-10 NOTE — ASSESSMENT & PLAN NOTE
Patient noticed to have abdominal distension  CT abdomen pelvis showed large volume ascites with small bilateral pleural effusions  Patient had paracentesis done for 7 5 L 10/5/22 and 12 L removed on 10/10  Will place on oral aldactone for now  Not diuresing much with oral Lasix and hence increased to Lasix 40 mg IV b i d  also give 1 dose of IV albumin daily  Still has peripheral edema noted  placed on IV vancomycin and Rocephin for SBP prophylaxis/treatment  Reviewed fluid studies for analysis for LDH, protein, culture and sensitivity, WBC and cytology and stat Gram stain  Prelim cultures on ascitic fluid are growing Gram-positive cocci  But no growth      Patient completed empiric antibiotic therapy  Id consult appreciated and appreciate recommendation  Patient provide referral for IR guided paracentesis every 2-3 weeks

## 2022-10-10 NOTE — DISCHARGE INSTRUCTIONS
Abdominal Paracentesis     WHAT YOU NEED TO KNOW:   Abdominal paracentesis is a procedure to remove abnormal fluid buildup in your abdomen  Fluid builds up because of liver problems, such as swelling and scarring  Heart failure, kidney disease, a mass, or problems with your pancreas may also cause fluid buildup  DISCHARGE INSTRUCTIONS:     Follow up with your healthcare provider as directed: Write down your questions so you remember to ask them during your visits  Wound care: Remove dressing after 24 hours  Leave glue in place  Return to your normal activities    Contact Interventional Radiology at [phone] Trista PATIENTS: Contact Interventional Radiology at [phone]) Orie Dancer PATIENTS: Contact Interventional Radiology at [phone]) if:  You have a fever and your wound is red and swollen  You have yellow, green, or bad-smelling discharge coming from your wound  You have pain or swelling in your abdomen  You have an upset stomach or you vomit  You have sudden, sharp pain in your abdomen  You urinate very little or not at all  You feel confused and more tired than usual    Your arm or leg feels warm, tender, and painful  It may look swollen and red  You suddenly feel lightheaded and have trouble breathing

## 2022-10-10 NOTE — PROGRESS NOTES
Vancomycin Assessment    Nurys Muro is a 67 y o  male who is currently receiving vancomycin 1500 mg IV once prn random level < 20 for other Peritonitis  Relevant clinical data and objective history reviewed:  Creatinine   Date Value Ref Range Status   10/10/2022 1 72 (H) 0 60 - 1 30 mg/dL Final     Comment:     Standardized to IDMS reference method   10/09/2022 1 45 (H) 0 60 - 1 30 mg/dL Final     Comment:     Standardized to IDMS reference method   10/08/2022 1 54 (H) 0 60 - 1 30 mg/dL Final     Comment:     Standardized to IDMS reference method     Vancomycin Rm   Date Value Ref Range Status   10/10/2022 19 4 10 0 - 20 0 ug/mL Final     BP 96/51   Pulse 93   Temp 98 4 °F (36 9 °C)   Resp 16   Ht 6' 2" (1 88 m)   Wt 102 kg (224 lb 13 9 oz)   SpO2 97%   BMI 28 87 kg/m²   I/O last 3 completed shifts: In: 821 [P O :821]  Out: 813 [Urine:813]  Lab Results   Component Value Date/Time    BUN 31 (H) 10/10/2022 05:40 AM    WBC 8 99 10/09/2022 04:58 AM    HGB 8 9 (L) 10/09/2022 04:58 AM    HCT 28 0 (L) 10/09/2022 04:58 AM    MCV 95 10/09/2022 04:58 AM    PLT 41 (LL) 10/09/2022 04:58 AM     Temp Readings from Last 3 Encounters:   10/10/22 98 4 °F (36 9 °C)   06/07/22 98 1 °F (36 7 °C) (Temporal)   04/19/22 97 9 °F (36 6 °C)     Vancomycin Days of Therapy: 5    Assessment/Plan  The patient is currently on vancomycin utilizing pulse dosing  Baseline risks associated with therapy include: pre-existing renal impairment, concomitant nephrotoxic medications, and advanced age  The patient is receiving 1500 mg IV once prn random level < 20 with the most recent vancomycin level being therapeutic based on a goal of 15-20 (appropriate for most indications) ; therefore, is clinically appropriate and will continue pulse dosing due to worsening kidney function  Pharmacy will continue to follow closely for s/sx of nephrotoxicity, infusion reactions, and appropriateness of therapy    BMP and CBC will be ordered per protocol  Plan for random level at 5240 on 10/11/21  Pharmacy will continue to follow the patient’s culture results and clinical progress daily      Catie Seymour, Pharmacist

## 2022-10-11 VITALS
BODY MASS INDEX: 26.03 KG/M2 | HEIGHT: 74 IN | HEART RATE: 103 BPM | RESPIRATION RATE: 17 BRPM | OXYGEN SATURATION: 97 % | WEIGHT: 202.82 LBS | SYSTOLIC BLOOD PRESSURE: 113 MMHG | DIASTOLIC BLOOD PRESSURE: 71 MMHG | TEMPERATURE: 97.9 F

## 2022-10-11 LAB
ANION GAP SERPL CALCULATED.3IONS-SCNC: 4 MMOL/L (ref 4–13)
BUN SERPL-MCNC: 31 MG/DL (ref 5–25)
CALCIUM SERPL-MCNC: 8.1 MG/DL (ref 8.3–10.1)
CHLORIDE SERPL-SCNC: 104 MMOL/L (ref 96–108)
CO2 SERPL-SCNC: 28 MMOL/L (ref 21–32)
CREAT SERPL-MCNC: 1.7 MG/DL (ref 0.6–1.3)
GFR SERPL CREATININE-BSD FRML MDRD: 39 ML/MIN/1.73SQ M
GLUCOSE SERPL-MCNC: 106 MG/DL (ref 65–140)
HISTIOCYTES NFR FLD: 30 %
LYMPHOCYTES NFR BLD AUTO: 34 %
MONO+MESO NFR FLD MANUAL: 2 %
MONOCYTES NFR BLD AUTO: 12 %
NEUTS SEG NFR BLD AUTO: 18 %
PATHOLOGIST INTERPRETATION: NORMAL
PATHOLOGY REVIEW: YES
POTASSIUM SERPL-SCNC: 3.6 MMOL/L (ref 3.5–5.3)
SODIUM SERPL-SCNC: 136 MMOL/L (ref 135–147)
TOTAL CELLS COUNTED SPEC: 100
WBC OTHER NFR FLD MANUAL: 4 %

## 2022-10-11 PROCEDURE — 97530 THERAPEUTIC ACTIVITIES: CPT

## 2022-10-11 PROCEDURE — 97110 THERAPEUTIC EXERCISES: CPT

## 2022-10-11 PROCEDURE — 80048 BASIC METABOLIC PNL TOTAL CA: CPT | Performed by: FAMILY MEDICINE

## 2022-10-11 PROCEDURE — G0408 INPT/TELE FOLLOW UP 35: HCPCS | Performed by: INTERNAL MEDICINE

## 2022-10-11 PROCEDURE — 99239 HOSP IP/OBS DSCHRG MGMT >30: CPT | Performed by: FAMILY MEDICINE

## 2022-10-11 RX ORDER — MIDODRINE HYDROCHLORIDE 5 MG/1
5 TABLET ORAL
Qty: 90 TABLET | Refills: 0 | Status: SHIPPED
Start: 2022-10-12 | End: 2022-11-11

## 2022-10-11 RX ORDER — SPIRONOLACTONE 50 MG/1
50 TABLET, FILM COATED ORAL DAILY
Qty: 30 TABLET | Refills: 0 | Status: SHIPPED
Start: 2022-10-11 | End: 2022-11-10

## 2022-10-11 RX ORDER — FUROSEMIDE 40 MG/1
40 TABLET ORAL DAILY
Qty: 30 TABLET | Refills: 0 | Status: SHIPPED
Start: 2022-10-12 | End: 2022-11-11

## 2022-10-11 RX ADMIN — RIFAXIMIN 550 MG: 550 TABLET ORAL at 08:09

## 2022-10-11 RX ADMIN — LACTULOSE 30 G: 20 SOLUTION ORAL at 11:13

## 2022-10-11 RX ADMIN — SPIRONOLACTONE 50 MG: 25 TABLET ORAL at 08:09

## 2022-10-11 RX ADMIN — MIDODRINE HYDROCHLORIDE 5 MG: 5 TABLET ORAL at 11:13

## 2022-10-11 RX ADMIN — MIDODRINE HYDROCHLORIDE 5 MG: 5 TABLET ORAL at 06:05

## 2022-10-11 RX ADMIN — Medication 1000 UNITS: at 08:09

## 2022-10-11 RX ADMIN — LACTULOSE 30 G: 20 SOLUTION ORAL at 08:11

## 2022-10-11 RX ADMIN — MIDODRINE HYDROCHLORIDE 5 MG: 5 TABLET ORAL at 15:16

## 2022-10-11 RX ADMIN — SERTRALINE HYDROCHLORIDE 50 MG: 50 TABLET ORAL at 08:09

## 2022-10-11 RX ADMIN — NYSTATIN: 100000 POWDER TOPICAL at 08:13

## 2022-10-11 NOTE — CASE MANAGEMENT
Case Management Progress Note    Patient name Samantha Lester  Location /-01 MRN [id number]  : 1950 Date 10/11/2022       LOS (days): 7  Geometric Mean LOS (GMLOS) (days): 5 00  Days to GMLOS:-2        OBJECTIVE:        Current admission status: Inpatient  Preferred Pharmacy:   PATIENT/FAMILY REPORTS NO PREFERRED PHARMACY  No address on file      [address] [id number], [address] [facility] [address] [address]  Phone: [phone] Fax: [phone]    Primary Care Provider: Faviola Rehman MD    Primary Insurance: MEDICARE  Secondary Insurance: [employer]ET[employer]    PROGRESS NOTE:        Pt is medically stable for discharge    CM placing request for transport to [address] [place] [address] [facility] will  pt at [phone] to transport to [facility]

## 2022-10-11 NOTE — PHYSICAL THERAPY NOTE
PHYSICAL THERAPY TREATMENT NOTE  NAME:  Bell Gardiner  DATE: 10/11/22    Length Of Stay: 7  Performed at least 2 patient identifiers during session: Name and ID bracelet    TREATMENT:      10/11/22 1439   PT Last Visit   PT Visit Date 10/11/22   Note Type   Note Type Treatment   Pain Assessment   Pain Assessment Tool 0-10   Pain Score No Pain   Restrictions/Precautions   Other Precautions Chair Alarm; Bed Alarm; Fall Risk;Cognitive   General   Chart Reviewed Yes   Response to Previous Treatment Patient unable to report, no changes reported from family or staff   Family/Caregiver Present No   Cognition   Overall Cognitive Status Impaired   Arousal/Participation Alert; Responsive; Cooperative   Attention Attends with cues to redirect   Orientation Level Oriented to person;Disoriented to place; Disoriented to time;Disoriented to situation   Memory Decreased recall of precautions;Decreased recall of recent events;Decreased short term memory;Decreased recall of biographical information   Following Commands Follows one step commands with increased time or repetition   Comments pt pleasant and cooperative with 1 step directions   Bed Mobility   Rolling R 3  Moderate assistance   Additional items Assist x 1; Increased time required;Verbal cues;LE management; Bedrails   Rolling L 3  Moderate assistance   Additional items Assist x 1;Bedrails; Increased time required;Verbal cues;LE management   Supine to Sit 3  Moderate assistance   Additional items Assist x 1; Increased time required;Verbal cues; Bedrails;LE management   Sit to Supine 2  Maximal assistance   Additional items Assist x 1;Bedrails; Increased time required;Verbal cues;LE management   Additional Comments pt sat at EOB for 20 minutes with min UE support, no LOB noted    Pt performed seated scooting toward HOBx5, completed multiple trials of rolling for hygiene and linen change   Balance   Static Sitting Fair   Dynamic Sitting Fair -   Endurance Deficit   Endurance Deficit Yes   Activity Tolerance   Activity Tolerance Patient limited by fatigue; Other (Comment)  (cognition)   Nurse Made Aware RN present during PT session   Exercises   Hip Abduction Sitting;15 reps;AROM; Bilateral   Hip Adduction Sitting;15 reps;AROM; Bilateral   Knee AROM Long Arc Quad Sitting;15 reps;AROM; Bilateral   Ankle Pumps Sitting;15 reps;AROM; Bilateral   Marching Sitting;15 reps;AROM; Bilateral   Assessment   Prognosis Fair   Problem List Decreased strength;Decreased endurance; Impaired balance;Decreased mobility; Decreased coordination;Decreased cognition;Decreased safety awareness;Decreased skin integrity; Impaired judgement   Assessment Pt seen for PT treatment session this date with interventions consisting of Therapeutic exercise consisting of: AROM 15 reps B LE in seated at EOB position and therapeutic activity consisting of training: bed mobility, supine<>sit transfers, static sitting tolerance at EOB for 20 minutes w/ Min UE support, vc and tactile cues for static sitting posture faciliation and multiple trials of rolling in bed for linen change and hygiene  Pt agreeable to PT treatment session upon arrival, pt found supine in bed w/ HOB elevated, in no apparent distress and responsive  In comparison to previous session, pt with no improvements as evidenced by pt continues to require extensive assist for functional mobility  Post session: pt returned BTB, bed alarm engaged, all needs in reach and RN notified of session findings/recommendations  Continue to recommend post acute rehabilitation services at time of d/c in order to maximize pt's functional independence and safety w/ mobility  Pt continues to be functioning below baseline level, and remains limited 2* factors listed above and including decreased functional mobility, decreased activity tolerance, impaired balance, patient at high risk for falls   PT will continue to see pt during current hospitalization in order to address the deficits listed above and provide interventions consistent w/ POC in effort to achieve STGs  Barriers to Discharge Decreased caregiver support   Goals   Patient Goals none stated   STG Expiration Date 10/20/22   Plan   Treatment/Interventions Functional transfer training;LE strengthening/ROM; Therapeutic exercise; Endurance training;Bed mobility;Gait training; Compensatory technique education;Spoke to nursing   Progress No functional improvements   PT Frequency 3-5x/wk   Recommendation   PT Discharge Recommendation Post acute rehabilitation services   AM-PAC Basic Mobility Inpatient   Turning in Bed Without Bedrails 2   Lying on Back to Sitting on Edge of Flat Bed 2   Moving Bed to Chair 1   Standing Up From Chair 1   Walk in Room 1   Climb 3-5 Stairs 1   Basic Mobility Inpatient Raw Score 8   Turning Head Towards Sound 4   Follow Simple Instructions 2   Low Function Basic Mobility Raw Score 14   Low Function Basic Mobility Standardized Score 22 01   Highest Level Of Mobility   -HLM Goal 3: Sit at edge of bed   JH-HLM Achieved 3: Sit at edge of bed   Education   Education Provided Mobility training;Home exercise program   Patient Reinforcement needed   End of Consult   Patient Position at End of Consult Supine;Bed/Chair alarm activated; All needs within reach       The patient's AM-PAC Basic Mobility Inpatient Short Form Raw Score is 8  A Raw score of less than or equal to 16 suggests the patient may benefit from discharge to post-acute rehabilitation services  Please also refer to the recommendation of the Physical Therapist for safe discharge planning        Frieda Chapa

## 2022-10-11 NOTE — PROGRESS NOTES
Progress Note - Infectious Disease   Esme Sanford 67 y o  male MRN: [id number]  Unit/Bed#: -01 Encounter: [id number]        REQUIRED DOCUMENTATION:     1  This service was provided via Telemedicine  2  Provider located at [facility]  3  TeleMed provider: Arnold Amaral MD   4  Identify all parties in room with patient during tele consult:RN  5  After connecting through [employer], patient was identified by name and date of birth and assistant checked wristband  Patient was then informed that this was a Telemedicine visit and that the exam was being conducted confidentially over secure lines  My office door was closed  No one else was in the room  Patient acknowledged consent and understanding of privacy and security of the Telemedicine visit, and gave us permission to have the assistant stay in the room in order to assist with the history and to conduct the exam   I informed the patient that I have reviewed their record in Epic and presented the opportunity for them to ask any questions regarding the visit today  The patient agreed to participate  Assessment/Recommendations     1  Sepsis, present on admission  - leukocytosis, tachycardia, lactic acidosis  - Source of sepsis is likely peritonitis  - Clinically improving, afebrile without leukocytosis    · Management as below    2  Possible spontaneous bacterial peritonitis  - suspected with underlying cirrhosis and ascites and encephalopathy  - CT without acute pathology  - s/p repeat paracentesis, studies remain benign with PMN<250, initial gram stain with rare GPC in pairs but cx negative    · Discontinue Vanc, completed empiric course of therapy  Continue supportive care    3  Acute encephalopathy  - Likely related to underlying cirrhosis and/or infection, no meningeal signs  - Improving     · Continue supportive care    4  Acute kidney injury   - creatinine mildly worse    · Continue renal dosing of abx    5   Abnormal ct CHEST  - Diffuse ground-glass opacities but no consolidation     - Patient has minimal respiratory symptoms and is not hypoxic   COVID PCR negative  -  Abnormal chest CT findings most likely pulmonary edema   No clinical signs of pneumonia  · Management per primary team    6  Cirrhosis  - c/b ascites, thrombocytopenia, hepatic encephalopathy    · Management per primary team, continue lactulose and rifaximin    Discussed with the primary service  History       Subjective: The patient has no complaints  Denies fevers, chills, or sweats  Denies nausea, vomiting, or diarrhea  Antibiotics:  Vanc, rifaximin      Physical Exam     Temp:  [97 9 °F (36 6 °C)-98 6 °F (37 °C)] 97 9 °F (36 6 °C)  HR:  [87-93] 87  Resp:  [16-18] 17  BP: ()/(51-56) 100/55  SpO2:  [97 %-99 %] 97 %  Temp (24hrs), Av 3 °F (36 8 °C), Min:97 9 °F (36 6 °C), Max:98 6 °F (37 °C)  Current: Temperature: 97 9 °F (36 6 °C)    Intake/Output Summary (Last 24 hours) at 10/11/2022 0933  Last data filed at 10/11/2022 0258  Gross per 24 hour   Intake 1076 ml   Output 350 ml   Net 726 ml       Physical exam findings reported by bedside and primary medical team staff      General Appearance:  Appearing chronically ill, nontoxic, and in no distress, appears stated age   Throat: Oropharynx moist without lesions; lips, mucosa, and tongue normal; teeth and gums normal   Lungs:   Clear to auscultation bilaterally, no audible wheezes, rhonchi and rales, respirations unlabored   Heart:  Regular rate and rhythm, S1, S2 normal, no murmur, rub or gallop   Abdomen:   Soft, non-tender, distended, positive bowel sounds, no masses, no organomegaly    No CVA tenderness   Extremities: Extremities normal, atraumatic, no cyanosis, clubbing, bilateral non pitting edema   Skin: Skin color, texture, turgor normal, no rashes or lesions  No draining wounds noted  Neurologic: Alert and oriented times 1         Invasive Devices:   Peripheral IV 10/10/22 Dorsal (posterior); Left Forearm (Active)   Site Assessment WDL 10/10/22 2040   Dressing Type Transparent 10/10/22 2040   Line Status Flushed;Saline locked 10/10/22 2040   Dressing Status Clean;Dry; Intact 10/10/22 2040   Dressing Change Due 10/14/22 10/10/22 1330   Reason Not Rotated Not due 10/10/22 1330       Labs, Imaging, & Other Studies     Lab Results:    I have personally reviewed pertinent labs  Results from last 7 days   Lab Units 10/09/22  0458 10/06/22  0504 10/05/22  0500   WBC Thousand/uL 8 99 8 66 9 35   HEMOGLOBIN g/dL 8 9* 8 9* 8 7*   PLATELETS Thousands/uL 41* 39* 40*     Results from last 7 days   Lab Units 10/11/22  0528 10/09/22  0458 10/08/22  0450 10/07/22  0929 10/06/22  0504 10/05/22  0500   POTASSIUM mmol/L 3 6   < > 3 3*   < > 3 7 4 4   CHLORIDE mmol/L 104   < > 107   < > 107 107   CO2 mmol/L 28   < > 26   < > 24 23   BUN mg/dL 31*   < > 30*   < > 29* 36*   CREATININE mg/dL 1 70*   < > 1 54*   < > 1 38* 1 27   EGFR ml/min/1 73sq m 39   < > 44   < > 50 55   CALCIUM mg/dL 8 1*   < > 8 1*   < > 8 1* 8 4   AST U/L  --   --  52*  --  65* 72*   ALT U/L  --   --  42  --  47 47   ALK PHOS U/L  --   --  98  --  90 88    < > = values in this interval not displayed  Results from last 7 days   Lab Units 10/10/22  1249 10/05/22  1200 10/04/22  1241   BLOOD CULTURE   --   --  No Growth After 5 Days  No Growth After 5 Days  GRAM STAIN RESULT  Rare Polys  No organisms seen Rare Polys*  Rare Gram positive cocci in pairs*  --    BODY FLUID CULTURE, STERILE   --  No growth  --        Imaging Studies:   I have personally reviewed pertinent imaging study reports and images in PACS  EKG, Pathology, and Other Studies:   I have personally reviewed pertinent reports  Counseling/Coordination of care: Total 35 minutes communication with the patient via telehealth  Labs, medical tests and imaging studies were independently reviewed by me as noted above

## 2022-10-11 NOTE — PLAN OF CARE
Problem: PAIN - ADULT  Goal: Verbalizes/displays adequate comfort level or baseline comfort level  Description: Interventions:  - Encourage patient to monitor pain and request assistance  - Assess pain using appropriate pain scale  - Administer analgesics based on type and severity of pain and evaluate response  - Implement non-pharmacological measures as appropriate and evaluate response  - Consider cultural and social influences on pain and pain management  - Notify physician/advanced practitioner if interventions unsuccessful or patient reports new pain  Outcome: Progressing     Problem: INFECTION - ADULT  Goal: Absence or prevention of progression during hospitalization  Description: INTERVENTIONS:  - Assess and monitor for signs and symptoms of infection  - Monitor lab/diagnostic results  - Monitor all insertion sites, i e  indwelling lines, tubes, and drains  - Monitor endotracheal if appropriate and nasal secretions for changes in amount and color  - [place] appropriate cooling/warming therapies per order  - Administer medications as ordered  - Instruct and encourage patient and family to use good hand hygiene technique  - Identify and instruct in appropriate isolation precautions for identified infection/condition  Outcome: Progressing     Problem: SAFETY ADULT  Goal: Patient will remain free of falls  Description: INTERVENTIONS:  - Educate patient/family on patient safety including physical limitations  - Instruct patient to call for assistance with activity   - Consult OT/PT to assist with strengthening/mobility   - Keep Call bell within reach  - Keep bed low and locked with side rails adjusted as appropriate  - Keep care items and personal belongings within reach  - Initiate and maintain comfort rounds  - Make Fall Risk Sign visible to staff  - Offer Toileting every 2 Hours, in advance of need  - Initiate/Maintain bed/chair alarm  - Obtain necessary fall risk management equipment:   - Apply yellow socks and bracelet for high fall risk patients  - Consider moving patient to room near nurses station  Outcome: Progressing  Goal: Maintain or return to baseline ADL function  Description: INTERVENTIONS:  -  Assess patient's ability to carry out ADLs; assess patient's baseline for ADL function and identify physical deficits which impact ability to perform ADLs (bathing, care of mouth/teeth, toileting, grooming, dressing, etc )  - Assess/evaluate cause of self-care deficits   - Assess range of motion  - Assess patient's mobility; develop plan if impaired  - Assess patient's need for assistive devices and provide as appropriate  - Encourage maximum independence but intervene and supervise when necessary  - Involve family in performance of ADLs  - Assess for home care needs following discharge   - Consider OT consult to assist with ADL evaluation and planning for discharge  - Provide patient education as appropriate  Outcome: Progressing  Goal: Maintains/Returns to pre admission functional level  Description: INTERVENTIONS:  - Perform BMAT or MOVE assessment daily    - Set and communicate daily mobility goal to care team and patient/family/caregiver  - Collaborate with rehabilitation services on mobility goals if consulted  - Perform Range of Motion 3 times a day  - Reposition patient every 2 hours    - Dangle patient 3 times a day  - Stand patient 3 times a day  - Ambulate patient 3 times a day  - Out of bed to chair 3 times a day   - Out of bed for meals 3 times a day  - Out of bed for toileting  - Record patient progress and toleration of activity level   Outcome: Progressing     Problem: DISCHARGE PLANNING  Goal: Discharge to home or other facility with appropriate resources  Description: INTERVENTIONS:  - Identify barriers to discharge w/patient and caregiver  - Arrange for needed discharge resources and transportation as appropriate  - Identify discharge learning needs (meds, wound care, etc )  - Arrange for interpretive services to assist at discharge as needed  - Refer to Case Management Department for coordinating discharge planning if the patient needs post-hospital services based on physician/advanced practitioner order or complex needs related to functional status, cognitive ability, or social support system  Outcome: Progressing     Problem: Knowledge Deficit  Goal: Patient/family/caregiver demonstrates understanding of disease process, treatment plan, medications, and discharge instructions  Description: Complete learning assessment and assess knowledge base    Interventions:  - Provide teaching at level of understanding  - Provide teaching via preferred learning methods  Outcome: Progressing     Problem: CARDIOVASCULAR - ADULT  Goal: Maintains optimal cardiac output and hemodynamic stability  Description: INTERVENTIONS:  - Monitor I/O, vital signs and rhythm  - Monitor for S/S and trends of decreased cardiac output  - Administer and titrate ordered vasoactive medications to optimize hemodynamic stability  - Assess quality of pulses, skin color and temperature  - Assess for signs of decreased coronary artery perfusion  - Instruct patient to report change in severity of symptoms  Outcome: Progressing  Goal: Absence of cardiac dysrhythmias or at baseline rhythm  Description: INTERVENTIONS:  - Continuous cardiac monitoring, vital signs, obtain 12 lead EKG if ordered  - Administer antiarrhythmic and heart rate control medications as ordered  - Monitor electrolytes and administer replacement therapy as ordered  Outcome: Progressing     Problem: RESPIRATORY - ADULT  Goal: Achieves optimal ventilation and oxygenation  Description: INTERVENTIONS:  - Assess for changes in respiratory status  - Assess for changes in mentation and behavior  - Position to facilitate oxygenation and minimize respiratory effort  - Oxygen administered by appropriate delivery if ordered  - Initiate smoking cessation education as indicated  - Encourage broncho-pulmonary hygiene including cough, deep breathe, Incentive Spirometry  - Assess the need for suctioning and aspirate as needed  - Assess and instruct to report SOB or any respiratory difficulty  - Respiratory Therapy support as indicated  Outcome: Progressing     Problem: GENITOURINARY - ADULT  Goal: Maintains or returns to baseline urinary function  Description: INTERVENTIONS:  - Assess urinary function  - Encourage oral fluids to ensure adequate hydration if ordered  - Administer IV fluids as ordered to ensure adequate hydration  - Administer ordered medications as needed  - Offer frequent toileting  - Follow urinary retention protocol if ordered  Outcome: Progressing     Problem: METABOLIC, FLUID AND ELECTROLYTES - ADULT  Goal: Electrolytes maintained within normal limits  Description: INTERVENTIONS:  - Monitor labs and assess patient for signs and symptoms of electrolyte imbalances  - Administer electrolyte replacement as ordered  - Monitor response to electrolyte replacements, including repeat lab results as appropriate  - Instruct patient on fluid and nutrition as appropriate  Outcome: Progressing  Goal: Fluid balance maintained  Description: INTERVENTIONS:  - Monitor labs   - Monitor I/O and WT  - Instruct patient on fluid and nutrition as appropriate  - Assess for signs & symptoms of volume excess or deficit  Outcome: Progressing     Problem: SKIN/TISSUE INTEGRITY - ADULT  Goal: Skin Integrity remains intact(Skin Breakdown Prevention)  Description: Assess:  -Perform Justin assessment every shift  -Clean and moisturize skin every shift  -Inspect skin when repositioning, toileting, and assisting with ADLS  -Assess extremities for adequate circulation and sensation     Bed Management:  -Have minimal linens on bed & keep smooth, unwrinkled  -Change linens as needed when moist or perspiring  -Avoid sitting or lying in one position for more than 2 hours while in bed  -Keep HOB at 30 degrees Toileting:  -Offer bedside commode  -Assess for incontinence every 2 hours  -Use incontinent care products after each incontinent episode such as calmoseptine    Activity:  -Mobilize patient 2 times a day  -Encourage activity and walks on unit  -Encourage or provide ROM exercises   -Turn and reposition patient every 2 Hours  -Use appropriate equipment to lift or move patient in bed  -Instruct/ Assist with weight shifting every 15 minutes when out of bed in chair  -Consider limitation of chair time 3 hour intervals    Skin Care:  -Avoid use of baby powder, tape, friction and shearing, hot water or constrictive clothing  -Relieve pressure over bony prominences using wedges   -Do not massage red bony areas    Outcome: Progressing     Problem: HEMATOLOGIC - ADULT  Goal: Maintains hematologic stability  Description: INTERVENTIONS  - Assess for signs and symptoms of bleeding or hemorrhage  - Monitor labs  - Administer supportive blood products/factors as ordered and appropriate  Outcome: Progressing     Problem: MUSCULOSKELETAL - ADULT  Goal: Maintain or return mobility to safest level of function  Description: INTERVENTIONS:  - Assess patient's ability to carry out ADLs; assess patient's baseline for ADL function and identify physical deficits which impact ability to perform ADLs (bathing, care of mouth/teeth, toileting, grooming, dressing, etc )  - Assess/evaluate cause of self-care deficits   - Assess range of motion  - Assess patient's mobility  - Assess patient's need for assistive devices and provide as appropriate  - Encourage maximum independence but intervene and supervise when necessary  - Involve family in performance of ADLs  - Assess for home care needs following discharge   - Consider OT consult to assist with ADL evaluation and planning for discharge  - Provide patient education as appropriate  Outcome: Progressing     Problem: Nutrition/Hydration-ADULT  Goal: Nutrient/Hydration intake appropriate for improving, restoring or maintaining nutritional needs  Description: Monitor and assess patient's nutrition/hydration status for malnutrition  Collaborate with interdisciplinary team and initiate plan and interventions as ordered  Monitor patient's weight and dietary intake as ordered or per policy  Utilize nutrition screening tool and intervene as necessary  Determine patient's food preferences and provide high-protein, high-caloric foods as appropriate       INTERVENTIONS:  - Monitor oral intake, urinary output, labs, and treatment plans  - Assess nutrition and hydration status and recommend course of action  - Evaluate amount of meals eaten  - Assist patient with eating if necessary   - Allow adequate time for meals  - Recommend/ encourage appropriate diets, oral nutritional supplements, and vitamin/mineral supplements  - Order, calculate, and assess calorie counts as needed  - Recommend, monitor, and adjust tube feedings and TPN/PPN based on assessed needs  - Assess need for intravenous fluids  - Provide specific nutrition/hydration education as appropriate  - Include patient/family/caregiver in decisions related to nutrition  Outcome: Progressing     Problem: MOBILITY - ADULT  Goal: Maintain or return to baseline ADL function  Description: INTERVENTIONS:  -  Assess patient's ability to carry out ADLs; assess patient's baseline for ADL function and identify physical deficits which impact ability to perform ADLs (bathing, care of mouth/teeth, toileting, grooming, dressing, etc )  - Assess/evaluate cause of self-care deficits   - Assess range of motion  - Assess patient's mobility; develop plan if impaired  - Assess patient's need for assistive devices and provide as appropriate  - Encourage maximum independence but intervene and supervise when necessary  - Involve family in performance of ADLs  - Assess for home care needs following discharge   - Consider OT consult to assist with ADL evaluation and planning for discharge  - Provide patient education as appropriate  Outcome: Progressing  Goal: Maintains/Returns to pre admission functional level  Description: INTERVENTIONS:  - Perform BMAT or MOVE assessment daily    - Set and communicate daily mobility goal to care team and patient/family/caregiver  - Collaborate with rehabilitation services on mobility goals if consulted  - Perform Range of Motion 3 times a day  - Reposition patient every 2 hours    - Dangle patient 3 times a day  - Stand patient 3 times a day  - Ambulate patient 3 times a day  - Out of bed to chair 3 times a day   - Out of bed for meals 3 times a day  - Out of bed for toileting  - Record patient progress and toleration of activity level   Outcome: Progressing     Problem: Prexisting or High Potential for Compromised Skin Integrity  Goal: Skin integrity is maintained or improved  Description: INTERVENTIONS:  - Identify patients at risk for skin breakdown  - Assess and monitor skin integrity  - Assess and monitor nutrition and hydration status  - Monitor labs   - Assess for incontinence   - Turn and reposition patient  - Assist with mobility/ambulation  - Relieve pressure over bony prominences  - Avoid friction and shearing  - Provide appropriate hygiene as needed including keeping skin clean and dry  - Evaluate need for skin moisturizer/barrier cream  - Collaborate with interdisciplinary team   - Patient/family teaching  - Consider wound care consult   Outcome: Progressing     Problem: Potential for Falls  Goal: Patient will remain free of falls  Description: INTERVENTIONS:  - Educate patient/family on patient safety including physical limitations  - Instruct patient to call for assistance with activity   - Consult OT/PT to assist with strengthening/mobility   - Keep Call bell within reach  - Keep bed low and locked with side rails adjusted as appropriate  - Keep care items and personal belongings within reach  - Initiate and maintain comfort rounds  - Make Fall Risk Sign visible to staff  - Offer Toileting every 2 Hours, in advance of need  - Initiate/Maintain bed/chair alarm  - Obtain necessary fall risk management equipment:   - Apply yellow socks and bracelet for high fall risk patients  - Consider moving patient to room near nurses station  Outcome: Progressing

## 2022-10-11 NOTE — CASE MANAGEMENT
Case Management Progress Note    Patient name David Dinning  Location /-01 MRN [id number]  : 1950 Date 10/11/2022       LOS (days): 7  Geometric Mean LOS (GMLOS) (days): 5 00  Days to GMLOS:-2        OBJECTIVE:        Current admission status: Inpatient  Preferred Pharmacy:   PATIENT/FAMILY REPORTS NO PREFERRED PHARMACY  No address on file      [facility] #[id number] Is[facility]oro Newman, [address]  [facility] [address] [place] [id number]  Phone: [phone] Fax: [phone]    Primary Care Provider: Lillian Rahman MD    Primary Insurance: MEDICARE  Secondary Insurance: [employer]    PROGRESS NOTE:        CM was unaware of discharge today  CM spoke to patient at the bedside, reviewed DC IMM with patient and informed that patient can stay an additional 4 hours for reconsidering appealing the discharge as the medicare rights were review on the day of discharge  Pt verbalized understanding and feels ready to go home and does not intend to stay 4 hours to reconsider

## 2022-10-11 NOTE — PLAN OF CARE
Problem: PHYSICAL THERAPY ADULT  Goal: Performs mobility at highest level of function for planned discharge setting  See evaluation for individualized goals  Description: Treatment/Interventions: Functional transfer training, LE strengthening/ROM, Therapeutic exercise, Endurance training, Patient/family training, Equipment eval/education, Bed mobility, Gait training, Compensatory technique education, Spoke to nursing  Equipment Recommended:  (TBD by rehab)       See flowsheet documentation for full assessment, interventions and recommendations  Outcome: Not Progressing  Note: Prognosis: Fair  Problem List: Decreased strength, Decreased endurance, Impaired balance, Decreased mobility, Decreased coordination, Decreased cognition, Decreased safety awareness, Decreased skin integrity, Impaired judgement  Assessment: Pt seen for PT treatment session this date with interventions consisting of Therapeutic exercise consisting of: AROM 15 reps B LE in seated at EOB position and therapeutic activity consisting of training: bed mobility, supine<>sit transfers, static sitting tolerance at EOB for 20 minutes w/ Min UE support, vc and tactile cues for static sitting posture faciliation and multiple trials of rolling in bed for linen change and hygiene  Pt agreeable to PT treatment session upon arrival, pt found supine in bed w/ HOB elevated, in no apparent distress and responsive  In comparison to previous session, pt with no improvements as evidenced by pt continues to require extensive assist for functional mobility  Post session: pt returned BTB, bed alarm engaged, all needs in reach and RN notified of session findings/recommendations  Continue to recommend post acute rehabilitation services at time of d/c in order to maximize pt's functional independence and safety w/ mobility   Pt continues to be functioning below baseline level, and remains limited 2* factors listed above and including decreased functional mobility, decreased activity tolerance, impaired balance, patient at high risk for falls  PT will continue to see pt during current hospitalization in order to address the deficits listed above and provide interventions consistent w/ POC in effort to achieve STGs  Barriers to Discharge: Decreased caregiver support  Barriers to Discharge Comments: pt currently requiring A x 2 for transfers  PT Discharge Recommendation: Post acute rehabilitation services    See flowsheet documentation for full assessment

## 2022-10-11 NOTE — PLAN OF CARE
Problem: PAIN - ADULT  Goal: Verbalizes/displays adequate comfort level or baseline comfort level  Description: Interventions:  - Encourage patient to monitor pain and request assistance  - Assess pain using appropriate pain scale  - Administer analgesics based on type and severity of pain and evaluate response  - Implement non-pharmacological measures as appropriate and evaluate response  - Consider cultural and social influences on pain and pain management  - Notify physician/advanced practitioner if interventions unsuccessful or patient reports new pain  Outcome: Progressing     Problem: INFECTION - ADULT  Goal: Absence or prevention of progression during hospitalization  Description: INTERVENTIONS:  - Assess and monitor for signs and symptoms of infection  - Monitor lab/diagnostic results  - Monitor all insertion sites, i e  indwelling lines, tubes, and drains  - Monitor endotracheal if appropriate and nasal secretions for changes in amount and color  - [place] appropriate cooling/warming therapies per order  - Administer medications as ordered  - Instruct and encourage patient and family to use good hand hygiene technique  - Identify and instruct in appropriate isolation precautions for identified infection/condition  Outcome: Progressing     Problem: SKIN/TISSUE INTEGRITY - ADULT  Goal: Skin Integrity remains intact(Skin Breakdown Prevention)  Description: Assess:  -Perform Justin assessment every   -Clean and moisturize skin every   -Inspect skin when repositioning, toileting, and assisting with ADLS  -Assess under medical devices such as  every   -Assess extremities for adequate circulation and sensation     Bed Management:  -Have minimal linens on bed & keep smooth, unwrinkled  -Change linens as needed when moist or perspiring  -Avoid sitting or lying in one position for more than  hours while in bed  -Keep HOB at degrees     Toileting:  -Offer bedside commode  -Assess for incontinence every   -Use incontinent care products after each incontinent episode such as     Activity:  -Mobilize patient  times a day  -Encourage activity and walks on unit  -Encourage or provide ROM exercises   -Turn and reposition patient every  Hours  -Use appropriate equipment to lift or move patient in bed  -Instruct/ Assist with weight shifting every  when out of bed in chair  -Consider limitation of chair time  hour intervals    Skin Care:  -Avoid use of baby powder, tape, friction and shearing, hot water or constrictive clothing  -Relieve pressure over bony prominences using   -Do not massage red bony areas    Next Steps:  -Teach patient strategies to minimize risks such as    -Consider consults to  interdisciplinary teams such as  Outcome: Progressing

## 2022-10-11 NOTE — DISCHARGE SUMMARY
[address]  Discharge- Franky Mahan 1950, 67 y o  male MRN: [id number]  Unit/Bed#: [facility] Encounter: [id number]  Primary Care Provider: Cornel Early MD   Date and time admitted to hospital: 10/4/2022 12:36 PM    * Acute metabolic encephalopathy  Assessment & Plan  Secondary to acute hepatic encephalopathy  Patient had a fall 1 week ago since then he has been having increasing confusion  Patient has been keeping up with his lactulose and xifaxan and ammonia level <10  CT brain negative for acute bleed or acute pathology  Ascitic fluid culture pending  prelim showing Gram-positive cocci   Concern for SBP  -completed empiric antibiotic therapy  Id consult appreciated,   Pt/ot recommending subacute rehab  Severe protein-calorie malnutrition ([address] )  Assessment & Plan  Malnutrition Findings:   Adult Malnutrition type: Chronic illness  Adult Degree of Malnutrition: Other severe protein calorie malnutrition  Malnutrition Characteristics: Muscle loss, Fat loss                  360 Statement: Chronic severe malnutrition related to increased nutrient needs and likely inadequate oral intake as evidenced by severe muscle wasting to temporalis, pectoralis, deltoid muscles; severe fat loss to buccal pads and triceps  Treated with: Continue 2gm Na at this time given cirrhosis, consider liberalizing as indicated  Fluid restriction per MD  +Sudhir BID mixed in pudding for wound healing  +ensure compact BID  Assistance with meals as accepted by pt/indicated noting tremors  +lids with cups to prevent spilling on self  Diet ed not appropriate at this time  Recommend daily weights  BMI Findings: Body mass index is 28 87 kg/m²         Sepsis [facility])  Assessment & Plan  Patient has sepsis present on admission with tachypnea respiratory rate 22, tachycardia with heart rate of 101, lactic acidosis of 2 9  Unclear source of infection at this time but concern for SBP at this time  Sepsis has resolved    NAVEED (acute kidney injury) ([facility] [address] )  Assessment & Plan  Baseline creatinine is 0 7-0 8  Currently creatinine is elevated to 1 38 on admission  Is probably secondary to hepatorenal syndrome due to worsening ascites due to underlying liver cirrhosis  Placed on gentle diuresis   Creatinine slightly worsened  however need to continue diuresis as patient is still fluid overloaded    Other ascites  Assessment & Plan  Patient noticed to have abdominal distension  CT abdomen pelvis showed large volume ascites with small bilateral pleural effusions  Patient had paracentesis done for 7 5 L 10/5/22 and 12 L removed on 10/10  Will place on oral aldactone for now  Not diuresing much with oral Lasix and hence increased to Lasix 40 mg IV b i d  also give 1 dose of IV albumin daily  Still has peripheral edema noted  placed on IV vancomycin and Rocephin for SBP prophylaxis/treatment  Reviewed fluid studies for analysis for LDH, protein, culture and sensitivity, WBC and cytology and stat Gram stain  Prelim cultures on ascitic fluid are growing Gram-positive cocci  But no growth  Patient completed empiric antibiotic therapy  Id consult appreciated and appreciate recommendation  Patient provide referral for IR guided paracentesis every 2-3 weeks    Chronic anemia  Assessment & Plan  Baseline hemoglobin is around 9-10  Currently hemoglobin is at baseline range with no active bleeding reported with chronic thrombocytopenia noted    Thrombocytopenia (HCC)  Assessment & Plan  Platelet count is 59 K on admission which is close to patient's baseline of 55-60k    platelet count dropped to 41 K  No active bleeding noted at this time  thrombocytopenia secondary to liver cirrhosis    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 [facility])  Assessment & Plan  Patient has known history of liver cirrhosis secondary to jaffe  Follows outpatient with [facility]    Known history of portal hypertension,hepatic encephalopathy and liver cirrhosis status post banding of varices  Not on any transplant list  Continue home medications for now  discussed prognosis with caretaker and son at length this admission  Dementia with behavioral disturbance  Assessment & Plan  Patient does have baseline dementia  On clinical exam, patient remain on his baseline      Medical Problems             Resolved Problems  Date Reviewed: 10/9/2022   None               Discharging Physician / Practitioner: Karla Maldonado  PCP: Vandana Miranda MD  Admission Date:   Admission Orders (From admission, onward)     Ordered        10/04/22 1410  INPATIENT ADMISSION  Once                      Discharge Date: 10/11/22    Consultations During Hospital Stay:  · Infectious disease  · IR    Procedures Performed:   · Status post IR guided paracentesis x2  Significant Findings / Test Results:   CT chest abdomen pelvis wo contrast    Result Date: 10/4/2022  Narrative: CT CHEST, ABDOMEN AND PELVIS WITHOUT IV CONTRAST INDICATION: Polytrauma, blunt trauma, fall  COMPARISON:  CT chest, abdomen and pelvis 6/4/2022 TECHNIQUE: CT examination of the chest, abdomen and pelvis was performed without intravenous contrast  Axial, sagittal, and coronal 2D reformatted images were created from the source data and submitted for interpretation  Radiation dose length product (DLP) for this visit:  1707 7 mGy-cm  This examination, like all CT scans performed in the [facility], was performed utilizing techniques to minimize radiation dose exposure, including the use of iterative reconstruction and automated exposure control  Enteric contrast was not administered  FINDINGS: CHEST LUNGS:  Moderate bilateral upper lobe and right middle lobe groundglass opacities, new from previous study  This is not a typical distribution for aspiration  Relative sparing of the lower lobes which would be unusual for Covid 19 pneumonia although this cannot be completely excluded    Other inflammatory or infectious etiologies also to be considered  No endobronchial lesions  PLEURA:  New small water density bilateral pleural effusions  No pneumothorax  HEART/GREAT VESSELS: Heart is unremarkable for patient's age  Atherosclerotic changes thoracic aorta and coronary arteries  No thoracic aortic aneurysm  MEDIASTINUM AND MIKE:  Unremarkable  CHEST WALL AND LOWER NECK:  Mild generalized subcutaneous edema in the chest wall/back  ABDOMEN Evaluation of the solid organs is limited without IV contrast   Evaluation of the upper abdomen is further degraded by beam hardening artifact from patient's arms  LIVER/BILIARY TREE:  Cirrhotic liver morphology  GALLBLADDER:  Calcification intimately associated with the posterior gallbladder, possibly a tiny stone  Cannot exclude a calcification in the adjacent soft tissues  Wall is not well-defined without contrast   There is surrounding pericholecystic fluid, likely part and parcel of diffuse ascites  SPLEEN:  Enlarged measuring 16 5 cm in length  Heterogeneous attenuation may in part be artifactual   No perisplenic hematoma  PANCREAS:  Moderately severe atrophy without acute findings  ADRENAL GLANDS:  Unremarkable  KIDNEYS/URETERS:  Small renal cysts  No hydronephrosis  STOMACH AND BOWEL:  Significantly limited evaluation of the bowel without oral or IV contrast   The stomach is poorly distended, assessment limited  The small bowel is normal caliber  Descending and sigmoid colon diverticulosis without evidence of diverticulitis  APPENDIX:  No findings to suggest appendicitis  ABDOMINOPELVIC CAVITY: Large volume of abdominopelvic free fluid has significantly increased from prior study, presumably related to ascites  No pneumoperitoneum  Evaluation of lymphadenopathy is limited in the absence of intravenous contrast  No bulky adenopathy detected on this noncontrast study  VESSELS: Atherosclerotic changes abdominal aorta without evidence of aneurysm   PELVIS REPRODUCTIVE ORGANS:  Unremarkable for patient's age  URINARY BLADDER:  Unremarkable  ABDOMINAL WALL/INGUINAL REGIONS: Fluid-containing bilateral inguinal hernias, left larger than right  Extensive generalized subcutaneous edema in the abdominal flanks/back which may be indicative of 3rd spacing/hypoproteinemia  OSSEOUS STRUCTURES:  No acute fracture or destructive osseous lesion  Old bilateral rib fracture deformities  Degenerative changes of the spine and shoulders  Impression: 1  Significantly limited study without IV or oral contrast  2  No definite acute traumatic injury in the chest, abdomen or pelvis within limitations  If relevant, repeat imaging with IV and oral contrast could be considered  3  Large volume of low-density abdominopelvic free fluid presumably corresponding to increasing ascites in the setting of cirrhosis and splenomegaly  4  Moderate bilateral upper lobe and right middle lobe groundglass opacities, new from previous study  Relative sparing of the lower lobes which would be unusual for Covid 19 pneumonia although this cannot be completely excluded  Other inflammatory or infectious etiologies also to be considered  5  New small water density bilateral pleural effusions  Workstation performed: [id number]     CT head without contrast    Result Date: 10/4/2022  Narrative: CT BRAIN - WITHOUT CONTRAST INDICATION:  Mental status change, persistent or worsening Altered mental  COMPARISON:  CT head 6/4/2022 TECHNIQUE:  CT examination of the brain was performed  In addition to axial images, sagittal and coronal 2D reformatted images were created and submitted for interpretation  Radiation dose length product (DLP) for this visit:  942 mGy-cm  This examination, like all CT scans performed in the [facility], was performed utilizing techniques to minimize radiation dose exposure, including the use of iterative reconstruction and automated exposure control  IMAGE QUALITY:  Diagnostic  FINDINGS: PARENCHYMA:  No intracranial mass, mass effect or midline shift  No CT signs of acute infarction  No acute parenchymal hemorrhage  Age-related cortical atrophy  Periventricular white matter hypodensity which is nonspecific although most compatible with chronic small vessel ischemic disease  VENTRICLES AND EXTRA-AXIAL SPACES:  Normal for the patient's age  VISUALIZED ORBITS AND PARANASAL SINUSES:  Unremarkable  CALVARIUM AND EXTRACRANIAL SOFT TISSUES:  Normal      Impression: No acute intracranial abnormality  Chronic microangiopathic changes  Workstation performed: [id number]     IR INPATIENT Paracentesis    Result Date: 10/10/2022  Narrative: Ultrasound-guided paracentesis Clinical History: Recurrent ascites    Technique: Patient was brought to the interventional radiology area and placed supine on the stretcher  After a brief ultrasound examination was performed to localize a pocket of fluid,an area on the skin of the left abdomen was prepped, and draped in the usual sterile fashion  Lidocaine was administered to the skin and a small skin incision was made  A 5 [place] multisidehole catheter  was advanced into the fluid and 1200 mL of cloudy yellow fluid was aspirated  After the procedure, the catheter was removed and a bandage applied to the site  The patient tolerated the procedure well and suffered no complications  Impression: Impression: 1  Successful ultrasound-guided paracentesis yielding 1200 mL of cloudy yellow fluid     Workstation performed: [id number]     Paracentesis    Result Date: 10/5/2022  · Narrative: Linda Lovell PA-C     10/5/2022 11:46 AM Paracentesis Date/Time: 10/5/2022 11:45 AM Performed by: Linda Lovell PA-C Authorized by: Linda Lovell PA-C Patient location:  Bedside Consent:   Consent obtained:  Verbal   Consent given by: Dr Leslie Kulkarni obtained from patient son via phone   Risks discussed:  Bleeding, bowel perforation, infection and pain Universal protocol:   Immediately prior to procedure a time out was called: yes    Patient identity confirmed:  Arm band, provided demographic data and hospital-assigned identification number Pre-procedure details:   Initial or Subsequent Exam:  Initial   Procedure purpose:  Diagnostic   Indications: abdominal discomfort secondary to ascites, new onet ascites and secondary bacterial peritonitis  Anesthesia (see MAR for exact dosages): Anesthesia method:  Local infiltration   Local anesthetic:  Lidocaine 1% w/o epi Procedure details:   Needle gauge:  18   Equipment: Paracentesis kit used    Ultrasound guidance: yes    Sterile ultrasound techniques: Sterile gel and sterile probe covers were used    Approach:  Percutaneous   Puncture site:  L lower quadrant   Fluid removed amount:  7 5L   Fluid appearance:  Cloudy   Dressing:  Adhesive bandage Post-procedure details:   Patient tolerance of procedure: Tolerated well, no immediate complications  Lab Results   Component Value Date    WBC 8 99 10/09/2022    HGB 8 9 (L) 10/09/2022    HCT 28 0 (L) 10/09/2022    MCV 95 10/09/2022    PLT 41 (LL) 10/09/2022     Lab Results   Component Value Date    SODIUM 136 10/11/2022    K 3 6 10/11/2022     10/11/2022    CO2 28 10/11/2022    AGAP 4 10/11/2022    BUN 31 (H) 10/11/2022    CREATININE 1 70 (H) 10/11/2022    GLUC 106 10/11/2022    GLUF 78 03/17/2022    CALCIUM 8 1 (L) 10/11/2022    AST 52 (H) 10/08/2022    ALT 42 10/08/2022    ALKPHOS 98 10/08/2022    TP 7 0 10/08/2022    TBILI 2 80 (H) 10/08/2022    EGFR 39 10/11/2022     ·     Incidental Findings:   ·   Test Results Pending at Discharge (will require follow up):   · As mentioned above     Outpatient Tests Requested:  · CMP in 2-3 days    Complications:  None    Reason for Admission:  Confusion    Hospital Course:   Arlene Muir is a 67 y o  male patient who originally presented to the hospital on 10/4/2022 due to confusion    Patient admitted under the diagnosis of acute metabolic encephalopathy most likely multifactorial   Ammonia level was normal   Patient does have liver cirrhosis  Patient also received criteria for sepsis received IV antibiotic and it was suspected SBP  Infectious disease consulted, does not recommend any more IV antibiotic  Patient is status post IR guided paracentesis x2  Also patient diuretics increased patient patient developed NAVEED, considering patient's paracentesis, patient advised to continue diuretics  Patient also advised to recheck BMP in 2-3 days for NAVEED  Patient advised to continue Xifaxan and lactulose  Patient has baseline dementia  Dietary recommendation was regular diet, 2 g sodium, 1500 cc fluid restrictions  With [employer] and dinner, as well as Ensure compact chocolate at lunch and dinner  Patient advised to follow-up with PCP and other specialty as scheduled  All lab results, Aging findings, treatment plan of care discussed in details with patient's son over the phone  Verbalizes to understand and agrees  Please see above list of diagnoses and related plan for additional information  Condition at Discharge: stable    Discharge Day Visit / Exam:   Subjective:  Seen and evaluated during the round  Resting comfortably  Denies any significant complaint  Patient does answer few basic questions,  Vitals: Blood Pressure: 113/71 (10/11/22 1517)  Pulse: 103 (10/11/22 1517)  Temperature: 97 9 °F (36 6 °C) (10/11/22 0652)  Temp Source: Temporal (10/04/22 1235)  Respirations: 17 (10/11/22 0652)  Height: 6' 2" (188 cm) (10/05/22 1233)  Weight - Scale: 92 kg (202 lb 13 2 oz) (10/11/22 0600)  SpO2: 97 % (10/11/22 1045)  Exam:   Physical Exam  Vitals and nursing note reviewed  Exam conducted with a chaperone present  Constitutional:       Comments: Mild confuse (as per documentation, as his baseline)   HENT:      Head: Normocephalic and atraumatic  Nose: Nose normal  No congestion  Mouth/Throat:      Mouth: Mucous membranes are moist       Pharynx: No oropharyngeal exudate or posterior oropharyngeal erythema  Eyes:      General: No scleral icterus  Left eye: No discharge  Extraocular Movements: Extraocular movements intact  Conjunctiva/sclera: Conjunctivae normal       Pupils: Pupils are equal, round, and reactive to light  Neck:      Vascular: No carotid bruit  Cardiovascular:      Rate and Rhythm: Normal rate  Heart sounds: No murmur heard  No friction rub  No gallop  Pulmonary:      Effort: Pulmonary effort is normal  No respiratory distress  Breath sounds: No stridor  No wheezing  Abdominal:      General: Abdomen is flat  Bowel sounds are normal  There is distension  Tenderness: There is no abdominal tenderness  There is no guarding or rebound  Hernia: No hernia is present  Musculoskeletal:         General: No swelling, tenderness, deformity or signs of injury  Normal range of motion  Cervical back: Normal range of motion  No rigidity or tenderness  Lymphadenopathy:      Cervical: No cervical adenopathy  Skin:     General: Skin is warm  Capillary Refill: Capillary refill takes less than 2 seconds  Coloration: Skin is not jaundiced or pale  Findings: No bruising or erythema  Neurological:      Mental Status: He is alert  Mental status is at baseline  Cranial Nerves: No cranial nerve deficit  Sensory: No sensory deficit  Motor: No weakness  Coordination: Coordination normal    Psychiatric:         Mood and Affect: Mood normal          Discussion with Family: Updated  (son) via phone  Discharge instructions/Information to patient and family:   See after visit summary for information provided to patient and family  Provisions for Follow-Up Care:  See after visit summary for information related to follow-up care and any pertinent home health orders         Disposition:   Other [facility]    Planned Readmission:  If condition get worse     Discharge Statement:  I spent >35 minutes discharging the patient  This time was spent on the day of discharge  I had direct contact with the patient on the day of discharge  Greater than 50% of the total time was spent examining patient, answering all patient questions, arranging and discussing plan of care with patient as well as directly providing post-discharge instructions  Additional time then spent on discharge activities  Discharge Medications:  See after visit summary for reconciled discharge medications provided to patient and/or family        **Please Note: This note may have been constructed using a voice recognition system**

## 2022-10-12 PROCEDURE — 88112 CYTOPATH CELL ENHANCE TECH: CPT | Performed by: PATHOLOGY

## 2022-10-12 PROCEDURE — 88305 TISSUE EXAM BY PATHOLOGIST: CPT | Performed by: PATHOLOGY

## 2022-10-14 LAB
BACTERIA SPEC BFLD CULT: NO GROWTH
GRAM STN SPEC: NORMAL
GRAM STN SPEC: NORMAL

## 2022-10-20 ENCOUNTER — APPOINTMENT (INPATIENT)
Dept: RADIOLOGY | Facility: HOSPITAL | Age: 72
DRG: 441 | End: 2022-10-20
Payer: MEDICARE

## 2022-10-20 ENCOUNTER — APPOINTMENT (EMERGENCY)
Dept: CT IMAGING | Facility: HOSPITAL | Age: 72
DRG: 441 | End: 2022-10-20
Payer: MEDICARE

## 2022-10-20 ENCOUNTER — APPOINTMENT (INPATIENT)
Dept: INTERVENTIONAL RADIOLOGY/VASCULAR | Facility: HOSPITAL | Age: 72
DRG: 441 | End: 2022-10-20
Attending: INTERNAL MEDICINE
Payer: MEDICARE

## 2022-10-20 ENCOUNTER — HOSPITAL ENCOUNTER (INPATIENT)
Facility: HOSPITAL | Age: 72
LOS: 3 days | Discharge: NURSING, REHAB OR LONG-TERM CARE | DRG: 441 | End: 2022-10-23
Attending: EMERGENCY MEDICINE | Admitting: FAMILY MEDICINE
Payer: MEDICARE

## 2022-10-20 DIAGNOSIS — K76.82 HEPATIC ENCEPHALOPATHY: Primary | ICD-10-CM

## 2022-10-20 DIAGNOSIS — R41.82 ALTERED MENTAL STATE: ICD-10-CM

## 2022-10-20 DIAGNOSIS — K74.60 CIRRHOSIS (HCC): ICD-10-CM

## 2022-10-20 DIAGNOSIS — R18.8 ASCITES: ICD-10-CM

## 2022-10-20 LAB
ALBUMIN FLD-MCNC: <0.6 G/DL
ALBUMIN SERPL BCP-MCNC: 1.8 G/DL (ref 3.5–5)
ALP SERPL-CCNC: 137 U/L (ref 46–116)
ALT SERPL W P-5'-P-CCNC: 55 U/L (ref 12–78)
AMMONIA PLAS-SCNC: 134 UMOL/L (ref 11–35)
ANION GAP SERPL CALCULATED.3IONS-SCNC: 7 MMOL/L (ref 4–13)
APPEARANCE FLD: ABNORMAL
APTT PPP: 38 SECONDS (ref 23–37)
AST SERPL W P-5'-P-CCNC: 73 U/L (ref 5–45)
BACTERIA UR QL AUTO: ABNORMAL /HPF
BASOPHILS # BLD AUTO: 0.03 THOUSANDS/ÂΜL (ref 0–0.1)
BASOPHILS NFR BLD AUTO: 0 % (ref 0–1)
BILIRUB DIRECT SERPL-MCNC: 2.09 MG/DL (ref 0–0.2)
BILIRUB SERPL-MCNC: 3.67 MG/DL (ref 0.2–1)
BILIRUB UR QL STRIP: ABNORMAL
BUN SERPL-MCNC: 33 MG/DL (ref 5–25)
CALCIUM ALBUM COR SERPL-MCNC: 9.9 MG/DL (ref 8.3–10.1)
CALCIUM SERPL-MCNC: 8.1 MG/DL (ref 8.3–10.1)
CHLORIDE SERPL-SCNC: 102 MMOL/L (ref 96–108)
CLARITY UR: CLEAR
CO2 SERPL-SCNC: 26 MMOL/L (ref 21–32)
COLOR FLD: ABNORMAL
COLOR UR: ABNORMAL
CREAT SERPL-MCNC: 1.94 MG/DL (ref 0.6–1.3)
CREAT UR-MCNC: 104 MG/DL
EOSINOPHIL # BLD AUTO: 0.08 THOUSAND/ÂΜL (ref 0–0.61)
EOSINOPHIL NFR BLD AUTO: 1 % (ref 0–6)
EOSINOPHIL NFR FLD MANUAL: 1 %
ERYTHROCYTE [DISTWIDTH] IN BLOOD BY AUTOMATED COUNT: 15.9 % (ref 11.6–15.1)
FLUAV RNA RESP QL NAA+PROBE: NEGATIVE
FLUBV RNA RESP QL NAA+PROBE: NEGATIVE
GFR SERPL CREATININE-BSD FRML MDRD: 33 ML/MIN/1.73SQ M
GLUCOSE SERPL-MCNC: 95 MG/DL (ref 65–140)
GLUCOSE UR STRIP-MCNC: NEGATIVE MG/DL
HCT VFR BLD AUTO: 27 % (ref 36.5–49.3)
HGB BLD-MCNC: 9.3 G/DL (ref 12–17)
HGB UR QL STRIP.AUTO: ABNORMAL
HISTIOCYTES NFR FLD: 28 %
HYALINE CASTS #/AREA URNS LPF: ABNORMAL /LPF
IMM GRANULOCYTES # BLD AUTO: 0.05 THOUSAND/UL (ref 0–0.2)
IMM GRANULOCYTES NFR BLD AUTO: 1 % (ref 0–2)
INR PPP: 1.66 (ref 0.84–1.19)
KETONES UR STRIP-MCNC: NEGATIVE MG/DL
LACTATE SERPL-SCNC: 1.5 MMOL/L (ref 0.5–2)
LEUKOCYTE ESTERASE UR QL STRIP: NEGATIVE
LIPASE SERPL-CCNC: 349 U/L (ref 73–393)
LYMPHOCYTES # BLD AUTO: 1.55 THOUSANDS/ÂΜL (ref 0.6–4.47)
LYMPHOCYTES NFR BLD AUTO: 19 % (ref 14–44)
LYMPHOCYTES NFR BLD AUTO: 37 %
MAGNESIUM SERPL-MCNC: 2.1 MG/DL (ref 1.6–2.6)
MCH RBC QN AUTO: 29.8 PG (ref 26.8–34.3)
MCHC RBC AUTO-ENTMCNC: 34.4 G/DL (ref 31.4–37.4)
MCV RBC AUTO: 87 FL (ref 82–98)
MONOCYTES # BLD AUTO: 0.86 THOUSAND/ÂΜL (ref 0.17–1.22)
MONOCYTES NFR BLD AUTO: 11 % (ref 4–12)
MONOCYTES NFR BLD AUTO: 21 %
NEUTROPHILS # BLD AUTO: 5.4 THOUSANDS/ÂΜL (ref 1.85–7.62)
NEUTS SEG NFR BLD AUTO: 13 %
NEUTS SEG NFR BLD AUTO: 68 % (ref 43–75)
NITRITE UR QL STRIP: NEGATIVE
NON-SQ EPI CELLS URNS QL MICRO: ABNORMAL /HPF
NRBC BLD AUTO-RTO: 0 /100 WBCS
OTHER STN SPEC: ABNORMAL
PH UR STRIP.AUTO: 6.5 [PH]
PLATELET # BLD AUTO: 80 THOUSANDS/UL (ref 149–390)
PMV BLD AUTO: 11.3 FL (ref 8.9–12.7)
POTASSIUM SERPL-SCNC: 4.3 MMOL/L (ref 3.5–5.3)
PROT FLD-MCNC: <2 G/DL
PROT SERPL-MCNC: 7.3 G/DL (ref 6.4–8.4)
PROT UR STRIP-MCNC: ABNORMAL MG/DL
PROT UR-MCNC: 174 MG/DL
PROT/CREAT UR: 1.67 MG/G{CREAT} (ref 0–0.1)
PROTHROMBIN TIME: 19.7 SECONDS (ref 11.6–14.5)
RBC # BLD AUTO: 3.12 MILLION/UL (ref 3.88–5.62)
RBC #/AREA URNS AUTO: ABNORMAL /HPF
RSV RNA RESP QL NAA+PROBE: NEGATIVE
SARS-COV-2 RNA RESP QL NAA+PROBE: NEGATIVE
SITE: ABNORMAL
SODIUM 24H UR-SCNC: <5 MOL/L
SODIUM SERPL-SCNC: 135 MMOL/L (ref 135–147)
SP GR UR STRIP.AUTO: 1.02 (ref 1–1.03)
TOTAL CELLS COUNTED SPEC: 100
UROBILINOGEN UR QL STRIP.AUTO: >=8 E.U./DL
WBC # BLD AUTO: 7.97 THOUSAND/UL (ref 4.31–10.16)
WBC # FLD MANUAL: 98 /UL
WBC #/AREA URNS AUTO: ABNORMAL /HPF

## 2022-10-20 PROCEDURE — 82140 ASSAY OF AMMONIA: CPT | Performed by: EMERGENCY MEDICINE

## 2022-10-20 PROCEDURE — 83605 ASSAY OF LACTIC ACID: CPT | Performed by: EMERGENCY MEDICINE

## 2022-10-20 PROCEDURE — 83690 ASSAY OF LIPASE: CPT | Performed by: EMERGENCY MEDICINE

## 2022-10-20 PROCEDURE — 99285 EMERGENCY DEPT VISIT HI MDM: CPT | Performed by: EMERGENCY MEDICINE

## 2022-10-20 PROCEDURE — 99222 1ST HOSP IP/OBS MODERATE 55: CPT | Performed by: INTERNAL MEDICINE

## 2022-10-20 PROCEDURE — 82042 OTHER SOURCE ALBUMIN QUAN EA: CPT | Performed by: INTERNAL MEDICINE

## 2022-10-20 PROCEDURE — 87205 SMEAR GRAM STAIN: CPT | Performed by: INTERNAL MEDICINE

## 2022-10-20 PROCEDURE — G1004 CDSM NDSC: HCPCS

## 2022-10-20 PROCEDURE — 85025 COMPLETE CBC W/AUTO DIFF WBC: CPT | Performed by: EMERGENCY MEDICINE

## 2022-10-20 PROCEDURE — 93005 ELECTROCARDIOGRAM TRACING: CPT

## 2022-10-20 PROCEDURE — 81001 URINALYSIS AUTO W/SCOPE: CPT | Performed by: EMERGENCY MEDICINE

## 2022-10-20 PROCEDURE — 49083 ABD PARACENTESIS W/IMAGING: CPT

## 2022-10-20 PROCEDURE — 84156 ASSAY OF PROTEIN URINE: CPT | Performed by: INTERNAL MEDICINE

## 2022-10-20 PROCEDURE — 82570 ASSAY OF URINE CREATININE: CPT | Performed by: INTERNAL MEDICINE

## 2022-10-20 PROCEDURE — 89051 BODY FLUID CELL COUNT: CPT | Performed by: INTERNAL MEDICINE

## 2022-10-20 PROCEDURE — 0W9G3ZZ DRAINAGE OF PERITONEAL CAVITY, PERCUTANEOUS APPROACH: ICD-10-PCS | Performed by: RADIOLOGY

## 2022-10-20 PROCEDURE — 87040 BLOOD CULTURE FOR BACTERIA: CPT | Performed by: EMERGENCY MEDICINE

## 2022-10-20 PROCEDURE — 84157 ASSAY OF PROTEIN OTHER: CPT | Performed by: INTERNAL MEDICINE

## 2022-10-20 PROCEDURE — 85610 PROTHROMBIN TIME: CPT | Performed by: EMERGENCY MEDICINE

## 2022-10-20 PROCEDURE — 71045 X-RAY EXAM CHEST 1 VIEW: CPT

## 2022-10-20 PROCEDURE — 70450 CT HEAD/BRAIN W/O DYE: CPT

## 2022-10-20 PROCEDURE — 74018 RADEX ABDOMEN 1 VIEW: CPT

## 2022-10-20 PROCEDURE — 87070 CULTURE OTHR SPECIMN AEROBIC: CPT | Performed by: INTERNAL MEDICINE

## 2022-10-20 PROCEDURE — 88112 CYTOPATH CELL ENHANCE TECH: CPT | Performed by: SPECIALIST

## 2022-10-20 PROCEDURE — 85730 THROMBOPLASTIN TIME PARTIAL: CPT | Performed by: EMERGENCY MEDICINE

## 2022-10-20 PROCEDURE — 84300 ASSAY OF URINE SODIUM: CPT | Performed by: INTERNAL MEDICINE

## 2022-10-20 PROCEDURE — 80053 COMPREHEN METABOLIC PANEL: CPT | Performed by: EMERGENCY MEDICINE

## 2022-10-20 PROCEDURE — 82248 BILIRUBIN DIRECT: CPT | Performed by: EMERGENCY MEDICINE

## 2022-10-20 PROCEDURE — 99285 EMERGENCY DEPT VISIT HI MDM: CPT

## 2022-10-20 PROCEDURE — 0241U HB NFCT DS VIR RESP RNA 4 TRGT: CPT | Performed by: EMERGENCY MEDICINE

## 2022-10-20 PROCEDURE — 74176 CT ABD & PELVIS W/O CONTRAST: CPT

## 2022-10-20 PROCEDURE — 36415 COLL VENOUS BLD VENIPUNCTURE: CPT | Performed by: EMERGENCY MEDICINE

## 2022-10-20 PROCEDURE — 87081 CULTURE SCREEN ONLY: CPT | Performed by: NURSE PRACTITIONER

## 2022-10-20 PROCEDURE — 83735 ASSAY OF MAGNESIUM: CPT | Performed by: EMERGENCY MEDICINE

## 2022-10-20 RX ORDER — LACTULOSE 20 G/30ML
30 SOLUTION ORAL 4 TIMES DAILY
Status: DISCONTINUED | OUTPATIENT
Start: 2022-10-20 | End: 2022-10-20

## 2022-10-20 RX ORDER — ALBUMIN (HUMAN) 12.5 G/50ML
50 SOLUTION INTRAVENOUS ONCE
Status: COMPLETED | OUTPATIENT
Start: 2022-10-20 | End: 2022-10-20

## 2022-10-20 RX ORDER — ALBUMIN (HUMAN) 12.5 G/50ML
25 SOLUTION INTRAVENOUS ONCE
Status: COMPLETED | OUTPATIENT
Start: 2022-10-20 | End: 2022-10-20

## 2022-10-20 RX ORDER — HEPARIN SODIUM 5000 [USP'U]/ML
5000 INJECTION, SOLUTION INTRAVENOUS; SUBCUTANEOUS EVERY 8 HOURS SCHEDULED
Status: DISCONTINUED | OUTPATIENT
Start: 2022-10-20 | End: 2022-10-22

## 2022-10-20 RX ORDER — LACTULOSE 20 G/30ML
30 SOLUTION ORAL ONCE
Status: DISCONTINUED | OUTPATIENT
Start: 2022-10-20 | End: 2022-10-20

## 2022-10-20 RX ORDER — ALBUMIN (HUMAN) 12.5 G/50ML
25 SOLUTION INTRAVENOUS EVERY 6 HOURS
Status: COMPLETED | OUTPATIENT
Start: 2022-10-20 | End: 2022-10-22

## 2022-10-20 RX ORDER — CEFTRIAXONE 1 G/50ML
1000 INJECTION, SOLUTION INTRAVENOUS EVERY 24 HOURS
Status: DISCONTINUED | OUTPATIENT
Start: 2022-10-20 | End: 2022-10-22

## 2022-10-20 RX ADMIN — CEFTRIAXONE 1000 MG: 1 INJECTION, SOLUTION INTRAVENOUS at 10:35

## 2022-10-20 RX ADMIN — LACTULOSE 200 G: 10 SOLUTION ORAL at 12:58

## 2022-10-20 RX ADMIN — ALBUMIN (HUMAN) 50 G: 0.25 INJECTION, SOLUTION INTRAVENOUS at 17:19

## 2022-10-20 RX ADMIN — LACTULOSE 200 G: 10 SOLUTION ORAL at 18:40

## 2022-10-20 RX ADMIN — HEPARIN SODIUM 5000 UNITS: 5000 INJECTION INTRAVENOUS; SUBCUTANEOUS at 13:38

## 2022-10-20 RX ADMIN — HEPARIN SODIUM 5000 UNITS: 5000 INJECTION INTRAVENOUS; SUBCUTANEOUS at 21:41

## 2022-10-20 RX ADMIN — ALBUMIN (HUMAN) 25 G: 0.25 INJECTION, SOLUTION INTRAVENOUS at 13:38

## 2022-10-20 RX ADMIN — ALBUMIN (HUMAN) 25 G: 12.5 SOLUTION INTRAVENOUS at 16:45

## 2022-10-20 RX ADMIN — SODIUM CHLORIDE 500 ML: 0.9 INJECTION, SOLUTION INTRAVENOUS at 08:40

## 2022-10-20 RX ADMIN — ALBUMIN (HUMAN) 25 G: 0.25 INJECTION, SOLUTION INTRAVENOUS at 19:06

## 2022-10-20 NOTE — PLAN OF CARE
Problem: Potential for Falls  Goal: Patient will remain free of falls  Description: INTERVENTIONS:  - Educate patient/family on patient safety including physical limitations  - Instruct patient to call for assistance with activity   - Consult OT/PT to assist with strengthening/mobility   - Keep Call bell within reach  - Keep bed low and locked with side rails adjusted as appropriate  - Keep care items and personal belongings within reach  - Initiate and maintain comfort rounds  - Make Fall Risk Sign visible to staff  - Offer Toileting every 2 Hours, in advance of need  - Initiate/Maintain bed alarm  - Obtain necessary fall risk management equipment: na  - Apply yellow socks and bracelet for high fall risk patients  - Consider moving patient to room near nurses station  Outcome: Progressing     Problem: MOBILITY - ADULT  Goal: Maintain or return to baseline ADL function  Description: INTERVENTIONS:  -  Assess patient's ability to carry out ADLs; assess patient's baseline for ADL function and identify physical deficits which impact ability to perform ADLs (bathing, care of mouth/teeth, toileting, grooming, dressing, etc )  - Assess/evaluate cause of self-care deficits   - Assess range of motion  - Assess patient's mobility; develop plan if impaired  - Assess patient's need for assistive devices and provide as appropriate  - Encourage maximum independence but intervene and supervise when necessary  - Involve family in performance of ADLs  - Assess for home care needs following discharge   - Consider OT consult to assist with ADL evaluation and planning for discharge  - Provide patient education as appropriate  Outcome: Progressing  Goal: Maintains/Returns to pre admission functional level  Description: INTERVENTIONS:  - Perform BMAT or MOVE assessment daily    - Set and communicate daily mobility goal to care team and patient/family/caregiver     - Collaborate with rehabilitation services on mobility goals if consulted  - Perform Range of Motion 2 times a day  - Reposition patient every 2 hours    - Dangle patient 0 times a day  - Stand patient 0 times a day  - Ambulate patient 0 times a day  - Out of bed to chair 0 times a day   - Out of bed for meals 0 times a day  - Out of bed for toileting  - Record patient progress and toleration of activity level   Outcome: Progressing     Problem: SAFETY,RESTRAINT: NV/NON-SELF DESTRUCTIVE BEHAVIOR  Goal: Remains free of harm/injury (restraint for non violent/non self-detsructive behavior)  Description: INTERVENTIONS:  - Instruct patient/family regarding restraint use   - Assess and monitor physiologic and psychological status   - Provide interventions and comfort measures to meet assessed patient needs   - Identify and implement measures to help patient regain control  - Assess readiness for release of restraint   Outcome: Progressing  Goal: Returns to optimal restraint-free functioning  Description: INTERVENTIONS:  - Assess the patient's behavior and symptoms that indicate continued need for restraint  - Identify and implement measures to help patient regain control  - Assess readiness for release of restraint   Outcome: Progressing     Problem: PAIN - ADULT  Goal: Verbalizes/displays adequate comfort level or baseline comfort level  Description: Interventions:  - Encourage patient to monitor pain and request assistance  - Assess pain using appropriate pain scale  - Administer analgesics based on type and severity of pain and evaluate response  - Implement non-pharmacological measures as appropriate and evaluate response  - Consider cultural and social influences on pain and pain management  - Notify physician/advanced practitioner if interventions unsuccessful or patient reports new pain  Outcome: Progressing     Problem: INFECTION - ADULT  Goal: Absence or prevention of progression during hospitalization  Description: INTERVENTIONS:  - Assess and monitor for signs and symptoms of infection  - Monitor lab/diagnostic results  - Monitor all insertion sites, i e  indwelling lines, tubes, and drains  - Monitor endotracheal if appropriate and nasal secretions for changes in amount and color  - [place] appropriate cooling/warming therapies per order  - Administer medications as ordered  - Instruct and encourage patient and family to use good hand hygiene technique  - Identify and instruct in appropriate isolation precautions for identified infection/condition  Outcome: Progressing  Goal: Absence of fever/infection during neutropenic period  Description: INTERVENTIONS:  - Monitor WBC    Outcome: Progressing     Problem: SAFETY ADULT  Goal: Patient will remain free of falls  Description: INTERVENTIONS:  - Educate patient/family on patient safety including physical limitations  - Instruct patient to call for assistance with activity   - Consult OT/PT to assist with strengthening/mobility   - Keep Call bell within reach  - Keep bed low and locked with side rails adjusted as appropriate  - Keep care items and personal belongings within reach  - Initiate and maintain comfort rounds  - Make Fall Risk Sign visible to staff  - Offer Toileting every 2 Hours, in advance of need  - Initiate/Maintain bed alarm  - Obtain necessary fall risk management equipment: na  - Apply yellow socks and bracelet for high fall risk patients  - Consider moving patient to room near nurses station  Outcome: Progressing  Goal: Maintain or return to baseline ADL function  Description: INTERVENTIONS:  -  Assess patient's ability to carry out ADLs; assess patient's baseline for ADL function and identify physical deficits which impact ability to perform ADLs (bathing, care of mouth/teeth, toileting, grooming, dressing, etc )  - Assess/evaluate cause of self-care deficits   - Assess range of motion  - Assess patient's mobility; develop plan if impaired  - Assess patient's need for assistive devices and provide as appropriate  - Encourage maximum independence but intervene and supervise when necessary  - Involve family in performance of ADLs  - Assess for home care needs following discharge   - Consider OT consult to assist with ADL evaluation and planning for discharge  - Provide patient education as appropriate  Outcome: Progressing  Goal: Maintains/Returns to pre admission functional level  Description: INTERVENTIONS:  - Perform BMAT or MOVE assessment daily    - Set and communicate daily mobility goal to care team and patient/family/caregiver  - Collaborate with rehabilitation services on mobility goals if consulted  - Perform Range of Motion 2 times a day  - Reposition patient every 2 hours  - Dangle patient 0 times a day  - Stand patient 0 times a day  - Ambulate patient 0 times a day  - Out of bed to chair 0 times a day   - Out of bed for meals 0 times a day  - Out of bed for toileting  - Record patient progress and toleration of activity level   Outcome: Progressing     Problem: DISCHARGE PLANNING  Goal: Discharge to home or other facility with appropriate resources  Description: INTERVENTIONS:  - Identify barriers to discharge w/patient and caregiver  - Arrange for needed discharge resources and transportation as appropriate  - Identify discharge learning needs (meds, wound care, etc )  - Arrange for interpretive services to assist at discharge as needed  - Refer to Case Management Department for coordinating discharge planning if the patient needs post-hospital services based on physician/advanced practitioner order or complex needs related to functional status, cognitive ability, or social support system  Outcome: Progressing     Problem: Knowledge Deficit  Goal: Patient/family/caregiver demonstrates understanding of disease process, treatment plan, medications, and discharge instructions  Description: Complete learning assessment and assess knowledge base    Interventions:  - Provide teaching at level of understanding  - Provide teaching via preferred learning methods  Outcome: Progressing

## 2022-10-20 NOTE — NURSING NOTE
Attempted to give pt lactulose retention enema as ordered  Pt was given about 500 mL of ordered enema, when he pushed out the rectal balloon  Different nurse attempted to replace rectal balloon but pt was pushing it out again  HUGO MONTANO notified of above and new orders obtained

## 2022-10-20 NOTE — ASSESSMENT & PLAN NOTE
· Presents with lethargy, GCS 12 secondary to elevated ammonia level   · Ammonia 134  · CT brain without acute abnormality  · Lactulose enema attempted but unsuccessful  · NG tube will be placed for lactulose administration  · Monitor neuro status  · NPO until GCS improved

## 2022-10-20 NOTE — H&P
[address]  H&P- Benjamin Soto 1950, 67 y o  male MRN: [id number]  Unit/Bed#: -01 Encounter: [id number]  Primary Care Provider: Jacinto Jameson MD   Date and time admitted to hospital: 10/20/2022  4:25 AM    * Acute hepatic encephalopathy  Assessment & Plan  · Presents with lethargy, GCS 12 secondary to elevated ammonia level   · Ammonia 134  · CT brain without acute abnormality  · Lactulose enema attempted but unsuccessful  · NG tube will be placed for lactulose administration  · Monitor neuro status  · NPO until GCS improved    NAVEED (acute kidney injury) ([facility] [address] )  Assessment & Plan  · Creatinine 1 94 on admission  · Baseline closer to 1 45  · Will give gentle fluid bolus as patient appears hypovolemic  · Trend creatinine  · Check urinalysis  · Spironolactone, furosemide on hold    Chronic anemia  Assessment & Plan  · Chronic anemia  · Hemoglobin 9 3 on admission  · Appears baseline  · Trend hemoglobin    SIRS (systemic inflammatory response syndrome) (HCC)  Assessment & Plan  · Tachycardia, tachypnea with acute hepatic encephalopathy decompensated liver cirrhosis and NAVEED  · Gentle fluid resuscitation  · Lactic acid 1 5  · No acute infectious process    Thrombocytopenia (HCC)  Assessment & Plan  · Platelets 80 in setting of chronic liver disease- actually improved from baseline 41 just 2 weeks ago  · Monitor with DVT prophylaxis    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 ([facility]  Assessment & Plan  · Decompensated liver cirrhosis with hepatic encephalopathy  · Meld score 24  · NG tube placed for lactulose administration  · When able to tolerate p o  resume spironolactone, furosemide  · Trend daily meld labs  · Dementia from skilled nursing facility  · DNR DNI    Dementia with behavioral disturbance  Assessment & Plan  · Continue sertraline and Aricept when tolerating oral intake    VTE Pharmacologic Prophylaxis: VTE Score: 3 Moderate Risk (Score 3-4) - Pharmacological DVT Prophylaxis Ordered: heparin  Code Status: Level 3 - DNAR and DNI   Discussion with family: Updated  (son) via phone  Anticipated Length of Stay: Patient will be admitted on an inpatient basis with an anticipated length of stay of greater than 2 midnights secondary to Hepatic encephalopathy  Total Time for Visit, including Counseling / Coordination of Care: 30 minutes Greater than 50% of this total time spent on direct patient counseling and coordination of care  Chief Complaint:  Altered mental status    History of Present Illness:  Rosetta Sommers is a 67 y o  male with a PMH of liver cirrhosis with ascites who undergoes frequent paracentesis, CKD, chronic anemia, thrombocytopenia    Review of Systems:  Review of Systems   Unable to perform ROS: Mental status change       Past Medical and Surgical History:   Past Medical History:   Diagnosis Date   • Cirrhosis ([facility] [address] )    • Dementia ([facility] [address] )    • Hypertension        Past Surgical History:   Procedure Laterality Date   • IR PARACENTESIS  10/10/2022   • REPLACEMENT TOTAL KNEE         Meds/Allergies:  Prior to Admission medications    Medication Sig Start Date End Date Taking?  Authorizing Provider   aspirin (ECOTRIN LOW STRENGTH) 81 mg EC tablet Take 81 mg by mouth daily    Historical Provider, MD   Cholecalciferol 25 MCG (1000 UT) tablet Take 1 tablet by mouth daily 11/8/21   Historical Provider, MD   donepezil (ARICEPT) 10 mg tablet Take 10 mg by mouth daily at bedtime 12/9/21   Historical Provider, MD   furosemide (LASIX) 40 mg tablet Take 1 tablet (40 mg total) by mouth daily Do not start before October 12, 2022  10/12/22 11/11/22  Richmond Shannon MD   lactulose 20 g/30 mL Take 45 mL (30 g total) by mouth 4 (four) times a day  Patient taking differently: Take 20 g by mouth 4 (four) times a day 3/4/22 4/3/22  Isis Harris MD   midodrine (PROAMATINE) 5 mg tablet Take 1 tablet (5 mg total) by mouth 3 (three) times a day before meals Please hold the medication if systolic blood pressure is more than 110 Do not start before October 12, 2022  10/12/22 11/11/22  Washington Oleary MD   rifaximin (XIFAXAN) 550 mg tablet Take 550 mg by mouth every 12 (twelve) hours    Historical Provider, MD   sertraline (ZOLOFT) 50 mg tablet Take 50 mg by mouth daily    Historical Provider, MD   spironolactone (ALDACTONE) 50 mg tablet Take 1 tablet (50 mg total) by mouth daily 10/11/22 11/10/22  Washington Oelary MD     I have reveiwed home medications using records provided by [facility]  Allergies: No Known Allergies    Social History:  Marital Status:    Occupation: retired  Patient Pre-hospital Living Situation: [facility]  Patient Pre-hospital Level of Mobility: unable to be assessed at time of evaluation  Patient Pre-hospital Diet Restrictions: mechanical soft  Substance Use History:   Social History     Substance and Sexual Activity   Alcohol Use Not Currently    Comment: former social ETOH     Social History     Tobacco Use   Smoking Status Former Smoker   Smokeless Tobacco Never Used     Social History     Substance and Sexual Activity   Drug Use Never       Family History:  Family History   Problem Relation Age of Onset   • Arthritis Mother        Physical Exam:     Vitals:   Blood Pressure: 128/69 (10/20/22 0628)  Pulse: 104 (10/20/22 0701)  Temperature: 97 8 °F (36 6 °C) (10/20/22 0701)  Temp Source: Temporal (10/20/22 0701)  Respirations: 21 (10/20/22 0701)  Height: 6' 2" (188 cm) (10/20/22 0430)  Weight - Scale: 93 7 kg (206 lb 9 1 oz) (10/20/22 0430)  SpO2: 96 % (10/20/22 0701)    Physical Exam  Vitals and nursing note reviewed  Constitutional:       General: He is in acute distress  Appearance: He is well-developed  He is ill-appearing  HENT:      Head: Normocephalic and atraumatic  Mouth/Throat:      Mouth: Mucous membranes are dry     Eyes:      Conjunctiva/sclera: Conjunctivae normal    Cardiovascular: Rate and Rhythm: Regular rhythm  Tachycardia present  Heart sounds: No murmur heard  Pulmonary:      Effort: Pulmonary effort is normal  No respiratory distress  Breath sounds: Normal breath sounds  Abdominal:      General: There is distension  Palpations: Abdomen is soft  Tenderness: There is no abdominal tenderness  Musculoskeletal:      Cervical back: Neck supple  Right lower leg: Edema present  Left lower leg: Edema present  Skin:     General: Skin is warm and dry  Capillary Refill: Capillary refill takes less than 2 seconds  Neurological:      Mental Status: He is alert  Comments: Lethargic but protecting airway   Psychiatric:      Comments: Unable to evaluate        Additional Data:     Lab Results:  Results from last 7 days   Lab Units 10/20/22  0437   WBC Thousand/uL 7 97   HEMOGLOBIN g/dL 9 3*   HEMATOCRIT % 27 0*   PLATELETS Thousands/uL 80*   NEUTROS PCT % 68   LYMPHS PCT % 19   MONOS PCT % 11   EOS PCT % 1     Results from last 7 days   Lab Units 10/20/22  0437   SODIUM mmol/L 135   POTASSIUM mmol/L 4 3   CHLORIDE mmol/L 102   CO2 mmol/L 26   BUN mg/dL 33*   CREATININE mg/dL 1 94*   ANION GAP mmol/L 7   CALCIUM mg/dL 8 1*   ALBUMIN g/dL 1 8*   TOTAL BILIRUBIN mg/dL 3 67*   ALK PHOS U/L 137*   ALT U/L 55   AST U/L 73*   GLUCOSE RANDOM mg/dL 95     Results from last 7 days   Lab Units 10/20/22  0437   INR  1 66*             Results from last 7 days   Lab Units 10/20/22  0437   LACTIC ACID mmol/L 1 5       Imaging: Reviewed radiology reports from this admission including: abdominal/pelvic CT  CT abdomen pelvis wo contrast   Final Result by Severiano Aguiar MD (10/20 9166)      Cirrhosis with portal hypertension as evidenced by splenomegaly, small caliber varices, and large volume ascites (similar to prior exam)        Small bilateral pleural effusions with bibasilar atelectasis (right worse than left), similar to prior exam       No new acute finding in the abdomen or pelvis  Additional chronic/incidental findings as detailed above  Workstation performed: [id number]         CT head without contrast   Final Result by Kya Lewis MD (10/20 9218)      No acute intracranial abnormality  Mild chronic microangiopathy  Workstation performed: [id number]         XR abdomen 1 view kub    (Results Pending)   XR chest portable    (Results Pending)       EKG and Other Studies Reviewed on Admission:   · EKG: Sinus Tachycardia    ** Please Note: This note has been constructed using a voice recognition system   **

## 2022-10-20 NOTE — ASSESSMENT & PLAN NOTE
· Tachycardia, tachypnea with acute hepatic encephalopathy decompensated liver cirrhosis and NAVEED  · Gentle fluid resuscitation  · Lactic acid 1 5  · No acute infectious process

## 2022-10-20 NOTE — CASE MANAGEMENT
Case Management Progress Note    Patient name Verena Chau  Location /-01 MRN [id number]  : 1950 Date 10/20/2022       LOS (days): 0  Geometric Mean LOS (GMLOS) (days): 3 10  Days to GMLOS:2 9        OBJECTIVE:        Current admission status: Inpatient  Preferred Pharmacy:   PATIENT/FAMILY REPORTS NO PREFERRED PHARMACY  No address on file      [id number], [address] [address]  [facility] [address] [place] [id number]  Phone: [phone] Fax: [phone]    Primary Care Provider: Kirk Segura MD    Primary Insurance: MEDICARE  Secondary Insurance: [employer]    PROGRESS NOTE:  CM reviewed chart, Pt recently d/c to [facility]  CM made referral via [address] for return planning  [facility] reports son is deciding on bed hold, however they will accept Pt back at d/c  CM placed call to Pt's son re: planning, await return call

## 2022-10-20 NOTE — ED PROVIDER NOTES
History  Chief Complaint   Patient presents with   • Abdominal Pain     Per nursing home, in the last 24 hours patient has had decreased LOC, abdominal distension, and increased jaundice     Patient is a 71-year-old male history of dementia and cirrhosis of the liver presents emergency department with acute change in mental status over the last 24 hours has been increasingly confused he not eating or drinking or taking medications per nursing home  Nursing home reports patient usually confused but able to cooperate and follow commands has had increased jaundice and abdominal distention as well  History provided by:  Patient, EMS personnel and nursing home      Prior to Admission Medications   Prescriptions Last Dose Informant Patient Reported? Taking? Cholecalciferol 25 MCG (1000 UT) tablet   Yes No   Sig: Take 1 tablet by mouth daily   aspirin (ECOTRIN LOW STRENGTH) 81 mg EC tablet   Yes No   Sig: Take 81 mg by mouth daily   donepezil (ARICEPT) 10 mg tablet   Yes No   Sig: Take 10 mg by mouth daily at bedtime   furosemide (LASIX) 40 mg tablet   No No   Sig: Take 1 tablet (40 mg total) by mouth daily Do not start before October 12, 2022  lactulose 20 g/30 mL   No No   Sig: Take 45 mL (30 g total) by mouth 4 (four) times a day   Patient taking differently: Take 20 g by mouth 4 (four) times a day   midodrine (PROAMATINE) 5 mg tablet   No No   Sig: Take 1 tablet (5 mg total) by mouth 3 (three) times a day before meals Please hold the medication if systolic blood pressure is more than 110 Do not start before October 12, 2022     rifaximin (XIFAXAN) 550 mg tablet   Yes No   Sig: Take 550 mg by mouth every 12 (twelve) hours   sertraline (ZOLOFT) 50 mg tablet   Yes No   Sig: Take 50 mg by mouth daily   spironolactone (ALDACTONE) 50 mg tablet   No No   Sig: Take 1 tablet (50 mg total) by mouth daily      Facility-Administered Medications: None       Past Medical History:   Diagnosis Date   • Cirrhosis ([facility] [address] )    • Dementia ([facility] [address] )    • Hypertension        Past Surgical History:   Procedure Laterality Date   • IR PARACENTESIS  10/10/2022   • REPLACEMENT TOTAL KNEE         Family History   Problem Relation Age of Onset   • Arthritis Mother      I have reviewed and agree with the history as documented  E-Cigarette/Vaping   • E-Cigarette Use Never User      E-Cigarette/Vaping Substances     Social History     Tobacco Use   • Smoking status: Former Smoker   • Smokeless tobacco: Never Used   Vaping Use   • Vaping Use: Never used   Substance Use Topics   • Alcohol use: Not Currently     Comment: former social ETOH   • Drug use: Never       Review of Systems   Unable to perform ROS: Mental status change   All other systems reviewed and are negative  Physical Exam  Physical Exam  Vitals and nursing note reviewed  Constitutional:       Appearance: He is well-developed  He is ill-appearing  HENT:      Head: Normocephalic and atraumatic  Right Ear: External ear normal       Left Ear: External ear normal       Nose: Nose normal    Eyes:      General: Scleral icterus present  Conjunctiva/sclera: Conjunctivae normal       Pupils: Pupils are equal, round, and reactive to light  Cardiovascular:      Rate and Rhythm: Normal rate and regular rhythm  Heart sounds: Normal heart sounds  Pulmonary:      Effort: Pulmonary effort is normal  No respiratory distress  Breath sounds: Normal breath sounds  No wheezing or rales  Chest:      Chest wall: No tenderness  Abdominal:      General: Abdomen is protuberant  Bowel sounds are normal  There is distension  Palpations: Abdomen is soft  Tenderness: There is no abdominal tenderness  There is no guarding  Musculoskeletal:         General: Normal range of motion  Cervical back: Normal range of motion and neck supple  Skin:     General: Skin is warm and dry  Coloration: Skin is jaundiced  Neurological:      Mental Status: He is unresponsive  GCS: GCS eye subscore is 4  GCS verbal subscore is 3  GCS motor subscore is 5  Cranial Nerves: No facial asymmetry  Sensory: No sensory deficit  Psychiatric:         Behavior: Behavior is uncooperative  Vital Signs  ED Triage Vitals [10/20/22 0430]   Temperature Pulse Respirations Blood Pressure SpO2   98 4 °F (36 9 °C) (!) 107 20 129/69 95 %      Temp Source Heart Rate Source Patient Position - Orthostatic VS BP Location FiO2 (%)   Rectal Monitor Sitting Left arm --      Pain Score       --           Vitals:    10/20/22 0430 10/20/22 0515   BP: 129/69 119/62   Pulse: (!) 107 105   Patient Position - Orthostatic VS: Sitting Lying         Visual Acuity      ED Medications  Medications   lactulose retention enema 200 g (has no administration in time range)       Diagnostic Studies  Results Reviewed     Procedure Component Value Units Date/Time    FLU/RSV/COVID - if FLU/RSV clinically relevant [[id number]]  (Normal) Collected: 10/20/22 0437    Lab Status: Final result Specimen: Nares from Nose Updated: 10/20/22 0523     SARS-CoV-2 Negative     INFLUENZA A PCR Negative     INFLUENZA B PCR Negative     RSV PCR Negative    Narrative:      FOR PEDIATRIC PATIENTS - copy/paste COVID Guidelines URL to browser: [email or web address]  [employer]    SARS-CoV-2 assay is a Nucleic Acid Amplification assay intended for the  qualitative detection of nucleic acid from SARS-CoV-2 in nasopharyngeal  swabs  Results are for the presumptive identification of SARS-CoV-2 RNA  Positive results are indicative of infection with SARS-CoV-2, the virus  causing COVID-19, but do not rule out bacterial infection or co-infection  with other viruses  Laboratories within the [place] and its  territories are required to report all positive results to the appropriate  public health authorities   Negative results do not preclude SARS-CoV-2  infection and should not be used as the sole basis for treatment or other  patient management decisions  Negative results must be combined with  clinical observations, patient history, and epidemiological information  This test has not been FDA cleared or approved  This test has been authorized by FDA under an Emergency Use Authorization  (EUA)  This test is only authorized for the duration of time the  declaration that circumstances exist justifying the authorization of the  emergency use of an in vitro diagnostic tests for detection of SARS-CoV-2  virus and/or diagnosis of COVID-19 infection under section 564(b)(1) of  the Act, 21 U  S C  062VKK-3(F)(7), unless the authorization is terminated  or revoked sooner  The test has been validated but independent review by FDA  and CLIA is pending  Test performed using [employer] GeneXpert: This RT-PCR assay targets N2,  a region unique to SARS-CoV-2  A conserved region in the E-gene was chosen  for pan-Sarbecovirus detection which includes SARS-CoV-2  According to CMS-2020-01-R, this platform meets the definition of high-throughput technology      Comprehensive metabolic panel [[id number]]  (Abnormal) Collected: 10/20/22 0437    Lab Status: Final result Specimen: Blood from Arm, Right Updated: 10/20/22 0516     Sodium 135 mmol/L      Potassium 4 3 mmol/L      Chloride 102 mmol/L      CO2 26 mmol/L      ANION GAP 7 mmol/L      BUN 33 mg/dL      Creatinine 1 94 mg/dL      Glucose 95 mg/dL      Calcium 8 1 mg/dL      Corrected Calcium 9 9 mg/dL      AST 73 U/L      ALT 55 U/L      Alkaline Phosphatase 137 U/L      Total Protein 7 3 g/dL      Albumin 1 8 g/dL      Total Bilirubin 3 67 mg/dL      eGFR 33 ml/min/1 73sq m     Narrative:      Brad guidelines for Chronic Kidney Disease (CKD):   •  Stage 1 with normal or high GFR (GFR > 90 mL/min/1 73 square meters)  •  Stage 2 Mild CKD (GFR = 60-89 mL/min/1 73 square meters)  •  Stage 3A Moderate CKD (GFR = 45-59 mL/min/1 73 square meters)  •  Stage 3B Moderate CKD (GFR = 30-44 mL/min/1 73 square meters)  •  Stage 4 Severe CKD (GFR = 15-29 mL/min/1 73 square meters)  •  Stage 5 End Stage CKD (GFR <15 mL/min/1 73 square meters)  Note: GFR calculation is accurate only with a steady state creatinine    Lipase [[id number]]  (Normal) Collected: 10/20/22 0437    Lab Status: Final result Specimen: Blood from Arm, Right Updated: 10/20/22 0516     Lipase 349 u/L     Bilirubin, direct [[id number]]  (Abnormal) Collected: 10/20/22 0437    Lab Status: Final result Specimen: Blood from Arm, Right Updated: 10/20/22 0516     Bilirubin, Direct 2 09 mg/dL     Magnesium [[id number]]  (Normal) Collected: 10/20/22 0437    Lab Status: Final result Specimen: Blood from Arm, Right Updated: 10/20/22 0516     Magnesium 2 1 mg/dL     Protime-INR [[id number]]  (Abnormal) Collected: 10/20/22 0437    Lab Status: Final result Specimen: Blood from Arm, Right Updated: 10/20/22 0516     Protime 19 7 seconds      INR 1 66    APTT [[id number]]  (Abnormal) Collected: 10/20/22 0437    Lab Status: Final result Specimen: Blood from Arm, Right Updated: 10/20/22 0516     PTT 38 seconds     Lactic acid [[id number]]  (Normal) Collected: 10/20/22 0437    Lab Status: Final result Specimen: Blood from Arm, Right Updated: 10/20/22 0510     LACTIC ACID 1 5 mmol/L     Narrative:      Result may be elevated if tourniquet was used during collection  Ammonia [[id number]]  (Abnormal) Collected: 10/20/22 0437    Lab Status: Final result Specimen: Blood from Arm, Right Updated: 10/20/22 0502     Ammonia 134 umol/L     Blood culture #1 [[id number]] Collected: 10/20/22 0443    Lab Status:  In process Specimen: Blood from Hand, Left Updated: 10/20/22 0455    CBC and differential [[id number]]  (Abnormal) Collected: 10/20/22 0437    Lab Status: Final result Specimen: Blood from Arm, Right Updated: 10/20/22 0449     WBC 7 97 Thousand/uL      RBC 3 12 Million/uL      Hemoglobin 9 3 g/dL      Hematocrit 27 0 % MCV 87 fL      MCH 29 8 pg      MCHC 34 4 g/dL      RDW 15 9 %      MPV 11 3 fL      Platelets 80 Thousands/uL      nRBC 0 /100 WBCs      Neutrophils Relative 68 %      Immat GRANS % 1 %      Lymphocytes Relative 19 %      Monocytes Relative 11 %      Eosinophils Relative 1 %      Basophils Relative 0 %      Neutrophils Absolute 5 40 Thousands/µL      Immature Grans Absolute 0 05 Thousand/uL      Lymphocytes Absolute 1 55 Thousands/µL      Monocytes Absolute 0 86 Thousand/µL      Eosinophils Absolute 0 08 Thousand/µL      Basophils Absolute 0 03 Thousands/µL     Blood culture #2 [[id number]] Collected: 10/20/22 0437    Lab Status: In process Specimen: Blood from Arm, Right Updated: 10/20/22 0444    UA w Reflex to Microscopic w Reflex to Culture [[id number]]     Lab Status: No result Specimen: Urine                  CT abdomen pelvis wo contrast   Final Result by Rambo Ureña MD (10/20 0132)      Cirrhosis with portal hypertension as evidenced by splenomegaly, small caliber varices, and large volume ascites (similar to prior exam)  Small bilateral pleural effusions with bibasilar atelectasis (right worse than left), similar to prior exam       No new acute finding in the abdomen or pelvis  Additional chronic/incidental findings as detailed above  Workstation performed: [id number]         CT head without contrast   Final Result by Rambo Ureña MD (10/20 9689)      No acute intracranial abnormality  Mild chronic microangiopathy  Workstation performed: [id number]                    Procedures  Procedures         ED Course  ED Course as of 10/20/22 0547   Thu Oct 20, 2022   Tayo Clark Spoke with Berenice Bee hospitalist events practitioner on-call reviewed case and findings in the emergency department accepts for admission level 2 step-down on behalf Dr Mine Ma                                   SBIRT 22yo+    Flowsheet Row Most Recent Value   SBIRT (25 yo +)    In order to provide better care to our patients, we are screening all of our patients for alcohol and drug use  Would it be okay to ask you these screening questions? Yes Filed at: 10/20/2022 0436   Initial Alcohol Screen: US AUDIT-C     1  How often do you have a drink containing alcohol? 0 Filed at: 10/20/2022 0436   2  How many drinks containing alcohol do you have on a typical day you are drinking? 0 Filed at: 10/20/2022 0436   3a  Male UNDER 65: How often do you have five or more drinks on one occasion? 0 Filed at: 10/20/2022 0436   3b  FEMALE Any Age, or MALE 65+: How often do you have 4 or more drinks on one occassion? 0 Filed at: 10/20/2022 0436   Audit-C Score 0 Filed at: 10/20/2022 0985   SHELIA: How many times in the past year have you    Used an illegal drug or used a prescription medication for non-medical reasons?  Never Filed at: 10/20/2022 0436                    MDM  Number of Diagnoses or Management Options  Altered mental state: new and requires workup  Ascites: new and requires workup  Cirrhosis ([facility] [address] ): new and requires workup  Hepatic encephalopathy: new and requires workup     Amount and/or Complexity of Data Reviewed  Clinical lab tests: ordered and reviewed  Tests in the radiology section of CPT®: ordered and reviewed  Tests in the medicine section of CPT®: ordered and reviewed  Decide to obtain previous medical records or to obtain history from someone other than the patient: yes  Review and summarize past medical records: yes  Independent visualization of images, tracings, or specimens: yes    Risk of Complications, Morbidity, and/or Mortality  Presenting problems: moderate  Diagnostic procedures: moderate  Management options: moderate    Patient Progress  Patient progress: stable      Disposition  Final diagnoses:   Hepatic encephalopathy   Cirrhosis ([facility] [address] )   Ascites   Altered mental state     Time reflects when diagnosis was documented in both MDM as applicable and the Disposition within this note Time User Action Codes Description Comment    10/20/2022  5:05 AM David Erwin Add [K76 82] Hepatic encephalopathy     10/20/2022  5:05 AM David Erwin Add [K74 60] Cirrhosis ([facility] [address] )     10/20/2022  5:05 AM David Erwin Add [R18 8] Ascites     10/20/2022  5:06 AM David Erwin Add [R41 82] Altered mental state       ED Disposition     ED Disposition   Admit    Condition   Stable    Date/Time   u Oct 20, 2022  5:05 AM    Comment   Case was discussed with Marguerite and the patient's admission status was agreed to be Admission Status: inpatient status to the service of Dr Wilma Morelos  Follow-up Information    None         Patient's Medications   Discharge Prescriptions    No medications on file       No discharge procedures on file      PDMP Review       Value Time User    PDMP Reviewed  Yes 10/11/2022  3:33 PM Lady Adriana MD          ED Provider  Electronically Signed by           Edward Sharp, DO  10/20/22 5665 Cheryle Mcconnell, DO  10/20/22 4819

## 2022-10-20 NOTE — ASSESSMENT & PLAN NOTE
· Decompensated liver cirrhosis with hepatic encephalopathy  · Meld score 24  · NG tube placed for lactulose administration  · When able to tolerate p o  resume spironolactone, furosemide  · Trend daily meld labs  · Dementia from skilled nursing facility  · DNR DNI

## 2022-10-20 NOTE — ASSESSMENT & PLAN NOTE
· Platelets 80 in setting of chronic liver disease- actually improved from baseline 41 just 2 weeks ago  · Monitor with DVT prophylaxis

## 2022-10-20 NOTE — ASSESSMENT & PLAN NOTE
· Creatinine 1 94 on admission  · Baseline closer to 1 45  · Will give gentle fluid bolus as patient appears hypovolemic  · Trend creatinine  · Check urinalysis  · Spironolactone, furosemide on hold

## 2022-10-20 NOTE — ED PROVIDER NOTES
History  Chief Complaint   Patient presents with   • Abdominal Pain     Per nursing home, in the last 24 hours patient has had decreased LOC, abdominal distension, and increased jaundice     Patient is a 65-year-old male history of dementia and cirrhosis of the liver presents emergency department with acute change in mental status over the last 24 hours has been increasingly confused he not eating or drinking or taking medications per nursing home  Nursing home reports patient usually confused but able to cooperate and follow commands has had increased jaundice and abdominal distention as well  History provided by:  Patient, EMS personnel and nursing home      Prior to Admission Medications   Prescriptions Last Dose Informant Patient Reported? Taking? Cholecalciferol 25 MCG (1000 UT) tablet   Yes No   Sig: Take 1 tablet by mouth daily   aspirin (ECOTRIN LOW STRENGTH) 81 mg EC tablet   Yes No   Sig: Take 81 mg by mouth daily   donepezil (ARICEPT) 10 mg tablet   Yes No   Sig: Take 10 mg by mouth daily at bedtime   furosemide (LASIX) 40 mg tablet   No No   Sig: Take 1 tablet (40 mg total) by mouth daily Do not start before October 12, 2022  lactulose 20 g/30 mL   No No   Sig: Take 45 mL (30 g total) by mouth 4 (four) times a day   Patient taking differently: Take 20 g by mouth 4 (four) times a day   midodrine (PROAMATINE) 5 mg tablet   No No   Sig: Take 1 tablet (5 mg total) by mouth 3 (three) times a day before meals Please hold the medication if systolic blood pressure is more than 110 Do not start before October 12, 2022     rifaximin (XIFAXAN) 550 mg tablet   Yes No   Sig: Take 550 mg by mouth every 12 (twelve) hours   sertraline (ZOLOFT) 50 mg tablet   Yes No   Sig: Take 50 mg by mouth daily   spironolactone (ALDACTONE) 50 mg tablet   No No   Sig: Take 1 tablet (50 mg total) by mouth daily      Facility-Administered Medications: None       Past Medical History:   Diagnosis Date   • Cirrhosis ([facility] [address] )    • Dementia ([facility] [address] )    • Hypertension        Past Surgical History:   Procedure Laterality Date   • IR PARACENTESIS  10/10/2022   • REPLACEMENT TOTAL KNEE         Family History   Problem Relation Age of Onset   • Arthritis Mother      I have reviewed and agree with the history as documented  E-Cigarette/Vaping   • E-Cigarette Use Never User      E-Cigarette/Vaping Substances     Social History     Tobacco Use   • Smoking status: Former Smoker   • Smokeless tobacco: Never Used   Vaping Use   • Vaping Use: Never used   Substance Use Topics   • Alcohol use: Not Currently     Comment: former social ETOH   • Drug use: Never       Review of Systems   Unable to perform ROS: Mental status change   All other systems reviewed and are negative  Physical Exam  Physical Exam  Vitals and nursing note reviewed  Constitutional:       Appearance: He is well-developed  He is ill-appearing  HENT:      Head: Normocephalic and atraumatic  Right Ear: External ear normal       Left Ear: External ear normal       Nose: Nose normal    Eyes:      General: Scleral icterus present  Conjunctiva/sclera: Conjunctivae normal       Pupils: Pupils are equal, round, and reactive to light  Cardiovascular:      Rate and Rhythm: Normal rate and regular rhythm  Heart sounds: Normal heart sounds  Pulmonary:      Effort: Pulmonary effort is normal  No respiratory distress  Breath sounds: Normal breath sounds  No wheezing or rales  Chest:      Chest wall: No tenderness  Abdominal:      General: Abdomen is protuberant  Bowel sounds are normal  There is distension  Palpations: Abdomen is soft  Tenderness: There is no abdominal tenderness  There is no guarding  Musculoskeletal:         General: Normal range of motion  Cervical back: Normal range of motion and neck supple  Skin:     General: Skin is warm and dry  Coloration: Skin is jaundiced  Neurological:      Mental Status: He is unresponsive  GCS: GCS eye subscore is 4  GCS verbal subscore is 3  GCS motor subscore is 5  Cranial Nerves: No facial asymmetry  Sensory: No sensory deficit  Psychiatric:         Behavior: Behavior is uncooperative           Vital Signs  ED Triage Vitals [10/20/22 0430]   Temperature Pulse Respirations Blood Pressure SpO2   98 4 °F (36 9 °C) (!) 107 20 129/69 95 %      Temp Source Heart Rate Source Patient Position - Orthostatic VS BP Location FiO2 (%)   Rectal Monitor Sitting Left arm --      Pain Score       --           Vitals:    10/20/22 0430   BP: 129/69   Pulse: (!) 107   Patient Position - Orthostatic VS: Sitting         Visual Acuity      ED Medications  Medications - No data to display    Diagnostic Studies  Results Reviewed     Procedure Component Value Units Date/Time    FLU/RSV/COVID - if FLU/RSV clinically relevant [[id number]] Collected: 10/20/22 0437    Lab Status: No result Specimen: Nares from Nose     CBC and differential [[id number]]     Lab Status: No result Specimen: Blood     Protime-INR [[id number]]     Lab Status: No result Specimen: Blood     APTT [[id number]]     Lab Status: No result Specimen: Blood     Comprehensive metabolic panel [[id number]]     Lab Status: No result Specimen: Blood     Magnesium [[id number]]     Lab Status: No result Specimen: Blood     Ammonia [[id number]]     Lab Status: No result Specimen: Blood     Lactic acid [[id number]]     Lab Status: No result Specimen: Blood     Lipase [[id number]]     Lab Status: No result Specimen: Blood     UA w Reflex to Microscopic w Reflex to Culture [[id number]]     Lab Status: No result Specimen: Urine     Blood culture #1 [[id number]]     Lab Status: No result Specimen: Blood     Blood culture #2 [[id number]]     Lab Status: No result Specimen: Blood     Bilirubin, direct [[id number]]     Lab Status: No result Specimen: Blood                  CT head without contrast    (Results Pending)   CT abdomen pelvis wo contrast    (Results Pending) Procedures  ECG 12 Lead Documentation Only    Date/Time: 10/20/2022 4:39 AM  Performed by: Delaney Hyatt DO  Authorized by: Delaney Hyatt DO     ECG reviewed by me, the ED Provider: yes    Patient location:  ED  Quality:     Tracing quality:  Limited by artifact  Rate:     ECG rate:  106    ECG rate assessment: tachycardic    Rhythm:     Rhythm: sinus tachycardia    Ectopy:     Ectopy: PVCs    QRS:     QRS axis:  Normal    QRS intervals:  Normal  ST segments:     ST segments:  Non-specific  T waves:     T waves: non-specific               ED Course                               SBIRT 22yo+    Flowsheet Row Most Recent Value   SBIRT (23 yo +)    In order to provide better care to our patients, we are screening all of our patients for alcohol and drug use  Would it be okay to ask you these screening questions? Yes Filed at: 10/20/2022 0436   Initial Alcohol Screen: US AUDIT-C     1  How often do you have a drink containing alcohol? 0 Filed at: 10/20/2022 0436   2  How many drinks containing alcohol do you have on a typical day you are drinking? 0 Filed at: 10/20/2022 0436   3a  Male UNDER 65: How often do you have five or more drinks on one occasion? 0 Filed at: 10/20/2022 0436   3b  FEMALE Any Age, or MALE 65+: How often do you have 4 or more drinks on one occassion? 0 Filed at: 10/20/2022 0436   Audit-C Score 0 Filed at: 10/20/2022 7879   SHELIA: How many times in the past year have you    Used an illegal drug or used a prescription medication for non-medical reasons? Never Filed at: 10/20/2022 9810                    MDM    Disposition  Final diagnoses:   None     ED Disposition     None      Follow-up Information    None         Patient's Medications   Discharge Prescriptions    No medications on file       No discharge procedures on file      PDMP Review       Value Time User    PDMP Reviewed  Yes 10/11/2022  3:33 PM Tiara Brown MD          ED Provider  Electronically Signed by

## 2022-10-21 LAB
ALBUMIN SERPL BCP-MCNC: 2.9 G/DL (ref 3.5–5)
ALP SERPL-CCNC: 86 U/L (ref 46–116)
ALT SERPL W P-5'-P-CCNC: 35 U/L (ref 12–78)
ANION GAP SERPL CALCULATED.3IONS-SCNC: 11 MMOL/L (ref 4–13)
AST SERPL W P-5'-P-CCNC: 63 U/L (ref 5–45)
ATRIAL RATE: 106 BPM
BASOPHILS # BLD AUTO: 0.01 THOUSANDS/ÂΜL (ref 0–0.1)
BASOPHILS NFR BLD AUTO: 0 % (ref 0–1)
BILIRUB SERPL-MCNC: 5.32 MG/DL (ref 0.2–1)
BUN SERPL-MCNC: 32 MG/DL (ref 5–25)
CALCIUM ALBUM COR SERPL-MCNC: 9.9 MG/DL (ref 8.3–10.1)
CALCIUM SERPL-MCNC: 9 MG/DL (ref 8.3–10.1)
CHLORIDE SERPL-SCNC: 105 MMOL/L (ref 96–108)
CO2 SERPL-SCNC: 24 MMOL/L (ref 21–32)
CREAT SERPL-MCNC: 1.91 MG/DL (ref 0.6–1.3)
EOSINOPHIL # BLD AUTO: 0.01 THOUSAND/ÂΜL (ref 0–0.61)
EOSINOPHIL NFR BLD AUTO: 0 % (ref 0–6)
ERYTHROCYTE [DISTWIDTH] IN BLOOD BY AUTOMATED COUNT: 15.9 % (ref 11.6–15.1)
GFR SERPL CREATININE-BSD FRML MDRD: 33 ML/MIN/1.73SQ M
GLUCOSE SERPL-MCNC: 69 MG/DL (ref 65–140)
HCT VFR BLD AUTO: 25.3 % (ref 36.5–49.3)
HGB BLD-MCNC: 8.3 G/DL (ref 12–17)
IMM GRANULOCYTES # BLD AUTO: 0.02 THOUSAND/UL (ref 0–0.2)
IMM GRANULOCYTES NFR BLD AUTO: 0 % (ref 0–2)
INR PPP: 2.16 (ref 0.84–1.19)
LYMPHOCYTES # BLD AUTO: 0.55 THOUSANDS/ÂΜL (ref 0.6–4.47)
LYMPHOCYTES NFR BLD AUTO: 10 % (ref 14–44)
MAGNESIUM SERPL-MCNC: 2 MG/DL (ref 1.6–2.6)
MCH RBC QN AUTO: 30.3 PG (ref 26.8–34.3)
MCHC RBC AUTO-ENTMCNC: 32.8 G/DL (ref 31.4–37.4)
MCV RBC AUTO: 92 FL (ref 82–98)
MONOCYTES # BLD AUTO: 0.5 THOUSAND/ÂΜL (ref 0.17–1.22)
MONOCYTES NFR BLD AUTO: 9 % (ref 4–12)
MRSA NOSE QL CULT: NORMAL
NEUTROPHILS # BLD AUTO: 4.32 THOUSANDS/ÂΜL (ref 1.85–7.62)
NEUTS SEG NFR BLD AUTO: 81 % (ref 43–75)
NRBC BLD AUTO-RTO: 0 /100 WBCS
P AXIS: 62 DEGREES
PHOSPHATE SERPL-MCNC: 3.5 MG/DL (ref 2.3–4.1)
PLATELET # BLD AUTO: 51 THOUSANDS/UL (ref 149–390)
PMV BLD AUTO: 11.6 FL (ref 8.9–12.7)
POTASSIUM SERPL-SCNC: 3.5 MMOL/L (ref 3.5–5.3)
PR INTERVAL: 180 MS
PROT SERPL-MCNC: 6.6 G/DL (ref 6.4–8.4)
PROTHROMBIN TIME: 24.2 SECONDS (ref 11.6–14.5)
QRS AXIS: 65 DEGREES
QRSD INTERVAL: 72 MS
QT INTERVAL: 300 MS
QTC INTERVAL: 398 MS
RBC # BLD AUTO: 2.74 MILLION/UL (ref 3.88–5.62)
SODIUM SERPL-SCNC: 140 MMOL/L (ref 135–147)
T WAVE AXIS: 111 DEGREES
VENTRICULAR RATE: 106 BPM
WBC # BLD AUTO: 5.41 THOUSAND/UL (ref 4.31–10.16)

## 2022-10-21 PROCEDURE — 85610 PROTHROMBIN TIME: CPT | Performed by: NURSE PRACTITIONER

## 2022-10-21 PROCEDURE — 84100 ASSAY OF PHOSPHORUS: CPT | Performed by: NURSE PRACTITIONER

## 2022-10-21 PROCEDURE — 85025 COMPLETE CBC W/AUTO DIFF WBC: CPT | Performed by: NURSE PRACTITIONER

## 2022-10-21 PROCEDURE — 83735 ASSAY OF MAGNESIUM: CPT | Performed by: NURSE PRACTITIONER

## 2022-10-21 PROCEDURE — 99232 SBSQ HOSP IP/OBS MODERATE 35: CPT | Performed by: INTERNAL MEDICINE

## 2022-10-21 PROCEDURE — 80053 COMPREHEN METABOLIC PANEL: CPT | Performed by: NURSE PRACTITIONER

## 2022-10-21 PROCEDURE — 97163 PT EVAL HIGH COMPLEX 45 MIN: CPT

## 2022-10-21 PROCEDURE — 97167 OT EVAL HIGH COMPLEX 60 MIN: CPT

## 2022-10-21 RX ORDER — NYSTATIN 100000 [USP'U]/G
POWDER TOPICAL 2 TIMES DAILY
Status: DISCONTINUED | OUTPATIENT
Start: 2022-10-21 | End: 2022-10-23 | Stop reason: HOSPADM

## 2022-10-21 RX ORDER — ASPIRIN 81 MG/1
81 TABLET ORAL DAILY
Status: DISCONTINUED | OUTPATIENT
Start: 2022-10-21 | End: 2022-10-23

## 2022-10-21 RX ORDER — LACTULOSE 20 G/30ML
30 SOLUTION ORAL 4 TIMES DAILY
Status: DISCONTINUED | OUTPATIENT
Start: 2022-10-21 | End: 2022-10-22

## 2022-10-21 RX ORDER — MIDODRINE HYDROCHLORIDE 5 MG/1
5 TABLET ORAL
Status: DISCONTINUED | OUTPATIENT
Start: 2022-10-21 | End: 2022-10-23 | Stop reason: HOSPADM

## 2022-10-21 RX ADMIN — ALBUMIN (HUMAN) 25 G: 0.25 INJECTION, SOLUTION INTRAVENOUS at 12:53

## 2022-10-21 RX ADMIN — LACTULOSE 200 G: 10 SOLUTION ORAL at 00:18

## 2022-10-21 RX ADMIN — ALBUMIN (HUMAN) 25 G: 0.25 INJECTION, SOLUTION INTRAVENOUS at 18:09

## 2022-10-21 RX ADMIN — CEFTRIAXONE 1000 MG: 1 INJECTION, SOLUTION INTRAVENOUS at 08:44

## 2022-10-21 RX ADMIN — LACTULOSE 30 G: 20 SOLUTION ORAL at 06:30

## 2022-10-21 RX ADMIN — HEPARIN SODIUM 5000 UNITS: 5000 INJECTION INTRAVENOUS; SUBCUTANEOUS at 13:10

## 2022-10-21 RX ADMIN — ALBUMIN (HUMAN) 25 G: 0.25 INJECTION, SOLUTION INTRAVENOUS at 00:16

## 2022-10-21 RX ADMIN — MIDODRINE HYDROCHLORIDE 5 MG: 5 TABLET ORAL at 12:53

## 2022-10-21 RX ADMIN — LACTULOSE 30 G: 20 SOLUTION ORAL at 12:54

## 2022-10-21 RX ADMIN — HEPARIN SODIUM 5000 UNITS: 5000 INJECTION INTRAVENOUS; SUBCUTANEOUS at 22:23

## 2022-10-21 RX ADMIN — HEPARIN SODIUM 5000 UNITS: 5000 INJECTION INTRAVENOUS; SUBCUTANEOUS at 05:40

## 2022-10-21 RX ADMIN — MIDODRINE HYDROCHLORIDE 5 MG: 5 TABLET ORAL at 17:49

## 2022-10-21 RX ADMIN — LACTULOSE 30 G: 20 SOLUTION ORAL at 17:49

## 2022-10-21 RX ADMIN — ALBUMIN (HUMAN) 25 G: 0.25 INJECTION, SOLUTION INTRAVENOUS at 06:30

## 2022-10-21 RX ADMIN — LACTULOSE 30 G: 20 SOLUTION ORAL at 22:22

## 2022-10-21 RX ADMIN — NYSTATIN: 100000 POWDER TOPICAL at 17:50

## 2022-10-21 RX ADMIN — ASPIRIN 81 MG: 81 TABLET, COATED ORAL at 12:53

## 2022-10-21 NOTE — PHYSICAL THERAPY NOTE
PHYSICAL THERAPY EVALUATION  NAME:  Akil Solares  DATE: 10/21/22    AGE:   67 y o  Mrn:   [id number]  ADMIT DX:  Hepatic encephalopathy [K76 82]  Cirrhosis ([facility] ) [K74 60]  Altered mental state [R41 82]  Abdominal pain [R10 9]  Ascites [R18 8]    Past Medical History:   Diagnosis Date   • Cirrhosis ([facility] )    • Dementia ([facility] )    • Hypertension      Length Of Stay: 1  Performed at least 2 patient identifiers during session: Name and Birthday  PHYSICAL THERAPY EVALUATION :    10/21/22 1511   PT Last Visit   PT Visit Date 10/21/22   Note Type   Note type Evaluation   Pain Assessment   Pain Assessment Tool FLACC   Pain Rating: FLACC (Rest) - Face 1   Pain Rating: FLACC (Rest) - Legs 0   Pain Rating: FLACC (Rest) - Activity 0   Pain Rating: FLACC (Rest) - Cry 1   Pain Rating: FLACC (Rest) - Consolability 0   Score: FLACC (Rest) 2   Pain Rating: FLACC (Activity) - Face 1   Pain Rating: FLACC (Activity) - Legs 1   Pain Rating: FLACC (Activity) - Activity 1   Pain Rating: FLACC (Activity) - Cry 1   Pain Rating: FLACC (Activity) - Consolability 0   Score: FLACC (Activity) 4   Restrictions/Precautions   Other Precautions Bed Alarm;Cognitive;Multiple lines; Fall Risk   Home Living   Additional Comments Pt poor historian, unable to provide PLOF or home set up  Per last admission, patient live sin a [facility] with ramp to enter and 24/7 caregiver support  Has assistance for ADLs, IADLs and mobility with RW  Also has wheelchair to use  General   Additional Pertinent History Pt recently admitted 10/4/22 to 10/11/22 due to acute metabolic encephalopathy due to hepatic encephalopathy  discharged to post acute rehab  Pt with pressure injury coccyx, redness at groin   Cognition   Orientation Level Oriented to person;Disoriented to place; Disoriented to time;Disoriented to situation   Following Commands Follows one step commands with increased time or repetition   Comments repeated cues for safety, decreased insight to deficits Subjective   Subjective none stated   RLE Assessment   RLE Assessment X  (WFL at hips and ankles and knee flexion  knee extension minimal AROM ~-45 degrees and PROM ~-30 degrees)   LLE Assessment   LLE Assessment X  (AROM WFL at hip, to neutral at ankle and knee flexion WFL  knee extension ~-45 degrees and PROM to ~-30 degrees)   Coordination   Movements are Fluid and Coordinated 0   Coordination and Movement Description pt with slight tremor during mobility, impaired alternating toe tapping   Rapid Alternating Movements Impaired   Light Touch   RLE Light Touch Impaired   RLE Light Touch Comments diminished or absent B LEs   LLE Light Touch Impaired   LLE Light Touch Comments diminished or absent B LEs   Bed Mobility   Rolling R 4  Minimal assistance   Additional items Assist x 1; Increased time required; Bedrails;Verbal cues   Rolling L 4  Minimal assistance   Additional items Assist x 1; Increased time required;Verbal cues; Bedrails   Supine to Sit 2  Maximal assistance   Additional items Assist x 1; Increased time required;Verbal cues  (trunk management)   Sit to Supine 4  Minimal assistance   Additional items Assist x 1; Increased time required;Verbal cues;LE management   Additional Comments HOB flat without bedrail  required maxAx1 to achieve sitting at EOB with verbal cues for tehcnique  rolling right and left wiht minAx1 wiht verbal cues for task completion  required minimal assistance of LEs to return to supine   Transfers   Sit to Stand 3  Moderate assistance   Additional items Assist x 1; Increased time required;Verbal cues   Stand to Sit 3  Moderate assistance   Additional items Assist x 1; Impulsive;Verbal cues   Stand pivot Unable to assess   Additional Comments use of RW  pt able to stand with RW with modAx1 with verbal cues for hand placement, bed slightly elevated   attempted lateral stepping to [facility] and marching with maxAx1 for wt shifting, pt moved R LE minimally and stated "No" and returned to sitting at EOB impulsively  manual cues for controlled descent  static standing wiht B UE support on RW with miNAx2 for safety concerns < 30 seconds  Ambulation/Elevation   Distance pt unable  attempted to try marching for pre-gait activity, however patient lifted R LE and advanced minimally and impulsively returned to sitting   Balance   Static Sitting Fair -   Dynamic Sitting Poor +   Static Standing Poor   Dynamic Standing Poor -   Activity Tolerance   Activity Tolerance Patient limited by fatigue;Patient limited by pain   Medical Staff Made Aware OT, Gay Osgood   Nurse Made Aware RNVeronica Shoulders and Felicia   Assessment   Prognosis Guarded   Problem List Decreased strength;Decreased range of motion;Decreased endurance; Impaired balance;Decreased mobility; Decreased cognition; Impaired judgement;Decreased safety awareness;Decreased skin integrity;Pain   Barriers to Discharge Decreased caregiver support; Inaccessible home environment   Barriers to Discharge Comments requires assistance with all mobility   Goals   Patient Goals none stated   STG Expiration Date 11/04/22   PT Treatment Day 0   Plan   Treatment/Interventions ADL retraining;Functional transfer training;LE strengthening/ROM; Therapeutic exercise; Endurance training;Cognitive reorientation;Patient/family training;Equipment eval/education; Bed mobility;Gait training; Compensatory technique education;Spoke to nursing;Spoke to case management;OT   PT Frequency 3-5x/wk   Recommendation   PT Discharge Recommendation Post acute rehabilitation services   Equipment Recommended   (TBD by rehab)   AM-PAC Basic Mobility Inpatient   Turning in Bed Without Bedrails 3   Lying on Back to Sitting on Edge of Flat Bed 3   Moving Bed to Chair 1   Standing Up From Chair 2   Walk in Room 1   Climb 3-5 Stairs 1   Basic Mobility Inpatient Raw Score 11   Basic Mobility Standardized Score 30 25   Highest Level Of Mobility   JH-HLM Goal 4: Move to chair/commode   JH-HLM Achieved 3: Sit at edge of bed End of Consult   Patient Position at End of Consult Supine;Bed/Chair alarm activated; All needs within reach     (Please find full objective findings from PT assessment regarding body systems outlined above)  Assessment: Pt is a 67 y o  male seen for PT evaluation s/p admission to [address] [place] [address] on 10/20/2022 with Acute hepatic encephalopathy  Order placed for PT services  Upon evaluation: Pt is presenting with impaired functional mobility due to pain, decreased strength, decreased ROM, decreased endurance, impaired balance, impaired cognition, decreased safety awareness, impaired judgment, fall risk, and impaired skin integrity requiring  minimal to maximal assistance for bed mobility, moderate assistance for transfers, and unable t spt or ambulate this date due to pain, fatigue and safety concerns   Pt's clinical presentation is currently unpredictable given the functional mobility deficits above, especially weakness, decreased ROM, decreased skin integrity, decreased endurance, pain, decreased activity tolerance, decreased functional mobility tolerance, decreased safety awareness, impaired judgement, and decreased cognition, coupled with fall risks as indicated by AM-PAC 6-Clicks: 66/06 as well as hx of falls, impulsivity, impaired balance, polypharmacy, impaired judgement, decreased safety awareness and decreased cognition and combined with medical complications of abnormal renal lab values, abnormal H&H, multiple readmissions, impulsivity during admission, need for input for mobility technique/safety and ascites with ultrasound-guided paracentesis yielding 13,100 mL of cloudy yellow fluid 15/04/66, acute metabolic encephalopathy, acute hepatic encephalopathy, NAVEED, SIRS, thrombocytopenia  Pt's PMHx and comorbidities that may affect physical performance and progress include: HTN, Dementia and chronic anemia, liver cirrhosis due to JAFFE   Personal factors affecting pt at time of IE include: inaccessible home environment, limited home support, inability to perform IADLs, inability to perform ADLs, inability to navigate level surfaces without external assistance, limited insight into impairments and recent fall(s)/fall history  Pt will benefit from continued skilled PT services to address deficits as defined above and to maximize level of functional mobility to facilitate return toward PLOF and improved QOL  From PT/mobility standpoint, recommendation at time of d/c would be Short term rehab pending progress in order to reduce fall risk and maximize pt's functional independence and consistency with mobility in order to facilitate return to PLOF  Recommend trial with walker next 1-2 sessions and ther ex next 1-2 sessions  The patient's AM-PAC Basic Mobility Inpatient Short Form Raw Score is 11  A Raw score of less than or equal to 16 suggests the patient may benefit from discharge to post-acute rehabilitation services  Please also refer to the recommendation of the Physical Therapist for safe discharge planning  Goals: Pt will: Perform bed mobility tasks with Supervision to reposition in bed and prepare for transfers  Pt will perform transfers with Supervision to decrease burden of care, decrease risk for falls and improve activity tolerance and prepare for ambulation  Pt will ambulate with RW for >/= 48' with  stand by assistance  to decrease burden of care, decrease risk for falls, improve activity tolerance and improve gait quality and to access home environment  Pt will participate in objective balance assessment to determine baseline fall risk  Pt will increase B LE strength >/= 1/2 MMT grade to facilitate functional mobility        Nahid Higuera

## 2022-10-21 NOTE — ASSESSMENT & PLAN NOTE
· Tachycardia, tachypnea with acute hepatic encephalopathy decompensated liver cirrhosis and NAVEED  · Currently on high-dose IV albumin  · Blood pressures remained stable  · Fluid analysis negative for SBP  Follow-up on culture  · Currently on ceftriaxone for SBP prophylaxis    To complete 7 days of treatment

## 2022-10-21 NOTE — PLAN OF CARE
Problem: Nutrition/Hydration-ADULT  Goal: Nutrient/Hydration intake appropriate for improving, restoring or maintaining nutritional needs  Description: Monitor and assess patient's nutrition/hydration status for malnutrition  Collaborate with interdisciplinary team and initiate plan and interventions as ordered  Monitor patient's weight and dietary intake as ordered or per policy  Utilize nutrition screening tool and intervene as necessary  Determine patient's food preferences and provide high-protein, high-caloric foods as appropriate       INTERVENTIONS:  - Monitor oral intake, urinary output, labs, and treatment plans  - Assess nutrition and hydration status and recommend course of action  - Evaluate amount of meals eaten  - Assist patient with eating if necessary   - Allow adequate time for meals  - Recommend/ encourage appropriate diets, oral nutritional supplements, and vitamin/mineral supplements  - Order, calculate, and assess calorie counts as needed  - Recommend, monitor, and adjust tube feedings and TPN/PPN based on assessed needs  - Assess need for intravenous fluids  - Provide specific nutrition/hydration education as appropriate  - Include patient/family/caregiver in decisions related to nutrition  10/21/2022 1428 by Kiko Fernandez  Outcome: Progressing  10/21/2022 1411 by Kiko Fernandez  Outcome: Progressing

## 2022-10-21 NOTE — PROGRESS NOTES
[address]  Progress Note - Pablo Reyes 1950, 67 y o  male MRN: [id number]  Unit/Bed#: -01 Encounter: [id number]  Primary Care Provider: Norma Holder MD   Date and time admitted to hospital: 10/20/2022  4:25 AM    * Acute hepatic encephalopathy  Assessment & Plan  · Presents with lethargy, GCS 12 secondary to elevated ammonia level   · Ammonia 134  Will follow-up  · CT brain without acute abnormality  · Lactulose enema overnight with multiple bowel movements  Mentation has improved  · Will start p o  Lactulose with aim for 3-4 soft stools daily  · Monitor neuro status  · Trial of level 2 dysphagia diet today    NAVEED (acute kidney injury) ([facility] )  Assessment & Plan  · Creatinine 1 94 on admission  · Baseline closer to 1-1 4  · Has  proteinuria so on less likely to be hepatorenal syndrome  · Patient on IV albumin 25 g every 6 hourly x8 doses  · Status post 30 L of paracentesis yesterday  · Continue to monitor renal functions closely  · Continue to hold diuretics for now  · Avoid hypotension and nephrotoxic medications  · Continue with urinary retention protocol      Chronic anemia  Assessment & Plan  · Chronic anemia  · Hemoglobin 9 3 on admission-->8 3  · No overt bleeding  · Trend hemoglobin    SIRS (systemic inflammatory response syndrome) (HCC)  Assessment & Plan  · Tachycardia, tachypnea with acute hepatic encephalopathy decompensated liver cirrhosis and NAVEED  · Currently on high-dose IV albumin  · Blood pressures remained stable  · Fluid analysis negative for SBP  Follow-up on culture  · Currently on ceftriaxone for SBP prophylaxis  To complete 7 days of treatment    Thrombocytopenia (HCC)  Assessment & Plan  Thrombocytopenia in the setting of chronic liver disease  Continue with DVT prophylaxis for now      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 [facility])  Assessment & Plan  · Decompensated liver cirrhosis with hepatic encephalopathy  · Meld score 24-->28  · Patient presented with encephalopathy with elevated ammonia level  Initially was on lactulose retention enema  Not transition to p o  Lactulose  · Status post paracentesis 13L of fluid(10/20)  Preliminary analysis negative for SBP  · Empirically on ceftriaxone for SBP prophylaxis  · No evidence of overt GI bleed  · Trend daily meld labs    · Not a transplant candidate secondary to advanced dementia  · Continue with supportive care  · DNR DNI    Dementia with behavioral disturbance  Assessment & Plan  · Continue sertraline and Aricept when tolerating oral intake          VTE Pharmacologic Prophylaxis: VTE Score: 3 Moderate Risk (Score 3-4) - Pharmacological DVT Prophylaxis Ordered: heparin  Patient Centered Rounds: I performed bedside rounds with nursing staff today  Discussions with Specialists or Other Care Team Provider:  Discussed with nursing and case management    Education and Discussions with Family / Patient: Updated  (son) via phone  Time Spent for Care: 30 minutes  More than 50% of total time spent on counseling and coordination of care as described above  Current Length of Stay: 1 day(s)  Current Patient Status: Inpatient   Certification Statement: The patient will continue to require additional inpatient hospital stay due to Ongoing management of hepatic encephalopathy  Discharge Plan: Anticipate discharge in 48-72 hrs to rehab facility  Code Status: Level 3 - DNAR and DNI    Subjective:   Patient seen and examined at bedside  Tolerated lactulose enema with multiple bowel movements overnight  Mentation has somewhat improved today  No overt bleeding noted by nursing  He is status post paracentesis with 13 L of ascitic fluid removed    Objective:     Vitals:   Temp (24hrs), Av 3 °F (36 8 °C), Min:97 8 °F (36 6 °C), Max:98 9 °F (37 2 °C)    Temp:  [97 8 °F (36 6 °C)-98 9 °F (37 2 °C)] 97 8 °F (36 6 °C)  HR:  [] 96  Resp:  [14-24] 21  BP: ()/(51-69) 104/51  SpO2:  [95 %-99 %] 99 %  Body mass index is 26 52 kg/m²  Input and Output Summary (last 24 hours): Intake/Output Summary (Last 24 hours) at 10/21/2022 1243  Last data filed at 10/21/2022 1101  Gross per 24 hour   Intake 660 ml   Output 53146 ml   Net -21745 ml       Physical Exam:   Physical Exam  Constitutional:       General: He is not in acute distress  Appearance: He is ill-appearing  Comments: Opening eyes spontaneously  More alert today  Following simple commands   HENT:      Head: Normocephalic  Mouth/Throat:      Mouth: Mucous membranes are dry  Eyes:      Pupils: Pupils are equal, round, and reactive to light  Cardiovascular:      Rate and Rhythm: Normal rate and regular rhythm  Pulmonary:      Comments: Poor respiratory effort with diminished breath sounds at bases  Abdominal:      General: Bowel sounds are normal       Palpations: Abdomen is soft  Tenderness: There is no abdominal tenderness  Comments: Decreased abdominal distension   Musculoskeletal:      Cervical back: Neck supple  Right lower leg: Edema present  Left lower leg: Edema present  Skin:     Coloration: Skin is pale  Neurological:      Mental Status: He is alert  Comments: Following simple commands  Alert to self today           Additional Data:     Labs:  Results from last 7 days   Lab Units 10/21/22  0439   WBC Thousand/uL 5 41   HEMOGLOBIN g/dL 8 3*   HEMATOCRIT % 25 3*   PLATELETS Thousands/uL 51*   NEUTROS PCT % 81*   LYMPHS PCT % 10*   MONOS PCT % 9   EOS PCT % 0     Results from last 7 days   Lab Units 10/21/22  0439   SODIUM mmol/L 140   POTASSIUM mmol/L 3 5   CHLORIDE mmol/L 105   CO2 mmol/L 24   BUN mg/dL 32*   CREATININE mg/dL 1 91*   ANION GAP mmol/L 11   CALCIUM mg/dL 9 0   ALBUMIN g/dL 2 9*   TOTAL BILIRUBIN mg/dL 5 32*   ALK PHOS U/L 86   ALT U/L 35   AST U/L 63*   GLUCOSE RANDOM mg/dL 69     Results from last 7 days   Lab Units 10/21/22  0439   INR  2 16* Results from last 7 days   Lab Units 10/20/22  0437   LACTIC ACID mmol/L 1 5       Lines/Drains:  Invasive Devices  Report    Peripheral Intravenous Line  Duration           Peripheral IV 10/20/22 Right Forearm 1 day          Drain  Duration           Urethral Catheter 16 Fr  <1 day              Urinary Catheter:  Goal for removal: Remove after 48 hrs of I/O monitoring               Imaging: No pertinent imaging reviewed  Recent Cultures (last 7 days):   Results from last 7 days   Lab Units 10/20/22  1622 10/20/22  0443 10/20/22  0437   BLOOD CULTURE   --  No Growth at 24 hrs  No Growth at 24 hrs  GRAM STAIN RESULT  No Polys or Bacteria seen  --   --    BODY FLUID CULTURE, STERILE  No growth  --   --        Last 24 Hours Medication List:   Current Facility-Administered Medications   Medication Dose Route Frequency Provider Last Rate   • albumin human  25 g Intravenous Nidia Sanches MD Stopped (10/21/22 0701)   • aspirin  81 mg Oral Daily Indira Lester MD     • cefTRIAXone  1,000 mg Intravenous Q24H Indira Lester MD 1,000 mg (10/21/22 0844)   • heparin (porcine)  5,000 Units Subcutaneous Q8H [facility] DUSTY Chamorro     • lactulose  30 g Oral 4x Daily DUSTY Chamorro     • midodrine  5 mg Oral TID [place], MD          Today, Patient Was Seen By: Indira Lester    **Please Note: This note may have been constructed using a voice recognition system  **

## 2022-10-21 NOTE — PLAN OF CARE
Problem: PHYSICAL THERAPY ADULT  Goal: Performs mobility at highest level of function for planned discharge setting  See evaluation for individualized goals  Description: Treatment/Interventions: ADL retraining, Functional transfer training, LE strengthening/ROM, Therapeutic exercise, Endurance training, Cognitive reorientation, Patient/family training, Equipment eval/education, Bed mobility, Gait training, Compensatory technique education, Spoke to nursing, Spoke to case management, OT  Equipment Recommended:  (TBD by rehab)       See flowsheet documentation for full assessment, interventions and recommendations  Note: Prognosis: Guarded  Problem List: Decreased strength, Decreased range of motion, Decreased endurance, Impaired balance, Decreased mobility, Decreased cognition, Impaired judgement, Decreased safety awareness, Decreased skin integrity, Pain  Assessment: Pt is a 67 y o  male seen for PT evaluation s/p admission to [address] [place] [address] on 10/20/2022 with Acute hepatic encephalopathy  Order placed for PT services  Upon evaluation: Pt is presenting with impaired functional mobility due to pain, decreased strength, decreased ROM, decreased endurance, impaired balance, impaired cognition, decreased safety awareness, impaired judgment, fall risk, and impaired skin integrity requiring  minimal to maximal assistance for bed mobility, moderate assistance for transfers, and unable t spt or ambulate this date due to pain, fatigue and safety concerns    Pt's clinical presentation is currently unpredictable given the functional mobility deficits above, especially weakness, decreased ROM, decreased skin integrity, decreased endurance, pain, decreased activity tolerance, decreased functional mobility tolerance, decreased safety awareness, impaired judgement, and decreased cognition, coupled with fall risks as indicated by AM-PAC 6-Clicks: 71/20 as well as hx of falls, impulsivity, impaired balance, polypharmacy, impaired judgement, decreased safety awareness and decreased cognition and combined with medical complications of abnormal renal lab values, abnormal H&H, multiple readmissions, impulsivity during admission, need for input for mobility technique/safety and ascites with ultrasound-guided paracentesis yielding 13,100 mL of cloudy yellow fluid 45/65/32, acute metabolic encephalopathy, acute hepatic encephalopathy, NAVEED, SIRS, thrombocytopenia  Pt's PMHx and comorbidities that may affect physical performance and progress include: HTN, Dementia and chronic anemia, liver cirrhosis due to JAFFE  Personal factors affecting pt at time of IE include: inaccessible home environment, limited home support, inability to perform IADLs, inability to perform ADLs, inability to navigate level surfaces without external assistance, limited insight into impairments and recent fall(s)/fall history  Pt will benefit from continued skilled PT services to address deficits as defined above and to maximize level of functional mobility to facilitate return toward PLOF and improved QOL  From PT/mobility standpoint, recommendation at time of d/c would be Short term rehab pending progress in order to reduce fall risk and maximize pt's functional independence and consistency with mobility in order to facilitate return to PLOF  Recommend trial with walker next 1-2 sessions and ther ex next 1-2 sessions  Barriers to Discharge: Decreased caregiver support, Inaccessible home environment  Barriers to Discharge Comments: requires assistance with all mobility  PT Discharge Recommendation: Post acute rehabilitation services    See flowsheet documentation for full assessment

## 2022-10-21 NOTE — OCCUPATIONAL THERAPY NOTE
Occupational Therapy Evaluation     Patient Name: Maura Fish  CKVHN'G Date: 10/21/2022  Problem List  Principal Problem:    Acute hepatic encephalopathy  Active Problems:    Dementia with behavioral disturbance    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 (HCC)    Thrombocytopenia (HCC)    SIRS (systemic inflammatory response syndrome) (HCC)    Chronic anemia    NAVEED (acute kidney injury) ([facility] )    Past Medical History  Past Medical History:   Diagnosis Date    Cirrhosis ([facility] )     Dementia ([facility] )     Hypertension      Past Surgical History  Past Surgical History:   Procedure Laterality Date    IR PARACENTESIS  10/10/2022    IR PARACENTESIS  10/20/2022    REPLACEMENT TOTAL KNEE          10/21/22 1513   Note Type   Note type Evaluation   Pain Assessment   Pain Assessment Tool FLACC   Pain Rating: FLACC (Rest) - Face 1   Pain Rating: FLACC (Rest) - Legs 0   Pain Rating: FLACC (Rest) - Activity 0   Pain Rating: FLACC (Rest) - Cry 0   Pain Rating: FLACC (Rest) - Consolability 0   Score: FLACC (Rest) 1   Pain Rating: FLACC (Activity) - Face 1   Pain Rating: FLACC (Activity) - Legs 0   Pain Rating: FLACC (Activity) - Activity 1   Pain Rating: FLACC (Activity) - Cry 0   Pain Rating: FLACC (Activity) - Consolability 0   Score: FLACC (Activity) 2   Restrictions/Precautions   Other Precautions Chair Alarm; Bed Alarm;Cognitive; Fall Risk;Multiple lines  (zaragoza cath)   Home Living   Additional Comments Pt poor historian, unable to give any home living set-up or PLOF information  Per EMR, pt currently presenting from [facility]  ADL   UB Dressing Assistance 3  Moderate Assistance   UB Dressing Deficit Setup;Verbal cueing;Supervision/safety; Increased time to complete;Pull over head;Pull around back;Pull down in back   LB Dressing Assistance 1  Total Assistance   LB Dressing Deficit Setup; Requires assistive device for steadying;Verbal cueing;Supervision/safety; Increased time to complete; Thread LLE into underwear; Thread RLE into underwear;Don/doff L sock; Don/doff R sock;Pull up over hips   Toileting Assistance  1  Total Assistance   Toileting Deficit Setup;Verbal cueing;Supervison/safety; Increased time to complete;Perineal hygiene   Additional Comments UB ADLs @ Mod A with significant cueing for task initaiton and sequencing  Pt incontinent of BM at beginning of session, requiring Total A for LB dressing and toileting  Bed Mobility   Rolling R 4  Minimal assistance   Additional items Assist x 1; Increased time required;Verbal cues; Bedrails   Rolling L 4  Minimal assistance   Additional items Assist x 1; Increased time required;Verbal cues; Bedrails   Supine to Sit 2  Maximal assistance   Additional items Assist x 1; Increased time required;Verbal cues;LE management  (trunk management)   Sit to Supine 4  Minimal assistance   Additional items Assist x 1; Increased time required;Verbal cues;LE management   Additional Comments Pt supine in bed at beginning of session  Pt able to roll L/R to for pericare and to don brief @ Min A x1 with use of bed rails  Supine to sit @ Max A x1  Pt required SBA to maintain seated at EOB for approximately 3 minutes  At end of session, sit to supine @ Min A x1 for LE management  Pt supine in bed at end of session with bed alarm intact, call bell within reach and all needs met  Transfers   Sit to Stand 3  Moderate assistance   Additional items Assist x 1; Increased time required;Verbal cues   Stand to Sit 3  Moderate assistance   Additional items Assist x 1; Increased time required;Verbal cues   Additional Comments STS from EOB to RW @ Mod A x1  Pt able to maintain standing for approximately 15 seconds @ Min A x2 for safety  Pt attempted to advance steps laterally but was unsuccessful and returned to seated     Balance   Static Sitting Fair -   Dynamic Sitting Poor   Static Standing Poor -   Activity Tolerance   Activity Tolerance Patient limited by fatigue   Medical Staff Made Aware Spoke with PT [facility]   Nurse Made Aware Spoke with CHARLEY Bonilla and CHARLEY BERNAL Assessment   RUE Assessment X   LUE Assessment   LUE Assessment X   Hand Function   Gross Motor Coordination Impaired   Fine Motor Coordination Impaired   Hand Function Comments Difficult to assess BUE d/t inconsistent command following   strength WFL  Significantly decreased BUE MS and ROM but pt able to use functionally for transfer  Cognition   Overall Cognitive Status Impaired   Arousal/Participation Alert; Responsive; Cooperative;Persistent stimuli required   Attention Difficulty attending to directions   Orientation Level Oriented to person;Disoriented to place; Disoriented to situation;Disoriented to time   Memory Unable to assess   Following Commands Follows one step commands inconsistently   Assessment   Limitation Decreased ADL status; Decreased UE ROM; Decreased UE strength;Decreased Safe judgement during ADL;Decreased cognition;Decreased endurance;Decreased self-care trans;Decreased high-level ADLs   Prognosis Guarded   Assessment Pt is a 67 y o  male, admitted to [address] [place] [address] 10/20/2022 d/t experiencing AMS  Dx: acute hepatic encephalopathy  Pt with PMHx impacting their performance during ADL tasks, including: cirrhosis, dementia and hypertension  Pt unable to give PLOF or home living set-up but based on EMR, pt currently residing at [facility]  Cognitive status was PTA was Impaired  OT order placed to assess Pt's ADLs, cognitive status, and performance during functional tasks in order to maximize safety and independence while making most appropriate d/c recommendations   Pt's clinical presentation is currently evolving given new onset deficits that effect Pt's occupational performance and ability to safely return to PLOF including decrease activity tolerance, decrease standing balance, decrease sitting balance, decrease performance during ADL tasks, decrease cognition, decrease safety awareness , decrease BUE ROM, decrease UB MS, decrease generalized strength, decrease activity engagement, decrease performance during functional transfers and high fall risk combined with medical complications of abnormal renal lab values, abnormal H&H, abnormal CBC, incontinence and need for input for mobility technique/safety  Personal factors affecting Pt at time of initial evaluation include: past experience, behavioral pattern, inability to perform IADLs, inability to perform ADLs, inability to ambulate household distances, inability to navigate community distances, decreased initiation and engagement, difficulty communicating and recent fall(s)/fall history  PT/OT co-evaluation completed at this time d/t significant mobility deficits and safety concerns  Pt will benefit from continued skilled OT services to address deficits as defined above and to maximize level independence/participation during ADLs and functional tasks to facilitate return toward PLOF and improved quality of life  From an occupational therapy standpoint, recommendation at time of d/c would be post acute rehabilitation services  Plan   Treatment Interventions ADL retraining;Functional transfer training; Endurance training;UE strengthening/ROM; Cognitive reorientation;Patient/family training;Equipment evaluation/education; Neuromuscular reeducation; Compensatory technique education;Continued evaluation; Energy conservation; Activityengagement   Goal Expiration Date 11/04/22   OT Frequency 3-5x/wk   Recommendation   OT Discharge Recommendation Post acute rehabilitation services   AM-PAC Daily Activity Inpatient   Lower Body Dressing 1   Bathing 1   Toileting 1   Upper Body Dressing 2   Grooming 2   Eating 2   Daily Activity Raw Score 9   Turning Head Towards Sound 3   Follow Simple Instructions 2   Low Function Daily Activity Raw Score 14   Low Function Daily Activity Standardized Score 24 79   AM-PAC Applied Cognition Inpatient   Following a Speech/Presentation 2   Understanding Ordinary Conversation 2   Taking Medications 1   Remembering Where Things Are Placed or Put Away 1   Remembering List of 4-5 Errands 1   Taking Care of Complicated Tasks 1   Applied Cognition Raw Score 8   Applied Cognition Standardized Score 19 32     The patient's raw score on the AM-PAC Daily Activity inpatient short form is 9, standardized score is 24 79 , less than 39 4  Patients at this level are likely to benefit from DC to post-acute rehabilitation services  Please refer to the recommendation of the Occupational Therapist for safe DC planning  Pt goals to be met by 11/4/2022    Pt will demonstrate ability to complete grooming/hygiene tasks @ S after set-up  Pt will demonstrate ability to complete supine<>sit @ S in order to increase safety and independence during ADL tasks  Pt will demonstrate ability to complete UB ADLs including washing/dressing @ S in order to increase performance and participation during meaningful tasks  Pt will demonstrate ability to complete LB dressing @ Min A in order to increase safety and independence during meaningful tasks  Pt will demonstrate ability to complete toileting tasks including CM and pericare @ Min A in order to increase safety and independence during meaningful tasks  Pt will demonstrate ability to complete EOB, chair, toilet/commode transfers @ Min A in order to increase performance and participation during functional tasks  Pt will demonstrate ability to stand for 2-3 minutes while maintaining Fair + balance with use of RW for UB support PRN  Pt will demonstrate Good attention and participation in continued evaluation of functional ambulation house hold distances in order to assist with safe d/c planning  Pt will attend to continued cognitive assessments 100% of the time in order to provide most appropriate d/c recommendations     Pt will identify 3 areas of interest/hobbies and 1 intervention on how to incorporate into daily life in order to increase interaction with environment and peers as well as increase participation in meaningful tasks  Pt will demonstrate 100% carryover of BUE HEP in order to increase BUE MS and increase performance during functional tasks upon d/c home      Juan Francisco Guzman, OTR/L

## 2022-10-21 NOTE — ASSESSMENT & PLAN NOTE
· Creatinine 1 94 on admission  · Baseline closer to 1-1 4  · Has  proteinuria so on less likely to be hepatorenal syndrome  · Patient on IV albumin 25 g every 6 hourly x8 doses  · Status post 30 L of paracentesis yesterday    · Continue to monitor renal functions closely  · Continue to hold diuretics for now  · Avoid hypotension and nephrotoxic medications  · Continue with urinary retention protocol

## 2022-10-21 NOTE — CASE MANAGEMENT
Case Management Discharge Planning Note    Patient name Derrick Nichols  Location /-53 MRN [id number]  : 1950 Date 10/21/2022       Current Admission Date: 10/20/2022  Current Admission Diagnosis:Acute hepatic encephalopathy   Patient Active Problem List    Diagnosis Date Noted   • Severe protein-calorie malnutrition ([facility] ) 10/05/2022   • Other ascites 10/04/2022   • NAVEED (acute kidney injury) ([facility] ) 10/04/2022   • Sepsis ([facility] ) 10/04/2022   • Closed fracture of one rib of right side 2022   • Weakness generalized 2022   • Chronic anemia 2022   • SIRS (systemic inflammatory response syndrome) ([facility] ) 2022   • Cholelithiasis    • Moderate protein-calorie malnutrition ([facility] [facility] ) 2022   • Thrombocytopenia ([facility] [facility] ) 2022   • Dementia with behavioral disturbance    • 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 ([facility] )    • Acute hepatic encephalopathy    • Hypertension       LOS (days): 1  Geometric Mean LOS (GMLOS) (days): 3 10  Days to GMLOS:2     OBJECTIVE:  Risk of Unplanned Readmission Score: 28 94         Current admission status: Inpatient   Preferred Pharmacy:   PATIENT/FAMILY REPORTS NO PREFERRED PHARMACY  No address on file      [address] [place] [address] [id number] [facility] [address] [facility] [address] [id number]  Phone: [phone] Fax: [phone]    Primary Care Provider: Anne Enamorado MD    Primary Insurance: MEDICARE  Secondary Insurance: Blaine Rosario    DISCHARGE DETAILS:    Chart reviewed aware of medical management  Clinical information supporting hospitalization:   ---S/p paracentesis 10/20 13,100 cc removed  Iv albumin, rocephin  Barriers to discharge:  - medical management  Discharge plan:  Kaylee Lee CM will continue to follow

## 2022-10-21 NOTE — MALNUTRITION/BMI
This medical record reflects one or more clinical indicators suggestive of malnutrition and/or morbid obesity  Malnutrition Findings:   Adult Malnutrition type: Chronic illness  Adult Degree of Malnutrition: Other severe protein calorie malnutrition  Malnutrition Characteristics: Muscle loss, Fat loss                  360 Statement: in setting of dementia, poor po intake, evidenced by body fat/severe depletion depleted orbital depressions, muscle mass/severe depletion depressed /  hollowing/ temples, protruding clavicles,  treated with diet and supplements    BMI Findings: Body mass index is 26 52 kg/m²  See Nutrition note dated 10/21/22 for additional details  Completed nutrition assessment is viewable in the nutrition documentation

## 2022-10-21 NOTE — ASSESSMENT & PLAN NOTE
· Presents with lethargy, GCS 12 secondary to elevated ammonia level   · Ammonia 134  Will follow-up  · CT brain without acute abnormality  · Lactulose enema overnight with multiple bowel movements  Mentation has improved  · Will start p o  Lactulose with aim for 3-4 soft stools daily      · Monitor neuro status  · Trial of level 2 dysphagia diet today

## 2022-10-21 NOTE — ASSESSMENT & PLAN NOTE
· Decompensated liver cirrhosis with hepatic encephalopathy  · Meld score 24-->28  · Patient presented with encephalopathy with elevated ammonia level  Initially was on lactulose retention enema  Not transition to p o  Lactulose  · Status post paracentesis 13L of fluid(10/20)    Preliminary analysis negative for SBP  · Empirically on ceftriaxone for SBP prophylaxis  · No evidence of overt GI bleed  · Trend daily meld labs    · Not a transplant candidate secondary to advanced dementia  · Continue with supportive care  · DNR DNI

## 2022-10-21 NOTE — PLAN OF CARE
Problem: OCCUPATIONAL THERAPY ADULT  Goal: Performs self-care activities at highest level of function for planned discharge setting  See evaluation for individualized goals  Description: Treatment Interventions: ADL retraining, Functional transfer training, Endurance training, UE strengthening/ROM, Cognitive reorientation, Patient/family training, Equipment evaluation/education, Neuromuscular reeducation, Compensatory technique education, Continued evaluation, Energy conservation, Activityengagement          See flowsheet documentation for full assessment, interventions and recommendations  Note: Limitation: Decreased ADL status, Decreased UE ROM, Decreased UE strength, Decreased Safe judgement during ADL, Decreased cognition, Decreased endurance, Decreased self-care trans, Decreased high-level ADLs  Prognosis: Guarded  Assessment: Pt is a 67 y o  male, admitted to [address] [place] [address] 10/20/2022 d/t experiencing AMS  Dx: acute hepatic encephalopathy  Pt with PMHx impacting their performance during ADL tasks, including: cirrhosis, dementia and hypertension  Pt unable to give PLOF or home living set-up but based on EMR, pt currently residing at [facility]  Cognitive status was PTA was Impaired  OT order placed to assess Pt's ADLs, cognitive status, and performance during functional tasks in order to maximize safety and independence while making most appropriate d/c recommendations   Pt's clinical presentation is currently evolving given new onset deficits that effect Pt's occupational performance and ability to safely return to PLOF including decrease activity tolerance, decrease standing balance, decrease sitting balance, decrease performance during ADL tasks, decrease cognition, decrease safety awareness , decrease BUE ROM, decrease UB MS, decrease generalized strength, decrease activity engagement, decrease performance during functional transfers and high fall risk combined with medical complications of abnormal renal lab values, abnormal H&H, abnormal CBC, incontinence and need for input for mobility technique/safety  Personal factors affecting Pt at time of initial evaluation include: past experience, behavioral pattern, inability to perform IADLs, inability to perform ADLs, inability to ambulate household distances, inability to navigate community distances, decreased initiation and engagement, difficulty communicating and recent fall(s)/fall history  PT/OT co-evaluation completed at this time d/t significant mobility deficits and safety concerns  Pt will benefit from continued skilled OT services to address deficits as defined above and to maximize level independence/participation during ADLs and functional tasks to facilitate return toward PLOF and improved quality of life  From an occupational therapy standpoint, recommendation at time of d/c would be post acute rehabilitation services       OT Discharge Recommendation: Post acute rehabilitation services

## 2022-10-22 LAB
ALBUMIN SERPL BCP-MCNC: 3.3 G/DL (ref 3.5–5)
ALP SERPL-CCNC: 74 U/L (ref 46–116)
ALT SERPL W P-5'-P-CCNC: 45 U/L (ref 12–78)
AMMONIA PLAS-SCNC: <10 UMOL/L (ref 11–35)
ANION GAP SERPL CALCULATED.3IONS-SCNC: 11 MMOL/L (ref 4–13)
ANION GAP SERPL CALCULATED.3IONS-SCNC: 9 MMOL/L (ref 4–13)
AST SERPL W P-5'-P-CCNC: 94 U/L (ref 5–45)
BASOPHILS # BLD AUTO: 0.01 THOUSANDS/ÂΜL (ref 0–0.1)
BASOPHILS NFR BLD AUTO: 0 % (ref 0–1)
BILIRUB SERPL-MCNC: 5.18 MG/DL (ref 0.2–1)
BUN SERPL-MCNC: 26 MG/DL (ref 5–25)
BUN SERPL-MCNC: 26 MG/DL (ref 5–25)
CALCIUM ALBUM COR SERPL-MCNC: 9.4 MG/DL (ref 8.3–10.1)
CALCIUM SERPL-MCNC: 8.5 MG/DL (ref 8.3–10.1)
CALCIUM SERPL-MCNC: 8.8 MG/DL (ref 8.3–10.1)
CHLORIDE SERPL-SCNC: 108 MMOL/L (ref 96–108)
CHLORIDE SERPL-SCNC: 109 MMOL/L (ref 96–108)
CO2 SERPL-SCNC: 25 MMOL/L (ref 21–32)
CO2 SERPL-SCNC: 25 MMOL/L (ref 21–32)
CREAT SERPL-MCNC: 1.72 MG/DL (ref 0.6–1.3)
CREAT SERPL-MCNC: 1.77 MG/DL (ref 0.6–1.3)
EOSINOPHIL # BLD AUTO: 0.05 THOUSAND/ÂΜL (ref 0–0.61)
EOSINOPHIL NFR BLD AUTO: 1 % (ref 0–6)
ERYTHROCYTE [DISTWIDTH] IN BLOOD BY AUTOMATED COUNT: 15.7 % (ref 11.6–15.1)
GFR SERPL CREATININE-BSD FRML MDRD: 37 ML/MIN/1.73SQ M
GFR SERPL CREATININE-BSD FRML MDRD: 38 ML/MIN/1.73SQ M
GLUCOSE SERPL-MCNC: 132 MG/DL (ref 65–140)
GLUCOSE SERPL-MCNC: 94 MG/DL (ref 65–140)
HCT VFR BLD AUTO: 26 % (ref 36.5–49.3)
HGB BLD-MCNC: 8.3 G/DL (ref 12–17)
IMM GRANULOCYTES # BLD AUTO: 0.01 THOUSAND/UL (ref 0–0.2)
IMM GRANULOCYTES NFR BLD AUTO: 0 % (ref 0–2)
INR PPP: 2.06 (ref 0.84–1.19)
LYMPHOCYTES # BLD AUTO: 0.61 THOUSANDS/ÂΜL (ref 0.6–4.47)
LYMPHOCYTES NFR BLD AUTO: 13 % (ref 14–44)
MCH RBC QN AUTO: 29.7 PG (ref 26.8–34.3)
MCHC RBC AUTO-ENTMCNC: 31.9 G/DL (ref 31.4–37.4)
MCV RBC AUTO: 93 FL (ref 82–98)
MONOCYTES # BLD AUTO: 0.46 THOUSAND/ÂΜL (ref 0.17–1.22)
MONOCYTES NFR BLD AUTO: 10 % (ref 4–12)
NEUTROPHILS # BLD AUTO: 3.68 THOUSANDS/ÂΜL (ref 1.85–7.62)
NEUTS SEG NFR BLD AUTO: 76 % (ref 43–75)
NRBC BLD AUTO-RTO: 0 /100 WBCS
PLATELET # BLD AUTO: 41 THOUSANDS/UL (ref 149–390)
PMV BLD AUTO: 11.4 FL (ref 8.9–12.7)
POTASSIUM SERPL-SCNC: 2.7 MMOL/L (ref 3.5–5.3)
POTASSIUM SERPL-SCNC: 3.1 MMOL/L (ref 3.5–5.3)
PROT SERPL-MCNC: 6.6 G/DL (ref 6.4–8.4)
PROTHROMBIN TIME: 23.3 SECONDS (ref 11.6–14.5)
RBC # BLD AUTO: 2.79 MILLION/UL (ref 3.88–5.62)
SODIUM SERPL-SCNC: 142 MMOL/L (ref 135–147)
SODIUM SERPL-SCNC: 145 MMOL/L (ref 135–147)
WBC # BLD AUTO: 4.82 THOUSAND/UL (ref 4.31–10.16)

## 2022-10-22 PROCEDURE — 82140 ASSAY OF AMMONIA: CPT | Performed by: INTERNAL MEDICINE

## 2022-10-22 PROCEDURE — 85610 PROTHROMBIN TIME: CPT | Performed by: INTERNAL MEDICINE

## 2022-10-22 PROCEDURE — 99232 SBSQ HOSP IP/OBS MODERATE 35: CPT | Performed by: FAMILY MEDICINE

## 2022-10-22 PROCEDURE — 80048 BASIC METABOLIC PNL TOTAL CA: CPT | Performed by: FAMILY MEDICINE

## 2022-10-22 PROCEDURE — 80053 COMPREHEN METABOLIC PANEL: CPT | Performed by: INTERNAL MEDICINE

## 2022-10-22 PROCEDURE — 85025 COMPLETE CBC W/AUTO DIFF WBC: CPT | Performed by: INTERNAL MEDICINE

## 2022-10-22 RX ORDER — LACTULOSE 20 G/30ML
40 SOLUTION ORAL 4 TIMES DAILY
Status: DISCONTINUED | OUTPATIENT
Start: 2022-10-22 | End: 2022-10-22

## 2022-10-22 RX ORDER — POTASSIUM CHLORIDE 20MEQ/15ML
40 LIQUID (ML) ORAL ONCE
Status: COMPLETED | OUTPATIENT
Start: 2022-10-22 | End: 2022-10-22

## 2022-10-22 RX ORDER — LACTULOSE 20 G/30ML
30 SOLUTION ORAL 4 TIMES DAILY
Status: DISCONTINUED | OUTPATIENT
Start: 2022-10-22 | End: 2022-10-23 | Stop reason: HOSPADM

## 2022-10-22 RX ORDER — DONEPEZIL HYDROCHLORIDE 5 MG/1
10 TABLET, FILM COATED ORAL
Status: DISCONTINUED | OUTPATIENT
Start: 2022-10-22 | End: 2022-10-23 | Stop reason: HOSPADM

## 2022-10-22 RX ORDER — POTASSIUM CHLORIDE 20 MEQ/1
40 TABLET, EXTENDED RELEASE ORAL ONCE
Status: DISCONTINUED | OUTPATIENT
Start: 2022-10-22 | End: 2022-10-22

## 2022-10-22 RX ORDER — FUROSEMIDE 40 MG/1
40 TABLET ORAL DAILY
Status: DISCONTINUED | OUTPATIENT
Start: 2022-10-22 | End: 2022-10-23 | Stop reason: HOSPADM

## 2022-10-22 RX ORDER — POTASSIUM CHLORIDE 14.9 MG/ML
20 INJECTION INTRAVENOUS
Status: DISPENSED | OUTPATIENT
Start: 2022-10-22 | End: 2022-10-22

## 2022-10-22 RX ADMIN — MIDODRINE HYDROCHLORIDE 5 MG: 5 TABLET ORAL at 06:18

## 2022-10-22 RX ADMIN — DONEPEZIL HYDROCHLORIDE 10 MG: 5 TABLET, FILM COATED ORAL at 21:35

## 2022-10-22 RX ADMIN — POTASSIUM CHLORIDE 40 MEQ: 20 SOLUTION ORAL at 07:36

## 2022-10-22 RX ADMIN — MIDODRINE HYDROCHLORIDE 5 MG: 5 TABLET ORAL at 12:21

## 2022-10-22 RX ADMIN — FUROSEMIDE 40 MG: 40 TABLET ORAL at 13:57

## 2022-10-22 RX ADMIN — SERTRALINE HYDROCHLORIDE 50 MG: 50 TABLET ORAL at 13:57

## 2022-10-22 RX ADMIN — HEPARIN SODIUM 5000 UNITS: 5000 INJECTION INTRAVENOUS; SUBCUTANEOUS at 05:19

## 2022-10-22 RX ADMIN — LACTULOSE 30 G: 20 SOLUTION ORAL at 18:19

## 2022-10-22 RX ADMIN — LACTULOSE 30 G: 20 SOLUTION ORAL at 21:36

## 2022-10-22 RX ADMIN — RIFAXIMIN 550 MG: 550 TABLET ORAL at 21:35

## 2022-10-22 RX ADMIN — NYSTATIN: 100000 POWDER TOPICAL at 18:19

## 2022-10-22 RX ADMIN — RIFAXIMIN 550 MG: 550 TABLET ORAL at 13:57

## 2022-10-22 RX ADMIN — ALBUMIN (HUMAN) 25 G: 0.25 INJECTION, SOLUTION INTRAVENOUS at 06:18

## 2022-10-22 RX ADMIN — POTASSIUM CHLORIDE 40 MEQ: 1.5 SOLUTION ORAL at 12:21

## 2022-10-22 RX ADMIN — NYSTATIN: 100000 POWDER TOPICAL at 08:48

## 2022-10-22 RX ADMIN — LACTULOSE 30 G: 20 SOLUTION ORAL at 08:51

## 2022-10-22 RX ADMIN — MIDODRINE HYDROCHLORIDE 5 MG: 5 TABLET ORAL at 16:15

## 2022-10-22 RX ADMIN — POTASSIUM CHLORIDE 40 MEQ: 1.5 SOLUTION ORAL at 13:57

## 2022-10-22 RX ADMIN — POTASSIUM CHLORIDE 20 MEQ: 14.9 INJECTION, SOLUTION INTRAVENOUS at 06:55

## 2022-10-22 RX ADMIN — CEFTRIAXONE 1000 MG: 1 INJECTION, SOLUTION INTRAVENOUS at 08:44

## 2022-10-22 RX ADMIN — ALBUMIN (HUMAN) 25 G: 0.25 INJECTION, SOLUTION INTRAVENOUS at 01:03

## 2022-10-22 RX ADMIN — ASPIRIN 81 MG: 81 TABLET, COATED ORAL at 08:42

## 2022-10-22 RX ADMIN — LACTULOSE 30 G: 20 SOLUTION ORAL at 13:56

## 2022-10-22 NOTE — ASSESSMENT & PLAN NOTE
· Tachycardia, tachypnea with acute hepatic encephalopathy decompensated liver cirrhosis and NAVEED  · Blood pressures remained stable  · Fluid analysis negative for SBP  neg culture  · Currently on ceftriaxone for SBP prophylaxis  will discontinue

## 2022-10-22 NOTE — PROGRESS NOTES
[address]  Progress Note - Nicole Ford 1950, 67 y o  male MRN: [id number]  Unit/Bed#: -01 Encounter: [id number]  Primary Care Provider: Adrien Fisher MD   Date and time admitted to hospital: 10/20/2022  4:25 AM    * Acute hepatic encephalopathy  Assessment & Plan  · Presents with lethargy, GCS 12 secondary to elevated ammonia level   · Ammonia 134 on admission  now dropped to <10  · CT brain without acute abnormality  · Cont xifaxan  · Lactulose enema and now on oral lactulose qid  Mentation has improved and back to baseline  · aim for 4-5 soft stools daily  NAVEED (acute kidney injury) ([address] )  Assessment & Plan  · Creatinine 1 94 on admission  now improved to 1 7  · Baseline closer to 1-1 4  · Has  proteinuria so less likely to be hepatorenal syndrome  · Patient on IV albumin 25 g every 6 hourly x8 doses completed   · Status post 13 L of paracentesis on 10/22  · Continue to monitor renal functions closely  · Restart diuretics  · Avoid hypotension and nephrotoxic medications  · Continue with urinary retention protocol      Chronic anemia  Assessment & Plan  · Chronic anemia  · Hemoglobin 9 3 on admission-->8  3 baseline hb is 8-9  · No overt bleeding  · Trend hemoglobin    SIRS (systemic inflammatory response syndrome) (HCC)  Assessment & Plan  · Tachycardia, tachypnea with acute hepatic encephalopathy decompensated liver cirrhosis and NAVEED  · Blood pressures remained stable  · Fluid analysis negative for SBP  neg culture  · Currently on ceftriaxone for SBP prophylaxis  will discontinue    Thrombocytopenia (HCC)  Assessment & Plan  Thrombocytopenia in the setting of chronic liver disease  Stop heparin    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 [facility])  Assessment & Plan  · Decompensated liver cirrhosis with hepatic encephalopathy  · Meld score 24-->28  · Patient presented with encephalopathy with elevated ammonia level  Initially was on lactulose retention enema    Now transition to p o  Lactulose  · Status post paracentesis 13L of fluid(10/20)  Preliminary analysis negative for SBP  · Empirically on ceftriaxone for SBP prophylaxis  now stop  · No evidence of overt GI bleed  · Not a transplant candidate secondary to advanced dementia  · Continue with supportive care  · DNR DNI    Dementia with behavioral disturbance  Assessment & Plan  · Continue sertraline and Aricept when tolerating oral intake      VTE Pharmacologic Prophylaxis:   Pharmacologic: Pharmacologic VTE Prophylaxis contraindicated due to thrombocytopenia  Mechanical VTE Prophylaxis in Place: Yes    Patient Centered Rounds: I have performed bedside rounds with nursing staff today  Discussions with Specialists or Other Care Team Provider: none    Education and Discussions with Family / Patient: discussed with patient at bedside and will update family    Time Spent for Care: 30 minutes  More than 50% of total time spent on counseling and coordination of care as described above  Current Length of Stay: 2 day(s)    Current Patient Status: Inpatient   Certification Statement: The patient will continue to require additional inpatient hospital stay due to hepatic enceph    Discharge Plan: medically cleared for discharge to rehab    Code Status: Level 3 - DNAR and DNI      Subjective:   Patient feels well  back to baseline  no complaints    Objective:     Vitals:   Temp (24hrs), Av 2 °F (36 8 °C), Min:97 5 °F (36 4 °C), Max:98 6 °F (37 °C)    Temp:  [97 5 °F (36 4 °C)-98 6 °F (37 °C)] 98 4 °F (36 9 °C)  HR:  [] 97  Resp:  [16-20] 20  BP: (108-122)/(65-74) 110/69  SpO2:  [97 %-100 %] 99 %  Body mass index is 26 52 kg/m²  Input and Output Summary (last 24 hours): Intake/Output Summary (Last 24 hours) at 10/22/2022 1250  Last data filed at 10/22/2022 0844  Gross per 24 hour   Intake 2214 17 ml   Output 880 ml   Net 1334 17 ml       Physical Exam:     Physical Exam  Vitals and nursing note reviewed  Constitutional:       Appearance: Normal appearance  HENT:      Head: Normocephalic and atraumatic  Right Ear: External ear normal       Left Ear: External ear normal       Nose: Nose normal       Mouth/Throat:      Pharynx: Oropharynx is clear  Eyes:      Pupils: Pupils are equal, round, and reactive to light  Cardiovascular:      Rate and Rhythm: Normal rate and regular rhythm  Heart sounds: Normal heart sounds  Pulmonary:      Effort: Pulmonary effort is normal       Breath sounds: Normal breath sounds  Abdominal:      General: Bowel sounds are normal  There is distension  Palpations: Abdomen is soft  Tenderness: There is no abdominal tenderness  Musculoskeletal:         General: Normal range of motion  Cervical back: Normal range of motion and neck supple  Skin:     General: Skin is warm and dry  Capillary Refill: Capillary refill takes less than 2 seconds  Neurological:      General: No focal deficit present  Mental Status: He is alert        Comments: Oriented to person and place   Psychiatric:         Mood and Affect: Mood normal            Additional Data:     Labs:    Results from last 7 days   Lab Units 10/22/22  0522   WBC Thousand/uL 4 82   HEMOGLOBIN g/dL 8 3*   HEMATOCRIT % 26 0*   PLATELETS Thousands/uL 41*   NEUTROS PCT % 76*   LYMPHS PCT % 13*   MONOS PCT % 10   EOS PCT % 1     Results from last 7 days   Lab Units 10/22/22  1134 10/22/22  0522   SODIUM mmol/L 142 145   POTASSIUM mmol/L 3 1* 2 7*   CHLORIDE mmol/L 108 109*   CO2 mmol/L 25 25   BUN mg/dL 26* 26*   CREATININE mg/dL 1 77* 1 72*   ANION GAP mmol/L 9 11   CALCIUM mg/dL 8 5 8 8   ALBUMIN g/dL  --  3 3*   TOTAL BILIRUBIN mg/dL  --  5 18*   ALK PHOS U/L  --  74   ALT U/L  --  45   AST U/L  --  94*   GLUCOSE RANDOM mg/dL 132 94     Results from last 7 days   Lab Units 10/22/22  0522   INR  2 06*             Results from last 7 days   Lab Units 10/20/22  0437   LACTIC ACID mmol/L 1 5 * I Have Reviewed All Lab Data Listed Above  * Additional Pertinent Lab Tests Reviewed: [address] Admission Reviewed    Imaging:    Imaging Reports Reviewed Today Include: none  Imaging Personally Reviewed by Myself Includes:  none    Recent Cultures (last 7 days):     Results from last 7 days   Lab Units 10/20/22  1622 10/20/22  0443 10/20/22  0437   BLOOD CULTURE   --  No Growth at 48 hrs  No Growth at 48 hrs  GRAM STAIN RESULT  No Polys or Bacteria seen  --   --    BODY FLUID CULTURE, STERILE  No growth  --   --        Last 24 Hours Medication List:   Current Facility-Administered Medications   Medication Dose Route Frequency Provider Last Rate   • aspirin  81 mg Oral Daily Basil Pugh MD     • lactulose  30 g Oral 4x Daily Basil Pugh MD     • midodrine  5 mg Oral TID AC Basil Pugh MD     • nystatin   Topical BID Basil Pugh MD          Today, Patient Was Seen By: Basil Pugh    ** Please Note: Dictation voice to text software may have been used in the creation of this document   **

## 2022-10-22 NOTE — ASSESSMENT & PLAN NOTE
· Creatinine 1 94 on admission  now improved to 1 7  · Baseline closer to 1-1 4  · Has  proteinuria so less likely to be hepatorenal syndrome  · Patient on IV albumin 25 g every 6 hourly x8 doses completed   · Status post 13 L of paracentesis on 10/22  · Continue to monitor renal functions closely  · Restart diuretics  · Avoid hypotension and nephrotoxic medications  · Continue with urinary retention protocol

## 2022-10-22 NOTE — ASSESSMENT & PLAN NOTE
· Presents with lethargy, GCS 12 secondary to elevated ammonia level   · Ammonia 134 on admission  now dropped to <10  · CT brain without acute abnormality  · Cont xifaxan  · Lactulose enema and now on oral lactulose qid  Mentation has improved and back to baseline  · aim for 4-5 soft stools daily

## 2022-10-22 NOTE — ASSESSMENT & PLAN NOTE
· Decompensated liver cirrhosis with hepatic encephalopathy  · Meld score 24-->28  · Patient presented with encephalopathy with elevated ammonia level  Initially was on lactulose retention enema  Now transition to p o  Lactulose  · Status post paracentesis 13L of fluid(10/20)  Preliminary analysis negative for SBP  · Empirically on ceftriaxone for SBP prophylaxis  now stop  · No evidence of overt GI bleed  · Not a transplant candidate secondary to advanced dementia  · Continue with supportive care  · DNR DNI

## 2022-10-22 NOTE — PLAN OF CARE
Problem: Nutrition/Hydration-ADULT  Goal: Nutrient/Hydration intake appropriate for improving, restoring or maintaining nutritional needs  Description: Monitor and assess patient's nutrition/hydration status for malnutrition  Collaborate with interdisciplinary team and initiate plan and interventions as ordered  Monitor patient's weight and dietary intake as ordered or per policy  Utilize nutrition screening tool and intervene as necessary  Determine patient's food preferences and provide high-protein, high-caloric foods as appropriate       INTERVENTIONS:  - Monitor oral intake, urinary output, labs, and treatment plans  - Assess nutrition and hydration status and recommend course of action  - Evaluate amount of meals eaten  - Assist patient with eating if necessary   - Allow adequate time for meals  - Recommend/ encourage appropriate diets, oral nutritional supplements, and vitamin/mineral supplements  -  - provide snacks inbetween meals  - Provide specific nutrition/hydration education as appropriate  - Include patient/family/caregiver in decisions related to nutrition  Outcome: Not Progressing

## 2022-10-22 NOTE — ASSESSMENT & PLAN NOTE
· Chronic anemia  · Hemoglobin 9 3 on admission-->8  3 baseline hb is 8-9  · No overt bleeding  · Trend hemoglobin

## 2022-10-22 NOTE — NURSING NOTE
Report called to [facility] RN who was informed of Dr Heidi Kinney requesting labs 1 hour post first IV dose of potassium  Labs to be due at 1100 and additional doses of potassium to be determined at that time

## 2022-10-22 NOTE — PLAN OF CARE
Problem: INFECTION - ADULT  Goal: Absence or prevention of progression during hospitalization  Description: INTERVENTIONS:  - Assess and monitor for signs and symptoms of infection  - Monitor lab/diagnostic results  - Monitor all insertion sites, i e  indwelling lines, tubes, and drains  - Monitor endotracheal if appropriate and nasal secretions for changes in amount and color  - Maxie appropriate cooling/warming therapies per order  - Administer medications as ordered  - Instruct and encourage patient and family to use good hand hygiene technique  - Identify and instruct in appropriate isolation precautions for identified infection/condition  Outcome: Progressing     Problem: INFECTION - ADULT  Goal: Absence of fever/infection during neutropenic period  Description: INTERVENTIONS:  - Monitor WBC    Outcome: Progressing

## 2022-10-23 VITALS
DIASTOLIC BLOOD PRESSURE: 77 MMHG | HEART RATE: 99 BPM | TEMPERATURE: 98.1 F | BODY MASS INDEX: 26.51 KG/M2 | WEIGHT: 206.57 LBS | RESPIRATION RATE: 17 BRPM | HEIGHT: 74 IN | SYSTOLIC BLOOD PRESSURE: 121 MMHG | OXYGEN SATURATION: 99 %

## 2022-10-23 LAB
AMMONIA PLAS-SCNC: <10 UMOL/L (ref 11–35)
ANION GAP SERPL CALCULATED.3IONS-SCNC: 12 MMOL/L (ref 4–13)
BACTERIA SPEC BFLD CULT: NO GROWTH
BUN SERPL-MCNC: 24 MG/DL (ref 5–25)
CALCIUM SERPL-MCNC: 8.5 MG/DL (ref 8.3–10.1)
CHLORIDE SERPL-SCNC: 111 MMOL/L (ref 96–108)
CO2 SERPL-SCNC: 20 MMOL/L (ref 21–32)
CREAT SERPL-MCNC: 1.73 MG/DL (ref 0.6–1.3)
FLUAV RNA RESP QL NAA+PROBE: NEGATIVE
FLUBV RNA RESP QL NAA+PROBE: NEGATIVE
GFR SERPL CREATININE-BSD FRML MDRD: 38 ML/MIN/1.73SQ M
GLUCOSE SERPL-MCNC: 106 MG/DL (ref 65–140)
GRAM STN SPEC: NORMAL
POTASSIUM SERPL-SCNC: 3.3 MMOL/L (ref 3.5–5.3)
RSV RNA RESP QL NAA+PROBE: NEGATIVE
SARS-COV-2 RNA RESP QL NAA+PROBE: NEGATIVE
SODIUM SERPL-SCNC: 143 MMOL/L (ref 135–147)

## 2022-10-23 PROCEDURE — 82140 ASSAY OF AMMONIA: CPT | Performed by: FAMILY MEDICINE

## 2022-10-23 PROCEDURE — 99239 HOSP IP/OBS DSCHRG MGMT >30: CPT | Performed by: FAMILY MEDICINE

## 2022-10-23 PROCEDURE — 0241U HB NFCT DS VIR RESP RNA 4 TRGT: CPT | Performed by: FAMILY MEDICINE

## 2022-10-23 PROCEDURE — 80048 BASIC METABOLIC PNL TOTAL CA: CPT | Performed by: FAMILY MEDICINE

## 2022-10-23 RX ORDER — LACTULOSE 20 G/30ML
30 SOLUTION ORAL 4 TIMES DAILY
Qty: 5400 ML | Refills: 0
Start: 2022-10-23 | End: 2022-11-22

## 2022-10-23 RX ORDER — POTASSIUM CHLORIDE 20MEQ/15ML
40 LIQUID (ML) ORAL ONCE
Status: COMPLETED | OUTPATIENT
Start: 2022-10-23 | End: 2022-10-23

## 2022-10-23 RX ORDER — POTASSIUM CHLORIDE 20MEQ/15ML
20 LIQUID (ML) ORAL DAILY
Qty: 30 ML | Refills: 0
Start: 2022-10-23

## 2022-10-23 RX ADMIN — POTASSIUM CHLORIDE 40 MEQ: 1.5 SOLUTION ORAL at 09:49

## 2022-10-23 RX ADMIN — SERTRALINE HYDROCHLORIDE 50 MG: 50 TABLET ORAL at 09:48

## 2022-10-23 RX ADMIN — RIFAXIMIN 550 MG: 550 TABLET ORAL at 09:48

## 2022-10-23 RX ADMIN — NYSTATIN: 100000 POWDER TOPICAL at 09:49

## 2022-10-23 RX ADMIN — FUROSEMIDE 40 MG: 40 TABLET ORAL at 09:48

## 2022-10-23 RX ADMIN — LACTULOSE 30 G: 20 SOLUTION ORAL at 09:48

## 2022-10-23 RX ADMIN — LACTULOSE 30 G: 20 SOLUTION ORAL at 11:27

## 2022-10-23 RX ADMIN — MIDODRINE HYDROCHLORIDE 5 MG: 5 TABLET ORAL at 11:27

## 2022-10-23 NOTE — DISCHARGE SUMMARY
[facility]  Discharge- [facility] 1950, 67 y o  male MRN: [id number]  Unit/Bed#: -01 Encounter: [id number]  Primary Care Provider: Ramon Vasuqez MD   Date and time admitted to hospital: 10/20/2022  4:25 AM    * Acute hepatic encephalopathy  Assessment & Plan  · Presents with lethargy, GCS 12 secondary to elevated ammonia level   · Ammonia 134 on admission  now dropped to <10  · CT brain without acute abnormality  · Cont xifaxan  · Lactulose enema and now on oral lactulose qid  Mentation has improved and back to baseline  · aim for 4-5 soft stools daily  NAVEED (acute kidney injury) ([facility] [address] )  Assessment & Plan  · Creatinine 1 94 on admission  now improved to 1 7  · Baseline closer to 1-1 4  · Has  proteinuria so less likely to be hepatorenal syndrome  · Patient on IV albumin 25 g every 6 hourly x8 doses completed   · Status post 13 L of paracentesis on 10/22  · Continue to monitor renal functions closely  · Restart diuretics  · Avoid hypotension and nephrotoxic medications  · Continue with urinary retention protocol      Chronic anemia  Assessment & Plan  · Chronic anemia  · Hemoglobin 9 3 on admission-->8  3 baseline hb is 8-9  · No overt bleeding  · Trend hemoglobin    SIRS (systemic inflammatory response syndrome) (HCC)  Assessment & Plan  · Tachycardia, tachypnea with acute hepatic encephalopathy decompensated liver cirrhosis and NAVEED  · Blood pressures remained stable  · Fluid analysis negative for SBP  neg culture  · Currently on ceftriaxone for SBP prophylaxis  will discontinue    Thrombocytopenia (HCC)  Assessment & Plan  Thrombocytopenia in the setting of chronic liver disease  Stop heparin    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 [facility])  Assessment & Plan  · Decompensated liver cirrhosis with hepatic encephalopathy  · Meld score 24-->28  · Patient presented with encephalopathy with elevated ammonia level  Initially was on lactulose retention enema    Now transition to p o  Lactulose  · Status post paracentesis 13L of fluid(10/20)  Preliminary analysis negative for SBP  · Empirically on ceftriaxone for SBP prophylaxis  now stop  · No evidence of overt GI bleed  · Not a transplant candidate secondary to advanced dementia  · Continue with supportive care  · DNR DNI    Dementia with behavioral disturbance  Assessment & Plan  · Continue sertraline and Aricept when tolerating oral intake      Discharging Physician / Practitioner: Nissa Nixon  PCP: Michele Madrid MD  Admission Date:   Admission Orders (From admission, onward)     Ordered        10/20/22 0544  INPATIENT ADMISSION  Once                      Discharge Date: 10/23/22    Medical Problems             Resolved Problems  Date Reviewed: 10/23/2022   None                 Consultations During Hospital Stay:  · IR    Procedures Performed:   · None    Significant Findings / Test Results:   CT abdomen pelvis wo contrast    Result Date: 10/20/2022  Impression: Cirrhosis with portal hypertension as evidenced by splenomegaly, small caliber varices, and large volume ascites (similar to prior exam)  Small bilateral pleural effusions with bibasilar atelectasis (right worse than left), similar to prior exam  No new acute finding in the abdomen or pelvis  Additional chronic/incidental findings as detailed above  Workstation performed: [id number]     XR chest portable    Result Date: 10/20/2022  Impression: NG tube coiled in the cervical esophagus  It was subsequently repositioned in the stomach  Low lung volumes with mild bibasilar atelectasis  Workstation performed: [id number]     XR abdomen 1 view kub    Result Date: 10/20/2022  Impression: Distal portion of the nasogastric tube curling within the gastric fundus Workstation performed: [id number]     CT head without contrast    Result Date: 10/20/2022  Impression: No acute intracranial abnormality  Mild chronic microangiopathy   Workstation performed: [id number]     IR INPATIENT Paracentesis    Result Date: 10/21/2022  Impression: Impression: 1  Successful ultrasound-guided paracentesis yielding 13,100 mL of cloudy yellow fluid   2  Specimens were sent to the laboratory as described above  Workstation performed: [id number]     Incidental Findings:   · none    Test Results Pending at Discharge (will require follow up):   · none     Outpatient Tests Requested:  · Cbc,cmp,ammonia level in 10 days    Complications:  none    Reason for Admission:  Confusion    Hospital Course:     Brayan Anaya is a 67 y o  male patient who originally presented to the hospital on 10/20/2022 due to acute hepatic encephalopathy secondary to elevated ammonia level and also acute kidney injury  Also noted to have decompensated liver cirrhosis with worsening ascites  Patient underwent lactulose enemas following which ammonia level started to decrease  Also had paracentesis done for 13 L on 10/20  No evidence of infection on cultures including ascitic fluid culture    is clinically improving  mental status is back to his baseline tolerating oral lactulose  Please avoid decreasing his lactulose does as he is very sensitive and ends up with high ammonia levels        Please see above list of diagnoses and related plan for additional information  Condition at Discharge: fair     Discharge Day Visit / Exam:     Subjective:  Patient denies any chest pain or shortness of breath or abdominal pain  Feels well  Vitals: Blood Pressure: 121/77 (10/23/22 0709)  Pulse: 99 (10/23/22 0709)  Temperature: 98 1 °F (36 7 °C) (10/23/22 0709)  Temp Source: Axillary (10/22/22 1100)  Respirations: 17 (10/23/22 0709)  Height: 6' 2" (188 cm) (10/20/22 0430)  Weight - Scale: 93 7 kg (206 lb 9 1 oz) (10/20/22 0430)  SpO2: 99 % (10/23/22 0709)  Exam:   Physical Exam  Vitals and nursing note reviewed  Constitutional:       Appearance: Normal appearance  HENT:      Head: Normocephalic and atraumatic        Right Ear: External ear normal       Left Ear: External ear normal       Nose: Nose normal       Mouth/Throat:      Pharynx: Oropharynx is clear  Eyes:      Pupils: Pupils are equal, round, and reactive to light  Cardiovascular:      Rate and Rhythm: Normal rate and regular rhythm  Heart sounds: Normal heart sounds  Pulmonary:      Effort: Pulmonary effort is normal       Breath sounds: Normal breath sounds  Abdominal:      General: Bowel sounds are normal       Palpations: Abdomen is soft  Tenderness: There is no abdominal tenderness  Comments: Mild abdominal distension noted   Musculoskeletal:         General: Normal range of motion  Cervical back: Normal range of motion and neck supple  Skin:     General: Skin is warm and dry  Capillary Refill: Capillary refill takes less than 2 seconds  Coloration: Skin is jaundiced  Neurological:      General: No focal deficit present  Mental Status: He is alert  Comments: Oriented to person and place   Psychiatric:         Mood and Affect: Mood normal          Discussion with Family:  Discussed with son    Discharge instructions/Information to patient and family:   See after visit summary for information provided to patient and family  Provisions for Follow-Up Care:  See after visit summary for information related to follow-up care and any pertinent home health orders  Disposition:     SNF    For Discharges to [facility] SNF:   · Not Applicable to this Patient - Not Applicable to this Patient    Planned Readmission:  None     Discharge Statement:  I spent 35 minutes discharging the patient  This time was spent on the day of discharge  I had direct contact with the patient on the day of discharge  Greater than 50% of the total time was spent examining patient, answering all patient questions, arranging and discussing plan of care with patient as well as directly providing post-discharge instructions    Additional time then spent on discharge activities  Discharge Medications:  See after visit summary for reconciled discharge medications provided to patient and family        ** Please Note: This note has been constructed using a voice recognition system **

## 2022-10-23 NOTE — CASE MANAGEMENT
Case Management Discharge Planning Note    Patient name Ferguson [facility]  Location /-99 MRN [id number]  : 1950 Date 10/23/2022       Current Admission Date: 10/20/2022  Current Admission Diagnosis:Acute hepatic encephalopathy   Patient Active Problem List    Diagnosis Date Noted   • Severe protein-calorie malnutrition ([facility] [address] ) 10/05/2022   • Other ascites 10/04/2022   • NAVEED (acute kidney injury) ([facility] [address] ) 10/04/2022   • Sepsis ([facility] [address] ) 10/04/2022   • Closed fracture of one rib of right side 2022   • Weakness generalized 2022   • Chronic anemia 2022   • SIRS (systemic inflammatory response syndrome) ([facility] [address] ) 2022   • Cholelithiasis    • Moderate protein-calorie malnutrition ([facility] [address] ) 2022   • Thrombocytopenia ([facility] [address] ) 2022   • Dementia with behavioral disturbance    • 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 ([facility] [address] )    • Acute hepatic encephalopathy    • Hypertension       LOS (days): 3  Geometric Mean LOS (GMLOS) (days): 4 70  Days to GMLOS:1 5     OBJECTIVE:  Risk of Unplanned Readmission Score: 26 2         Current admission status: Inpatient   Preferred Pharmacy:   PATIENT/FAMILY REPORTS NO PREFERRED PHARMACY  No address on file      [id number] [address] [address]  Phone: [phone] Fax: [phone]    Primary Care Provider: Ramon Vasquez MD    Primary Insurance: MEDICARE  Secondary Insurance: Micaela Turner    DISCHARGE DETAILS:    Discharge planning discussed with[de-identified] sonSrinivasan     Comments - [facility] of Choice: [facility]  CM contacted family/caregiver?: Yes  Were Treatment Team discharge recommendations reviewed with patient/caregiver?: Yes  Did patient/caregiver verbalize understanding of patient care needs?: Yes  Were patient/caregiver advised of the risks associated with not following Treatment Team discharge recommendations?: Yes         [facility]         Is the patient interested in HHC at discharge?: No    DME Referral Provided  Referral made for DME?: No    Other Referral/Resources/Interventions Provided:  Interventions: Facility Return  [facility] Comments: [facility]    Would you like to participate in our [address] service program?  : No - Declined    Treatment Team Recommendation: SNF  Discharge Destination Plan[de-identified] SNF  Transport at Discharge : BLS Ambulance                  IMM Given (Date):: 10/23/22  IMM Given to[de-identified] Family        0900 CM contacted [facility] to inquire if patient able to return to facility  CM received confirmation for patients return after 3:00     0930 CM submitted [employer] referral for BLS transport to [facility]  1050 CM contacted son, Daniel Suh, to let him know patient will be transported back to [facility] by [facility] at 3:00 PM today

## 2022-10-23 NOTE — PLAN OF CARE
Problem: Potential for Falls  Goal: Patient will remain free of falls  Description: INTERVENTIONS:  - Educate patient/family on patient safety including physical limitations  - Instruct patient to call for assistance with activity   - Consult OT/PT to assist with strengthening/mobility   - Keep Call bell within reach  - Keep bed low and locked with side rails adjusted as appropriate  - Keep care items and personal belongings within reach  - Initiate and maintain comfort rounds  - Make Fall Risk Sign visible to staff  - Offer Toileting every 2 Hours, in advance of need  - Initiate/Maintain bed alarm  - Obtain necessary fall risk management equipment: na  - Apply yellow socks and bracelet for high fall risk patients  - Consider moving patient to room near nurses station  Outcome: Progressing     Problem: SAFETY ADULT  Goal: Patient will remain free of falls  Description: INTERVENTIONS:  - Educate patient/family on patient safety including physical limitations  - Instruct patient to call for assistance with activity   - Consult OT/PT to assist with strengthening/mobility   - Keep Call bell within reach  - Keep bed low and locked with side rails adjusted as appropriate  - Keep care items and personal belongings within reach  - Initiate and maintain comfort rounds  - Make Fall Risk Sign visible to staff  - Offer Toileting every 2 Hours, in advance of need  - Initiate/Maintain bed alarm  - Obtain necessary fall risk management equipment: na  - Apply yellow socks and bracelet for high fall risk patients  - Consider moving patient to room near nurses station  Outcome: Progressing     Problem: INFECTION - ADULT  Goal: Absence or prevention of progression during hospitalization  Description: INTERVENTIONS:  - Assess and monitor for signs and symptoms of infection  - Monitor lab/diagnostic results  - Monitor all insertion sites, i e  indwelling lines, tubes, and drains  - Monitor endotracheal if appropriate and nasal secretions for changes in amount and color  - [place] appropriate cooling/warming therapies per order  - Administer medications as ordered  - Instruct and encourage patient and family to use good hand hygiene technique  - Identify and instruct in appropriate isolation precautions for identified infection/condition  Outcome: Progressing

## 2022-10-23 NOTE — PLAN OF CARE
Problem: Potential for Falls  Goal: Patient will remain free of falls  Description: INTERVENTIONS:  - Educate patient/family on patient safety including physical limitations  - Instruct patient to call for assistance with activity   - Consult OT/PT to assist with strengthening/mobility   - Keep Call bell within reach  - Keep bed low and locked with side rails adjusted as appropriate  - Keep care items and personal belongings within reach  - Initiate and maintain comfort rounds  - Make Fall Risk Sign visible to staff  - Offer Toileting every 2 Hours, in advance of need  - Initiate/Maintain bed alarm  - Obtain necessary fall risk management equipment: na  - Apply yellow socks and bracelet for high fall risk patients  - Consider moving patient to room near nurses station  Outcome: Progressing     Problem: MOBILITY - ADULT  Goal: Maintain or return to baseline ADL function  Description: INTERVENTIONS:  -  Assess patient's ability to carry out ADLs; assess patient's baseline for ADL function and identify physical deficits which impact ability to perform ADLs (bathing, care of mouth/teeth, toileting, grooming, dressing, etc )  - Assess/evaluate cause of self-care deficits   - Assess range of motion  - Assess patient's mobility; develop plan if impaired  - Assess patient's need for assistive devices and provide as appropriate  - Encourage maximum independence but intervene and supervise when necessary  - Involve family in performance of ADLs  - Assess for home care needs following discharge   - Consider OT consult to assist with ADL evaluation and planning for discharge  - Provide patient education as appropriate  Outcome: Progressing  Goal: Maintains/Returns to pre admission functional level  Description: INTERVENTIONS:  - Perform BMAT or MOVE assessment daily    - Set and communicate daily mobility goal to care team and patient/family/caregiver     - Collaborate with rehabilitation services on mobility goals if consulted  - Perform Range of Motion 2 times a day  - Reposition patient every 2 hours    - Dangle patient 0 times a day  - Stand patient 0 times a day  - Ambulate patient 0 times a day  - Out of bed to chair 0 times a day   - Out of bed for meals 0 times a day  - Out of bed for toileting  - Record patient progress and toleration of activity level   Outcome: Progressing     Problem: PAIN - ADULT  Goal: Verbalizes/displays adequate comfort level or baseline comfort level  Description: Interventions:  - Encourage patient to monitor pain and request assistance  - Assess pain using appropriate pain scale  - Administer analgesics based on type and severity of pain and evaluate response  - Implement non-pharmacological measures as appropriate and evaluate response  - Consider cultural and social influences on pain and pain management  - Notify physician/advanced practitioner if interventions unsuccessful or patient reports new pain  Outcome: Progressing     Problem: INFECTION - ADULT  Goal: Absence or prevention of progression during hospitalization  Description: INTERVENTIONS:  - Assess and monitor for signs and symptoms of infection  - Monitor lab/diagnostic results  - Monitor all insertion sites, i e  indwelling lines, tubes, and drains  - Monitor endotracheal if appropriate and nasal secretions for changes in amount and color  - [place] appropriate cooling/warming therapies per order  - Administer medications as ordered  - Instruct and encourage patient and family to use good hand hygiene technique  - Identify and instruct in appropriate isolation precautions for identified infection/condition  Outcome: Progressing  Goal: Absence of fever/infection during neutropenic period  Description: INTERVENTIONS:  - Monitor WBC    Outcome: Progressing     Problem: SAFETY ADULT  Goal: Patient will remain free of falls  Description: INTERVENTIONS:  - Educate patient/family on patient safety including physical limitations  - Instruct patient to call for assistance with activity   - Consult OT/PT to assist with strengthening/mobility   - Keep Call bell within reach  - Keep bed low and locked with side rails adjusted as appropriate  - Keep care items and personal belongings within reach  - Initiate and maintain comfort rounds  - Make Fall Risk Sign visible to staff  - Offer Toileting every 2 Hours, in advance of need  - Initiate/Maintain bed alarm  - Obtain necessary fall risk management equipment: na  - Apply yellow socks and bracelet for high fall risk patients  - Consider moving patient to room near nurses station  Outcome: Progressing  Goal: Maintain or return to baseline ADL function  Description: INTERVENTIONS:  -  Assess patient's ability to carry out ADLs; assess patient's baseline for ADL function and identify physical deficits which impact ability to perform ADLs (bathing, care of mouth/teeth, toileting, grooming, dressing, etc )  - Assess/evaluate cause of self-care deficits   - Assess range of motion  - Assess patient's mobility; develop plan if impaired  - Assess patient's need for assistive devices and provide as appropriate  - Encourage maximum independence but intervene and supervise when necessary  - Involve family in performance of ADLs  - Assess for home care needs following discharge   - Consider OT consult to assist with ADL evaluation and planning for discharge  - Provide patient education as appropriate  Outcome: Progressing  Goal: Maintains/Returns to pre admission functional level  Description: INTERVENTIONS:  - Perform BMAT or MOVE assessment daily    - Set and communicate daily mobility goal to care team and patient/family/caregiver  - Collaborate with rehabilitation services on mobility goals if consulted  - Perform Range of Motion 2 times a day  - Reposition patient every 2 hours    - Dangle patient 0 times a day  - Stand patient 0 times a day  - Ambulate patient 0 times a day  - Out of bed to chair 0 times a day   - Out of bed for meals 0 times a day  - Out of bed for toileting  - Record patient progress and toleration of activity level   Outcome: Progressing     Problem: DISCHARGE PLANNING  Goal: Discharge to home or other facility with appropriate resources  Description: INTERVENTIONS:  - Identify barriers to discharge w/patient and caregiver  - Arrange for needed discharge resources and transportation as appropriate  - Identify discharge learning needs (meds, wound care, etc )  - Arrange for interpretive services to assist at discharge as needed  - Refer to Case Management Department for coordinating discharge planning if the patient needs post-hospital services based on physician/advanced practitioner order or complex needs related to functional status, cognitive ability, or social support system  Outcome: Progressing     Problem: Knowledge Deficit  Goal: Patient/family/caregiver demonstrates understanding of disease process, treatment plan, medications, and discharge instructions  Description: Complete learning assessment and assess knowledge base    Interventions:  - Provide teaching at level of understanding  - Provide teaching via preferred learning methods  Outcome: Progressing     Problem: Prexisting or High Potential for Compromised Skin Integrity  Goal: Skin integrity is maintained or improved  Description: INTERVENTIONS:  - Identify patients at risk for skin breakdown  - Assess and monitor skin integrity  - Assess and monitor nutrition and hydration status  - Monitor labs   - Assess for incontinence   - Turn and reposition patient  - Assist with mobility/ambulation  - Relieve pressure over bony prominences  - Avoid friction and shearing  - Provide appropriate hygiene as needed including keeping skin clean and dry  - Evaluate need for skin moisturizer/barrier cream  - Collaborate with interdisciplinary team   - Patient/family teaching  - Consider wound care consult   Outcome: Progressing     Problem: Nutrition/Hydration-ADULT  Goal: Nutrient/Hydration intake appropriate for improving, restoring or maintaining nutritional needs  Description: Monitor and assess patient's nutrition/hydration status for malnutrition  Collaborate with interdisciplinary team and initiate plan and interventions as ordered  Monitor patient's weight and dietary intake as ordered or per policy  Utilize nutrition screening tool and intervene as necessary  Determine patient's food preferences and provide high-protein, high-caloric foods as appropriate       INTERVENTIONS:  - Monitor oral intake, urinary output, labs, and treatment plans  - Assess nutrition and hydration status and recommend course of action  - Evaluate amount of meals eaten  - Assist patient with eating if necessary   - Allow adequate time for meals  - Recommend/ encourage appropriate diets, oral nutritional supplements, and vitamin/mineral supplements  -  - provide snacks inbetween meals  - Provide specific nutrition/hydration education as appropriate  - Include patient/family/caregiver in decisions related to nutrition  Outcome: Progressing

## 2022-10-23 NOTE — NURSING NOTE
Report called to CARIN WHITEHEAD, reviewed assessment, orders, medications, reviewed zaragoza removed today needs to void by 18:00 tonight verbally understood

## 2022-10-25 LAB
BACTERIA BLD CULT: NORMAL
BACTERIA BLD CULT: NORMAL

## 2022-10-25 PROCEDURE — 88112 CYTOPATH CELL ENHANCE TECH: CPT | Performed by: SPECIALIST

## 2022-11-05 ENCOUNTER — APPOINTMENT (EMERGENCY)
Dept: RADIOLOGY | Facility: HOSPITAL | Age: 72
End: 2022-11-05

## 2022-11-05 ENCOUNTER — APPOINTMENT (EMERGENCY)
Dept: CT IMAGING | Facility: HOSPITAL | Age: 72
End: 2022-11-05

## 2022-11-05 ENCOUNTER — HOSPITAL ENCOUNTER (EMERGENCY)
Facility: HOSPITAL | Age: 72
Discharge: HOME | End: 2022-11-05
Attending: EMERGENCY MEDICINE

## 2022-11-05 VITALS
DIASTOLIC BLOOD PRESSURE: 67 MMHG | WEIGHT: 189.82 LBS | SYSTOLIC BLOOD PRESSURE: 118 MMHG | HEART RATE: 92 BPM | RESPIRATION RATE: 16 BRPM | BODY MASS INDEX: 24.36 KG/M2 | OXYGEN SATURATION: 98 % | HEIGHT: 74 IN | TEMPERATURE: 97.5 F

## 2022-11-05 DIAGNOSIS — D64.9 ANEMIA: ICD-10-CM

## 2022-11-05 DIAGNOSIS — R53.1 WEAKNESS: Primary | ICD-10-CM

## 2022-11-05 DIAGNOSIS — R17 JAUNDICE: ICD-10-CM

## 2022-11-05 LAB
2HR DELTA HS TROPONIN: -1 NG/L
ALBUMIN SERPL BCP-MCNC: 2.5 G/DL (ref 3.5–5)
ALP SERPL-CCNC: 130 U/L (ref 46–116)
ALT SERPL W P-5'-P-CCNC: 43 U/L (ref 12–78)
AMMONIA PLAS-SCNC: <10 UMOL/L (ref 11–35)
AMORPH URATE CRY URNS QL MICRO: NORMAL /HPF
ANION GAP SERPL CALCULATED.3IONS-SCNC: 7 MMOL/L (ref 4–13)
AST SERPL W P-5'-P-CCNC: 54 U/L (ref 5–45)
BACTERIA UR QL AUTO: NORMAL /HPF
BASE EX.OXY STD BLDV CALC-SCNC: 82.8 % (ref 60–80)
BASE EXCESS BLDV CALC-SCNC: 1 MMOL/L
BASOPHILS # BLD AUTO: 0.02 THOUSANDS/ÂΜL (ref 0–0.1)
BASOPHILS NFR BLD AUTO: 0 % (ref 0–1)
BILIRUB SERPL-MCNC: 5.11 MG/DL (ref 0.2–1)
BILIRUB UR QL STRIP: ABNORMAL
BUN SERPL-MCNC: 21 MG/DL (ref 5–25)
CALCIUM ALBUM COR SERPL-MCNC: 9.6 MG/DL (ref 8.3–10.1)
CALCIUM SERPL-MCNC: 8.4 MG/DL (ref 8.3–10.1)
CARDIAC TROPONIN I PNL SERPL HS: 15 NG/L
CARDIAC TROPONIN I PNL SERPL HS: 16 NG/L
CHLORIDE SERPL-SCNC: 98 MMOL/L (ref 96–108)
CLARITY UR: ABNORMAL
CO2 SERPL-SCNC: 27 MMOL/L (ref 21–32)
COLOR UR: ABNORMAL
CREAT SERPL-MCNC: 1.5 MG/DL (ref 0.6–1.3)
EOSINOPHIL # BLD AUTO: 0.25 THOUSAND/ÂΜL (ref 0–0.61)
EOSINOPHIL NFR BLD AUTO: 3 % (ref 0–6)
ERYTHROCYTE [DISTWIDTH] IN BLOOD BY AUTOMATED COUNT: 16 % (ref 11.6–15.1)
FLUAV RNA RESP QL NAA+PROBE: NEGATIVE
FLUBV RNA RESP QL NAA+PROBE: NEGATIVE
GFR SERPL CREATININE-BSD FRML MDRD: 45 ML/MIN/1.73SQ M
GLUCOSE SERPL-MCNC: 95 MG/DL (ref 65–140)
GLUCOSE UR STRIP-MCNC: NEGATIVE MG/DL
HCO3 BLDV-SCNC: 24.5 MMOL/L (ref 24–30)
HCT VFR BLD AUTO: 27.4 % (ref 36.5–49.3)
HGB BLD-MCNC: 9 G/DL (ref 12–17)
HGB UR QL STRIP.AUTO: ABNORMAL
IMM GRANULOCYTES # BLD AUTO: 0.04 THOUSAND/UL (ref 0–0.2)
IMM GRANULOCYTES NFR BLD AUTO: 1 % (ref 0–2)
KETONES UR STRIP-MCNC: ABNORMAL MG/DL
LACTATE SERPL-SCNC: 1.8 MMOL/L (ref 0.5–2)
LEUKOCYTE ESTERASE UR QL STRIP: NEGATIVE
LIPASE SERPL-CCNC: 232 U/L (ref 73–393)
LYMPHOCYTES # BLD AUTO: 0.9 THOUSANDS/ÂΜL (ref 0.6–4.47)
LYMPHOCYTES NFR BLD AUTO: 12 % (ref 14–44)
MAGNESIUM SERPL-MCNC: 1.8 MG/DL (ref 1.6–2.6)
MCH RBC QN AUTO: 29.3 PG (ref 26.8–34.3)
MCHC RBC AUTO-ENTMCNC: 32.8 G/DL (ref 31.4–37.4)
MCV RBC AUTO: 89 FL (ref 82–98)
MONOCYTES # BLD AUTO: 0.49 THOUSAND/ÂΜL (ref 0.17–1.22)
MONOCYTES NFR BLD AUTO: 7 % (ref 4–12)
NEUTROPHILS # BLD AUTO: 5.7 THOUSANDS/ÂΜL (ref 1.85–7.62)
NEUTS SEG NFR BLD AUTO: 77 % (ref 43–75)
NITRITE UR QL STRIP: NEGATIVE
NON-SQ EPI CELLS URNS QL MICRO: NORMAL /HPF
NRBC BLD AUTO-RTO: 0 /100 WBCS
NT-PROBNP SERPL-MCNC: 554 PG/ML
O2 CT BLDV-SCNC: 11.7 ML/DL
PCO2 BLDV: 34.6 MM HG (ref 42–50)
PH BLDV: 7.47 [PH] (ref 7.3–7.4)
PH UR STRIP.AUTO: 6 [PH]
PHOSPHATE SERPL-MCNC: 3.1 MG/DL (ref 2.3–4.1)
PLATELET # BLD AUTO: 50 THOUSANDS/UL (ref 149–390)
PMV BLD AUTO: 11.4 FL (ref 8.9–12.7)
PO2 BLDV: 48.7 MM HG (ref 35–45)
POTASSIUM SERPL-SCNC: 4 MMOL/L (ref 3.5–5.3)
PROT SERPL-MCNC: 7.3 G/DL (ref 6.4–8.4)
PROT UR STRIP-MCNC: ABNORMAL MG/DL
RBC # BLD AUTO: 3.07 MILLION/UL (ref 3.88–5.62)
RBC #/AREA URNS AUTO: NORMAL /HPF
RSV RNA RESP QL NAA+PROBE: NEGATIVE
SARS-COV-2 RNA RESP QL NAA+PROBE: NEGATIVE
SODIUM SERPL-SCNC: 132 MMOL/L (ref 135–147)
SP GR UR STRIP.AUTO: 1.02 (ref 1–1.03)
UROBILINOGEN UR QL STRIP.AUTO: 0.2 E.U./DL
WBC # BLD AUTO: 7.4 THOUSAND/UL (ref 4.31–10.16)
WBC #/AREA URNS AUTO: NORMAL /HPF

## 2022-11-05 NOTE — ED PROVIDER NOTES
History  Chief Complaint   Patient presents with   • Weakness - Generalized     Per ems pt from [facility] and was sent in for increase weakness  Pt hx of liver failure  Pt jaundice upon arrival  Oriented to self only     59-year-old male with past medical history pertinent for dementia, cirrhosis, encephalopathy, Launie Kendall, hypertension who presents by EMS to the emergency department from [facility] for increased weakness and disorientation with jaundice noted  Patient oriented to self only on arrival per nursing  Patient here states he has no complaints  Denies any chest pain or any pain anywhere on his body  Denies any feeling of tiredness or weakness  Patient reports he feels fine  No other concerns  Prior to Admission Medications   Prescriptions Last Dose Informant Patient Reported? Taking? Cholecalciferol 25 MCG (1000 UT) tablet   Yes No   Sig: Take 1 tablet by mouth daily   donepezil (ARICEPT) 10 mg tablet   Yes No   Sig: Take 10 mg by mouth daily at bedtime   furosemide (LASIX) 40 mg tablet   No No   Sig: Take 1 tablet (40 mg total) by mouth daily Do not start before October 12, 2022  lactulose 20 g/30 mL   No No   Sig: Take 45 mL (30 g total) by mouth 4 (four) times a day   midodrine (PROAMATINE) 5 mg tablet   No No   Sig: Take 1 tablet (5 mg total) by mouth 3 (three) times a day before meals Please hold the medication if systolic blood pressure is more than 110 Do not start before October 12, 2022     potassium chloride 10% oral solution   No No   Sig: Take 15 mL (20 mEq total) by mouth daily   rifaximin (XIFAXAN) 550 mg tablet   Yes No   Sig: Take 550 mg by mouth every 12 (twelve) hours   sertraline (ZOLOFT) 50 mg tablet   Yes No   Sig: Take 50 mg by mouth daily   spironolactone (ALDACTONE) 50 mg tablet   No No   Sig: Take 1 tablet (50 mg total) by mouth daily      Facility-Administered Medications: None       Past Medical History:   Diagnosis Date   • Cirrhosis ([facility] [address] )    • Dementia ([address] )    • Encephalopathy    • Hypertension    • JAFFE (nonalcoholic steatohepatitis)        Past Surgical History:   Procedure Laterality Date   • IR PARACENTESIS  10/10/2022   • IR PARACENTESIS  10/20/2022   • REPLACEMENT TOTAL KNEE         Family History   Problem Relation Age of Onset   • Arthritis Mother      I have reviewed and agree with the history as documented  E-Cigarette/Vaping   • E-Cigarette Use Never User      E-Cigarette/Vaping Substances     Social History     Tobacco Use   • Smoking status: Former Smoker   • Smokeless tobacco: Never Used   Vaping Use   • Vaping Use: Never used   Substance Use Topics   • Alcohol use: Not Currently     Comment: former social ETOH   • Drug use: Never       Review of Systems   Constitutional: Negative for activity change, appetite change, chills, diaphoresis and fever  HENT: Negative for congestion, rhinorrhea and sore throat  Eyes: Negative for visual disturbance  Respiratory: Negative for chest tightness and shortness of breath  Cardiovascular: Negative for chest pain, palpitations and leg swelling  Gastrointestinal: Negative for abdominal pain, constipation, diarrhea, nausea and vomiting  Genitourinary: Negative for difficulty urinating and hematuria  Musculoskeletal: Negative for back pain and neck pain  Skin: Negative for color change and rash  Neurological: Positive for weakness (Denied by patient but reported by nursing home)  Negative for dizziness, seizures, light-headedness and headaches  Psychiatric/Behavioral: Negative for behavioral problems  Physical Exam  Physical Exam  Vitals and nursing note reviewed  Constitutional:       General: He is not in acute distress  Appearance: He is well-developed  He is not diaphoretic  HENT:      Head: Normocephalic and atraumatic  Right Ear: External ear normal       Left Ear: External ear normal       Nose: Nose normal    Eyes:      General: Scleral icterus present  Pupils: Pupils are equal, round, and reactive to light  Cardiovascular:      Rate and Rhythm: Normal rate and regular rhythm  Pulses: Normal pulses  Heart sounds: Normal heart sounds  Pulmonary:      Effort: Pulmonary effort is normal  No respiratory distress  Breath sounds: Normal breath sounds  No wheezing or rales  Abdominal:      General: Bowel sounds are normal  There is distension  Palpations: Abdomen is soft  There is no mass  Tenderness: There is no abdominal tenderness  Musculoskeletal:         General: No tenderness or deformity  Normal range of motion  Cervical back: Normal range of motion and neck supple  Skin:     General: Skin is warm and dry  Capillary Refill: Capillary refill takes less than 2 seconds  Findings: No erythema or rash  Neurological:      Mental Status: He is alert and oriented to person, place, and time  Motor: No abnormal muscle tone  Comments: Patient is awake and alert and oriented x 3 (knew the year and knew he was in the hospital and also knew his name  Could not remember where he came from as far as the nursing home  Speech is fluent  Fund of knowledge is appropriate  CN II - No field cuts by confrontation  CN III, IV, VI - pupils are 3 mm and briskly reactive  EOMs are full with no nystagmus  CN V - facial sensation is intact  CN VII - no facial asymmetry  CN VIII - auditory acuity is grossly intact to finger rub bilaterally  CN IX - palate rises symmetrically  CN XI - SCM and trapezius muscles are intact  CN XII - tongue protrudes midline  Sensory exam in intact to light touch, pinprick in all dermatomes tested  Coordination is grossly intact for finger-to-nose  Tremors noted with arm extension  5/5 strength in all extremities     Psychiatric:         Behavior: Behavior normal          Vital Signs  ED Triage Vitals   Temperature Pulse Respirations Blood Pressure SpO2   11/05/22 0644 11/05/22 0644 11/05/22 0649 11/05/22 0644 11/05/22 0644   97 5 °F (36 4 °C) 96 18 131/74 97 %      Temp Source Heart Rate Source Patient Position - Orthostatic VS BP Location FiO2 (%)   11/05/22 0644 11/05/22 0644 11/05/22 0644 11/05/22 0644 --   Temporal Monitor Lying Right arm       Pain Score       11/05/22 0644       No Pain           Vitals:    11/05/22 0644   BP: 131/74   Pulse: 96   Patient Position - Orthostatic VS: Lying         Visual Acuity  Visual Acuity    Flowsheet Row Most Recent Value   L Pupil Size (mm) 3   R Pupil Size (mm) 3          ED Medications  Medications - No data to display    Diagnostic Studies  Results Reviewed     Procedure Component Value Units Date/Time    HS Troponin I 2hr [[id number]]  (Normal) Collected: 11/05/22 0922    Lab Status: Final result Specimen: Blood from Arm, Right Updated: 11/05/22 0955     hs TnI 2hr 15 ng/L      Delta 2hr hsTnI -1 ng/L     Urine Microscopic [[id number]] Collected: 11/05/22 0920    Lab Status: Final result Specimen: Urine, Straight Cath Updated: 11/05/22 0945     RBC, UA 0-1 /hpf      WBC, UA 1-2 /hpf      Epithelial Cells Occasional /hpf      Bacteria, UA Occasional /hpf      AMORPH URATES Moderate /hpf     UA w Reflex to Microscopic w Reflex to Culture [[id number]]  (Abnormal) Collected: 11/05/22 0920    Lab Status: Final result Specimen: Urine, Straight Cath Updated: 11/05/22 0929     Color, UA Evelyne     Clarity, UA Slightly Cloudy     Specific [place], UA 1 020     pH, UA 6 0     Leukocytes, UA Negative     Nitrite, UA Negative     Protein,  (2+) mg/dl      Glucose, UA Negative mg/dl      Ketones, UA Trace mg/dl      Urobilinogen, UA 0 2 E U /dl      Bilirubin, UA Small     Occult Blood, UA Small    Ammonia [[id number]]  (Abnormal) Collected: 11/05/22 0712    Lab Status: Final result Specimen: Blood from Arm, Right Updated: 11/05/22 0814     Ammonia <10 umol/L     FLU/RSV/COVID - if FLU/RSV clinically relevant [[id number]]  (Normal) Collected: 11/05/22 0560 Lab Status: Final result Specimen: Nares from Nose Updated: 11/05/22 0812     SARS-CoV-2 Negative     INFLUENZA A PCR Negative     INFLUENZA B PCR Negative     RSV PCR Negative    Narrative:      FOR PEDIATRIC PATIENTS - copy/paste COVID Guidelines URL to browser: [email or web address]    SARS-CoV-2 assay is a Nucleic Acid Amplification assay intended for the  qualitative detection of nucleic acid from SARS-CoV-2 in nasopharyngeal  swabs  Results are for the presumptive identification of SARS-CoV-2 RNA  Positive results are indicative of infection with SARS-CoV-2, the virus  causing COVID-19, but do not rule out bacterial infection or co-infection  with other viruses  Laboratories within the [place] and its  territories are required to report all positive results to the appropriate  public health authorities  Negative results do not preclude SARS-CoV-2  infection and should not be used as the sole basis for treatment or other  patient management decisions  Negative results must be combined with  clinical observations, patient history, and epidemiological information  This test has not been FDA cleared or approved  This test has been authorized by FDA under an Emergency Use Authorization  (EUA)  This test is only authorized for the duration of time the  declaration that circumstances exist justifying the authorization of the  emergency use of an in vitro diagnostic tests for detection of SARS-CoV-2  virus and/or diagnosis of COVID-19 infection under section 564(b)(1) of  the Act, 21 U  S C  817HPI-3(I)(0), unless the authorization is terminated  or revoked sooner  The test has been validated but independent review by FDA  and CLIA is pending  Test performed using [employer] Gene[employer]pert: This RT-PCR assay targets N2,  a region unique to SARS-CoV-2   A conserved region in the E-gene was chosen  for pan-Sarbecovirus detection which includes SARS-CoV-2  According to CMS-2020-01-R, this platform meets the definition of high-throughput technology      Comprehensive metabolic panel [[id number]]  (Abnormal) Collected: 11/05/22 0712    Lab Status: Final result Specimen: Blood from Arm, Right Updated: 11/05/22 0758     Sodium 132 mmol/L      Potassium 4 0 mmol/L      Chloride 98 mmol/L      CO2 27 mmol/L      ANION GAP 7 mmol/L      BUN 21 mg/dL      Creatinine 1 50 mg/dL      Glucose 95 mg/dL      Calcium 8 4 mg/dL      Corrected Calcium 9 6 mg/dL      AST 54 U/L      ALT 43 U/L      Alkaline Phosphatase 130 U/L      Total Protein 7 3 g/dL      Albumin 2 5 g/dL      Total Bilirubin 5 11 mg/dL      eGFR 45 ml/min/1 73sq m     Narrative:      [place] guidelines for Chronic Kidney Disease (CKD):   •  Stage 1 with normal or high GFR (GFR > 90 mL/min/1 73 square meters)  •  Stage 2 Mild CKD (GFR = 60-89 mL/min/1 73 square meters)  •  Stage 3A Moderate CKD (GFR = 45-59 mL/min/1 73 square meters)  •  Stage 3B Moderate CKD (GFR = 30-44 mL/min/1 73 square meters)  •  Stage 4 Severe CKD (GFR = 15-29 mL/min/1 73 square meters)  •  Stage 5 End Stage CKD (GFR <15 mL/min/1 73 square meters)  Note: GFR calculation is accurate only with a steady state creatinine    Lipase [[id number]]  (Normal) Collected: 11/05/22 0712    Lab Status: Final result Specimen: Blood from Arm, Right Updated: 11/05/22 0758     Lipase 232 u/L     Magnesium [[id number]]  (Normal) Collected: 11/05/22 0712    Lab Status: Final result Specimen: Blood from Arm, Right Updated: 11/05/22 0758     Magnesium 1 8 mg/dL     Phosphorus [[id number]]  (Normal) Collected: 11/05/22 0712    Lab Status: Final result Specimen: Blood from Arm, Right Updated: 11/05/22 0758     Phosphorus 3 1 mg/dL     NT-BNP PRO [[id number]]  (Abnormal) Collected: 11/05/22 5722    Lab Status: Final result Specimen: Blood from Arm, Right Updated: 11/05/22 0758     NT-proBNP 554 pg/mL     Lactic acid, plasma [[id number]]  (Normal) Collected: 11/05/22 3329    Lab Status: Final result Specimen: Blood from Arm, Right Updated: 11/05/22 0758     LACTIC ACID 1 8 mmol/L     Narrative:      Result may be elevated if tourniquet was used during collection  HS Troponin 0hr (reflex protocol) [[id number]]  (Normal) Collected: 11/05/22 0712    Lab Status: Final result Specimen: Blood from Arm, Right Updated: 11/05/22 0755     hs TnI 0hr 16 ng/L     CBC and differential [[id number]]  (Abnormal) Collected: 11/05/22 0712    Lab Status: Final result Specimen: Blood from Arm, Right Updated: 11/05/22 0735     WBC 7 40 Thousand/uL      RBC 3 07 Million/uL      Hemoglobin 9 0 g/dL      Hematocrit 27 4 %      MCV 89 fL      MCH 29 3 pg      MCHC 32 8 g/dL      RDW 16 0 %      MPV 11 4 fL      Platelets 50 Thousands/uL      nRBC 0 /100 WBCs      Neutrophils Relative 77 %      Immat GRANS % 1 %      Lymphocytes Relative 12 %      Monocytes Relative 7 %      Eosinophils Relative 3 %      Basophils Relative 0 %      Neutrophils Absolute 5 70 Thousands/µL      Immature Grans Absolute 0 04 Thousand/uL      Lymphocytes Absolute 0 90 Thousands/µL      Monocytes Absolute 0 49 Thousand/µL      Eosinophils Absolute 0 25 Thousand/µL      Basophils Absolute 0 02 Thousands/µL     Blood gas, venous [[id number]]  (Abnormal) Collected: 11/05/22 0712    Lab Status: Final result Specimen: Blood from Arm, Right Updated: 11/05/22 0734     pH, Abhinav 7 468     pCO2, Abhinav 34 6 mm Hg      pO2, Abhinav 48 7 mm Hg      HCO3, Abhinav 24 5 mmol/L      Base Excess, Abhinav 1 0 mmol/L      O2 Content, Abhinav 11 7 ml/dL      O2 HGB, VENOUS 82 8 %                  CT abdomen pelvis wo contrast   Final Result by Rosa Irving MD (11/05 1888)      1  Cirrhosis with portal hypertension, large volume abdominopelvic ascites, moderate mesenteric edema, small bilateral pleural effusions and mild body wall edema  2   Findings suggesting cecal and ascending portal colopathy  Workstation performed: [id number]         CT head without contrast   Final Result by Odalys Hoang MD (11/05 5375)      No acute intracranial abnormality  Workstation performed: [id number]         XR chest 1 view portable   Final Result by Elvis De Jesus MD (11/05 8604)      No acute cardiopulmonary disease                    Workstation performed: [id number]                    Procedures  ECG 12 Lead Documentation Only    Date/Time: 11/5/2022 7:56 AM  Performed by: Christiano Garcia MD  Authorized by: Christiano Garcia MD     ECG reviewed by me, the ED Provider: yes    Patient location:  ED  Previous ECG:     Previous ECG:  Unavailable  Interpretation:     Interpretation: normal    Rate:     ECG rate:  99    ECG rate assessment: normal    Rhythm:     Rhythm: sinus rhythm    Ectopy:     Ectopy: none    QRS:     QRS axis:  Normal  Conduction:     Conduction: normal    ST segments:     ST segments:  Normal  T waves:     T waves: normal               ED Course          RESULTS:  Results Reviewed     Procedure Component Value Units Date/Time    HS Troponin I 2hr [[id number]]  (Normal) Collected: 11/05/22 0922    Lab Status: Final result Specimen: Blood from Arm, Right Updated: 11/05/22 0955     hs TnI 2hr 15 ng/L      Delta 2hr hsTnI -1 ng/L     Urine Microscopic [[id number]] Collected: 11/05/22 0920    Lab Status: Final result Specimen: Urine, Straight Cath Updated: 11/05/22 0945     RBC, UA 0-1 /hpf      WBC, UA 1-2 /hpf      Epithelial Cells Occasional /hpf      Bacteria, UA Occasional /hpf      AMORPH URATES Moderate /hpf     UA w Reflex to Microscopic w Reflex to Culture [[id number]]  (Abnormal) Collected: 11/05/22 0920    Lab Status: Final result Specimen: Urine, Straight Cath Updated: 11/05/22 0929     Color, UA Evelyne     Clarity, UA Slightly Cloudy     Specific [place], UA 1 020     pH, UA 6 0     Leukocytes, UA Negative     Nitrite, UA Negative     Protein,  (2+) mg/dl      Glucose, UA Negative mg/dl      Ketones, UA Trace mg/dl      Urobilinogen, UA 0 2 E U /dl      Bilirubin, UA Small     Occult Blood, UA Small    Ammonia [[id number]]  (Abnormal) Collected: 11/05/22 0712    Lab Status: Final result Specimen: Blood from Arm, Right Updated: 11/05/22 0814     Ammonia <10 umol/L     FLU/RSV/COVID - if FLU/RSV clinically relevant [[id number]]  (Normal) Collected: 11/05/22 3249    Lab Status: Final result Specimen: Nares from Nose Updated: 11/05/22 0812     SARS-CoV-2 Negative     INFLUENZA A PCR Negative     INFLUENZA B PCR Negative     RSV PCR Negative    Narrative:      FOR PEDIATRIC PATIENTS - copy/paste COVID Guidelines URL to browser: [email or web address]  [email or web address]    SARS-CoV-2 assay is a Nucleic Acid Amplification assay intended for the  qualitative detection of nucleic acid from SARS-CoV-2 in nasopharyngeal  swabs  Results are for the presumptive identification of SARS-CoV-2 RNA  Positive results are indicative of infection with SARS-CoV-2, the virus  causing COVID-19, but do not rule out bacterial infection or co-infection  with other viruses  Laboratories within the [facility] and its  territories are required to report all positive results to the appropriate  public health authorities  Negative results do not preclude SARS-CoV-2  infection and should not be used as the sole basis for treatment or other  patient management decisions  Negative results must be combined with  clinical observations, patient history, and epidemiological information  This test has not been FDA cleared or approved  This test has been authorized by FDA under an Emergency Use Authorization  (EUA)   This test is only authorized for the duration of time the  declaration that circumstances exist justifying the authorization of the  emergency use of an in vitro diagnostic tests for detection of SARS-CoV-2  virus and/or diagnosis of COVID-19 infection under section 564(b)(1) of  the Act, 21 U  S C  591ZSL-7(I)(2), unless the authorization is terminated  or revoked sooner  The test has been validated but independent review by FDA  and CLIA is pending  Test performed using [employer] Gene[employer]pert: This RT-PCR assay targets N2,  a region unique to SARS-CoV-2  A conserved region in the E-gene was chosen  for pan-Sarbecovirus detection which includes SARS-CoV-2  According to CMS-2020-01-R, this platform meets the definition of high-throughput technology      Comprehensive metabolic panel [[id number]]  (Abnormal) Collected: 11/05/22 0712    Lab Status: Final result Specimen: Blood from Arm, Right Updated: 11/05/22 0758     Sodium 132 mmol/L      Potassium 4 0 mmol/L      Chloride 98 mmol/L      CO2 27 mmol/L      ANION GAP 7 mmol/L      BUN 21 mg/dL      Creatinine 1 50 mg/dL      Glucose 95 mg/dL      Calcium 8 4 mg/dL      Corrected Calcium 9 6 mg/dL      AST 54 U/L      ALT 43 U/L      Alkaline Phosphatase 130 U/L      Total Protein 7 3 g/dL      Albumin 2 5 g/dL      Total Bilirubin 5 11 mg/dL      eGFR 45 ml/min/1 73sq m     Narrative:      [place] guidelines for Chronic Kidney Disease (CKD):   •  Stage 1 with normal or high GFR (GFR > 90 mL/min/1 73 square meters)  •  Stage 2 Mild CKD (GFR = 60-89 mL/min/1 73 square meters)  •  Stage 3A Moderate CKD (GFR = 45-59 mL/min/1 73 square meters)  •  Stage 3B Moderate CKD (GFR = 30-44 mL/min/1 73 square meters)  •  Stage 4 Severe CKD (GFR = 15-29 mL/min/1 73 square meters)  •  Stage 5 End Stage CKD (GFR <15 mL/min/1 73 square meters)  Note: GFR calculation is accurate only with a steady state creatinine    Lipase [[id number]]  (Normal) Collected: 11/05/22 0712    Lab Status: Final result Specimen: Blood from Arm, Right Updated: 11/05/22 0758     Lipase 232 u/L     Magnesium [[id number]]  (Normal) Collected: 11/05/22 0042    Lab Status: Final result Specimen: Blood from Arm, Right Updated: 11/05/22 8028 Magnesium 1 8 mg/dL     Phosphorus [[id number]]  (Normal) Collected: 11/05/22 0712    Lab Status: Final result Specimen: Blood from Arm, Right Updated: 11/05/22 0758     Phosphorus 3 1 mg/dL     NT-BNP PRO [[id number]]  (Abnormal) Collected: 11/05/22 1742    Lab Status: Final result Specimen: Blood from Arm, Right Updated: 11/05/22 0758     NT-proBNP 554 pg/mL     Lactic acid, plasma [[id number]]  (Normal) Collected: 11/05/22 4318    Lab Status: Final result Specimen: Blood from Arm, Right Updated: 11/05/22 0758     LACTIC ACID 1 8 mmol/L     Narrative:      Result may be elevated if tourniquet was used during collection      HS Troponin 0hr (reflex protocol) [[id number]]  (Normal) Collected: 11/05/22 0712    Lab Status: Final result Specimen: Blood from Arm, Right Updated: 11/05/22 0755     hs TnI 0hr 16 ng/L     CBC and differential [[id number]]  (Abnormal) Collected: 11/05/22 0712    Lab Status: Final result Specimen: Blood from Arm, Right Updated: 11/05/22 0735     WBC 7 40 Thousand/uL      RBC 3 07 Million/uL      Hemoglobin 9 0 g/dL      Hematocrit 27 4 %      MCV 89 fL      MCH 29 3 pg      MCHC 32 8 g/dL      RDW 16 0 %      MPV 11 4 fL      Platelets 50 Thousands/uL      nRBC 0 /100 WBCs      Neutrophils Relative 77 %      Immat GRANS % 1 %      Lymphocytes Relative 12 %      Monocytes Relative 7 %      Eosinophils Relative 3 %      Basophils Relative 0 %      Neutrophils Absolute 5 70 Thousands/µL      Immature Grans Absolute 0 04 Thousand/uL      Lymphocytes Absolute 0 90 Thousands/µL      Monocytes Absolute 0 49 Thousand/µL      Eosinophils Absolute 0 25 Thousand/µL      Basophils Absolute 0 02 Thousands/µL     Blood gas, venous [[id number]]  (Abnormal) Collected: 11/05/22 0712    Lab Status: Final result Specimen: Blood from Arm, Right Updated: 11/05/22 0734     pH, Abhinav 7 468     pCO2, Abhinav 34 6 mm Hg      pO2, Abhinav 48 7 mm Hg      HCO3, Abhinav 24 5 mmol/L      Base Excess, Abhinav 1 0 mmol/L      O2 Content, Abhinav 11 7 ml/dL      O2 HGB, VENOUS 82 8 %           CT abdomen pelvis wo contrast   Final Result      1  Cirrhosis with portal hypertension, large volume abdominopelvic ascites, moderate mesenteric edema, small bilateral pleural effusions and mild body wall edema  2   Findings suggesting cecal and ascending portal colopathy  Workstation performed: [id number]         CT head without contrast   Final Result      No acute intracranial abnormality  Workstation performed: [id number]         XR chest 1 view portable   Final Result      No acute cardiopulmonary disease  Workstation performed: [id number]             Vitals:    11/05/22 0644 11/05/22 0649   BP: 131/74    TempSrc: Temporal    Pulse: 96    Resp:  18   Patient Position - Orthostatic VS: Lying    Temp: 97 5 °F (36 4 °C)          MDM  Number of Diagnoses or Management Options  Anemia  Jaundice  Weakness  Diagnosis management comments: 76-year-old male with past medical history pertinent for dementia, cirrhosis, encephalopathy, Verlon Dg, hypertension who presents by EMS to the emergency department from [facility] for increased weakness and disorientation with jaundice noted  Vital signs on arrival here were non concerning  Patient had exam as above  EKG obtained on arrival showed normal sinus rhythm without any acute signs of ischemia or any arrhythmia  Lab work obtained to evaluate for possible causes of concerns by nursing home  Chest x-ray obtained along with CT of head and CT abdomen pelvis as well  Urine obtained  Lab work returned showing hemoglobin at 9 0 which has been increased from previous at 8 3 just a few weeks ago  Lab work otherwise showed BNP at 554, and otherwise unremarkable labs with normal troponin  Viral panel returned showing no viruses  Urine results showed no UTI  Imaging results returned showing baseline cirrhosis and otherwise unremarkable CT head    Patient's facility later stated that patient had diarrhea and they were initially concerned about C diff  Patient however has had no episodes of diarrhea while being in the emergency department for at least 4 hours  Unlikely C diff at this time  Patient appears well at this time and is not confused  States he has no complaints  Advised to follow-up with PCP and returning for worsening symptoms  Amount and/or Complexity of Data Reviewed  Clinical lab tests: ordered and reviewed  Tests in the radiology section of CPT®: ordered and reviewed  Tests in the medicine section of CPT®: ordered and reviewed  Obtain history from someone other than the patient: yes  Review and summarize past medical records: yes  Independent visualization of images, tracings, or specimens: yes    Risk of Complications, Morbidity, and/or Mortality  Presenting problems: moderate  Diagnostic procedures: moderate  Management options: moderate        Disposition  Final diagnoses:   Weakness   Jaundice   Anemia     Time reflects when diagnosis was documented in both MDM as applicable and the Disposition within this note     Time User Action Codes Description Comment    11/5/2022  8:00 AM Taurus ABEBE Add [R53 1] Weakness     11/5/2022  8:00 AM Taurus ABEBE Add [R17] Jaundice     11/5/2022  8:01 AM Lisa Trujillo Add [D64 9] Anemia       ED Disposition     ED Disposition   Discharge    Condition   Stable    Date/Time   Sat Nov 5, 2022 10:37 AM    Comment   Emma Kraft discharge to home/self care  Follow-up Information     Follow up With Specialties Details Why [address], MD Internal Medicine   [address] [facility]  [phone]            Patient's Medications   Discharge Prescriptions    No medications on file       No discharge procedures on file      PDMP Review       Value Time User    PDMP Reviewed  Yes 10/11/2022  3:33 PM Parviz Remy MD          ED Provider  Electronically Signed by           Jess Haley MD  11/05/22 1037

## 2022-11-05 NOTE — DISCHARGE INSTRUCTIONS
Thank you for letting us take care of you  You have been evaluated for weakness and jaundice  Please follow-up as instructed  Please return for worsening symptoms  At this time, you have no clinical evidence of symptoms or problems that will require hospitalization, however you should be evaluated soon by a primary care physician, and contact information has been provided  Follow up with your primary care physician  This is important as many medical conditions can be managed as an outpatient, in addition to routine health screening  Seeing your primary doctor often can help identify changes in the medical issue that brought you to the ED for care today  If you experience any new symptoms or acute worsening of current symptoms, please return to the ED

## 2022-11-06 LAB
ATRIAL RATE: 99 BPM
P AXIS: 63 DEGREES
PR INTERVAL: 172 MS
QRS AXIS: 50 DEGREES
QRSD INTERVAL: 76 MS
QT INTERVAL: 338 MS
QTC INTERVAL: 433 MS
T WAVE AXIS: 69 DEGREES
VENTRICULAR RATE: 99 BPM

## 2022-11-07 ENCOUNTER — TELEPHONE (OUTPATIENT)
Dept: INTERVENTIONAL RADIOLOGY/VASCULAR | Facility: HOSPITAL | Age: 72
End: 2022-11-07

## 2022-11-07 ENCOUNTER — HOSPITAL ENCOUNTER (OUTPATIENT)
Dept: INTERVENTIONAL RADIOLOGY/VASCULAR | Facility: HOSPITAL | Age: 72
Discharge: HOME | End: 2022-11-07
Attending: FAMILY MEDICINE

## 2022-11-07 VITALS
RESPIRATION RATE: 18 BRPM | DIASTOLIC BLOOD PRESSURE: 84 MMHG | SYSTOLIC BLOOD PRESSURE: 133 MMHG | OXYGEN SATURATION: 100 % | HEART RATE: 109 BPM

## 2022-11-07 DIAGNOSIS — R18.8 ASCITES: ICD-10-CM

## 2022-11-07 RX ORDER — LIDOCAINE HYDROCHLORIDE 10 MG/ML
INJECTION, SOLUTION EPIDURAL; INFILTRATION; INTRACAUDAL; PERINEURAL CODE/TRAUMA/SEDATION MEDICATION
Status: COMPLETED | OUTPATIENT
Start: 2022-11-07 | End: 2022-11-07

## 2022-11-07 RX ORDER — ALBUMIN (HUMAN) 12.5 G/50ML
50 SOLUTION INTRAVENOUS ONCE
Status: COMPLETED | OUTPATIENT
Start: 2022-11-07 | End: 2022-11-07

## 2022-11-07 RX ADMIN — LIDOCAINE HYDROCHLORIDE 5 ML: 10 INJECTION, SOLUTION EPIDURAL; INFILTRATION; INTRACAUDAL; PERINEURAL at 11:14

## 2022-11-07 RX ADMIN — ALBUMIN (HUMAN) 50 G: 12.5 SOLUTION INTRAVENOUS at 11:32

## 2022-11-07 NOTE — BRIEF OP NOTE (RAD/CATH)
INTERVENTIONAL RADIOLOGY PROCEDURE NOTE    Date: 11/7/2022    Procedure: IR PARACENTESIS    Preoperative diagnosis:   1  Ascites         Postoperative diagnosis: Same  Surgeon: Bita Trejo     Assistant: None  No qualified resident was available  Blood loss:  None    Specimens:  None     Findings:  Left lower abdominal wall approach paracentesis performed 89550 mL of cloudy yellow fluid aspirated  Complications: None immediate      Anesthesia: local

## 2022-11-07 NOTE — DISCHARGE INSTRUCTIONS
Abdominal Paracentesis     WHAT YOU NEED TO KNOW:   Abdominal paracentesis is a procedure to remove abnormal fluid buildup in your abdomen  Fluid builds up because of liver problems, such as swelling and scarring  Heart failure, kidney disease, a mass, or problems with your pancreas may also cause fluid buildup  DISCHARGE INSTRUCTIONS:     Follow up with your healthcare provider as directed: Write down your questions so you remember to ask them during your visits  Wound care: Remove dressing after 24 hours  Leave glue in place  Return to your normal activities    Contact Interventional Radiology at [phone] Trista PATIENTS: Contact Interventional Radiology at [phone]) Beatrice Zepeda PATIENTS: Contact Interventional Radiology at [phone]) if:  You have a fever and your wound is red and swollen  You have yellow, green, or bad-smelling discharge coming from your wound  You have pain or swelling in your abdomen  You have an upset stomach or you vomit  You have sudden, sharp pain in your abdomen  You urinate very little or not at all  You feel confused and more tired than usual    Your arm or leg feels warm, tender, and painful  It may look swollen and red  You suddenly feel lightheaded and have trouble breathing

## 2022-11-13 ENCOUNTER — HOSPITAL ENCOUNTER (EMERGENCY)
Facility: HOSPITAL | Age: 72
Discharge: ANOTHER HOSPITAL | End: 2022-11-13
Attending: EMERGENCY MEDICINE

## 2022-11-13 ENCOUNTER — APPOINTMENT (EMERGENCY)
Dept: CT IMAGING | Facility: HOSPITAL | Age: 72
End: 2022-11-13

## 2022-11-13 ENCOUNTER — APPOINTMENT (EMERGENCY)
Dept: RADIOLOGY | Facility: HOSPITAL | Age: 72
End: 2022-11-13

## 2022-11-13 VITALS
BODY MASS INDEX: 20.24 KG/M2 | WEIGHT: 157.63 LBS | DIASTOLIC BLOOD PRESSURE: 68 MMHG | SYSTOLIC BLOOD PRESSURE: 107 MMHG | HEART RATE: 101 BPM | OXYGEN SATURATION: 98 % | RESPIRATION RATE: 17 BRPM | TEMPERATURE: 96 F

## 2022-11-13 DIAGNOSIS — E87.20 LACTIC ACIDOSIS: ICD-10-CM

## 2022-11-13 DIAGNOSIS — K76.82 HEPATIC ENCEPHALOPATHY: Primary | ICD-10-CM

## 2022-11-13 LAB
ALBUMIN SERPL BCP-MCNC: 2.3 G/DL (ref 3.5–5)
ALP SERPL-CCNC: 117 U/L (ref 46–116)
ALT SERPL W P-5'-P-CCNC: 36 U/L (ref 12–78)
AMMONIA PLAS-SCNC: 110 UMOL/L (ref 11–35)
ANION GAP SERPL CALCULATED.3IONS-SCNC: 9 MMOL/L (ref 4–13)
AST SERPL W P-5'-P-CCNC: 56 U/L (ref 5–45)
BASOPHILS # BLD AUTO: 0.02 THOUSANDS/ÂΜL (ref 0–0.1)
BASOPHILS NFR BLD AUTO: 0 % (ref 0–1)
BILIRUB SERPL-MCNC: 7.92 MG/DL (ref 0.2–1)
BUN SERPL-MCNC: 33 MG/DL (ref 5–25)
CALCIUM ALBUM COR SERPL-MCNC: 10 MG/DL (ref 8.3–10.1)
CALCIUM SERPL-MCNC: 8.6 MG/DL (ref 8.3–10.1)
CHLORIDE SERPL-SCNC: 101 MMOL/L (ref 96–108)
CO2 SERPL-SCNC: 24 MMOL/L (ref 21–32)
CREAT SERPL-MCNC: 1.94 MG/DL (ref 0.6–1.3)
EOSINOPHIL # BLD AUTO: 0.17 THOUSAND/ÂΜL (ref 0–0.61)
EOSINOPHIL NFR BLD AUTO: 1 % (ref 0–6)
ERYTHROCYTE [DISTWIDTH] IN BLOOD BY AUTOMATED COUNT: 16.8 % (ref 11.6–15.1)
FLUAV RNA RESP QL NAA+PROBE: NEGATIVE
FLUBV RNA RESP QL NAA+PROBE: NEGATIVE
GFR SERPL CREATININE-BSD FRML MDRD: 33 ML/MIN/1.73SQ M
GLUCOSE SERPL-MCNC: 107 MG/DL (ref 65–140)
HCT VFR BLD AUTO: 29.6 % (ref 36.5–49.3)
HGB BLD-MCNC: 10.1 G/DL (ref 12–17)
IMM GRANULOCYTES # BLD AUTO: 0.17 THOUSAND/UL (ref 0–0.2)
IMM GRANULOCYTES NFR BLD AUTO: 1 % (ref 0–2)
INR PPP: 1.73 (ref 0.84–1.19)
LACTATE SERPL-SCNC: 1.7 MMOL/L (ref 0.5–2)
LACTATE SERPL-SCNC: 2.1 MMOL/L (ref 0.5–2)
LIPASE SERPL-CCNC: 291 U/L (ref 73–393)
LYMPHOCYTES # BLD AUTO: 0.89 THOUSANDS/ÂΜL (ref 0.6–4.47)
LYMPHOCYTES NFR BLD AUTO: 7 % (ref 14–44)
MCH RBC QN AUTO: 30.1 PG (ref 26.8–34.3)
MCHC RBC AUTO-ENTMCNC: 34.1 G/DL (ref 31.4–37.4)
MCV RBC AUTO: 88 FL (ref 82–98)
MONOCYTES # BLD AUTO: 0.8 THOUSAND/ÂΜL (ref 0.17–1.22)
MONOCYTES NFR BLD AUTO: 6 % (ref 4–12)
NEUTROPHILS # BLD AUTO: 10.39 THOUSANDS/ÂΜL (ref 1.85–7.62)
NEUTS SEG NFR BLD AUTO: 85 % (ref 43–75)
NRBC BLD AUTO-RTO: 0 /100 WBCS
NT-PROBNP SERPL-MCNC: 278 PG/ML
PLATELET # BLD AUTO: 106 THOUSANDS/UL (ref 149–390)
PMV BLD AUTO: 11.1 FL (ref 8.9–12.7)
POTASSIUM SERPL-SCNC: 4.2 MMOL/L (ref 3.5–5.3)
PROT SERPL-MCNC: 7.6 G/DL (ref 6.4–8.4)
PROTHROMBIN TIME: 20.4 SECONDS (ref 11.6–14.5)
RBC # BLD AUTO: 3.36 MILLION/UL (ref 3.88–5.62)
RSV RNA RESP QL NAA+PROBE: NEGATIVE
SARS-COV-2 RNA RESP QL NAA+PROBE: NEGATIVE
SODIUM SERPL-SCNC: 134 MMOL/L (ref 135–147)
WBC # BLD AUTO: 12.44 THOUSAND/UL (ref 4.31–10.16)

## 2022-11-13 RX ADMIN — SODIUM CHLORIDE 250 ML: 0.9 INJECTION, SOLUTION INTRAVENOUS at 19:45

## 2022-11-13 NOTE — ED PROVIDER NOTES
History  Chief Complaint   Patient presents with   • Abdominal Pain     Pt from home  Per EMS, pt was Dc'd to home from [facility] Monday  Family reports decline in mental status, increased pain beginning Thursday     Patient with history of end-stage liver disease, recently had large paracentesis, has had declining functioning over the past 3-4 days, sent to the emergency room by family for evaluation of the above  Patient has had decreased appetite and decreased oral intake  Family states that he seems weaker than normal   Patient is currently nonverbal and cannot provide a history  History provided by:  EMS personnel  History limited by:  Acuity of condition   used: No    Altered Mental Status  Presenting symptoms: behavior changes, disorientation, lethargy and partial responsiveness    Severity:  Severe  Most recent episode: Today  Episode history:  Single  Duration:  2 days  Timing:  Constant  Progression:  Worsening  Chronicity:  New  Context comment:  History of liver disease  Associated symptoms: abdominal pain        Prior to Admission Medications   Prescriptions Last Dose Informant Patient Reported? Taking? Cholecalciferol 25 MCG (1000 UT) tablet   Yes No   Sig: Take 1 tablet by mouth daily   donepezil (ARICEPT) 10 mg tablet   Yes No   Sig: Take 10 mg by mouth daily at bedtime   furosemide (LASIX) 40 mg tablet   No No   Sig: Take 1 tablet (40 mg total) by mouth daily Do not start before October 12, 2022  lactulose 20 g/30 mL   No No   Sig: Take 45 mL (30 g total) by mouth 4 (four) times a day   midodrine (PROAMATINE) 5 mg tablet   No No   Sig: Take 1 tablet (5 mg total) by mouth 3 (three) times a day before meals Please hold the medication if systolic blood pressure is more than 110 Do not start before October 12, 2022     potassium chloride 10% oral solution   No No   Sig: Take 15 mL (20 mEq total) by mouth daily   rifaximin (XIFAXAN) 550 mg tablet   Yes No   Sig: Take 550 mg by mouth every 12 (twelve) hours   sertraline (ZOLOFT) 50 mg tablet   Yes No   Sig: Take 50 mg by mouth daily   spironolactone (ALDACTONE) 50 mg tablet   No No   Sig: Take 1 tablet (50 mg total) by mouth daily      Facility-Administered Medications: None       Past Medical History:   Diagnosis Date   • Cirrhosis ([facility] [address] )    • Dementia ([facility] )    • Encephalopathy    • Hypertension    • JAFFE (nonalcoholic steatohepatitis)        Past Surgical History:   Procedure Laterality Date   • IR PARACENTESIS  10/10/2022   • IR PARACENTESIS  10/20/2022   • IR PARACENTESIS  11/7/2022   • REPLACEMENT TOTAL KNEE         Family History   Problem Relation Age of Onset   • Arthritis Mother      I have reviewed and agree with the history as documented  E-Cigarette/Vaping   • E-Cigarette Use Never User      E-Cigarette/Vaping Substances     Social History     Tobacco Use   • Smoking status: Former Smoker   • Smokeless tobacco: Never Used   Vaping Use   • Vaping Use: Never used   Substance Use Topics   • Alcohol use: Not Currently     Comment: former social ETOH   • Drug use: Never       Review of Systems   Unable to perform ROS: Mental status change   Gastrointestinal: Positive for abdominal pain  Physical Exam  Physical Exam  Constitutional:       Appearance: He is ill-appearing and toxic-appearing  HENT:      Head: Normocephalic and atraumatic  Eyes:      General: Scleral icterus present  Extraocular Movements: Extraocular movements intact  Cardiovascular:      Rate and Rhythm: Regular rhythm  Tachycardia present  Heart sounds: No murmur heard  Pulmonary:      Effort: Pulmonary effort is normal       Breath sounds: Normal breath sounds  No wheezing, rhonchi or rales  Abdominal:      General: Abdomen is flat  There is no distension  Tenderness: There is no abdominal tenderness  Negative signs include Washington's sign and McBurney's sign  Skin:     General: Skin is warm  Coloration: Skin is jaundiced   Skin is not mottled  Findings: No erythema  Neurological:      General: No focal deficit present  Mental Status: He is disoriented  Motor: No weakness  Vital Signs  ED Triage Vitals   Temperature Pulse Respirations Blood Pressure SpO2   11/13/22 1649 11/13/22 1649 11/13/22 1649 11/13/22 1649 11/13/22 1649   (!) 96 °F (35 6 °C) 105 16 133/77 98 %      Temp Source Heart Rate Source Patient Position - Orthostatic VS BP Location FiO2 (%)   11/13/22 1649 11/13/22 1815 11/13/22 1649 11/13/22 1649 --   Rectal Monitor Lying Right arm       Pain Score       --                  Vitals:    11/13/22 1800 11/13/22 1815 11/13/22 1830 11/13/22 1845   BP: 108/68 113/71 116/73 110/71   Pulse: 102 104 103 104   Patient Position - Orthostatic VS:             Visual Acuity      ED Medications  Medications - No data to display    Diagnostic Studies  Results Reviewed     Procedure Component Value Units Date/Time    Lactic acid, plasma [[id number]]  (Abnormal) Collected: 11/13/22 1707    Lab Status: Final result Specimen: Blood from Arm, Right Updated: 11/13/22 1800     LACTIC ACID 2 1 mmol/L     Narrative:      Result may be elevated if tourniquet was used during collection  Lactic acid 2 Hours [[id number]]     Lab Status: No result Specimen: Blood     COVID19, Influenza A/B, RSV PCR, [facility] [[id number]]  (Normal) Collected: 11/13/22 1707    Lab Status: Final result Specimen: Nares from Nasopharyngeal Swab Updated: 11/13/22 1758     SARS-CoV-2 Negative     INFLUENZA A PCR Negative     INFLUENZA B PCR Negative     RSV PCR Negative    Narrative:      FOR PEDIATRIC PATIENTS - copy/paste COVID Guidelines URL to browser: [email or web address] [email or web address]  [employer]    SARS-CoV-2 assay is a Nucleic Acid Amplification assay intended for the  qualitative detection of nucleic acid from SARS-CoV-2 in nasopharyngeal  swabs  Results are for the presumptive identification of SARS-CoV-2 RNA      Positive results are indicative of infection with SARS-CoV-2, the virus  causing COVID-19, but do not rule out bacterial infection or co-infection  with other viruses  Laboratories within the [place] and its  territories are required to report all positive results to the appropriate  public health authorities  Negative results do not preclude SARS-CoV-2  infection and should not be used as the sole basis for treatment or other  patient management decisions  Negative results must be combined with  clinical observations, patient history, and epidemiological information  This test has not been FDA cleared or approved  This test has been authorized by FDA under an Emergency Use Authorization  (EUA)  This test is only authorized for the duration of time the  declaration that circumstances exist justifying the authorization of the  emergency use of an in vitro diagnostic tests for detection of SARS-CoV-2  virus and/or diagnosis of COVID-19 infection under section 564(b)(1) of  the Act, 21 U  S C  084GED-9(M)(8), unless the authorization is terminated  or revoked sooner  The test has been validated but independent review by FDA  and CLIA is pending  Test performed using [employer] GeneXpert: This RT-PCR assay targets N2,  a region unique to SARS-CoV-2  A conserved region in the E-gene was chosen  for pan-Sarbecovirus detection which includes SARS-CoV-2  According to CMS-2020-01-R, this platform meets the definition of high-throughput technology      Comprehensive metabolic panel [[id number]]  (Abnormal) Collected: 11/13/22 1707    Lab Status: Final result Specimen: Blood from Arm, Right Updated: 11/13/22 1476     Sodium 134 mmol/L      Potassium 4 2 mmol/L      Chloride 101 mmol/L      CO2 24 mmol/L      ANION GAP 9 mmol/L      BUN 33 mg/dL      Creatinine 1 94 mg/dL      Glucose 107 mg/dL      Calcium 8 6 mg/dL      Corrected Calcium 10 0 mg/dL      AST 56 U/L      ALT 36 U/L      Alkaline Phosphatase 117 U/L      Total Protein 7 6 g/dL      Albumin 2 3 g/dL      Total Bilirubin 7 92 mg/dL      eGFR 33 ml/min/1 73sq m     Narrative:      National Kidney Disease Foundation guidelines for Chronic Kidney Disease (CKD):   •  Stage 1 with normal or high GFR (GFR > 90 mL/min/1 73 square meters)  •  Stage 2 Mild CKD (GFR = 60-89 mL/min/1 73 square meters)  •  Stage 3A Moderate CKD (GFR = 45-59 mL/min/1 73 square meters)  •  Stage 3B Moderate CKD (GFR = 30-44 mL/min/1 73 square meters)  •  Stage 4 Severe CKD (GFR = 15-29 mL/min/1 73 square meters)  •  Stage 5 End Stage CKD (GFR <15 mL/min/1 73 square meters)  Note: GFR calculation is accurate only with a steady state creatinine    Lipase [[id number]]  (Normal) Collected: 11/13/22 1707    Lab Status: Final result Specimen: Blood from Arm, Right Updated: 11/13/22 1747     Lipase 291 u/L     NT-BNP PRO [[id number]]  (Abnormal) Collected: 11/13/22 1707    Lab Status: Final result Specimen: Blood from Arm, Right Updated: 11/13/22 1747     NT-proBNP 278 pg/mL     Protime-INR [[id number]]  (Abnormal) Collected: 11/13/22 1707    Lab Status: Final result Specimen: Blood from Arm, Right Updated: 11/13/22 1740     Protime 20 4 seconds      INR 1 73    Ammonia [[id number]]  (Abnormal) Collected: 11/13/22 1707    Lab Status: Final result Specimen: Blood from Arm, Right Updated: 11/13/22 1734     Ammonia 110 umol/L     CBC and differential [[id number]]  (Abnormal) Collected: 11/13/22 1707    Lab Status: Final result Specimen: Blood from Arm, Right Updated: 11/13/22 1731     WBC 12 44 Thousand/uL      RBC 3 36 Million/uL      Hemoglobin 10 1 g/dL      Hematocrit 29 6 %      MCV 88 fL      MCH 30 1 pg      MCHC 34 1 g/dL      RDW 16 8 %      MPV 11 1 fL      Platelets 849 Thousands/uL      nRBC 0 /100 WBCs      Neutrophils Relative 85 %      Immat GRANS % 1 %      Lymphocytes Relative 7 %      Monocytes Relative 6 %      Eosinophils Relative 1 %      Basophils Relative 0 %      Neutrophils Absolute 10 39 Thousands/µL      Immature Grans Absolute 0 17 Thousand/uL      Lymphocytes Absolute 0 89 Thousands/µL      Monocytes Absolute 0 80 Thousand/µL      Eosinophils Absolute 0 17 Thousand/µL      Basophils Absolute 0 02 Thousands/µL     Blood culture #1 [[id number]] Collected: 11/13/22 1707    Lab Status: In process Specimen: Blood from Arm, Left Updated: 11/13/22 1713    Blood culture #2 [[id number]] Collected: 11/13/22 1707    Lab Status: In process Specimen: Blood from Arm, Right Updated: 11/13/22 1713    UA w Reflex to Microscopic w Reflex to Culture [[id number]]     Lab Status: No result Specimen: Urine                  XR chest portable   ED Interpretation by Taylor Gomez MD (11/13 1750)   Mild pulmonary vascular congestion                 Procedures  Procedures         ED Course  ED Course as of 11/13/22 1911   [place] Nov 13, 2022 1814 Signed out to Dr Ayde Colmenares pending possible transfer                               SBIRT 20yo+    Flowsheet Row Most Recent Value   SBIRT (25 yo +)    In order to provide better care to our patients, we are screening all of our patients for alcohol and drug use  Would it be okay to ask you these screening questions?  No Filed at: 11/13/2022 1733                    MDM  Number of Diagnoses or Management Options     Amount and/or Complexity of Data Reviewed  Clinical lab tests: ordered and reviewed  Tests in the radiology section of CPT®: ordered and reviewed  Decide to obtain previous medical records or to obtain history from someone other than the patient: yes  Review and summarize past medical records: yes  Independent visualization of images, tracings, or specimens: yes        Disposition  Final diagnoses:   Hepatic encephalopathy     Time reflects when diagnosis was documented in both MDM as applicable and the Disposition within this note     Time User Action Codes Description Comment    11/13/2022  7:08 PM Kemi Fuentes Add [K76 82] Hepatic encephalopathy       ED Disposition ED Disposition   Transfer to Another [address] [place] [address]    Condition   --    Date/Time   Sun Nov 13, 2022  7:08 PM    Comment   Nikolas Comment should be transferred out to Adán Montelongo  Follow-up Information    None         Patient's Medications   Discharge Prescriptions    No medications on file       No discharge procedures on file      PDMP Review       Value Time User    PDMP Reviewed  Yes 10/11/2022  3:33 PM Jamal Milligan MD          ED Provider  Electronically Signed by           Stephen Khanna MD  11/13/22 6813

## 2022-11-14 NOTE — EMTALA/ACUTE CARE TRANSFER
[address]  [facility] [address]  Dept: [phone]      EMTALA TRANSFER CONSENT    NAME Nurys Muro                                         1950                              MRN [id number]    I have been informed of my rights regarding examination, treatment, and transfer   by Dr Jose Rafael Esposito DO    Benefits:      Risks: Potential for delay in receiving treatment, Potential deterioration of medical condition, Loss of IV, Increased discomfort during transfer, Possible worsening of condition or death during transfer      Consent for Transfer:  I acknowledge that my medical condition has been evaluated and explained to me by the emergency department physician or other qualified medical person and/or my attending physician, who has recommended that I be transferred to the service of  Accepting Physician: Dr Kristin Saenz at [address] [place] [address] Name, [facility] : [id number] [address] [employer]  The above potential benefits of such transfer, the potential risks associated with such transfer, and the probable risks of not being transferred have been explained to me, and I fully understand them  The doctor has explained that, in my case, the benefits of transfer outweigh the risks  I agree to be transferred  I authorize the performance of emergency medical procedures and treatments upon me in both transit and upon arrival at the receiving facility  Additionally, I authorize the release of any and all medical records to the receiving facility and request they be transported with me, if possible  I understand that the safest mode of transportation during a medical emergency is an ambulance and that the Hospital advocates the use of this mode of transport  Risks of traveling to the receiving facility by car, including absence of medical control, life sustaining equipment, such as oxygen, and medical personnel has been explained to me and I fully understand them      ([address]RECT [address] BELOW)  [  ]  I consent to the stated transfer and to be transported by ambulance/helicopter  [  ]  I consent to the stated transfer, but refuse transportation by ambulance and accept full responsibility for my transportation by car  I understand the risks of non-ambulance transfers and I exonerate the Hospital and its staff from any deterioration in my condition that results from this refusal     X___________________________________________    DATE  22  TIME________  Signature of patient or legally responsible individual signing on patient behalf           RELATIONSHIP TO PATIENT_________________________          Provider Certification    NAME Rona Salmeron                                        [facility] 1950                              MRN [id number]    A medical screening exam was performed on the above named patient  Based on the examination:    Condition Necessitating Transfer The primary encounter diagnosis was Hepatic encephalopathy  A diagnosis of Lactic acidosis was also pertinent to this visit      Patient Condition: The patient has been stabilized such that within reasonable medical probability, no material deterioration of the patient condition or the condition of the unborn child(pepe) is likely to result from the transfer    Reason for Transfer: Patient/Family request, Other (Include comment)____________________ [facility])    Transfer Requirements: Facility [address] [place] [address] [employer]   · Space available and qualified personnel available for treatment as acknowledged by    · Agreed to accept transfer and to provide appropriate medical treatment as acknowledged by       Dr Marc Toledo  · Appropriate medical records of the examination and treatment of the patient are provided at the time of transfer   [address] _______  · Transfer will be performed by qualified personnel from [address] [facility]  and appropriate transfer equipment as required, including the use of necessary and appropriate life support measures  Provider Certification: I have examined the patient and explained the following risks and benefits of being transferred/refusing transfer to the patient/family:  General risk, such as traffic hazards, adverse weather conditions, rough terrain or turbulence, possible failure of equipment (including vehicle or aircraft), or consequences of actions of persons outside the control of the transport personnel, Unanticipated needs of medical equipment and personnel during transport, The possibility of a transport vehicle being unavailable, Risk of worsening condition      Based on these reasonable risks and benefits to the patient and/or the unborn child(pepe), and based upon the information available at the time of the patient’s examination, I certify that the medical benefits reasonably to be expected from the provision of appropriate medical treatments at another medical facility outweigh the increasing risks, if any, to the individual’s medical condition, and in the case of labor to the unborn child, from effecting the transfer      X____________________________________________ DATE 11/13/22        TIME_______      ORIGINAL - SEND TO MEDICAL RECORDS   COPY - SEND WITH PATIENT DURING TRANSFER

## 2022-11-14 NOTE — ED NOTES
Report given to EMS and called to Chelsey Shelby at [facility] ED     Jennifer Collins RN  11/13/22 2112

## 2022-11-14 NOTE — ED CARE HANDOFF
Emergency Department Sign Out Note        Sign out and transfer of care from Dr Bal Ybarra  See Separate Emergency Department note  The patient, Cecily Gibbons, was evaluated by the previous provider for AMS  Workup Completed:  Labs Reviewed   CBC AND DIFFERENTIAL - Abnormal       Result Value Ref Range Status    WBC 12 44 (*) 4 31 - 10 16 Thousand/uL Final    RBC 3 36 (*) 3 88 - 5 62 Million/uL Final    Hemoglobin 10 1 (*) 12 0 - 17 0 g/dL Final    Hematocrit 29 6 (*) 36 5 - 49 3 % Final    MCV 88  82 - 98 fL Final    MCH 30 1  26 8 - 34 3 pg Final    MCHC 34 1  31 4 - 37 4 g/dL Final    RDW 16 8 (*) 11 6 - 15 1 % Final    MPV 11 1  8 9 - 12 7 fL Final    Platelets 016 (*) 779 - 390 Thousands/uL Final    nRBC 0  /100 WBCs Final    Neutrophils Relative 85 (*) 43 - 75 % Final    Immat GRANS % 1  0 - 2 % Final    Lymphocytes Relative 7 (*) 14 - 44 % Final    Monocytes Relative 6  4 - 12 % Final    Eosinophils Relative 1  0 - 6 % Final    Basophils Relative 0  0 - 1 % Final    Neutrophils Absolute 10 39 (*) 1 85 - 7 62 Thousands/µL Final    Immature Grans Absolute 0 17  0 00 - 0 20 Thousand/uL Final    Lymphocytes Absolute 0 89  0 60 - 4 47 Thousands/µL Final    Monocytes Absolute 0 80  0 17 - 1 22 Thousand/µL Final    Eosinophils Absolute 0 17  0 00 - 0 61 Thousand/µL Final    Basophils Absolute 0 02  0 00 - 0 10 Thousands/µL Final   COMPREHENSIVE METABOLIC PANEL - Abnormal    Sodium 134 (*) 135 - 147 mmol/L Final    Potassium 4 2  3 5 - 5 3 mmol/L Final    Chloride 101  96 - 108 mmol/L Final    CO2 24  21 - 32 mmol/L Final    ANION GAP 9  4 - 13 mmol/L Final    BUN 33 (*) 5 - 25 mg/dL Final    Creatinine 1 94 (*) 0 60 - 1 30 mg/dL Final    Comment: Specimen Icteric; Results May be Affected    Glucose 107  65 - 140 mg/dL Final    Comment: If the patient is fasting, the ADA then defines impaired fasting glucose as > 100 mg/dL and diabetes as > or equal to 123 mg/dL    Specimen collection should occur prior to Sulfasalazine administration due to the potential for falsely depressed results  Specimen collection should occur prior to Sulfapyridine administration due to the potential for falsely elevated results  Calcium 8 6  8 3 - 10 1 mg/dL Final    Corrected Calcium 10 0  8 3 - 10 1 mg/dL Final    AST 56 (*) 5 - 45 U/L Final    Comment: Specimen collection should occur prior to Sulfasalazine administration due to the potential for falsely depressed results  ALT 36  12 - 78 U/L Final    Comment: Specimen collection should occur prior to Sulfasalazine administration due to the potential for falsely depressed results  Alkaline Phosphatase 117 (*) 46 - 116 U/L Final    Total Protein 7 6  6 4 - 8 4 g/dL Final    Comment: Specimen Icteric; Results May be Affected    Albumin 2 3 (*) 3 5 - 5 0 g/dL Final    Total Bilirubin 7 92 (*) 0 20 - 1 00 mg/dL Final    Comment: Use of this assay is not recommended for patients undergoing treatment with eltrombopag due to the potential for falsely elevated results  eGFR 33  ml/min/1 73sq m Final    Narrative:     [place] guidelines for Chronic Kidney Disease (CKD):   •  Stage 1 with normal or high GFR (GFR > 90 mL/min/1 73 square meters)  •  Stage 2 Mild CKD (GFR = 60-89 mL/min/1 73 square meters)  •  Stage 3A Moderate CKD (GFR = 45-59 mL/min/1 73 square meters)  •  Stage 3B Moderate CKD (GFR = 30-44 mL/min/1 73 square meters)  •  Stage 4 Severe CKD (GFR = 15-29 mL/min/1 73 square meters)  •  Stage 5 End Stage CKD (GFR <15 mL/min/1 73 square meters)  Note: GFR calculation is accurate only with a steady state creatinine   LACTIC ACID, PLASMA - Abnormal    LACTIC ACID 2 1 (*) 0 5 - 2 0 mmol/L Final    Narrative:     Result may be elevated if tourniquet was used during collection     AMMONIA - Abnormal    Ammonia 110 (*) 11 - 35 umol/L Final    Comment: Specimen collection should occur prior to Sulfapyridine administration due to the potential for falsely depressed results  NT-BNP PRO (BRAIN NATRIURETIC PEPTIDE) - Abnormal    NT-proBNP 278 (*) <125 pg/mL Final   PROTIME-INR - Abnormal    Protime 20 4 (*) 11 6 - 14 5 seconds Final    INR 1 73 (*) 0 84 - 1 19 Final   COVID19, INFLUENZA A/B, RSV PCR, [facility] - Normal    SARS-CoV-2 Negative  Negative Final    INFLUENZA A PCR Negative  Negative Final    INFLUENZA B PCR Negative  Negative Final    RSV PCR Negative  Negative Final    Narrative:     FOR PEDIATRIC PATIENTS - copy/paste COVID Guidelines URL to browser: [email or web address]  Fanbasex    SARS-CoV-2 assay is a Nucleic Acid Amplification assay intended for the  qualitative detection of nucleic acid from SARS-CoV-2 in nasopharyngeal  swabs  Results are for the presumptive identification of SARS-CoV-2 RNA  Positive results are indicative of infection with SARS-CoV-2, the virus  causing COVID-19, but do not rule out bacterial infection or co-infection  with other viruses  Laboratories within the [facility] and its  territories are required to report all positive results to the appropriate  public health authorities  Negative results do not preclude SARS-CoV-2  infection and should not be used as the sole basis for treatment or other  patient management decisions  Negative results must be combined with  clinical observations, patient history, and epidemiological information  This test has not been FDA cleared or approved  This test has been authorized by FDA under an Emergency Use Authorization  (EUA)  This test is only authorized for the duration of time the  declaration that circumstances exist justifying the authorization of the  emergency use of an in vitro diagnostic tests for detection of SARS-CoV-2  virus and/or diagnosis of COVID-19 infection under section 564(b)(1) of  the Act, 21 U  S C  110EKG-6(G)(7), unless the authorization is terminated  or revoked sooner   The test has been validated but independent review by FDA  and CLIA is pending  Test performed using [employer] GeneXpert: This RT-PCR assay targets N2,  a region unique to SARS-CoV-2  A conserved region in the E-gene was chosen  for pan-Sarbecovirus detection which includes SARS-CoV-2  According to CMS-2020-01-R, this platform meets the definition of high-throughput technology  LIPASE - Normal    Lipase 291  73 - 393 u/L Final   LACTIC ACID 2 HOUR - Normal    LACTIC ACID 1 7  0 5 - 2 0 mmol/L Final    Narrative:     Result may be elevated if tourniquet was used during collection  BLOOD CULTURE   BLOOD CULTURE   UA W REFLEX TO MICROSCOPIC WITH REFLEX TO CULTURE         ED Course / Workup Pending (followup):        CT head without contrast    Result Date: 11/13/2022  Narrative: CT BRAIN - WITHOUT CONTRAST INDICATION:   Delirium ams  COMPARISON:  11/5/2022 TECHNIQUE:  CT examination of the brain was performed  In addition to axial images, sagittal and coronal 2D reformatted images were created and submitted for interpretation  Radiation dose length product (DLP) for this visit:  879 86 mGy-cm   This examination, like all CT scans performed in the [facility], was performed utilizing techniques to minimize radiation dose exposure, including the use of iterative  reconstruction and automated exposure control  IMAGE QUALITY:  Diagnostic  FINDINGS: PARENCHYMA: Decreased attenuation is noted in periventricular and subcortical white matter demonstrating an appearance that is statistically most likely to represent mild microangiopathic change; this appearance is similar when compared to most recent prior examination  No CT signs of acute infarction  No intracranial mass, mass effect or midline shift  No acute parenchymal hemorrhage  VENTRICLES AND EXTRA-AXIAL SPACES:  Ventricles and extra-axial CSF spaces are prominent commensurate with the degree of volume loss  No hydrocephalus  No acute extra-axial hemorrhage   VISUALIZED ORBITS AND PARANASAL SINUSES:  Unremarkable  CALVARIUM AND EXTRACRANIAL SOFT TISSUES:  Normal      Impression: No acute intracranial abnormality  Workstation performed: [id number]                         ED Course as of 11/13/22 2119   Ana Loss Nov 13, 2022   5994 Care assumed from Dr Raymonde Paget pending arrangements for possible transfer due to capacity reasons verses admission  Family member/power of  was offered options of transfer and had requested transfer to [facility]  1937 Despite the presence of hypotension and/or significantly elevated lactate of = 4 and/or presence of septic shock, resuscitation with 30 ml/kg was withheld at this time because of concern for volume overload    Patient will instead be, or has been, resuscitated with 250 ml of crystalloid fluid  Order for these fluids have been placed and additional resuscitation will be provided based on the clinical condition of the patient  2014 Spoke with Dr Philippe Becker hospitalist on [facility] reviewed case and findings in the emergency department and management thus far  He would like patient transferred from this ED to [facility] ED where the workup can be reviewed and admission determination made there  2018 Spoke with Dr Theo Randhawa [address] [place] [address] ED attending on-call reviewed case and findings in the emergency department and hospitalist request for ED ED transfer he accepts for transfer  Procedures  MDM   I did contact son back and updated him that and and his father was accepted for transfer to [facility] at his request due to hospital capacity here          Disposition  Final diagnoses:   Hepatic encephalopathy   Lactic acidosis     Time reflects when diagnosis was documented in both MDM as applicable and the Disposition within this note     Time User Action Codes Description Comment    11/13/2022  7:08 PM Patric Spangler [K76 82] Hepatic encephalopathy     11/13/2022  7:29 PM Peter Roosevelt Add [E87 20] Lactic acidosis       ED Disposition     ED Disposition   Transfer to Another [address] [facility]    Condition   --    Date/Time   Sun Nov 13, 2022  7:08 PM    Comment   Tayna Celeste should be transferred out to   [address]             MD Documentation    Eri Gilbert Most Recent Value   Patient Condition The patient has been stabilized such that within reasonable medical probability, no material deterioration of the patient condition or the condition of the unborn child(pepe) is likely to result from the transfer   Reason for Transfer Patient/Family request, Other (Include comment)____________________  Derril Childes at capacity]   Risks of Transfer Potential for delay in receiving treatment, Potential deterioration of medical condition, Loss of IV, Increased discomfort during transfer, Possible worsening of condition or death during transfer   Accepting Physician Dr Jose Shelton Name [address] by (Company and Unit #) ALS   Sending MD [facility]   Provider Certification General risk, such as traffic hazards, adverse weather conditions, rough terrain or turbulence, possible failure of equipment (including vehicle or aircraft), or consequences of actions of persons outside the control of the transport personnel, Unanticipated needs of medical equipment and personnel during transport, The possibility of a transport vehicle being unavailable, Risk of worsening condition      RN Documentation    [facility] Patricia Markham [facility] [address] [place] [address] by [employer] and Unit #) ALS      Follow-up Information    None       Discharge Medication List as of 11/13/2022  9:15 PM      CONTINUE these medications which have NOT CHANGED    Details   Cholecalciferol 25 MCG (1000 UT) tablet Take 1 tablet by mouth daily, Starting Mon 11/8/2021, Historical Med      donepezil (ARICEPT) 10 mg tablet Take 10 mg by mouth daily at bedtime, Starting Thu 12/9/2021, Historical Med      furosemide (LASIX) 40 mg tablet Take 1 tablet (40 mg total) by mouth daily Do not start before October 12, 2022 , Starting Wed 10/12/2022, Until Fri 11/11/2022, No Print      lactulose 20 g/30 mL Take 45 mL (30 g total) by mouth 4 (four) times a day, Starting Sun 10/23/2022, Until Tue 11/22/2022, No Print      midodrine (PROAMATINE) 5 mg tablet Take 1 tablet (5 mg total) by mouth 3 (three) times a day before meals Please hold the medication if systolic blood pressure is more than 110 Do not start before October 12, 2022 , Starting Wed 10/12/2022, Until Fri 11/11/2022, No Print      potassium chloride 10% oral solution Take 15 mL (20 mEq total) by mouth daily, Starting Sun 10/23/2022, No Print      rifaximin (XIFAXAN) 550 mg tablet Take 550 mg by mouth every 12 (twelve) hours, Historical Med      sertraline (ZOLOFT) 50 mg tablet Take 50 mg by mouth daily, Historical Med      spironolactone (ALDACTONE) 50 mg tablet Take 1 tablet (50 mg total) by mouth daily, Starting Tue 10/11/2022, Until Thu 11/10/2022, No Print           No discharge procedures on file         ED Provider  Electronically Signed by     Aletha Goode,   11/13/22 Tommy DO  11/13/22 4535

## 2022-11-16 LAB
BACTERIA BLD CULT: ABNORMAL
BACTERIA BLD CULT: ABNORMAL
BLACTX-M ISLT/SPM QL: DETECTED
E COLI DNA BLD POS QL NAA+NON-PROBE: DETECTED
GRAM STN SPEC: ABNORMAL
GRAM STN SPEC: ABNORMAL

## 2022-11-17 NOTE — CASE MANAGEMENT
Case Management Assessment & Discharge Planning Note    Patient name Andrew Raya  Location /-36 MRN [id number]  : 1950 Date 10/20/2022       Current Admission Date: 10/20/2022  Current Admission Diagnosis:Acute hepatic encephalopathy   Patient Active Problem List    Diagnosis Date Noted   • Severe protein-calorie malnutrition ([facility] ) 10/05/2022   • Other ascites 10/04/2022   • NAVEED (acute kidney injury) ([facility] ) 10/04/2022   • Sepsis ([address] ) 10/04/2022   • Closed fracture of one rib of right side 2022   • Weakness generalized 2022   • Chronic anemia 2022   • SIRS (systemic inflammatory response syndrome) ([facility] ) 2022   • Cholelithiasis    • Moderate protein-calorie malnutrition ([facility] [facility] ) 2022   • Thrombocytopenia ([facility] ) 2022   • Dementia with behavioral disturbance    • 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 ([facility] [address] [facility] )    • Acute hepatic encephalopathy    • Hypertension       LOS (days): 0  Geometric Mean LOS (GMLOS) (days): 3 10  Days to GMLOS:2 8     OBJECTIVE:  PATIENT READMITTED TO HOSPITAL  Risk of Unplanned Readmission Score: 29 58     Current admission status: Inpatient  Referral Reason: Other    Preferred Pharmacy:   PATIENT/FAMILY REPORTS NO PREFERRED PHARMACY  No address on file      [address] [place] [address] [id number] Horris Gilford, [address] [place] [id number]  Phone: [phone] Fax: [phone]    Primary Care Provider: Monte Severe, MD    Primary Insurance: MEDICARE  Secondary Insurance: AETALEXIS    ASSESSMENT:  [address] Proxies    There are no active Health Care Proxies on file         Advance Directives  Does patient have a [address]?:  (Son is POA)    Readmission Root Cause  30 Day Readmission: Yes  Who directed you to return to the hospital?: Other (comment) (Pt came from [facility])  During previous admission, was a post-acute recommendation made?: Yes  What post-acute resources were offered?: [facility]  Patient was readmitted due to: Acute hepatic encephalopathy    Patient Information  [facility]  Mental Status: Other (Comment)  Assessment information provided by[de-identified] Son, Other - please comment  Primary Caregiver: Other (Comment) ([facility])  Support Systems: Son  Home entry access options  Select all that apply : No steps to enter home  Type of Current Residence: Other (Comment) (Pt came from [facility])    Activities of Daily Living Prior to Admission  Functional Status: Assistance (Currently at [facility])  Does the patient have a history of Short-Term Rehab?: Yes (Currently at [facility])    Patient Information Continued  Income Source: Pension/prison  Does patient have prescription coverage?: Yes  Food insecurity resource given?: N/A  Does patient receive dialysis treatments?: No  Does patient have a history of substance abuse?: No  Does patient have a history of Mental Health Diagnosis?: No    Means of Transportation  Means of Transport to [facility] Other (Comment) (At [facility])  Was application for public transport provided?: N/A        DISCHARGE DETAILS:    [facility]; Son  Freedom of Choice: Yes (Return to [facility])     Arnulfo5 Antony Hernandez contacted family/caregiver?: Yes  Pt w/ recent d/c to [facility] for [facility], son deciding on bed hold, but [facility] will accept Pt for return  CM will continue to follow for planning  Eye Shield Used: No

## 2022-12-03 PROBLEM — A41.9 SEPSIS (HCC): Status: RESOLVED | Noted: 2022-10-04 | Resolved: 2022-12-03
